# Patient Record
Sex: MALE | Race: WHITE | NOT HISPANIC OR LATINO | Employment: OTHER | URBAN - METROPOLITAN AREA
[De-identification: names, ages, dates, MRNs, and addresses within clinical notes are randomized per-mention and may not be internally consistent; named-entity substitution may affect disease eponyms.]

---

## 2017-06-20 ENCOUNTER — OFFICE VISIT (OUTPATIENT)
Dept: ORTHOPEDIC SURGERY | Facility: CLINIC | Age: 77
End: 2017-06-20

## 2017-06-20 VITALS — WEIGHT: 215 LBS | HEIGHT: 69 IN | BODY MASS INDEX: 31.84 KG/M2 | TEMPERATURE: 98.4 F

## 2017-06-20 DIAGNOSIS — M43.16 SPONDYLOLISTHESIS OF LUMBAR REGION: ICD-10-CM

## 2017-06-20 DIAGNOSIS — M48.061 SPINAL STENOSIS OF LUMBAR REGION: Primary | ICD-10-CM

## 2017-06-20 PROCEDURE — 99204 OFFICE O/P NEW MOD 45 MIN: CPT | Performed by: ORTHOPAEDIC SURGERY

## 2017-06-20 NOTE — PROGRESS NOTES
New patient or new problem visit    Chief Complaint   Patient presents with   • Lumbar Spine - Establish Care       HPI: He complains of moderate aching chronic low back pain which radiates into the right lower extremity.  The leg pain is worse than the back pain.  He does water exercises.  He is tried Naprosyn which helps somewhat but can't take that now as he is on anticoagulants for peripheral arterial disease for which she is undergone 2 surgeries    PFSH: See chart- reviewed    Review of Systems   Constitutional: Negative for fever.   HENT: Negative.    Cardiovascular: Negative for chest pain.   Gastrointestinal: Negative for abdominal pain.   Genitourinary: Positive for dysuria.   Musculoskeletal: Positive for back pain.   Neurological: Positive for weakness and numbness. Negative for headaches.       PE: Constitutional: Vital signs above-noted.  Awake, alert and oriented    Psychiatric: Affect and insight do not appear grossly disturbed.    Pulmonary: Breathing is unlabored, color is good.    Skin: Warm, dry and normal turgor    Cardiac:  pedal pulses intact.  No edema.    Eyesight and hearing appear adequate for examination purposes      Musculoskeletal:  There is mild tenderness to percussion and palpation of the spine. Motion appears untested.  Posture is unremarkable to coronal and sagittal inspection.    The skin about the area is intact.  There is no palpable or visible deformity.  There is no local spasm.       Neurologic:   Reflexes are 2+ and symmetrical in the patellae and absent in the Achilles.   Motor function is undisturbed in quadriceps, EHL, and gastrocnemius      Sensation appears symmetrically intact to light touch   .  In the bilateral lower extremities there is no evidence of atrophy.   Clonus is absent..  Gait appears undisturbed.  SLR test negative      MEDICAL DECISION MAKING    XRAY: Plain film x-rays of the lumbar spine show L4 5 degenerative spondylolisthesis grade 16 mm and  otherwise minimal degenerative change.  No comparison views are available.  MRI scan the lumbar spine shows moderate to severe number spinal stenosis at L4 5 where there is a degenerative spondylolisthesis and no other abnormal levels are noted.  I reviewed the radiologist report with which I agree.    Other: n/a    Impression: L4 5 degenerative spondylolisthesis with spinal stenosis (and history of peripheral arterial disease)    Plan: Lumbar epidural steroid injections.  He will have to stop the Plavix.  I will see him back if he fails to improve.  I suspect he would be a reasonable candidate for a 1 level decompression and fusion if need be.    Answers for HPI/ROS submitted by the patient on 6/18/2017   Back pain  Chronicity: new  Onset: more than 1 month ago  Frequency: 2 to 4 times per day  Progression since onset: unchanged  Pain location: gluteal, sacro-iliac  Pain quality: aching, shooting  Radiates to: right foot, right knee, right thigh  Pain - numeric: 6/10  Pain is: worse during the day  Aggravated by: position, standing  Stiffness is present: all day  bladder incontinence: No  bowel incontinence: No  leg pain: No  paresis: No  paresthesias: No  pelvic pain: Yes  tingling: Yes  weight loss: No  Risk factors: obesity

## 2017-07-13 ENCOUNTER — TRANSCRIBE ORDERS (OUTPATIENT)
Dept: PAIN MEDICINE | Facility: HOSPITAL | Age: 77
End: 2017-07-13

## 2017-07-13 ENCOUNTER — HOSPITAL ENCOUNTER (OUTPATIENT)
Dept: PAIN MEDICINE | Facility: HOSPITAL | Age: 77
Discharge: HOME | End: 2017-07-13
Admitting: ORTHOPAEDIC SURGERY

## 2017-07-13 ENCOUNTER — ANESTHESIA (OUTPATIENT)
Dept: PAIN MEDICINE | Facility: HOSPITAL | Age: 77
End: 2017-07-13

## 2017-07-13 ENCOUNTER — HOSPITAL ENCOUNTER (OUTPATIENT)
Dept: GENERAL RADIOLOGY | Facility: HOSPITAL | Age: 77
Discharge: HOME | End: 2017-07-13

## 2017-07-13 ENCOUNTER — ANESTHESIA EVENT (OUTPATIENT)
Dept: PAIN MEDICINE | Facility: HOSPITAL | Age: 77
End: 2017-07-13

## 2017-07-13 VITALS
DIASTOLIC BLOOD PRESSURE: 67 MMHG | WEIGHT: 207 LBS | TEMPERATURE: 97.9 F | HEART RATE: 66 BPM | RESPIRATION RATE: 16 BRPM | HEIGHT: 69 IN | OXYGEN SATURATION: 96 % | BODY MASS INDEX: 30.66 KG/M2 | SYSTOLIC BLOOD PRESSURE: 116 MMHG

## 2017-07-13 DIAGNOSIS — R52 PAIN: ICD-10-CM

## 2017-07-13 DIAGNOSIS — M48.061 SPINAL STENOSIS OF LUMBAR REGION: Primary | ICD-10-CM

## 2017-07-13 PROCEDURE — 0 IOPAMIDOL 41 % SOLUTION: Performed by: ANESTHESIOLOGY

## 2017-07-13 PROCEDURE — C1755 CATHETER, INTRASPINAL: HCPCS

## 2017-07-13 PROCEDURE — 77003 FLUOROGUIDE FOR SPINE INJECT: CPT

## 2017-07-13 PROCEDURE — 25010000002 METHYLPREDNISOLONE PER 80 MG: Performed by: ANESTHESIOLOGY

## 2017-07-13 RX ORDER — FOLIC ACID 1 MG/1
1 TABLET ORAL DAILY
COMMUNITY

## 2017-07-13 RX ORDER — LIDOCAINE HYDROCHLORIDE 10 MG/ML
1 INJECTION, SOLUTION INFILTRATION; PERINEURAL ONCE
Status: DISCONTINUED | OUTPATIENT
Start: 2017-07-13 | End: 2017-07-14 | Stop reason: HOSPADM

## 2017-07-13 RX ORDER — METHYLPREDNISOLONE ACETATE 80 MG/ML
80 INJECTION, SUSPENSION INTRA-ARTICULAR; INTRALESIONAL; INTRAMUSCULAR; SOFT TISSUE ONCE
Status: COMPLETED | OUTPATIENT
Start: 2017-07-13 | End: 2017-07-13

## 2017-07-13 RX ADMIN — IOPAMIDOL 10 ML: 408 INJECTION, SOLUTION INTRATHECAL at 10:02

## 2017-07-13 RX ADMIN — METHYLPREDNISOLONE ACETATE 80 MG: 80 INJECTION, SUSPENSION INTRA-ARTICULAR; INTRALESIONAL; INTRAMUSCULAR; SOFT TISSUE at 10:02

## 2017-07-13 NOTE — ANESTHESIA PROCEDURE NOTES
PAIN Epidural block    Patient location during procedure: pain clinic  Start Time: 7/13/2017 9:49 AM  Stop Time: 7/13/2017 10:04 AM  Indication:procedure for pain  Performed By  Anesthesiologist: SHA NGUYEN  Preanesthetic Checklist  Completed: patient identified, site marked, surgical consent, pre-op evaluation, timeout performed, risks and benefits discussed and monitors and equipment checked  Additional Notes  Interlaminar epidural was performed under fluoroscopic guidance.    Diagnosis  * Spinal stenosis of lumbar region without neurogenic claudication (M48.06)  * Lumbar radiculopathy (M54.16)  * Spondylolisthesis, lumbar region (M43.16)      Epidural Block Prep:  Pt Position:prone  Sterile Tech:cap, gloves, mask and sterile barrier  Prep:chlorhexidine gluconate and isopropyl alcohol  Monitoring:blood pressure monitoring, continuous pulse oximetry and EKG  Epidural Block Procedure:  Approach:right paramedian  Guidance: fluoroscopy  Location:lumbar  Level:4-5  Needle Type:Tuohy  Needle Gauge:20  Aspiration:negative  Medications:  Depomedrol:80 mg  Preservative Free Saline:3mL  Isovue:2mL  Comments:Epidural spread of contrast.  Post Assessment:  Dressing:occlusive dressing applied  Pt Tolerance:patient tolerated the procedure well with no apparent complications  Complications:no

## 2017-07-13 NOTE — H&P
[facility]    History and Physical    Patient Name: Albert Yadav  :  1940  MRN:  [id number]  Date of Admission: 2017    Subjective     Patient is a 77 y.o. male presents with chief complaint of chronic, intermitent, moderate low back and right lower extremity pain.  Onset of symptoms was gradual starting 4 months ago.  Symptoms are associated/aggravated by standing or walking for more than several minutes. Symptoms improve with ice, lying down and rest. In addition to his pain, he complains of some intermittent numbness and tingling in his right lower extremity which occurs mainly when he is walking.  On a pain scale from 0-10,he rates his pain as a 4. He describes the pain as dull and aching in nature.  He says the pain has adversely affected his ability to exercise.  He denies any weakness in his lower extremity.    MRI scan the lumbar spine shows moderate to severe number spinal stenosis at L4 5 where there is a degenerative spondylolisthesis, per Dr. Pacheco's note.    The following portions of the patients history were reviewed and updated as appropriate: current medications, allergies, past medical history, past surgical history, past family history, past social history and problem list                Objective     Past Medical History:   Past Medical History:   Diagnosis Date   • Hyperlipidemia    • Hypertension    • Peripheral vascular disease    • Rheumatoid arthritis    • Staph infection       [facility] POST SX     Past Surgical History:   Past Surgical History:   Procedure Laterality Date   • ANGIOPLASTY FEMORAL ARTERY Left 2016    Procedure: ULTRA SOUND ACCESS RIGHT FEMORAL ARTERY, AIF BILATERAL RUNOFF, SELECTIVE CATHETERIZATION LEFT FEMORAL ARTERY.;  Surgeon: Errol Michael MD;  Location: [facility] OR ;  Service:    • ARTERIOVENOUS FISTULA/SHUNT SURGERY Left 2016    Procedure: LEFT ILEO-FEMORAL GORTEX GRAFT AND LEFT LEG ARERIOGRAM ;  Surgeon: Errol DIAMOND  "MD Alec;  Location: [facility] 18/19;  Service:    • CHOLECYSTECTOMY     • EYE SURGERY Left     as child   • FRACTURE SURGERY Left     arm   • ILIAC ARTERY STENT     • KNEE SURGERY Left     ORIF   • ORIF ANKLE FRACTURE Left      Family History: History reviewed. No pertinent family history.  Social History:   Social History   Substance Use Topics   • Smoking status: Former Smoker     Quit date: 6/9/1989   • Smokeless tobacco: Never Used   • Alcohol use 4.2 oz/week     7 Shots of liquor per week       Vital Signs Range for the last 24 hours  Temperature: Temp:  [36.6 °C (97.9 °F)] 36.6 °C (97.9 °F)   Temp Source: Temp src: Oral   BP: BP: (144)/(85) 144/85   Pulse: Heart Rate:  [66] 66   Respirations: Resp:  [16] 16   SPO2: SpO2:  [96 %] 96 %   O2 Amount (l/min):     O2 Devices O2 Device: room air   Weight: Weight:  [207 lb (93.9 kg)] 207 lb (93.9 kg)     Flowsheet Rows         First Filed Value    Admission Height  69\" (175.3 cm) Documented at 07/13/2017 0911    Admission Weight  207 lb (93.9 kg) Documented at 07/13/2017 0911          --------------------------------------------------------------------------------    Current Outpatient Prescriptions   Medication Sig Dispense Refill   • atorvastatin (LIPITOR) 40 MG tablet Take 40 mg by mouth Every Evening.     • cetirizine (ZyrTEC) 10 MG tablet Take 10 mg by mouth Every Morning.     • folic acid (FOLVITE) 1 MG tablet Take 1 mg by mouth Daily.     • hydrochlorothiazide (HYDRODIURIL) 50 MG tablet Take 50 mg by mouth Every Morning.     • lisinopril (PRINIVIL,ZESTRIL) 40 MG tablet Take 40 mg by mouth Every Evening.     • melatonin 5 MG tablet tablet Take 5 mg by mouth Every Night.     • methotrexate 2.5 MG tablet Take 2.5 mg by mouth 1 (One) Time Per Week. wednesdays     • Multiple Vitamins-Minerals (CENTRUM SILVER) tablet Take 1 tablet by mouth Every Morning.     • naproxen (NAPROSYN) 250 MG tablet Take 250 mg by mouth 2 (Two) Times a Day As Needed for mild " pain (1-3). HP;DING FOR SX     • traZODone (DESYREL) 50 MG tablet Take 50 mg by mouth Every Night.     • amoxicillin-clavulanate (AUGMENTIN) 875-125 MG per tablet Take 1 tablet by mouth 2 (Two) Times a Day. Indications: Skin and Soft Tissue Infection 8 tablet 0   • aspirin tablet Take 81 mg by mouth Every Evening. INSTRUCTED TO CONTINUE TILL AM SX     • clopidogrel (PLAVIX) 75 MG tablet Take 75 mg by mouth Every Morning. TO STOP 3 DAYS PTS     • ONE TOUCH ULTRA TEST test strip   0   • Sennosides (SENNA LAX PO) Take 2 tablets by mouth 2 (Two) Times a Day.       No current facility-administered medications for this encounter.        --------------------------------------------------------------------------------  Assessment/Plan      Anesthesia Evaluation     Patient summary reviewed and Nursing notes reviewed   NPO Solid Status: > 8 hours       Airway   Mallampati: II  TM distance: >3 FB  Neck ROM: full  no difficulty expected  Dental - normal exam     Pulmonary - normal exam    breath sounds clear to auscultation  (+) a smoker Former,   Cardiovascular - normal exam    Rhythm: regular  Rate: normal    (+) hypertension, PVD (Carotid stenosis, bilateral), hyperlipidemia  (-) angina, orthopnea, PND, BEAULIEU      Neuro/Psych- negative ROS and neuro exam normal  GI/Hepatic/Renal/Endo    (+) obesity,      Musculoskeletal (-) normal exam    (-) straight leg test  Abdominal  - normal exam    Abdomen: soft.  Bowel sounds: normal.   Substance History - negative use     OB/GYN negative ob/gyn ROS         Other   (+) arthritis (Rheumatoid arthritis)         Phys Exam Other:   NECK:  Supple without adenopathy, JVD or bruits.    EXTREMITIES:  There is no clubbing, cyanosis or edema.  Radial pulses are 1+ and equal bilaterally.  Examination of the lumbar spine shows no marked tenderness or discoloration.    SKIN:  Warm and dry.    NEUROLOGICAL EXAM:  Alert and oriented ×3.  Extraocular muscles intact.  Strength is normal and  symmetrical in the lower extremities with respect to dorsal and plantar flexion of the ankles and quadriceps.                           Diagnosis and Plan    Treatment Plan  ASA 3      Procedures: Lumbar Epidural Steroid Injection(LESI), With fluoroscopy,       Anesthetic plan and risks discussed with patient.        Alternative management options include physical therapy, medical management with nonsteroidal anti-inflammatory medications or narcotics, chiropractic manipulation and surgical intervention.    Diagnosis     * Spinal stenosis of lumbar region without neurogenic claudication [M48.06]     * Lumbar radiculopathy [M54.16]     * Spondylolisthesis, lumbar region [M43.16]

## 2017-08-11 ENCOUNTER — APPOINTMENT (OUTPATIENT)
Dept: PAIN MEDICINE | Facility: HOSPITAL | Age: 77
End: 2017-08-11

## 2017-09-07 ENCOUNTER — ANESTHESIA EVENT (OUTPATIENT)
Dept: PAIN MEDICINE | Facility: HOSPITAL | Age: 77
End: 2017-09-07

## 2017-09-07 ENCOUNTER — ANESTHESIA (OUTPATIENT)
Dept: PAIN MEDICINE | Facility: HOSPITAL | Age: 77
End: 2017-09-07

## 2017-09-07 ENCOUNTER — HOSPITAL ENCOUNTER (OUTPATIENT)
Dept: GENERAL RADIOLOGY | Facility: HOSPITAL | Age: 77
Discharge: HOME | End: 2017-09-07

## 2017-09-07 ENCOUNTER — HOSPITAL ENCOUNTER (OUTPATIENT)
Dept: PAIN MEDICINE | Facility: HOSPITAL | Age: 77
Discharge: HOME | End: 2017-09-07
Attending: ORTHOPAEDIC SURGERY | Admitting: ORTHOPAEDIC SURGERY

## 2017-09-07 VITALS
WEIGHT: 209 LBS | OXYGEN SATURATION: 97 % | DIASTOLIC BLOOD PRESSURE: 70 MMHG | HEIGHT: 69 IN | RESPIRATION RATE: 16 BRPM | BODY MASS INDEX: 30.96 KG/M2 | SYSTOLIC BLOOD PRESSURE: 131 MMHG | HEART RATE: 70 BPM

## 2017-09-07 DIAGNOSIS — R52 PAIN: ICD-10-CM

## 2017-09-07 LAB — GLUCOSE BLDC GLUCOMTR-MCNC: 126 MG/DL (ref 70–130)

## 2017-09-07 PROCEDURE — 0 IOPAMIDOL 41 % SOLUTION: Performed by: ANESTHESIOLOGY

## 2017-09-07 PROCEDURE — C1755 CATHETER, INTRASPINAL: HCPCS

## 2017-09-07 PROCEDURE — 77003 FLUOROGUIDE FOR SPINE INJECT: CPT

## 2017-09-07 PROCEDURE — 25010000002 METHYLPREDNISOLONE PER 80 MG: Performed by: ANESTHESIOLOGY

## 2017-09-07 PROCEDURE — 82962 GLUCOSE BLOOD TEST: CPT

## 2017-09-07 RX ORDER — METHYLPREDNISOLONE ACETATE 80 MG/ML
80 INJECTION, SUSPENSION INTRA-ARTICULAR; INTRALESIONAL; INTRAMUSCULAR; SOFT TISSUE ONCE
Status: COMPLETED | OUTPATIENT
Start: 2017-09-07 | End: 2017-09-07

## 2017-09-07 RX ORDER — LIDOCAINE HYDROCHLORIDE 10 MG/ML
1 INJECTION, SOLUTION INFILTRATION; PERINEURAL ONCE AS NEEDED
Status: DISCONTINUED | OUTPATIENT
Start: 2017-09-07 | End: 2017-09-08 | Stop reason: HOSPADM

## 2017-09-07 RX ADMIN — IOPAMIDOL 10 ML: 408 INJECTION, SOLUTION INTRATHECAL at 08:04

## 2017-09-07 RX ADMIN — METHYLPREDNISOLONE ACETATE 80 MG: 80 INJECTION, SUSPENSION INTRA-ARTICULAR; INTRALESIONAL; INTRAMUSCULAR; SOFT TISSUE at 08:04

## 2017-09-07 NOTE — ANESTHESIA PROCEDURE NOTES
PAIN Epidural block    Patient location during procedure: pain clinic  Start Time: 9/7/2017 7:50 AM  Stop Time: 9/7/2017 8:06 AM  Indication:procedure for pain  Performed By  Anesthesiologist: SHA NGUYEN  Preanesthetic Checklist  Completed: patient identified, site marked, surgical consent, pre-op evaluation, timeout performed, risks and benefits discussed and monitors and equipment checked  Additional Notes  Interlaminar epidural was performed under fluoroscopic guidance.    Diagnosis     * Spondylolisthesis, lumbar region (M43.16)     * Spinal stenosis, lumbar region, without neurogenic claudication (M48.06)     * Lumbar radiculopathy (M54.16)  Prep:  Pt Position:prone  Sterile Tech:cap, gloves, mask and sterile barrier  Prep:chlorhexidine gluconate and isopropyl alcohol  Monitoring:blood pressure monitoring, continuous pulse oximetry and EKG  Procedure:  Approach:right paramedian  Guidance: fluoroscopy  Location:lumbar  Level:4-5  Needle Type:Tuohy  Needle Gauge:20  Aspiration:negative  Medications:  Depomedrol:80 mg  Preservative Free Saline:3mL  Isovue:2mL  Comments:Epidural spread of contrast.  Post Assessment:  Dressing:occlusive dressing applied  Pt Tolerance:patient tolerated the procedure well with no apparent complications  Complications:no

## 2017-09-07 NOTE — H&P
[facility]    History and Physical    Patient Name: Albert Yadav  :  1940  MRN:  [id number]  Date of Admission: 2017    Subjective     Patient is a 77 y.o. male presents with chief complaint of chronic, intermitent, moderate low back and right lower extremity pain.  Onset of symptoms was gradual starting 6 months ago.  Symptoms are associated/aggravated by standing or walking for more than several minutes. Symptoms improve with ice, lying down and rest. In addition to his pain, he complains of some intermittent numbness and tingling in his right lower extremity which occurs mainly when he is walking.  On a pain scale from 0-10,he rates his pain as a 4. He describes the pain as dull and aching in nature.  He says the pain has adversely affected his ability to exercise.  He denies any weakness in his lower extremity.  He reports approximate 90% relief following his last lumbar epidural steroid injection.  His pain has gradually returned.  His pain level today is a 3/10.     MRI scan the lumbar spine shows moderate to severe number spinal stenosis at L4 5 where there is a degenerative spondylolisthesis, per Dr. Pacheco's note.    The following portions of the patients history were reviewed and updated as appropriate: current medications, allergies, past medical history, past surgical history, past family history, past social history and problem list                Objective     Past Medical History:   Past Medical History:   Diagnosis Date   • Hyperlipidemia    • Hypertension    • Peripheral vascular disease    • Rheumatoid arthritis    • Staph infection       [facility] POST SX     Past Surgical History:   Past Surgical History:   Procedure Laterality Date   • ANGIOPLASTY FEMORAL ARTERY Left 2016    Procedure: ULTRA SOUND ACCESS RIGHT FEMORAL ARTERY, AIF BILATERAL RUNOFF, SELECTIVE CATHETERIZATION LEFT FEMORAL ARTERY.;  Surgeon: Errol Michael MD;  Location: [facility] OR ;  Service:  "   • ARTERIOVENOUS FISTULA/SHUNT SURGERY Left 12/13/2016    Procedure: LEFT ILEO-FEMORAL GORTEX GRAFT AND LEFT LEG ARERIOGRAM ;  Surgeon: Errol Michael MD;  Location: [facility] 18/19;  Service:    • CHOLECYSTECTOMY     • EYE SURGERY Left     as child   • FRACTURE SURGERY Left     arm   • ILIAC ARTERY STENT     • KNEE SURGERY Left     ORIF   • ORIF ANKLE FRACTURE Left      Family History: History reviewed. No pertinent family history.  Social History:   Social History   Substance Use Topics   • Smoking status: Former Smoker     Quit date: 6/9/1989   • Smokeless tobacco: Never Used   • Alcohol use 4.2 oz/week     7 Shots of liquor per week       Vital Signs Range for the last 24 hours  Temperature:     Temp Source:     BP: BP: (138)/(61) 138/61   Pulse: Heart Rate:  [65] 65   Respirations: Resp:  [16] 16   SPO2: SpO2:  [96 %] 96 %   O2 Amount (l/min):     O2 Devices O2 Device: room air   Weight: Weight:  [209 lb (94.8 kg)] 209 lb (94.8 kg)     Flowsheet Rows         First Filed Value    Admission Height  69\" (175.3 cm) Documented at 09/07/2017 0732    Admission Weight  209 lb (94.8 kg) Documented at 09/07/2017 0732          --------------------------------------------------------------------------------    Current Outpatient Prescriptions   Medication Sig Dispense Refill   • atorvastatin (LIPITOR) 40 MG tablet Take 40 mg by mouth Every Evening.     • cetirizine (ZyrTEC) 10 MG tablet Take 10 mg by mouth Every Morning.     • folic acid (FOLVITE) 1 MG tablet Take 1 mg by mouth Daily.     • hydrochlorothiazide (HYDRODIURIL) 50 MG tablet Take 50 mg by mouth Every Morning.     • lisinopril (PRINIVIL,ZESTRIL) 40 MG tablet Take 40 mg by mouth Every Evening.     • melatonin 5 MG tablet tablet Take 5 mg by mouth Every Night.     • methotrexate 2.5 MG tablet Take 2.5 mg by mouth 1 (One) Time Per Week. wednesdays     • Multiple Vitamins-Minerals (CENTRUM SILVER) tablet Take 1 tablet by mouth Every Morning.     • " naproxen (NAPROSYN) 250 MG tablet Take 250 mg by mouth 2 (Two) Times a Day As Needed for mild pain (1-3). HP;DING FOR SX     • ONE TOUCH ULTRA TEST test strip   0   • Sennosides (SENNA LAX PO) Take 2 tablets by mouth 2 (Two) Times a Day.     • traZODone (DESYREL) 50 MG tablet Take 50 mg by mouth Every Night.     • aspirin tablet Take 81 mg by mouth Every Evening. INSTRUCTED TO CONTINUE TILL AM SX     • clopidogrel (PLAVIX) 75 MG tablet Take 75 mg by mouth Every Morning. TO STOP 3 DAYS PTS       Current Facility-Administered Medications   Medication Dose Route Frequency Provider Last Rate Last Dose   • iopamidol (ISOVUE-M 200) injection 41%  12 mL Epidural Once PRN Dyllan Lugo MD       • lidocaine (XYLOCAINE) 1 % injection 1 mL  1 mL Intradermal Once PRN Dyllan Lugo MD       • methylPREDNISolone acetate (DEPO-medrol) injection 80 mg  80 mg Intra-articular Once Dyllan Lugo MD           --------------------------------------------------------------------------------  Assessment/Plan      Anesthesia Evaluation     Patient summary reviewed and Nursing notes reviewed   NPO Solid Status: > 8 hours       Airway   Mallampati: II  TM distance: >3 FB  Neck ROM: full  no difficulty expected  Dental - normal exam     Pulmonary - negative pulmonary ROS and normal exam    breath sounds clear to auscultation  Cardiovascular - normal exam    Rhythm: regular  Rate: normal    (+) hypertension, PVD (Carotid stenosis, bilateral), hyperlipidemia  (-) angina, orthopnea, PND, BEAULIEU      Neuro/Psych- negative ROS  GI/Hepatic/Renal/Endo    (+) obesity,      Musculoskeletal     Abdominal  - normal exam    Abdomen: soft.  Bowel sounds: normal.   Substance History - negative use     OB/GYN negative ob/gyn ROS         Other   (+) arthritis                                Diagnosis and Plan    Treatment Plan  ASA 3      Procedures: Lumbar Epidural Steroid Injection(LESI), With fluoroscopy,       Anesthetic plan and risks discussed  with patient.          Diagnosis     * Spondylolisthesis, lumbar region [M43.16]     * Spinal stenosis, lumbar region, without neurogenic claudication [M48.06]     * Lumbar radiculopathy [M54.16]

## 2017-11-06 ENCOUNTER — TRANSCRIBE ORDERS (OUTPATIENT)
Dept: PAIN MEDICINE | Facility: HOSPITAL | Age: 77
End: 2017-11-06

## 2017-11-06 DIAGNOSIS — M48.062 SPINAL STENOSIS OF LUMBAR REGION WITH NEUROGENIC CLAUDICATION: Primary | ICD-10-CM

## 2017-11-16 ENCOUNTER — HOSPITAL ENCOUNTER (OUTPATIENT)
Dept: GENERAL RADIOLOGY | Facility: HOSPITAL | Age: 77
Discharge: HOME | End: 2017-11-16

## 2017-11-16 ENCOUNTER — HOSPITAL ENCOUNTER (OUTPATIENT)
Dept: PAIN MEDICINE | Facility: HOSPITAL | Age: 77
Discharge: HOME | End: 2017-11-16
Admitting: ORTHOPAEDIC SURGERY

## 2017-11-16 ENCOUNTER — ANESTHESIA (OUTPATIENT)
Dept: PAIN MEDICINE | Facility: HOSPITAL | Age: 77
End: 2017-11-16

## 2017-11-16 ENCOUNTER — ANESTHESIA EVENT (OUTPATIENT)
Dept: PAIN MEDICINE | Facility: HOSPITAL | Age: 77
End: 2017-11-16

## 2017-11-16 VITALS
HEART RATE: 72 BPM | OXYGEN SATURATION: 94 % | SYSTOLIC BLOOD PRESSURE: 116 MMHG | DIASTOLIC BLOOD PRESSURE: 70 MMHG | RESPIRATION RATE: 16 BRPM

## 2017-11-16 DIAGNOSIS — R52 PAIN: ICD-10-CM

## 2017-11-16 DIAGNOSIS — M48.062 SPINAL STENOSIS OF LUMBAR REGION WITH NEUROGENIC CLAUDICATION: ICD-10-CM

## 2017-11-16 PROCEDURE — 0 IOPAMIDOL 41 % SOLUTION: Performed by: ANESTHESIOLOGY

## 2017-11-16 PROCEDURE — 25010000002 METHYLPREDNISOLONE PER 80 MG: Performed by: ANESTHESIOLOGY

## 2017-11-16 PROCEDURE — C1755 CATHETER, INTRASPINAL: HCPCS

## 2017-11-16 PROCEDURE — 77003 FLUOROGUIDE FOR SPINE INJECT: CPT

## 2017-11-16 RX ORDER — LIDOCAINE HYDROCHLORIDE 10 MG/ML
1 INJECTION, SOLUTION INFILTRATION; PERINEURAL ONCE AS NEEDED
Status: DISCONTINUED | OUTPATIENT
Start: 2017-11-16 | End: 2017-11-17 | Stop reason: HOSPADM

## 2017-11-16 RX ORDER — METHYLPREDNISOLONE ACETATE 80 MG/ML
80 INJECTION, SUSPENSION INTRA-ARTICULAR; INTRALESIONAL; INTRAMUSCULAR; SOFT TISSUE ONCE
Status: COMPLETED | OUTPATIENT
Start: 2017-11-16 | End: 2017-11-16

## 2017-11-16 RX ORDER — SODIUM CHLORIDE 0.9 % (FLUSH) 0.9 %
1-10 SYRINGE (ML) INJECTION AS NEEDED
Status: DISCONTINUED | OUTPATIENT
Start: 2017-11-16 | End: 2017-11-17 | Stop reason: HOSPADM

## 2017-11-16 RX ORDER — MIDAZOLAM HYDROCHLORIDE 1 MG/ML
1 INJECTION INTRAMUSCULAR; INTRAVENOUS AS NEEDED
Status: DISCONTINUED | OUTPATIENT
Start: 2017-11-16 | End: 2017-11-17 | Stop reason: HOSPADM

## 2017-11-16 RX ADMIN — IOPAMIDOL 10 ML: 408 INJECTION, SOLUTION INTRATHECAL at 08:10

## 2017-11-16 RX ADMIN — METHYLPREDNISOLONE ACETATE 80 MG: 80 INJECTION, SUSPENSION INTRA-ARTICULAR; INTRALESIONAL; INTRAMUSCULAR; SOFT TISSUE at 08:10

## 2017-11-16 NOTE — H&P
[facility]    History and Physical    Patient Name: Albert Yadav  :  1940  MRN:  [id number]  Date of Admission: 2017    Subjective     Patient is a 77 y.o. male presents with chief complaint of chronic low back, hips: bilateral, buttock and knee: bilateral pain.  Onset of symptoms was gradual starting several years ago.  Symptoms are associated/aggravated by nothing in particular or activity. Symptoms improve with injection  He reports pain in his low back which is more of an aggravation in severe pain.  The symptoms of numbness and tingling radiating down his right leg intermittently, they seem to be exacerbated with activity.  He feels like at times it does progress to jovany weakness any does report having fallen as a result of this in the past.  He does occasionally have some symptoms to go into his left leg as well but the symptoms are more the right than the left denies any pain with Valsalva or bowel or bladder incontinence.    He's had 2 previous epidural steroid injections.  One was very helpful, the second one was less helpful.  His MRI report which shows some spinal stenosis especially at L4 5 and spondylolisthesis.  The following portions of the patients history were reviewed and updated as appropriate: current medications, allergies, past medical history, past surgical history, past family history, past social history and problem list                Objective     Past Medical History:   Past Medical History:   Diagnosis Date   • Hyperlipidemia    • Hypertension    • Peripheral vascular disease    • Rheumatoid arthritis    • Staph infection     2015  [facility] POST SX     Past Surgical History:   Past Surgical History:   Procedure Laterality Date   • ANGIOPLASTY FEMORAL ARTERY Left 2016    Procedure: ULTRA SOUND ACCESS RIGHT FEMORAL ARTERY, AIF BILATERAL RUNOFF, SELECTIVE CATHETERIZATION LEFT FEMORAL ARTERY.;  Surgeon: Errol Michael MD;  Location: [facility] OR ;   Service:    • ARTERIOVENOUS FISTULA/SHUNT SURGERY Left 12/13/2016    Procedure: LEFT ILEO-FEMORAL GORTEX GRAFT AND LEFT LEG ARERIOGRAM ;  Surgeon: Errol Michael MD;  Location: [facility] OR 18/19;  Service:    • CHOLECYSTECTOMY     • EYE SURGERY Left     as child   • FRACTURE SURGERY Left     arm   • ILIAC ARTERY STENT     • KNEE SURGERY Left     ORIF   • ORIF ANKLE FRACTURE Left      Family History: History reviewed. No pertinent family history.  Social History:   Social History   Substance Use Topics   • Smoking status: Former Smoker     Quit date: 6/9/1989   • Smokeless tobacco: Never Used   • Alcohol use 4.2 oz/week     7 Shots of liquor per week       Vital Signs Range for the last 24 hours  Temperature:     Temp Source:     BP:     Pulse:     Respirations:     SPO2:     O2 Amount (l/min):     O2 Devices     Weight:           --------------------------------------------------------------------------------    Current Outpatient Prescriptions   Medication Sig Dispense Refill   • aspirin tablet Take 81 mg by mouth Every Evening. INSTRUCTED TO CONTINUE TILL AM SX     • atorvastatin (LIPITOR) 40 MG tablet Take 40 mg by mouth Every Evening.     • cetirizine (ZyrTEC) 10 MG tablet Take 10 mg by mouth Every Morning.     • clopidogrel (PLAVIX) 75 MG tablet Take 75 mg by mouth Every Morning. TO STOP 3 DAYS PTS     • folic acid (FOLVITE) 1 MG tablet Take 1 mg by mouth Daily.     • hydrochlorothiazide (HYDRODIURIL) 50 MG tablet Take 50 mg by mouth Every Morning.     • lisinopril (PRINIVIL,ZESTRIL) 40 MG tablet Take 40 mg by mouth Every Evening.     • melatonin 5 MG tablet tablet Take 5 mg by mouth Every Night.     • methotrexate 2.5 MG tablet Take 2.5 mg by mouth 1 (One) Time Per Week. wednesdays     • Multiple Vitamins-Minerals (CENTRUM SILVER) tablet Take 1 tablet by mouth Every Morning.     • naproxen (NAPROSYN) 250 MG tablet Take 250 mg by mouth 2 (Two) Times a Day As Needed for mild pain (1-3). HP;DING FOR SX      • ONE TOUCH ULTRA TEST test strip   0   • Sennosides (SENNA LAX PO) Take 2 tablets by mouth 2 (Two) Times a Day.     • traZODone (DESYREL) 50 MG tablet Take 50 mg by mouth Every Night.       Current Facility-Administered Medications   Medication Dose Route Frequency Provider Last Rate Last Dose   • iopamidol (ISOVUE-M 200) injection 41%  12 mL Epidural Once PRN Boyd Arriola MD       • lidocaine (XYLOCAINE) 1 % injection 1 mL  1 mL Intradermal Once PRN Boyd Arriola MD       • methylPREDNISolone acetate (DEPO-medrol) injection 80 mg  80 mg Intra-articular Once Boyd Arriola MD       • midazolam (VERSED) injection 1 mg  1 mg Intravenous PRN Boyd Arriola MD       • sodium chloride 0.9 % flush 1-10 mL  1-10 mL Intravenous PRN Boyd Arriola MD           --------------------------------------------------------------------------------  Assessment/Plan      Anesthesia Evaluation     no history of anesthetic complications:         Airway   Mallampati: II  TM distance: >3 FB  no difficulty expected  Dental      Pulmonary - normal exam   (-) wheezes  Cardiovascular     ECG reviewed  Rhythm: regular    (+) hypertension, PVD, hyperlipidemia  (-) murmur    ROS comment: ECG- IVCD  PE comment: Dorsal pedal pulses were palpable.  Lower extremity's were warm without edema.    Neuro/Psych  (-) normal sensory deficit    PE Comment: There is no obvious numbness or tingling in the lower extremities.  Patellar tendon reflexes were equal bilaterally.  GI/Hepatic/Renal/Endo      Musculoskeletal   normal range of motion    (+) back pain,   (-) straight leg test      PE comment: I was unable to appreciate any jovany weakness in his quadriceps group at this visit.  He does say this weakness is intermittent.  Abdominal    Substance History      OB/GYN          Other   (+) arthritis                                Diagnosis and Plan    Treatment Plan  ASA 2      Procedures: Lumbar Epidural Steroid Injection(LESI), With  fluoroscopy,       Anesthetic plan and risks discussed with patient.          Diagnosis     * Lumbar neuritis [M54.16]     * Lumbar spinal stenosis [M48.061]     * Spondylolisthesis, lumbar region [M43.16]     * Lumbago [M54.5]

## 2017-11-16 NOTE — ANESTHESIA PROCEDURE NOTES
PAIN Epidural block    Patient location during procedure: pain clinic  Start Time: 11/16/2017 8:03 AM  Stop Time: 11/16/2017 8:12 AM  Indication:at surgeon's request and procedure for pain  Performed By  Anesthesiologist: RENDER, ARTURO RAY  Preanesthetic Checklist  Completed: patient identified, site marked, surgical consent, pre-op evaluation, timeout performed, risks and benefits discussed and monitors and equipment checked  Additional Notes  Post-Op Diagnosis Codes:     * Lumbar neuritis (M54.16)     * Lumbar spinal stenosis (M48.061)     * Spondylolisthesis, lumbar region (M43.16)     * Lumbago (M54.5)    Prep:  Pt Position:prone  Sterile Tech:sterile barrier, mask and gloves  Prep:chlorhexidine gluconate and isopropyl alcohol  Monitoring:blood pressure monitoring, continuous pulse oximetry and EKG  Procedure:  Sedation: no   Approach:right paramedian  Guidance: fluoroscopy  Location:lumbar  Level:4-5  Needle Gauge:20 G  Aspiration:negative  Test Dose:negative  Medications:  Depomedrol:80 mg  Isovue:2mL  Comments:Lumbar epidural steroid injections performed using a right paramedian approach at the L4 5 level.  There is a good loss resistance with mild exacerbation of his symptoms upon entering the epidural space.  Epidurograms performed with 2 mL of Isovue, there was mild exacerbation of the pain, contrast was noted to spread primarily caudally on the right.  80 mg of Depo-Medrol were then slowly injected without exacerbation of his symptoms.  The needle was withdrawn.  Post Assessment:  Pt Tolerance:patient tolerated the procedure well with no apparent complications  Complications:no

## 2017-11-17 ENCOUNTER — OFFICE VISIT (OUTPATIENT)
Dept: NEUROSURGERY | Facility: CLINIC | Age: 77
End: 2017-11-17

## 2017-11-17 ENCOUNTER — HOSPITAL ENCOUNTER (OUTPATIENT)
Dept: GENERAL RADIOLOGY | Facility: HOSPITAL | Age: 77
Discharge: HOME | End: 2017-11-17
Attending: NEUROLOGICAL SURGERY | Admitting: NEUROLOGICAL SURGERY

## 2017-11-17 VITALS
RESPIRATION RATE: 18 BRPM | HEIGHT: 68 IN | HEART RATE: 80 BPM | BODY MASS INDEX: 32.28 KG/M2 | WEIGHT: 213 LBS | SYSTOLIC BLOOD PRESSURE: 108 MMHG | DIASTOLIC BLOOD PRESSURE: 60 MMHG

## 2017-11-17 DIAGNOSIS — M48.062 SPINAL STENOSIS, LUMBAR REGION, WITH NEUROGENIC CLAUDICATION: Primary | ICD-10-CM

## 2017-11-17 DIAGNOSIS — M48.062 SPINAL STENOSIS, LUMBAR REGION, WITH NEUROGENIC CLAUDICATION: ICD-10-CM

## 2017-11-17 PROCEDURE — 99203 OFFICE O/P NEW LOW 30 MIN: CPT | Performed by: NEUROLOGICAL SURGERY

## 2017-11-17 PROCEDURE — 72114 X-RAY EXAM L-S SPINE BENDING: CPT

## 2017-11-17 NOTE — PROGRESS NOTES
Subjective   Patient ID: Albert Yadav is a 77 y.o. male is being seen for consultation today at the request of Carlos Shields MD for back pain. Patient presents unaccompanied.     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 He has had several NATALYA's (last was yesterday).  He feels the NATALYA's are efficacious to some extent.  He reports his RLE is much worse than his LLE in terms of paroxysmal numbness and weakness.  He has mild back discomfort for years but his  RLE leg is really his worst issue.  He reports 2 falls referable to these paroxysms of numbness and leg dysfunction.  These are related to standing.  These occur at least daily.  No b/b issues.  No perineal complaints.  In general his leg strength is good.  He walks daily.  Leaning over a shopping cart relieves his complaints within a minute.  He trialed gabapentin but this was ineffective.  He has rheumatoid arthritis and does take MTX for this.  He trialed PT with limited efficacy as well.       The following portions of the patient's history were reviewed and updated as appropriate: allergies, current medications, past family history, past medical history, past social history, past surgical history and problem list.    Review of Systems   Constitutional: Negative for chills and fever.   HENT: Negative for tinnitus.    Eyes: Negative for visual disturbance.   Respiratory: Positive for cough (due to cold). Negative for shortness of breath and wheezing.    Cardiovascular: Negative for chest pain and palpitations.   Gastrointestinal: Negative for abdominal pain, nausea and vomiting.   Genitourinary: Negative for difficulty urinating and enuresis.   Musculoskeletal: Positive for back pain.   Skin: Negative for rash.   Neurological: Positive for numbness (RLE with standing, relieved with recent epidural). Negative for weakness.   Psychiatric/Behavioral: Negative for sleep disturbance.       Objective   Physical Exam   Constitutional: He  is oriented to person, place, and time.   Neurological: He is oriented to person, place, and time.   Reflex Scores:       Tricep reflexes are 2+ on the right side and 2+ on the left side.       Bicep reflexes are 2+ on the right side and 2+ on the left side.       Brachioradialis reflexes are 2+ on the right side and 2+ on the left side.       Patellar reflexes are 2+ on the right side and 2+ on the left side.       Achilles reflexes are 1+ on the right side and 1+ on the left side.    Neurologic Exam     Mental Status   Oriented to person, place, and time.     Motor Exam   Muscle bulk: normal    Strength   Right deltoid: 5/5  Left deltoid: 5/5  Right biceps: 5/5  Left biceps: 5/5  Right triceps: 5/5  Left triceps: 5/5  Right wrist flexion: 5/5  Left wrist flexion: 5/5  Right wrist extension: 5/5  Left wrist extension: 5/5  Right interossei: 5/5  Left interossei: 5/5  Right iliopsoas: 5/5  Left iliopsoas: 5/5  Right quadriceps: 5/5  Left quadriceps: 5/5  Right hamstrin/5  Left hamstrin/5  Right anterior tibial: 5/5  Left anterior tibial: 5/5  Right gastroc: 5/5  Left gastroc: 5/5       5/5 EHL     Gait, Coordination, and Reflexes     Reflexes   Right brachioradialis: 2+  Left brachioradialis: 2+  Right biceps: 2+  Left biceps: 2+  Right triceps: 2+  Left triceps: 2+  Right patellar: 2+  Left patellar: 2+  Right achilles: 1+  Left achilles: 1+  Right Freed: absent  Left Freed: absent  Right ankle clonus: absent  Left ankle clonus: absent       No SLR          Assessment/Plan   Independent Review of Radiographic Studies:  L45 spinal stenosis related to degenerative slip at this level.  He has milder stenosis at other levels.      Medical Decision Making:  Patient with degenerative slip at L45 and RA as well as some other medical issues.  He has had excellent non operative management.  He has had severe symptoms since May and feels he is treading water so to speak.  In terms of surgery we would need to  obtain flexion extension radiographs to ascertain if a fusion would be necessary.  If not unstable a simple decompression would likely assist his leg complaints.      Albert was seen today for back pain.    Diagnoses and all orders for this visit:    Spinal stenosis, lumbar region, with neurogenic claudication  -     XR Spine Lumbar Complete With Flex & Ext; Future      Return in about 10 days (around 11/27/2017).

## 2017-11-20 ENCOUNTER — APPOINTMENT (OUTPATIENT)
Dept: GENERAL RADIOLOGY | Facility: HOSPITAL | Age: 77
End: 2017-11-20

## 2017-11-20 ENCOUNTER — HOSPITAL ENCOUNTER (EMERGENCY)
Facility: HOSPITAL | Age: 77
Discharge: HOME | End: 2017-11-21
Attending: EMERGENCY MEDICINE | Admitting: EMERGENCY MEDICINE

## 2017-11-20 DIAGNOSIS — K56.41 FECAL IMPACTION (HCC): Primary | ICD-10-CM

## 2017-11-20 PROCEDURE — 74000 HC ABDOMEN KUB: CPT

## 2017-11-20 PROCEDURE — 99284 EMERGENCY DEPT VISIT MOD MDM: CPT

## 2017-11-21 VITALS
BODY MASS INDEX: 31.67 KG/M2 | OXYGEN SATURATION: 98 % | DIASTOLIC BLOOD PRESSURE: 74 MMHG | HEART RATE: 88 BPM | TEMPERATURE: 98.8 F | HEIGHT: 68 IN | RESPIRATION RATE: 18 BRPM | SYSTOLIC BLOOD PRESSURE: 136 MMHG | WEIGHT: 209 LBS

## 2017-11-21 RX ORDER — POLYETHYLENE GLYCOL 3350 17 G/17G
17 POWDER, FOR SOLUTION ORAL DAILY
Qty: 24 EACH | Refills: 0 | Status: SHIPPED | OUTPATIENT
Start: 2017-11-21 | End: 2018-05-09

## 2017-11-21 NOTE — ED PROVIDER NOTES
EMERGENCY DEPARTMENT ENCOUNTER    CHIEF COMPLAINT  Chief Complaint: Constipation  History given by: Patient  History limited by:   Room Number: 25/25  PMD: Carlos Shields MD      HPI:  Pt is a 77 y.o. male who presents complaining of constipation that onset 6 days. Pt reports having an epidural prior to onset of constipation. He reports that his last BM was 6 days ago. He has been taking Hydrocodone for pain. Pt reports some abd pain due to constipation. He has had nausea, but no vomiting.    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, left leg 12/13/2016   • Spinal stenosis, lumbar region, with neurogenic claudication 11/17/2017     Resolved Ambulatory Problems     Diagnosis Date Noted   • No Resolved Ambulatory Problems     Past Medical History:   Diagnosis Date   • Broken bones    • Hyperlipidemia    • Hypertension    • Peripheral vascular disease    • Rheumatoid arthritis    • Staph infection    • Tendency toward bleeding easily        PAST SURGICAL HISTORY  Past Surgical History:   Procedure Laterality Date   • ANGIOPLASTY FEMORAL ARTERY Left 11/7/2016    Procedure: ULTRA SOUND ACCESS RIGHT FEMORAL ARTERY, AIF BILATERAL RUNOFF, SELECTIVE CATHETERIZATION LEFT FEMORAL ARTERY.;  Surgeon: Errol Michael MD;  Location: [facility] OR 18/19;  Service:    • ARTERIOVENOUS FISTULA/SHUNT SURGERY Left 12/13/2016    Procedure: LEFT ILEO-FEMORAL GORTEX GRAFT AND LEFT LEG ARERIOGRAM ;  Surgeon: Errol Michael MD;  Location:  [facility] HYBRID OR 18/19;  Service:    • CHOLECYSTECTOMY     • EYE SURGERY Left     as child   • FRACTURE SURGERY Left     arm   • ILIAC ARTERY STENT     • KNEE SURGERY Left     ORIF   • ORIF ANKLE FRACTURE Left        FAMILY HISTORY  Family History   Problem Relation Age of Onset   • Diabetes Mother    • Cancer Father    • Heart disease Maternal Grandmother    • Heart disease Maternal Grandfather    • Heart disease Paternal Grandmother    • Heart disease Paternal Grandfather        SOCIAL HISTORY  Social History     Social History   • Marital status:      Spouse name: N/A   • Number of children: N/A   • Years of education: N/A     Occupational History   • retired teacher, musician    •       part time     Social History Main Topics   • Smoking status: Former Smoker     Quit date: 6/9/1989   • Smokeless tobacco: Never Used   • Alcohol use Yes      Comment: 1 drink per day at most   • Drug use: No   • Sexual activity: Defer     Other Topics Concern   • Not on file     Social History Narrative       ALLERGIES  Review of patient's allergies indicates no known allergies.    REVIEW OF SYSTEMS  Review of Systems   HENT: Negative.    Respiratory: Negative.    Cardiovascular: Negative.    Gastrointestinal: Positive for abdominal distention, abdominal pain and constipation.   Musculoskeletal: Negative for arthralgias and back pain.   Neurological: Negative for dizziness and facial asymmetry.   All other systems reviewed and are negative.      PHYSICAL EXAM  ED Triage Vitals   Temp Heart Rate Resp BP SpO2   11/20/17 2245 11/20/17 2245 11/20/17 2245 11/20/17 2245 11/20/17 2245   98.7 °F (37.1 °C) 88 18 140/75 97 %      Temp src Heart Rate Source Patient Position BP Location FiO2 (%)   -- 11/20/17 2354 11/20/17 2354 11/20/17 2354 --    Monitor Sitting Right arm        Physical Exam   Constitutional: He is oriented to person, place, and time and well-developed, well-nourished, and in no distress.   HENT:    Head: Normocephalic and atraumatic.   Eyes: EOM are normal. Pupils are equal, round, and reactive to light.   Neck: Normal range of motion. Neck supple.   Cardiovascular: Normal rate, regular rhythm and normal heart sounds.    Pulmonary/Chest: Effort normal and breath sounds normal. No respiratory distress.   Abdominal: Soft. There is tenderness in the suprapubic area. There is no rebound and no guarding.   Genitourinary: Rectal exam shows external hemorrhoid.   Genitourinary Comments: Large amount of stool in rectal vault that was removed via manual disimpaction. There was mild amount of red streaked stool consistent with irritated hemorrhoid.    Musculoskeletal: Normal range of motion. He exhibits no edema.   Neurological: He is alert and oriented to person, place, and time. He has normal sensation and normal strength.   Skin: Skin is warm and dry.   Psychiatric: Mood and affect normal.   Nursing note and vitals reviewed.      LAB RESULTS  Lab Results (last 24 hours)     ** No results found for the last 24 hours. **          I ordered the above labs and reviewed the results    RADIOLOGY  XR Abdomen 1 View   Preliminary Result   No acute findings in the abdomen.    Moderately large stool in the colon, patient may be constipated in   keeping with history.                       I ordered the above noted radiological studies. Interpreted by radiologist. Reviewed by me in PACS.       PROCEDURES  Procedures      PROGRESS AND CONSULTS  ED Course           MEDICAL DECISION MAKING  Results were reviewed/discussed with the patient and they were also made aware of online access. Pt also made aware that some labs, such as cultures, will not be resulted during ER visit and follow up with PMD is necessary.     MDM       DIAGNOSIS  Final diagnoses:   Fecal impaction       DISPOSITION  discharged    Latest Documented Vital Signs:  As of 1:44 AM  BP- 136/74 HR- 88 Temp- 98.8 °F (37.1 °C) (Oral) O2 sat- 98%    --  Documentation  assistance provided by adleine Gamez for Dr. Hughes.  Information recorded by the shukriibe was done at my direction and has been verified and validated by me.     Brooks Gamez  11/21/17 0029       Kumar Hughes MD  11/21/17 0148       Kumar Hughes MD  11/21/17 0225

## 2017-11-21 NOTE — ED NOTES
No bowel mvmt in 4 days. Pt reports he takes liquid hydrocodone & also had an epidural 4 days ago. Pt states he has tried home remedies with no success.     Ely Serna RN  11/20/17 3552       Ely Serna RN  11/20/17 8781

## 2017-11-29 ENCOUNTER — OFFICE VISIT (OUTPATIENT)
Dept: NEUROSURGERY | Facility: CLINIC | Age: 77
End: 2017-11-29

## 2017-11-29 VITALS
RESPIRATION RATE: 16 BRPM | BODY MASS INDEX: 32.43 KG/M2 | DIASTOLIC BLOOD PRESSURE: 80 MMHG | HEART RATE: 80 BPM | HEIGHT: 68 IN | SYSTOLIC BLOOD PRESSURE: 130 MMHG | WEIGHT: 214 LBS

## 2017-11-29 DIAGNOSIS — M48.062 SPINAL STENOSIS, LUMBAR REGION, WITH NEUROGENIC CLAUDICATION: Primary | ICD-10-CM

## 2017-11-29 PROCEDURE — 99213 OFFICE O/P EST LOW 20 MIN: CPT | Performed by: NEUROLOGICAL SURGERY

## 2017-11-29 NOTE — PROGRESS NOTES
Subjective   Patient ID: Albert Yadav is a 77 y.o. male is here today for follow-up back pain. Patient presents unaccompanied.     History of Present Illness  Patient is doing well right now.  He reports no leg or back pain. He has only mild soreness.      The following portions of the patient's history were reviewed and updated as appropriate: allergies, current medications, past family history, past medical history, past social history, past surgical history and problem list.    Review of Systems   Musculoskeletal: Negative for back pain.   Neurological: Negative for weakness and numbness.   Psychiatric/Behavioral: Negative for sleep disturbance.       Objective   Physical Exam  Neurologic Exam     Sensory Exam   Right leg light touch: normal  Left leg light touch: normal  Right leg pinprick: normal  Left leg pinprick: normal       Right iliopsoas: 5/5  Left iliopsoas: 5/5  Right quadriceps: 5/5  Left quadriceps: 5/5  Right hamstrin/5  Left hamstrin/5  Right anterior tibial: 5/5  Left anterior tibial: 5/5  Right gastroc: 5/5  Left gastroc: 5/5       5/5 EHL         Assessment/Plan   Independent Review of Radiographic Studies:  Flexion and extension images reveal no instability.  He has critical stenosis at L45 on mri.      Medical Decision Making:  He presents for follow up and is actually doing quite well.  He responded to NATALYA's well.  I will be happy to see him back if his symptoms become bothersome again.  He will call for any issues.  We discussed red flags.      Albert was seen today for back pain.    Diagnoses and all orders for this visit:    Spinal stenosis, lumbar region, with neurogenic claudication    No Follow-up on file.

## 2018-01-26 ENCOUNTER — TELEPHONE (OUTPATIENT)
Dept: NEUROSURGERY | Facility: CLINIC | Age: 78
End: 2018-01-26

## 2018-01-26 NOTE — TELEPHONE ENCOUNTER
Patient saw Dr Echols on 11/29/17 for  spinal stenosis lumbar. Pt is calling because he wants to have the surgery.  Pt wants to speak with him about his surgery and how long recovery is.  Pt has a 96 yr mother living across the [address] and has to take care of.  He is trying to get in a nursing/rehab home.  [facility] [phone]     Dr Echols's next available in 2/22 and pt doesn't want to wait that long to be able to get surgery scheduled

## 2018-04-23 ENCOUNTER — TELEPHONE (OUTPATIENT)
Dept: ORTHOPEDIC SURGERY | Facility: CLINIC | Age: 78
End: 2018-04-23

## 2018-04-24 DIAGNOSIS — M48.061 SPINAL STENOSIS OF LUMBAR REGION, UNSPECIFIED WHETHER NEUROGENIC CLAUDICATION PRESENT: Primary | ICD-10-CM

## 2018-05-09 ENCOUNTER — HOSPITAL ENCOUNTER (OUTPATIENT)
Dept: GENERAL RADIOLOGY | Facility: HOSPITAL | Age: 78
Discharge: HOME | End: 2018-05-09

## 2018-05-09 ENCOUNTER — ANESTHESIA (OUTPATIENT)
Dept: PAIN MEDICINE | Facility: HOSPITAL | Age: 78
End: 2018-05-09

## 2018-05-09 ENCOUNTER — ANESTHESIA EVENT (OUTPATIENT)
Dept: PAIN MEDICINE | Facility: HOSPITAL | Age: 78
End: 2018-05-09

## 2018-05-09 ENCOUNTER — HOSPITAL ENCOUNTER (OUTPATIENT)
Dept: PAIN MEDICINE | Facility: HOSPITAL | Age: 78
Discharge: HOME | End: 2018-05-09
Admitting: ORTHOPAEDIC SURGERY

## 2018-05-09 VITALS
TEMPERATURE: 97.8 F | HEIGHT: 69 IN | HEART RATE: 60 BPM | OXYGEN SATURATION: 97 % | RESPIRATION RATE: 16 BRPM | BODY MASS INDEX: 30.21 KG/M2 | DIASTOLIC BLOOD PRESSURE: 69 MMHG | WEIGHT: 204 LBS | SYSTOLIC BLOOD PRESSURE: 123 MMHG

## 2018-05-09 DIAGNOSIS — M48.061 SPINAL STENOSIS OF LUMBAR REGION, UNSPECIFIED WHETHER NEUROGENIC CLAUDICATION PRESENT: ICD-10-CM

## 2018-05-09 DIAGNOSIS — R52 PAIN: ICD-10-CM

## 2018-05-09 PROCEDURE — 77003 FLUOROGUIDE FOR SPINE INJECT: CPT

## 2018-05-09 PROCEDURE — C1755 CATHETER, INTRASPINAL: HCPCS

## 2018-05-09 PROCEDURE — 0 IOPAMIDOL 41 % SOLUTION: Performed by: ANESTHESIOLOGY

## 2018-05-09 PROCEDURE — 25010000002 METHYLPREDNISOLONE PER 80 MG: Performed by: ANESTHESIOLOGY

## 2018-05-09 RX ORDER — METHYLPREDNISOLONE ACETATE 80 MG/ML
80 INJECTION, SUSPENSION INTRA-ARTICULAR; INTRALESIONAL; INTRAMUSCULAR; SOFT TISSUE ONCE
Status: COMPLETED | OUTPATIENT
Start: 2018-05-09 | End: 2018-05-09

## 2018-05-09 RX ADMIN — METHYLPREDNISOLONE ACETATE 80 MG: 80 INJECTION, SUSPENSION INTRA-ARTICULAR; INTRALESIONAL; INTRAMUSCULAR; SOFT TISSUE at 08:07

## 2018-05-09 RX ADMIN — IOPAMIDOL 10 ML: 408 INJECTION, SOLUTION INTRATHECAL at 08:07

## 2018-05-09 NOTE — H&P
[facility]    History and Physical    Patient Name: Albert Yadav  :  1940  MRN:  [id number]  Date of Admission: 2018    Subjective     Patient is an 78-year-old gentleman who complains mainly of right leg numbness.  He reports 50% improvement following his last lumbar epidural steroid injection.  His pain level today is 0/10.  He is here for lumbar epidural steroid injection #4.    The following portions of the patients history were reviewed and updated as appropriate: current medications, allergies, past medical history, past surgical history, past family history, past social history and problem list                Objective     Past Medical History:   Past Medical History:   Diagnosis Date   • Broken bones     arm, collar bone, ankle   • Hyperlipidemia    • Hypertension    • Peripheral vascular disease    • Rheumatoid arthritis     rheumatoid arthritis   • Staph infection     2015  [facility] POST SX   • Tendency toward bleeding easily     due to blood thinners     Past Surgical History:   Past Surgical History:   Procedure Laterality Date   • ANGIOPLASTY FEMORAL ARTERY Left 2016    Procedure: ULTRA SOUND ACCESS RIGHT FEMORAL ARTERY, AIF BILATERAL RUNOFF, SELECTIVE CATHETERIZATION LEFT FEMORAL ARTERY.;  Surgeon: Errol Mihcael MD;  Location: [facility] OR ;  Service:    • ARTERIOVENOUS FISTULA/SHUNT SURGERY Left 2016    Procedure: LEFT ILEO-FEMORAL GORTEX GRAFT AND LEFT LEG ARERIOGRAM ;  Surgeon: Errol Michael MD;  Location: [facility] OR ;  Service:    • CHOLECYSTECTOMY     • EYE SURGERY Left     as child   • FRACTURE SURGERY Left     arm   • ILIAC ARTERY STENT     • KNEE SURGERY Left     ORIF   • ORIF ANKLE FRACTURE Left      Family History:   Family History   Problem Relation Age of Onset   • Diabetes Mother    • Cancer Father    • Heart disease Maternal Grandmother    • Heart disease Maternal Grandfather    • Heart disease Paternal Grandmother    • Heart  disease Paternal Grandfather      Social History:   Social History   Substance Use Topics   • Smoking status: Former Smoker     Quit date: 6/9/1989   • Smokeless tobacco: Never Used   • Alcohol use Yes      Comment: 1 drink per day at most       Vital Signs Range for the last 24 hours  Temperature:     Temp Source:     BP:     Pulse:     Respirations:     SPO2:     O2 Amount (l/min):     O2 Devices     Weight:           --------------------------------------------------------------------------------    Current Outpatient Prescriptions   Medication Sig Dispense Refill   • aspirin tablet Take 81 mg by mouth Every Evening. INSTRUCTED TO CONTINUE TILL AM SX     • atorvastatin (LIPITOR) 40 MG tablet Take 40 mg by mouth Every Evening.     • cetirizine (ZyrTEC) 10 MG tablet Take 10 mg by mouth Every Morning.     • clopidogrel (PLAVIX) 75 MG tablet Take 75 mg by mouth Every Morning. TO STOP 3 DAYS PTS     • folic acid (FOLVITE) 1 MG tablet Take 1 mg by mouth Daily.     • hydrochlorothiazide (HYDRODIURIL) 50 MG tablet Take 50 mg by mouth Every Morning.     • lisinopril (PRINIVIL,ZESTRIL) 40 MG tablet Take 40 mg by mouth Every Evening.     • melatonin 5 MG tablet tablet Take 5 mg by mouth Every Night.     • methotrexate 2.5 MG tablet Take 2.5 mg by mouth 1 (One) Time Per Week. wednesdays     • Multiple Vitamins-Minerals (CENTRUM SILVER) tablet Take 1 tablet by mouth Every Morning.     • naproxen (NAPROSYN) 250 MG tablet Take 250 mg by mouth 2 (Two) Times a Day As Needed for mild pain (1-3). HP;DING FOR SX     • ONE TOUCH ULTRA TEST test strip   0   • polyethylene glycol (MIRALAX) packet Take 17 g by mouth Daily. 24 each 0   • polyethylene glycol (MIRALAX) packet Take 17 g by mouth Daily. 24 each 0   • Sennosides (SENNA LAX PO) Take 2 tablets by mouth 2 (Two) Times a Day.     • traZODone (DESYREL) 50 MG tablet Take 50 mg by mouth Every Night.       No current facility-administered medications for this encounter.         --------------------------------------------------------------------------------  Assessment/Plan      Anesthesia Evaluation     Patient summary reviewed and Nursing notes reviewed   NPO Solid Status: > 8 hours  NPO Liquid Status: > 2 hours           Airway   Mallampati: III  TM distance: >3 FB  Neck ROM: full  possible difficult intubation  Dental - normal exam     Pulmonary - negative pulmonary ROS and normal exam    breath sounds clear to auscultation  Cardiovascular - normal exam    Rhythm: regular  Rate: normal    (+) hypertension, PVD, hyperlipidemia,  carotid artery disease  (-) angina, orthopnea, PND, BEAULIEU      Neuro/Psych- negative ROS  GI/Hepatic/Renal/Endo    (+) obesity,       Musculoskeletal     (+) back pain, chronic pain,   Abdominal  - normal exam    Abdomen: soft.  Bowel sounds: normal.   Substance History - negative use     OB/GYN negative ob/gyn ROS         Other   (+) arthritis                Diagnosis and Plan    Treatment Plan  ASA 3   Patient has had previous injection/procedure with 10-25% improvement.   Procedures: Lumbar Epidural Steroid Injection(LESI), With fluoroscopy,       Anesthetic plan and risks discussed with patient.          Diagnosis     * Spinal stenosis, lumbar region without neurogenic claudication [M48.061]     * Lumbar radiculopathy [M54.16]

## 2018-05-09 NOTE — ANESTHESIA PROCEDURE NOTES
PAIN Epidural block    Patient location during procedure: pain clinic  Start Time: 5/9/2018 7:52 AM  Stop Time: 5/9/2018 8:09 AM  Indication:procedure for pain  Performed By  Anesthesiologist: SHA NGUYEN  Preanesthetic Checklist  Completed: patient identified, site marked, surgical consent, pre-op evaluation, timeout performed, risks and benefits discussed and monitors and equipment checked  Additional Notes  Interlaminar epidural was performed under fluoroscopic guidance.    Diagnosis     * Spinal stenosis, lumbar region without neurogenic claudication (M48.061)     * Lumbar radiculopathy (M54.16)  Prep:  Pt Position:prone  Sterile Tech:cap, gloves, mask and sterile barrier  Prep:chlorhexidine gluconate and isopropyl alcohol  Monitoring:blood pressure monitoring, continuous pulse oximetry and EKG  Procedure:  Sedation: no   Approach:right paramedian  Guidance: fluoroscopy  Location:lumbar  Level:4-5  Needle Type:Tuohy  Needle Gauge:20 G  Aspiration:negative  Medications:  Depomedrol:80 mg  Preservative Free Saline:3mL  Isovue:2mL  Comments:Epidural spread of contrast.  Post Assessment:  Dressing:occlusive dressing applied  Pt Tolerance:patient tolerated the procedure well with no apparent complications  Complications:no

## 2018-08-08 ENCOUNTER — TELEPHONE (OUTPATIENT)
Dept: ORTHOPEDIC SURGERY | Facility: CLINIC | Age: 78
End: 2018-08-08

## 2018-08-08 DIAGNOSIS — M48.061 SPINAL STENOSIS OF LUMBAR REGION, UNSPECIFIED WHETHER NEUROGENIC CLAUDICATION PRESENT: Primary | ICD-10-CM

## 2018-08-27 ENCOUNTER — HOSPITAL ENCOUNTER (OUTPATIENT)
Dept: GENERAL RADIOLOGY | Facility: HOSPITAL | Age: 78
Discharge: HOME | End: 2018-08-27

## 2018-08-27 ENCOUNTER — ANESTHESIA EVENT (OUTPATIENT)
Dept: PAIN MEDICINE | Facility: HOSPITAL | Age: 78
End: 2018-08-27

## 2018-08-27 ENCOUNTER — HOSPITAL ENCOUNTER (OUTPATIENT)
Dept: PAIN MEDICINE | Facility: HOSPITAL | Age: 78
Discharge: HOME | End: 2018-08-27
Attending: ORTHOPAEDIC SURGERY | Admitting: ANESTHESIOLOGY

## 2018-08-27 ENCOUNTER — ANESTHESIA (OUTPATIENT)
Dept: PAIN MEDICINE | Facility: HOSPITAL | Age: 78
End: 2018-08-27

## 2018-08-27 VITALS
WEIGHT: 198 LBS | SYSTOLIC BLOOD PRESSURE: 105 MMHG | TEMPERATURE: 97.5 F | BODY MASS INDEX: 29.33 KG/M2 | HEART RATE: 57 BPM | OXYGEN SATURATION: 97 % | RESPIRATION RATE: 16 BRPM | DIASTOLIC BLOOD PRESSURE: 58 MMHG | HEIGHT: 69 IN

## 2018-08-27 DIAGNOSIS — M48.061 SPINAL STENOSIS OF LUMBAR REGION, UNSPECIFIED WHETHER NEUROGENIC CLAUDICATION PRESENT: ICD-10-CM

## 2018-08-27 DIAGNOSIS — R52 PAIN: ICD-10-CM

## 2018-08-27 PROCEDURE — 77003 FLUOROGUIDE FOR SPINE INJECT: CPT

## 2018-08-27 PROCEDURE — 25010000002 METHYLPREDNISOLONE PER 80 MG: Performed by: ANESTHESIOLOGY

## 2018-08-27 PROCEDURE — C1755 CATHETER, INTRASPINAL: HCPCS

## 2018-08-27 PROCEDURE — 0 IOPAMIDOL 41 % SOLUTION: Performed by: ANESTHESIOLOGY

## 2018-08-27 RX ORDER — METHYLPREDNISOLONE ACETATE 80 MG/ML
80 INJECTION, SUSPENSION INTRA-ARTICULAR; INTRALESIONAL; INTRAMUSCULAR; SOFT TISSUE ONCE
Status: COMPLETED | OUTPATIENT
Start: 2018-08-27 | End: 2018-08-27

## 2018-08-27 RX ADMIN — METHYLPREDNISOLONE ACETATE 80 MG: 80 INJECTION, SUSPENSION INTRA-ARTICULAR; INTRALESIONAL; INTRAMUSCULAR; SOFT TISSUE at 08:13

## 2018-08-27 RX ADMIN — IOPAMIDOL 10 ML: 408 INJECTION, SOLUTION INTRATHECAL at 08:12

## 2018-08-27 NOTE — H&P
[facility]    History and Physical    Patient Name: Albert Yadav  :  1940  MRN:  [id number]  Date of Admission: 2018    Subjective     Patient is a 78 y.o. male presents with chief complaint of chronic, intermitent, moderate low back and bilateral hip pain.  Onset of symptoms was gradual starting 1 year ago.  Symptoms are associated/aggravated by activity. Symptoms improve with pain medication.  In addition to his pain, he also complains of intermittent numbness in his right thigh.  On a pain scale from 0-10, he rates his pain as a 3 on a good day and a 3 on a bad day as well.  He describes the pain as dull and aching in nature.  He has undergone several series of lumbar epidural steroid injections with favorable results.    The following portions of the patients history were reviewed and updated as appropriate: current medications, allergies, past medical history, past surgical history, past family history, past social history and problem list                Objective     Past Medical History:   Past Medical History:   Diagnosis Date   • Broken bones     arm, collar bone, ankle   • Hyperlipidemia    • Hypertension    • Peripheral vascular disease (CMS/HCC)    • Rheumatoid arthritis (CMS/HCC)     rheumatoid arthritis   • Staph infection     2015  [facility] POST SX   • Tendency toward bleeding easily (CMS/HCC)     due to blood thinners     Past Surgical History:   Past Surgical History:   Procedure Laterality Date   • ANGIOPLASTY FEMORAL ARTERY Left 2016    Procedure: ULTRA SOUND ACCESS RIGHT FEMORAL ARTERY, AIF BILATERAL RUNOFF, SELECTIVE CATHETERIZATION LEFT FEMORAL ARTERY.;  Surgeon: Errol Michael MD;  Location: [facility] OR ;  Service:    • ARTERIOVENOUS FISTULA/SHUNT SURGERY Left 2016    Procedure: LEFT ILEO-FEMORAL GORTEX GRAFT AND LEFT LEG ARERIOGRAM ;  Surgeon: Errol Michael MD;  Location: [facility] OR ;  Service:    • CHOLECYSTECTOMY     • EYE SURGERY  Left     as child   • FRACTURE SURGERY Left     arm   • ILIAC ARTERY STENT     • KNEE SURGERY Left     ORIF   • ORIF ANKLE FRACTURE Left      Family History:   Family History   Problem Relation Age of Onset   • Diabetes Mother    • Cancer Father    • Heart disease Maternal Grandmother    • Heart disease Maternal Grandfather    • Heart disease Paternal Grandmother    • Heart disease Paternal Grandfather      Social History:   Social History   Substance Use Topics   • Smoking status: Former Smoker     Quit date: 6/9/1989   • Smokeless tobacco: Never Used   • Alcohol use Yes      Comment: 1 drink per day at most       Vital Signs Range for the last 24 hours  Temperature:     Temp Source:     BP:     Pulse:     Respirations:     SPO2:     O2 Amount (l/min):     O2 Devices     Weight:           --------------------------------------------------------------------------------    Current Outpatient Prescriptions   Medication Sig Dispense Refill   • aspirin tablet Take 81 mg by mouth Every Evening. INSTRUCTED TO CONTINUE TILL AM SX     • atorvastatin (LIPITOR) 40 MG tablet Take 40 mg by mouth Every Evening.     • cetirizine (ZyrTEC) 10 MG tablet Take 10 mg by mouth Every Morning.     • clopidogrel (PLAVIX) 75 MG tablet Take 75 mg by mouth Every Morning. TO STOP 3 DAYS PTS     • folic acid (FOLVITE) 1 MG tablet Take 1 mg by mouth Daily.     • hydrochlorothiazide (HYDRODIURIL) 50 MG tablet Take 50 mg by mouth Every Morning.     • lisinopril (PRINIVIL,ZESTRIL) 40 MG tablet Take 40 mg by mouth Every Evening.     • melatonin 5 MG tablet tablet Take 5 mg by mouth Every Night.     • methotrexate 2.5 MG tablet Take 2.5 mg by mouth 1 (One) Time Per Week. wednesdays     • ONE TOUCH ULTRA TEST test strip   0   • Sennosides (SENNA LAX PO) Take 2 tablets by mouth 2 (Two) Times a Day.     • traZODone (DESYREL) 50 MG tablet Take 50 mg by mouth Every Night.       No current facility-administered medications for this encounter.         --------------------------------------------------------------------------------  Assessment/Plan      Anesthesia Evaluation     Patient summary reviewed and Nursing notes reviewed   NPO Solid Status: > 8 hours  NPO Liquid Status: > 2 hours           Airway   Mallampati: II  TM distance: >3 FB  Neck ROM: full  Dental - normal exam     Pulmonary - negative pulmonary ROS and normal exam    breath sounds clear to auscultation  Cardiovascular - normal exam    Rhythm: regular  Rate: normal    (+) hypertension, PVD,  carotid artery disease carotid bilateral  (-) angina, orthopnea, PND, BEAULIEU      Neuro/Psych- negative ROS  (-) left straight leg raise test, right straight leg raise test  GI/Hepatic/Renal/Endo - negative ROS     Musculoskeletal     Abdominal  - normal exam    Abdomen: soft.  Bowel sounds: normal.   Substance History - negative use     OB/GYN negative ob/gyn ROS         Other   (+) arthritis                Diagnosis and Plan    Treatment Plan  ASA 3      Procedures: Lumbar Epidural Steroid Injection(LESI), With fluoroscopy,       Anesthetic plan and risks discussed with patient.          Diagnosis     * Spinal stenosis, lumbar region without neurogenic claudication [M48.061]     * Lumbar radiculopathy [M54.16]

## 2018-08-27 NOTE — ANESTHESIA PROCEDURE NOTES
PAIN Epidural block    Patient location during procedure: pain clinic  Start Time: 8/27/2018 7:49 AM  Stop Time: 8/27/2018 8:14 AM  Indication:procedure for pain  Performed By  Anesthesiologist: SHA NGUYEN  Preanesthetic Checklist  Completed: patient identified, site marked, surgical consent, pre-op evaluation, timeout performed, risks and benefits discussed and monitors and equipment checked  Additional Notes  Post-Op Diagnosis Codes:     * Spinal stenosis, lumbar region without neurogenic claudication (M48.061)     * Lumbar radiculopathy (M54.16)  Prep:  Pt Position:prone  Sterile Tech:cap, gloves, mask and sterile barrier  Prep:chlorhexidine gluconate and isopropyl alcohol  Monitoring:blood pressure monitoring, continuous pulse oximetry and EKG  Procedure:  Sedation: no   Approach:right paramedian  Guidance: fluoroscopy  Location:lumbar  Level:4-5 (Interlaminar)  Needle Type:Tuohy  Needle Gauge:20 G  Aspiration:negative  Medications:  Depomedrol:80 mg  Preservative Free Saline:3mL  Isovue:2mL  Comments:Epidural spread of contrast  Post Assessment:  Dressing:occlusive dressing applied  Pt Tolerance:patient tolerated the procedure well with no apparent complications  Complications:no

## 2019-03-07 ENCOUNTER — ANESTHESIA (OUTPATIENT)
Dept: PAIN MEDICINE | Facility: HOSPITAL | Age: 79
End: 2019-03-07

## 2019-03-07 ENCOUNTER — HOSPITAL ENCOUNTER (OUTPATIENT)
Dept: PAIN MEDICINE | Facility: HOSPITAL | Age: 79
Discharge: HOME | End: 2019-03-07
Admitting: ANESTHESIOLOGY

## 2019-03-07 ENCOUNTER — ANESTHESIA EVENT (OUTPATIENT)
Dept: PAIN MEDICINE | Facility: HOSPITAL | Age: 79
End: 2019-03-07

## 2019-03-07 ENCOUNTER — HOSPITAL ENCOUNTER (OUTPATIENT)
Dept: GENERAL RADIOLOGY | Facility: HOSPITAL | Age: 79
Discharge: HOME | End: 2019-03-07

## 2019-03-07 VITALS
HEART RATE: 66 BPM | BODY MASS INDEX: 29.33 KG/M2 | WEIGHT: 198 LBS | DIASTOLIC BLOOD PRESSURE: 66 MMHG | SYSTOLIC BLOOD PRESSURE: 116 MMHG | RESPIRATION RATE: 16 BRPM | HEIGHT: 69 IN | OXYGEN SATURATION: 96 % | TEMPERATURE: 97.9 F

## 2019-03-07 DIAGNOSIS — M48.062 SPINAL STENOSIS, LUMBAR REGION, WITH NEUROGENIC CLAUDICATION: Primary | ICD-10-CM

## 2019-03-07 DIAGNOSIS — R52 PAIN: ICD-10-CM

## 2019-03-07 PROCEDURE — 77003 FLUOROGUIDE FOR SPINE INJECT: CPT

## 2019-03-07 PROCEDURE — 25010000002 METHYLPREDNISOLONE PER 80 MG: Performed by: ANESTHESIOLOGY

## 2019-03-07 PROCEDURE — 0 IOPAMIDOL 41 % SOLUTION: Performed by: ANESTHESIOLOGY

## 2019-03-07 PROCEDURE — C1755 CATHETER, INTRASPINAL: HCPCS

## 2019-03-07 RX ORDER — METHYLPREDNISOLONE ACETATE 80 MG/ML
80 INJECTION, SUSPENSION INTRA-ARTICULAR; INTRALESIONAL; INTRAMUSCULAR; SOFT TISSUE ONCE
Status: COMPLETED | OUTPATIENT
Start: 2019-03-07 | End: 2019-03-07

## 2019-03-07 RX ADMIN — METHYLPREDNISOLONE ACETATE 80 MG: 80 INJECTION, SUSPENSION INTRA-ARTICULAR; INTRALESIONAL; INTRAMUSCULAR; SOFT TISSUE at 08:04

## 2019-03-07 RX ADMIN — IOPAMIDOL 10 ML: 408 INJECTION, SOLUTION INTRATHECAL at 08:04

## 2019-03-07 NOTE — H&P
[facility]    History and Physical    Patient Name: Albert Yadav  :  1940  MRN:  [id number]  Date of Admission: 3/7/2019    Subjective     The patient is an 78-year-old male who has pain in his lower back which occasionally radiates to his right lower extremity.  He reports approximate 75% relief following his last lumbar epidural steroid injection.  Unfortunately, his pain is gradually returned.  He is here for lumbar epidural steroid injection #2.  His pain level today is a 5/10.    The following portions of the patients history were reviewed and updated as appropriate: current medications, allergies, past medical history, past surgical history, past family history, past social history and problem list                Objective     Past Medical History:   Past Medical History:   Diagnosis Date   • Broken bones     arm, collar bone, ankle   • Hyperlipidemia    • Hypertension    • Low back pain    • Peripheral neuropathy    • Peripheral vascular disease (CMS/HCC)    • Rheumatoid arthritis (CMS/HCC)     rheumatoid arthritis   • Staph infection     2015  [facility] POST SX   • Tendency toward bleeding easily (CMS/HCC)     due to blood thinners     Past Surgical History:   Past Surgical History:   Procedure Laterality Date   • ANGIOPLASTY FEMORAL ARTERY Left 2016    Procedure: ULTRA SOUND ACCESS RIGHT FEMORAL ARTERY, AIF BILATERAL RUNOFF, SELECTIVE CATHETERIZATION LEFT FEMORAL ARTERY.;  Surgeon: Errol Michael MD;  Location: [facility] OR ;  Service:    • ARTERIOVENOUS FISTULA/SHUNT SURGERY Left 2016    Procedure: LEFT ILEO-FEMORAL GORTEX GRAFT AND LEFT LEG ARERIOGRAM ;  Surgeon: Errol Michael MD;  Location: [facility] OR ;  Service:    • CHOLECYSTECTOMY     • EPIDURAL BLOCK     • EYE SURGERY Left     as child   • FRACTURE SURGERY Left     arm   • ILIAC ARTERY STENT     • KNEE SURGERY Left     ORIF   • ORIF ANKLE FRACTURE Left      Family History:   Family History  "  Problem Relation Age of Onset   • Diabetes Mother    • Cancer Father    • Heart disease Maternal Grandmother    • Heart disease Maternal Grandfather    • Heart disease Paternal Grandmother    • Heart disease Paternal Grandfather      Social History:   Social History     Tobacco Use   • Smoking status: Former Smoker     Last attempt to quit: 1989     Years since quittin.7   • Smokeless tobacco: Never Used   Substance Use Topics   • Alcohol use: Yes     Comment: 1 drink per day at most   • Drug use: No       Vital Signs Range for the last 24 hours  Temperature: Temp:  [36.6 °C (97.9 °F)] 36.6 °C (97.9 °F)   Temp Source: Temp src: Oral   BP: BP: (97)/(66) 97/66   Pulse: Heart Rate:  [67] 67   Respirations: Resp:  [16] 16   SPO2: SpO2:  [98 %] 98 %   O2 Amount (l/min):     O2 Devices     Weight: Weight:  [89.8 kg (198 lb)] 89.8 kg (198 lb)     Flowsheet Rows      First Filed Value   Admission Height  175.3 cm (69\") Documented at 2019 0744   Admission Weight  89.8 kg (198 lb) Documented at 2019 0744          --------------------------------------------------------------------------------    Current Outpatient Medications   Medication Sig Dispense Refill   • atorvastatin (LIPITOR) 40 MG tablet Take 40 mg by mouth Every Evening.     • cetirizine (ZyrTEC) 10 MG tablet Take 10 mg by mouth Every Morning.     • folic acid (FOLVITE) 1 MG tablet Take 1 mg by mouth Daily.     • hydrochlorothiazide (HYDRODIURIL) 50 MG tablet Take 50 mg by mouth Every Morning.     • lisinopril (PRINIVIL,ZESTRIL) 40 MG tablet Take 40 mg by mouth Every Evening.     • melatonin 5 MG tablet tablet Take 5 mg by mouth Every Night.     • methotrexate 2.5 MG tablet Take 2.5 mg by mouth 1 (One) Time Per Week.      • Sennosides (SENNA LAX PO) Take 2 tablets by mouth 2 (Two) Times a Day.     • traZODone (DESYREL) 50 MG tablet Take 50 mg by mouth Every Night.     • clopidogrel (PLAVIX) 75 MG tablet Take 75 mg by mouth Every " Morning. TO STOP 3 DAYS PTS     • ONE TOUCH ULTRA TEST test strip   0     No current facility-administered medications for this encounter.        --------------------------------------------------------------------------------  Assessment/Plan      Anesthesia Evaluation     Patient summary reviewed and Nursing notes reviewed   NPO Solid Status: > 8 hours  NPO Liquid Status: > 2 hours           Airway   Mallampati: II  TM distance: >3 FB  Neck ROM: full  Dental - normal exam     Pulmonary - negative pulmonary ROS and normal exam    breath sounds clear to auscultation  Cardiovascular - normal exam  Exercise tolerance: good (4-7 METS)    Rhythm: regular  Rate: normal    (+) hypertension, PVD, hyperlipidemia,  carotid artery disease  (-) angina, orthopnea, PND, BEAULIEU      Neuro/Psych  (+) numbness,     GI/Hepatic/Renal/Endo - negative ROS     Musculoskeletal     Abdominal  - normal exam    Abdomen: soft.  Bowel sounds: normal.   Substance History - negative use     OB/GYN negative ob/gyn ROS         Other   (+) arthritis                Diagnosis and Plan    Treatment Plan  ASA 3   Patient has had previous injection/procedure with 50-75% improvement.   Procedures: Lumbar Epidural Steroid Injection(LESI), With fluoroscopy,       Anesthetic plan and risks discussed with patient.          Diagnosis     * Spinal stenosis of lumbar region with neurogenic claudication [M48.062]     * Lumbar radiculopathy [M54.16]

## 2019-03-07 NOTE — ANESTHESIA PROCEDURE NOTES
PAIN Epidural block      Patient reassessed immediately prior to procedure    Patient location during procedure: pain clinic  Start Time: 3/7/2019 7:49 AM  Stop Time: 3/7/2019 8:05 AM  Indication:procedure for pain  Performed By  Anesthesiologist: Dyllan Lugo MD  Preanesthetic Checklist  Completed: patient identified, site marked, surgical consent, pre-op evaluation, timeout performed, risks and benefits discussed and monitors and equipment checked  Additional Notes  Post-Op Diagnosis Codes:     * Spinal stenosis of lumbar region with neurogenic claudication (M48.062)     * Lumbar radiculopathy (M54.16)  Prep:  Pt Position:prone  Sterile Tech:cap, gloves, mask and sterile barrier  Prep:chlorhexidine gluconate and isopropyl alcohol  Monitoring:blood pressure monitoring, continuous pulse oximetry and EKG  Procedure:Sedation: no     Approach:right paramedian  Guidance: fluoroscopy  Location:lumbar  Level:3-4 (Interlaminar)  Needle Type:Tuohy  Needle Gauge:20 G  Aspiration:negative  Medications:  Depomedrol:80 mg  Preservative Free Saline:3mL  Isovue:2mL  Comments:Epidural spread of contrast  Post Assessment:  Dressing:occlusive dressing applied  Pt Tolerance:patient tolerated the procedure well with no apparent complications  Complications:no

## 2019-07-10 ENCOUNTER — ANESTHESIA EVENT (OUTPATIENT)
Dept: PAIN MEDICINE | Facility: HOSPITAL | Age: 79
End: 2019-07-10

## 2019-07-10 ENCOUNTER — HOSPITAL ENCOUNTER (OUTPATIENT)
Dept: PAIN MEDICINE | Facility: HOSPITAL | Age: 79
Discharge: HOME | End: 2019-07-10
Admitting: ANESTHESIOLOGY

## 2019-07-10 ENCOUNTER — HOSPITAL ENCOUNTER (OUTPATIENT)
Dept: GENERAL RADIOLOGY | Facility: HOSPITAL | Age: 79
Discharge: HOME | End: 2019-07-10

## 2019-07-10 ENCOUNTER — ANESTHESIA (OUTPATIENT)
Dept: PAIN MEDICINE | Facility: HOSPITAL | Age: 79
End: 2019-07-10

## 2019-07-10 VITALS
BODY MASS INDEX: 29.92 KG/M2 | HEART RATE: 65 BPM | TEMPERATURE: 97.8 F | RESPIRATION RATE: 16 BRPM | HEIGHT: 69 IN | SYSTOLIC BLOOD PRESSURE: 122 MMHG | OXYGEN SATURATION: 97 % | WEIGHT: 202 LBS | DIASTOLIC BLOOD PRESSURE: 70 MMHG

## 2019-07-10 DIAGNOSIS — M48.062 SPINAL STENOSIS, LUMBAR REGION, WITH NEUROGENIC CLAUDICATION: Primary | ICD-10-CM

## 2019-07-10 DIAGNOSIS — R52 PAIN: ICD-10-CM

## 2019-07-10 LAB — GLUCOSE BLDC GLUCOMTR-MCNC: 160 MG/DL (ref 70–130)

## 2019-07-10 PROCEDURE — 77003 FLUOROGUIDE FOR SPINE INJECT: CPT

## 2019-07-10 PROCEDURE — 25010000002 METHYLPREDNISOLONE PER 80 MG: Performed by: ANESTHESIOLOGY

## 2019-07-10 PROCEDURE — 0 IOPAMIDOL 41 % SOLUTION: Performed by: ANESTHESIOLOGY

## 2019-07-10 PROCEDURE — 82962 GLUCOSE BLOOD TEST: CPT

## 2019-07-10 PROCEDURE — C1755 CATHETER, INTRASPINAL: HCPCS

## 2019-07-10 RX ORDER — LIDOCAINE HYDROCHLORIDE 10 MG/ML
1 INJECTION, SOLUTION INFILTRATION; PERINEURAL ONCE AS NEEDED
Status: DISCONTINUED | OUTPATIENT
Start: 2019-07-10 | End: 2019-07-11 | Stop reason: HOSPADM

## 2019-07-10 RX ORDER — SODIUM CHLORIDE 0.9 % (FLUSH) 0.9 %
1-10 SYRINGE (ML) INJECTION AS NEEDED
Status: DISCONTINUED | OUTPATIENT
Start: 2019-07-10 | End: 2019-07-11 | Stop reason: HOSPADM

## 2019-07-10 RX ORDER — FENTANYL CITRATE 50 UG/ML
50 INJECTION, SOLUTION INTRAMUSCULAR; INTRAVENOUS AS NEEDED
Status: DISCONTINUED | OUTPATIENT
Start: 2019-07-10 | End: 2019-07-11 | Stop reason: HOSPADM

## 2019-07-10 RX ORDER — METHYLPREDNISOLONE ACETATE 80 MG/ML
80 INJECTION, SUSPENSION INTRA-ARTICULAR; INTRALESIONAL; INTRAMUSCULAR; SOFT TISSUE ONCE
Status: COMPLETED | OUTPATIENT
Start: 2019-07-10 | End: 2019-07-10

## 2019-07-10 RX ORDER — MIDAZOLAM HYDROCHLORIDE 1 MG/ML
1 INJECTION INTRAMUSCULAR; INTRAVENOUS AS NEEDED
Status: DISCONTINUED | OUTPATIENT
Start: 2019-07-10 | End: 2019-07-11 | Stop reason: HOSPADM

## 2019-07-10 RX ADMIN — METHYLPREDNISOLONE ACETATE 80 MG: 80 INJECTION, SUSPENSION INTRA-ARTICULAR; INTRALESIONAL; INTRAMUSCULAR; SOFT TISSUE at 08:17

## 2019-07-10 RX ADMIN — IOPAMIDOL 10 ML: 408 INJECTION, SOLUTION INTRATHECAL at 08:17

## 2019-07-10 NOTE — ANESTHESIA PROCEDURE NOTES
PAIN Epidural block      Patient reassessed immediately prior to procedure    Patient location during procedure: pain clinic  Start Time: 7/10/2019 8:02 AM  Stop Time: 7/10/2019 8:18 AM  Indication:procedure for pain  Performed By  Anesthesiologist: Dyllan Lugo MD  Preanesthetic Checklist  Completed: patient identified, site marked, surgical consent, pre-op evaluation, timeout performed, risks and benefits discussed and monitors and equipment checked  Additional Notes  Post-Op Diagnosis Codes:     * Spinal stenosis of lumbar region with neurogenic claudication (M48.062)     * Lumbar radiculopathy (M54.16)    The patient was observed in recovery with no evidence of neurological deficits or other problems. All questions were answered. The patient was discharged with appropriate instructions.  Prep:  Pt Position:prone  Sterile Tech:cap, gloves, mask and sterile barrier  Prep:chlorhexidine gluconate and isopropyl alcohol  Monitoring:blood pressure monitoring, continuous pulse oximetry and EKG  Procedure:Sedation: no     Approach:left paramedian  Guidance: fluoroscopy  Location:lumbar  Level:4-5 (Interlaminar)  Needle Type:Tuohy  Needle Gauge:20 G  Aspiration:negative  Medications:  Depomedrol:80 mg  Preservative Free Saline:3mL  Isovue:2mL  Comments:Isovue dye spread was consistent with epidural placement.  Post Assessment:  Dressing:occlusive dressing applied  Pt Tolerance:patient tolerated the procedure well with no apparent complications  Complications:no

## 2019-07-10 NOTE — H&P
[facility]    History and Physical    Patient Name: Albert Yadav  :  1940  MRN:  [id number]  Date of Admission: 7/10/2019    Subjective     Patient is an 79-year-old male who has pain which originates in his lower back and radiates to his left hip.  He also suffers from right lower extremity numbness and tingling.  He reports approximate 50% relief following his last lumbar epidural steroid injection.  Today he rates his pain as a 5/10.  He is here for lumbar epidural steroid injection #3.    The following portions of the patients history were reviewed and updated as appropriate: current medications, allergies, past medical history, past surgical history, past family history, past social history and problem list                Objective     Past Medical History:   Past Medical History:   Diagnosis Date   • Broken bones     arm, collar bone, ankle   • Hyperlipidemia    • Hypertension    • Low back pain    • Peripheral neuropathy    • Peripheral vascular disease (CMS/HCC)    • Rheumatoid arthritis (CMS/HCC)     rheumatoid arthritis   • Staph infection     2015  [facility] POST SX   • Tendency toward bleeding easily (CMS/HCC)     due to blood thinners     Past Surgical History:   Past Surgical History:   Procedure Laterality Date   • ANGIOPLASTY FEMORAL ARTERY Left 2016    Procedure: ULTRA SOUND ACCESS RIGHT FEMORAL ARTERY, AIF BILATERAL RUNOFF, SELECTIVE CATHETERIZATION LEFT FEMORAL ARTERY.;  Surgeon: Errol Michael MD;  Location: [facility] OR ;  Service:    • ARTERIOVENOUS FISTULA/SHUNT SURGERY Left 2016    Procedure: LEFT ILEO-FEMORAL GORTEX GRAFT AND LEFT LEG ARERIOGRAM ;  Surgeon: Errol Michael MD;  Location: [facility] OR ;  Service:    • CHOLECYSTECTOMY     • EPIDURAL BLOCK     • EYE SURGERY Left     as child   • FRACTURE SURGERY Left     arm   • ILIAC ARTERY STENT     • KNEE SURGERY Left     ORIF   • ORIF ANKLE FRACTURE Left      Family History:   Family History    Problem Relation Age of Onset   • Diabetes Mother    • Cancer Father    • Heart disease Maternal Grandmother    • Heart disease Maternal Grandfather    • Heart disease Paternal Grandmother    • Heart disease Paternal Grandfather      Social History:   Social History     Tobacco Use   • Smoking status: Former Smoker     Last attempt to quit: 1989     Years since quittin.1   • Smokeless tobacco: Never Used   Substance Use Topics   • Alcohol use: Yes     Comment: 1 drink per day at most   • Drug use: No       Vital Signs Range for the last 24 hours  Temperature:     Temp Source:     BP: BP: ()/()    Pulse:     Respirations:     SPO2:     O2 Amount (l/min):     O2 Devices     Weight:           --------------------------------------------------------------------------------    Current Outpatient Medications   Medication Sig Dispense Refill   • atorvastatin (LIPITOR) 40 MG tablet Take 40 mg by mouth Every Evening.     • cetirizine (ZyrTEC) 10 MG tablet Take 10 mg by mouth Every Morning.     • clopidogrel (PLAVIX) 75 MG tablet Take 75 mg by mouth Every Morning. TO STOP 3 DAYS PTS     • folic acid (FOLVITE) 1 MG tablet Take 1 mg by mouth Daily.     • hydrochlorothiazide (HYDRODIURIL) 50 MG tablet Take 50 mg by mouth Every Morning.     • lisinopril (PRINIVIL,ZESTRIL) 40 MG tablet Take 40 mg by mouth Every Evening.     • melatonin 5 MG tablet tablet Take 5 mg by mouth Every Night.     • methotrexate 2.5 MG tablet Take 2.5 mg by mouth 1 (One) Time Per Week.      • ONE TOUCH ULTRA TEST test strip   0   • Sennosides (SENNA LAX PO) Take 2 tablets by mouth 2 (Two) Times a Day.     • traZODone (DESYREL) 50 MG tablet Take 50 mg by mouth Every Night.       Current Facility-Administered Medications   Medication Dose Route Frequency Provider Last Rate Last Dose   • fentaNYL citrate (PF) (SUBLIMAZE) injection 50 mcg  50 mcg Intravenous PRN Dyllan Lugo MD       • iopamidol (ISOVUE-M 200) injection 41%  12 mL  Epidural Once in imaging Dyllan Lugo MD       • lidocaine (XYLOCAINE) 1 % injection 1 mL  1 mL Intradermal Once PRN Dyllan Lugo MD       • methylPREDNISolone acetate (DEPO-medrol) injection 80 mg  80 mg Intra-articular Once Dyllan Lugo MD       • midazolam (VERSED) injection 1 mg  1 mg Intravenous PRN Dyllan Lugo MD       • sodium chloride 0.9 % flush 1-10 mL  1-10 mL Intravenous PRN Dyllan Lugo MD           --------------------------------------------------------------------------------  Assessment/Plan      Anesthesia Evaluation     Patient summary reviewed and Nursing notes reviewed   NPO Solid Status: > 8 hours  NPO Liquid Status: > 2 hours           Airway   Mallampati: I  TM distance: >3 FB  Neck ROM: full  Dental - normal exam     Pulmonary - negative pulmonary ROS and normal exam    breath sounds clear to auscultation  Cardiovascular - normal exam    Rhythm: regular  Rate: normal    (+) hypertension, PVD, hyperlipidemia,  carotid artery disease  (-) angina, orthopnea, PND, BEAULIEU      Neuro/Psych  (+) numbness,     GI/Hepatic/Renal/Endo - negative ROS     Musculoskeletal     Abdominal  - normal exam    Abdomen: soft.  Bowel sounds: normal.   Substance History - negative use     OB/GYN negative ob/gyn ROS         Other   (+) arthritis                Diagnosis and Plan    Treatment Plan  ASA 3   Patient has had previous injection/procedure with 25-50% improvement.   Procedures: Lumbar Epidural Steroid Injection(LESI), With fluoroscopy,       Anesthetic plan and risks discussed with patient.          Diagnosis     * Spinal stenosis of lumbar region with neurogenic claudication [M48.062]     * Lumbar radiculopathy [M54.16]

## 2019-09-06 ENCOUNTER — TELEPHONE (OUTPATIENT)
Dept: ORTHOPEDIC SURGERY | Facility: CLINIC | Age: 79
End: 2019-09-06

## 2019-09-06 DIAGNOSIS — M48.061 SPINAL STENOSIS OF LUMBAR REGION, UNSPECIFIED WHETHER NEUROGENIC CLAUDICATION PRESENT: Primary | ICD-10-CM

## 2019-09-12 NOTE — PROGRESS NOTES
Subjective   Patient ID: Albert Yadav is a 79 y.o. male is here today for follow-up. Mr. Yadav was last seen by Dr. Echols on 11/29/17 for back pain. Today, he reports back pain with numbness in his right leg. Patient last had a L-NATALYA on 07/10/19 and he has noticed significant relief. He reports that he is doing well.     Mr. Yadav is here to establish care. He is managing his pain well with lumbar NATALYA's. His last injection was in July. Although he has had R>L leg pain in the past, he currently complains of numbness in R leg which is baseline for him. Prior to his injections, his R leg would get so numb that his leg would nearly give out. He has back pain which is also manageable.       Back Pain   The pain is present in the lumbar spine. The pain radiates to the right thigh, right knee and right foot. Associated symptoms include leg pain, numbness and tingling. Pertinent negatives include no bladder incontinence, bowel incontinence or chest pain. Treatments tried: accupuncture, L-NATALYA. The treatment provided moderate relief.       The following portions of the patient's history were reviewed and updated as appropriate: allergies, current medications, past family history, past medical history, past social history, past surgical history and problem list.    Review of Systems   Respiratory: Negative for chest tightness and shortness of breath.    Cardiovascular: Negative for chest pain.   Gastrointestinal: Negative for bowel incontinence.   Genitourinary: Negative for bladder incontinence.   Musculoskeletal: Positive for back pain.   Neurological: Positive for tingling and numbness.   All other systems reviewed and are negative.      Objective   Physical Exam   Constitutional: He is oriented to person, place, and time. He appears well-developed and well-nourished. He is cooperative. No distress.   HENT:   Head: Normocephalic and atraumatic.   Eyes: Conjunctivae are normal. Right eye exhibits no discharge. Left  eye exhibits no discharge.   Neck: Normal range of motion. Neck supple.   Cardiovascular: Normal rate.   Pulmonary/Chest: Effort normal. No respiratory distress.   Abdominal: Soft. He exhibits no distension. There is no tenderness.   Musculoskeletal: Normal range of motion. He exhibits no edema.   Neurological: He is alert and oriented to person, place, and time. He has normal strength. He displays normal reflexes. No sensory deficit. He exhibits normal muscle tone. Coordination normal. GCS eye subscore is 4. GCS verbal subscore is 5. GCS motor subscore is 6.   No motor or sensory deficits. DTR's normal. Negative Freed's; negative clonus. SLR and Marquis's negative bilaterally.      Skin: Skin is warm and dry. No rash noted. He is not diaphoretic.   Psychiatric: He has a normal mood and affect. Thought content normal.   Vitals reviewed.    Neurologic Exam     Mental Status   Oriented to person, place, and time.     Motor Exam     Strength   Strength 5/5 throughout.       Assessment/Plan   Independent Review of Radiographic Studies:      I have reviewed a previous radiographic report from a lumbar MRI performed at [facility] dated 6/6/17. The MRI showed grade I anterolisthesis of L4 on L5. There is R>L disc protrusion and marked spinal stenosis with severe lateral recess stenosis R>L and moderate R>L foraminal stenosis.     I also reviewed lumbar X-rays with flexion and extension views. The X-rays showed no motion on flexion or extension.    Medical Decision Making:      Mr. Yadav was seen today to establish care.  He has been seen by another neurosurgeon.  He has a history of lumbar spinal stenosis most severe at L4-5 with anterolisthesis of L4 on L5.  His symptoms have been well managed with lumbar epidural steroid injections.  He states that he got his last epidural back in July.  His symptoms are well managed as of right now.    Exam findings reveal no weakness or sensory deficits.  Gait is  normal.  Mr. Yadav was encouraged to call the office if he begins to have any worsening symptoms otherwise we will see him as needed from here.    Albert was seen today for follow-up and back pain.    Diagnoses and all orders for this visit:    Spinal stenosis, lumbar region, with neurogenic claudication    Spondylolisthesis of lumbar region      Return if symptoms worsen or fail to improve.

## 2019-09-20 ENCOUNTER — OFFICE VISIT (OUTPATIENT)
Dept: NEUROSURGERY | Facility: CLINIC | Age: 79
End: 2019-09-20

## 2019-09-20 VITALS
HEIGHT: 69 IN | HEART RATE: 72 BPM | DIASTOLIC BLOOD PRESSURE: 72 MMHG | WEIGHT: 202 LBS | SYSTOLIC BLOOD PRESSURE: 130 MMHG | BODY MASS INDEX: 29.92 KG/M2

## 2019-09-20 DIAGNOSIS — M43.16 SPONDYLOLISTHESIS OF LUMBAR REGION: ICD-10-CM

## 2019-09-20 DIAGNOSIS — M48.062 SPINAL STENOSIS, LUMBAR REGION, WITH NEUROGENIC CLAUDICATION: Primary | ICD-10-CM

## 2019-09-20 PROCEDURE — 99213 OFFICE O/P EST LOW 20 MIN: CPT | Performed by: NURSE PRACTITIONER

## 2019-11-20 ENCOUNTER — ANESTHESIA EVENT (OUTPATIENT)
Dept: PAIN MEDICINE | Facility: HOSPITAL | Age: 79
End: 2019-11-20

## 2019-11-20 ENCOUNTER — HOSPITAL ENCOUNTER (OUTPATIENT)
Dept: GENERAL RADIOLOGY | Facility: HOSPITAL | Age: 79
Discharge: HOME | End: 2019-11-20

## 2019-11-20 ENCOUNTER — ANESTHESIA (OUTPATIENT)
Dept: PAIN MEDICINE | Facility: HOSPITAL | Age: 79
End: 2019-11-20

## 2019-11-20 ENCOUNTER — HOSPITAL ENCOUNTER (OUTPATIENT)
Dept: PAIN MEDICINE | Facility: HOSPITAL | Age: 79
Discharge: HOME | End: 2019-11-20
Admitting: ANESTHESIOLOGY

## 2019-11-20 VITALS
OXYGEN SATURATION: 98 % | RESPIRATION RATE: 16 BRPM | BODY MASS INDEX: 30.27 KG/M2 | HEART RATE: 75 BPM | SYSTOLIC BLOOD PRESSURE: 145 MMHG | TEMPERATURE: 98 F | WEIGHT: 205 LBS | DIASTOLIC BLOOD PRESSURE: 76 MMHG

## 2019-11-20 DIAGNOSIS — M48.062 SPINAL STENOSIS, LUMBAR REGION, WITH NEUROGENIC CLAUDICATION: Primary | ICD-10-CM

## 2019-11-20 DIAGNOSIS — R52 PAIN: ICD-10-CM

## 2019-11-20 PROCEDURE — 77003 FLUOROGUIDE FOR SPINE INJECT: CPT

## 2019-11-20 PROCEDURE — 25010000002 METHYLPREDNISOLONE PER 80 MG: Performed by: ANESTHESIOLOGY

## 2019-11-20 PROCEDURE — C1755 CATHETER, INTRASPINAL: HCPCS

## 2019-11-20 PROCEDURE — 0 IOPAMIDOL 41 % SOLUTION: Performed by: ANESTHESIOLOGY

## 2019-11-20 RX ORDER — LIDOCAINE HYDROCHLORIDE 10 MG/ML
1 INJECTION, SOLUTION INFILTRATION; PERINEURAL ONCE AS NEEDED
Status: DISCONTINUED | OUTPATIENT
Start: 2019-11-20 | End: 2019-11-21 | Stop reason: HOSPADM

## 2019-11-20 RX ORDER — SENNOSIDES 8.6 MG
650 CAPSULE ORAL EVERY 8 HOURS PRN
COMMUNITY
End: 2023-03-07 | Stop reason: HOSPADM

## 2019-11-20 RX ORDER — METHYLPREDNISOLONE ACETATE 80 MG/ML
80 INJECTION, SUSPENSION INTRA-ARTICULAR; INTRALESIONAL; INTRAMUSCULAR; SOFT TISSUE ONCE
Status: COMPLETED | OUTPATIENT
Start: 2019-11-20 | End: 2019-11-20

## 2019-11-20 RX ADMIN — METHYLPREDNISOLONE ACETATE 80 MG: 80 INJECTION, SUSPENSION INTRA-ARTICULAR; INTRALESIONAL; INTRAMUSCULAR; SOFT TISSUE at 08:27

## 2019-11-20 RX ADMIN — IOPAMIDOL 10 ML: 408 INJECTION, SOLUTION INTRATHECAL at 08:27

## 2019-11-20 NOTE — ANESTHESIA PROCEDURE NOTES
PAIN Epidural block      Patient reassessed immediately prior to procedure    Patient location during procedure: pain clinic  Start Time: 11/20/2019 8:17 AM  Stop Time: 11/20/2019 8:29 AM  Indication:procedure for pain  Performed By  Anesthesiologist: Dyllan Lugo MD  Preanesthetic Checklist  Completed: patient identified, site marked, surgical consent, pre-op evaluation, timeout performed, risks and benefits discussed and monitors and equipment checked  Additional Notes  Post-Op Diagnosis Codes:     * Spinal stenosis of lumbar region with neurogenic claudication (M48.062)     * Lumbar radiculopathy (M54.16)    The patient was observed in recovery with no evidence of neurological deficits or other problems. All questions were answered. The patient was discharged with appropriate instructions.  Prep:  Pt Position:prone  Sterile Tech:cap, gloves, mask and sterile barrier  Prep:chlorhexidine gluconate and isopropyl alcohol  Monitoring:blood pressure monitoring, continuous pulse oximetry and EKG  Procedure:Sedation: no     Approach:right paramedian  Guidance: fluoroscopy  Location:lumbar  Level:4-5 (Interlaminar)  Needle Type:Tuohy  Needle Gauge:20 G  Aspiration:negative  Medications:  Depomedrol:80 mg  Preservative Free Saline:3mL  Isovue:2mL  Comments:Isovue dye spread was consistent with epidural placement.  Post Assessment:  Dressing:occlusive dressing applied  Pt Tolerance:patient tolerated the procedure well with no apparent complications  Complications:no

## 2019-11-20 NOTE — H&P
[facility]    History and Physical    Patient Name: Albert Yadav  :  1940  MRN:  [id number]  Date of Admission: 2019    Subjective     Patient is a 79 y.o. male presents with chief complaint of chronic, intermitent, moderate, severe posterior right lower extremity pain.  Onset of symptoms was gradual starting 3 years ago.  Symptoms are associated/aggravated by exercise, lifting or twisting and walking more than a few minutes. Symptoms improve with relaxation, pain medication, lying down and injection.  He is responded favorably to lumbar epidural steroid injections in the past.  He was referred to the pain clinic for a new series of lumbar epidural steroid injections.    The following portions of the patients history were reviewed and updated as appropriate: current medications, allergies, past medical history, past surgical history, past family history, past social history and problem list                Objective     Past Medical History:   Past Medical History:   Diagnosis Date   • Broken bones     arm, collar bone, ankle   • Hyperlipidemia    • Hypertension    • Low back pain    • Peripheral neuropathy    • Peripheral vascular disease (CMS/HCC)    • Rheumatoid arthritis (CMS/HCC)     rheumatoid arthritis   • Staph infection     2015  [facility] POST SX   • Tendency toward bleeding easily (CMS/HCC)     due to blood thinners     Past Surgical History:   Past Surgical History:   Procedure Laterality Date   • ANGIOPLASTY FEMORAL ARTERY Left 2016    Procedure: ULTRA SOUND ACCESS RIGHT FEMORAL ARTERY, AIF BILATERAL RUNOFF, SELECTIVE CATHETERIZATION LEFT FEMORAL ARTERY.;  Surgeon: Errol Michael MD;  Location: [facility] OR ;  Service:    • ARTERIOVENOUS FISTULA/SHUNT SURGERY Left 2016    Procedure: LEFT ILEO-FEMORAL GORTEX GRAFT AND LEFT LEG ARERIOGRAM ;  Surgeon: Errol Michael MD;  Location: [facility] OR ;  Service:    • CHOLECYSTECTOMY     • EPIDURAL BLOCK     •  EYE SURGERY Left     as child   • FRACTURE SURGERY Left     arm   • ILIAC ARTERY STENT     • KNEE SURGERY Left     ORIF   • ORIF ANKLE FRACTURE Left      Family History:   Family History   Problem Relation Age of Onset   • Diabetes Mother    • Cancer Father    • Heart disease Maternal Grandmother    • Heart disease Maternal Grandfather    • Heart disease Paternal Grandmother    • Heart disease Paternal Grandfather      Social History:   Social History     Tobacco Use   • Smoking status: Former Smoker     Last attempt to quit: 1989     Years since quittin.4   • Smokeless tobacco: Never Used   Substance Use Topics   • Alcohol use: Yes     Comment: 1 drink per day at most   • Drug use: No       Vital Signs Range for the last 24 hours  Temperature: Temp:  [36.7 °C (98 °F)] 36.7 °C (98 °F)   Temp Source: Temp src: Oral   BP: BP: (129)/(68) 129/68   Pulse: Heart Rate:  [73] 73   Respirations: Resp:  [16] 16   SPO2: SpO2:  [99 %] 99 %   O2 Amount (l/min):     O2 Devices Device (Oxygen Therapy): room air   Weight: Weight:  [93 kg (205 lb)] 93 kg (205 lb)     Flowsheet Rows      First Filed Value   Admission Height  --   Admission Weight  93 kg (205 lb) Documented at 2019 0801          --------------------------------------------------------------------------------    Current Outpatient Medications   Medication Sig Dispense Refill   • acetaminophen (TYLENOL) 650 MG 8 hr tablet Take 650 mg by mouth Every 8 (Eight) Hours As Needed for Mild Pain .     • atorvastatin (LIPITOR) 40 MG tablet Take 40 mg by mouth Every Evening.     • cetirizine (ZyrTEC) 10 MG tablet Take 10 mg by mouth Every Morning.     • folic acid (FOLVITE) 1 MG tablet Take 1 mg by mouth Daily.     • hydrochlorothiazide (HYDRODIURIL) 50 MG tablet Take 50 mg by mouth Every Morning.     • lisinopril (PRINIVIL,ZESTRIL) 40 MG tablet Take 40 mg by mouth Every Evening.     • melatonin 5 MG tablet tablet Take 5 mg by mouth Every Night.     • methotrexate  2.5 MG tablet Take 2.5 mg by mouth 1 (One) Time Per Week. wednesdays     • ONE TOUCH ULTRA TEST test strip   0   • Sennosides (SENNA LAX PO) Take 2 tablets by mouth 2 (Two) Times a Day.     • traZODone (DESYREL) 50 MG tablet Take 50 mg by mouth Every Night.     • clopidogrel (PLAVIX) 75 MG tablet Take 75 mg by mouth Every Morning. TO STOP 3 DAYS PTS       No current facility-administered medications for this encounter.        --------------------------------------------------------------------------------  Assessment/Plan      Anesthesia Evaluation     Patient summary reviewed and Nursing notes reviewed   NPO Solid Status: > 8 hours  NPO Liquid Status: > 2 hours    Pain impairs ability to perform ADLs: Dressing, Working and Exercise/Activity  Modalities previously tried to control pain with limited effectiveness within the last 4-6 weeks: OTC medications, Prescription medications and Other     Airway   Mallampati: II  TM distance: >3 FB  Neck ROM: full  Dental - normal exam     Pulmonary - negative pulmonary ROS and normal exam    breath sounds clear to auscultation  Cardiovascular - normal exam    Rhythm: regular  Rate: normal    (+) hypertension, PVD, hyperlipidemia,  carotid artery disease carotid bilateral  (-) angina, orthopnea, PND, BEAULIEU      Neuro/Psych  (+) numbness,     GI/Hepatic/Renal/Endo - negative ROS     Musculoskeletal     Abdominal  - normal exam    Abdomen: soft.  Bowel sounds: normal.   Substance History - negative use     OB/GYN negative ob/gyn ROS         Other   arthritis,                 Diagnosis and Plan    Treatment Plan  ASA 3      Procedures: Lumbar Epidural Steroid Injection(LESI) (Target area will be L4-5), With fluoroscopy,       Anesthetic plan and risks discussed with patient.        Alternative management options include physical therapy, medical management with nonsteroidal anti-inflammatory medications or narcotics, chiropractic manipulation and surgical  intervention.    Diagnosis     * Spinal stenosis of lumbar region with neurogenic claudication [M48.062]     * Lumbar radiculopathy [M54.16]

## 2020-06-09 ENCOUNTER — HOSPITAL ENCOUNTER (OUTPATIENT)
Dept: GENERAL RADIOLOGY | Facility: HOSPITAL | Age: 80
Discharge: HOME | End: 2020-06-09

## 2020-06-09 ENCOUNTER — ANESTHESIA (OUTPATIENT)
Dept: PAIN MEDICINE | Facility: HOSPITAL | Age: 80
End: 2020-06-09

## 2020-06-09 ENCOUNTER — ANESTHESIA EVENT (OUTPATIENT)
Dept: PAIN MEDICINE | Facility: HOSPITAL | Age: 80
End: 2020-06-09

## 2020-06-09 ENCOUNTER — HOSPITAL ENCOUNTER (OUTPATIENT)
Dept: PAIN MEDICINE | Facility: HOSPITAL | Age: 80
Discharge: HOME | End: 2020-06-09
Admitting: ANESTHESIOLOGY

## 2020-06-09 VITALS
OXYGEN SATURATION: 97 % | TEMPERATURE: 97.3 F | HEART RATE: 63 BPM | RESPIRATION RATE: 14 BRPM | BODY MASS INDEX: 30.72 KG/M2 | SYSTOLIC BLOOD PRESSURE: 124 MMHG | WEIGHT: 208 LBS | DIASTOLIC BLOOD PRESSURE: 63 MMHG

## 2020-06-09 DIAGNOSIS — R52 PAIN: ICD-10-CM

## 2020-06-09 DIAGNOSIS — M48.062 SPINAL STENOSIS, LUMBAR REGION, WITH NEUROGENIC CLAUDICATION: Primary | ICD-10-CM

## 2020-06-09 PROCEDURE — 77003 FLUOROGUIDE FOR SPINE INJECT: CPT

## 2020-06-09 PROCEDURE — 25010000002 METHYLPREDNISOLONE PER 80 MG: Performed by: ANESTHESIOLOGY

## 2020-06-09 PROCEDURE — C1755 CATHETER, INTRASPINAL: HCPCS

## 2020-06-09 PROCEDURE — 0 IOPAMIDOL 41 % SOLUTION: Performed by: ANESTHESIOLOGY

## 2020-06-09 RX ORDER — METHYLPREDNISOLONE ACETATE 80 MG/ML
80 INJECTION, SUSPENSION INTRA-ARTICULAR; INTRALESIONAL; INTRAMUSCULAR; SOFT TISSUE ONCE
Status: COMPLETED | OUTPATIENT
Start: 2020-06-09 | End: 2020-06-09

## 2020-06-09 RX ADMIN — METHYLPREDNISOLONE ACETATE 80 MG: 80 INJECTION, SUSPENSION INTRA-ARTICULAR; INTRALESIONAL; INTRAMUSCULAR; SOFT TISSUE at 08:51

## 2020-06-09 RX ADMIN — IOPAMIDOL 10 ML: 408 INJECTION, SOLUTION INTRATHECAL at 08:51

## 2020-06-09 NOTE — ANESTHESIA PROCEDURE NOTES
PAIN Epidural block      Patient reassessed immediately prior to procedure    Patient location during procedure: pain clinic  Start Time: 6/9/2020 8:37 AM  Stop Time: 6/9/2020 8:54 AM  Indication:procedure for pain  Performed By  Anesthesiologist: Dyllan Lugo MD  Preanesthetic Checklist  Completed: patient identified, site marked, surgical consent, pre-op evaluation, timeout performed, risks and benefits discussed and monitors and equipment checked  Additional Notes  Post-Op Diagnosis Codes:     * Spinal stenosis of lumbar region with neurogenic claudication (M48.062)     * Lumbar radiculopathy (M54.16)    The patient was observed in recovery with no evidence of neurological deficits or other problems. All questions were answered. The patient was discharged with appropriate instructions.  Prep:  Pt Position:prone  Sterile Tech:cap, gloves, mask and sterile barrier  Prep:chlorhexidine gluconate and isopropyl alcohol  Monitoring:blood pressure monitoring, continuous pulse oximetry and EKG  Procedure:Sedation: no     Approach:right paramedian  Guidance: fluoroscopy  Location:lumbar  Level:4-5 (Interlaminar)  Needle Type:Tuohy  Needle Gauge:20 G  Aspiration:negative  Medications:  Depomedrol:80 mg  Preservative Free Saline:3mL  Isovue:2mL  Comments:Isovue dye spread was consistent with epidural placement.  Post Assessment:  Dressing:occlusive dressing applied  Pt Tolerance:patient tolerated the procedure well with no apparent complications  Complications:no

## 2020-06-09 NOTE — H&P
[facility]    History and Physical    Patient Name: Albert Yadav  :  1940  MRN:  [id number]  Date of Admission: 2020    Subjective     Patient is an 80-year-old male who has pain which originates in his lower back and radiates to his right lower extremity.  He also complains of some intermittent numbness and tingling in his right lower extremity.  He reports approximate 95% relief following his last lumbar epidural steroid injection.  Today he rates his pain as a 3/10.    The following portions of the patients history were reviewed and updated as appropriate: current medications, allergies, past medical history, past surgical history, past family history, past social history and problem list                Objective     Past Medical History:   Past Medical History:   Diagnosis Date   • Broken bones     arm, collar bone, ankle   • Hyperlipidemia    • Hypertension    • Low back pain    • Peripheral neuropathy    • Peripheral vascular disease (CMS/HCC)    • Rheumatoid arthritis (CMS/HCC)     rheumatoid arthritis   • Staph infection     2015  [facility] POST SX   • Tendency toward bleeding easily (CMS/HCC)     due to blood thinners     Past Surgical History:   Past Surgical History:   Procedure Laterality Date   • ANGIOPLASTY FEMORAL ARTERY Left 2016    Procedure: ULTRA SOUND ACCESS RIGHT FEMORAL ARTERY, AIF BILATERAL RUNOFF, SELECTIVE CATHETERIZATION LEFT FEMORAL ARTERY.;  Surgeon: Errol Michael MD;  Location: [facility] OR ;  Service:    • ARTERIOVENOUS FISTULA/SHUNT SURGERY Left 2016    Procedure: LEFT ILEO-FEMORAL GORTEX GRAFT AND LEFT LEG ARERIOGRAM ;  Surgeon: Errol Michael MD;  Location: [facility] OR ;  Service:    • CHOLECYSTECTOMY     • EPIDURAL BLOCK     • EYE SURGERY Left     as child   • FRACTURE SURGERY Left     arm   • ILIAC ARTERY STENT     • KNEE SURGERY Left     ORIF   • ORIF ANKLE FRACTURE Left      Family History:   Family History   Problem  Relation Age of Onset   • Diabetes Mother    • Cancer Father    • Heart disease Maternal Grandmother    • Heart disease Maternal Grandfather    • Heart disease Paternal Grandmother    • Heart disease Paternal Grandfather      Social History:   Social History     Tobacco Use   • Smoking status: Former Smoker     Last attempt to quit: 1989     Years since quittin.0   • Smokeless tobacco: Never Used   Substance Use Topics   • Alcohol use: Yes     Comment: 1 drink per day at most   • Drug use: No       Vital Signs Range for the last 24 hours  Temperature: Temp:  [36.3 °C (97.3 °F)] 36.3 °C (97.3 °F)   Temp Source: Temp src: Infrared   BP: BP: (140)/(76) 140/76   Pulse: Heart Rate:  [67] 67   Respirations: Resp:  [16] 16   SPO2: SpO2:  [97 %] 97 %   O2 Amount (l/min):     O2 Devices     Weight: Weight:  [94.3 kg (208 lb)] 94.3 kg (208 lb)     Flowsheet Rows      First Filed Value   Admission Height  --   Admission Weight  94.3 kg (208 lb) Documented at 2020 0812          --------------------------------------------------------------------------------    Current Outpatient Medications   Medication Sig Dispense Refill   • acetaminophen (TYLENOL) 650 MG 8 hr tablet Take 650 mg by mouth Every 8 (Eight) Hours As Needed for Mild Pain .     • atorvastatin (LIPITOR) 40 MG tablet Take 40 mg by mouth Every Evening.     • cetirizine (ZyrTEC) 10 MG tablet Take 10 mg by mouth Every Morning.     • folic acid (FOLVITE) 1 MG tablet Take 1 mg by mouth Daily.     • hydrochlorothiazide (HYDRODIURIL) 50 MG tablet Take 50 mg by mouth Every Morning.     • lisinopril (PRINIVIL,ZESTRIL) 40 MG tablet Take 40 mg by mouth Every Evening.     • melatonin 5 MG tablet tablet Take 5 mg by mouth Every Night.     • methotrexate 2.5 MG tablet Take 2.5 mg by mouth 1 (One) Time Per Week.      • ONE TOUCH ULTRA TEST test strip   0   • Sennosides (SENNA LAX PO) Take 2 tablets by mouth 2 (Two) Times a Day.     • traZODone (DESYREL) 50  MG tablet Take 50 mg by mouth Every Night.     • clopidogrel (PLAVIX) 75 MG tablet Take 75 mg by mouth Every Morning. TO STOP 3 DAYS PTS       No current facility-administered medications for this encounter.        --------------------------------------------------------------------------------  Assessment/Plan      Anesthesia Evaluation     Patient summary reviewed and Nursing notes reviewed   NPO Solid Status: > 8 hours  NPO Liquid Status: > 2 hours           Airway   Mallampati: II  TM distance: >3 FB  Neck ROM: full  Dental - normal exam     Pulmonary - negative pulmonary ROS and normal exam    breath sounds clear to auscultation  Cardiovascular - normal exam    Rhythm: regular  Rate: normal    (+) hypertension, PVD, hyperlipidemia,  carotid artery disease carotid bilateral  (-) angina, orthopnea, PND, BEAULIEU      Neuro/Psych  (+) numbness,     GI/Hepatic/Renal/Endo - negative ROS     Musculoskeletal     Abdominal  - normal exam    Abdomen: soft.  Bowel sounds: normal.   Substance History - negative use     OB/GYN negative ob/gyn ROS         Other   arthritis,                 Diagnosis and Plan    Treatment Plan  ASA 3   Patient has had previous injection/procedure with % improvement.   Procedures: Lumbar Epidural Steroid Injection(LESI), With fluoroscopy,       Anesthetic plan and risks discussed with patient.          Diagnosis     * Spinal stenosis of lumbar region with neurogenic claudication [M48.062]     * Lumbar radiculopathy [M54.16]

## 2020-06-09 NOTE — NURSING NOTE
"Patient found on floor walking out of department.  He states \"the soles of his shoes made him trip\".  No injuries reported by patient or assessed by staff.  Assisted off floor to wheelchair by this nurse, Hattie Hendrix RN, Lo Koehler RN.  Patient did not want to go to the emergency room.  Dr. Lugo and  notified.  Patient will call department if he has any problems.  Out to car by wheelchair with Hattie Hendrix RN.  "

## 2020-08-21 ENCOUNTER — TRANSCRIBE ORDERS (OUTPATIENT)
Dept: ADMINISTRATIVE | Facility: HOSPITAL | Age: 80
End: 2020-08-21

## 2020-08-21 DIAGNOSIS — I65.23 CAROTID STENOSIS, BILATERAL: Primary | ICD-10-CM

## 2020-09-02 ENCOUNTER — HOSPITAL ENCOUNTER (OUTPATIENT)
Dept: CT IMAGING | Facility: HOSPITAL | Age: 80
Discharge: HOME | End: 2020-09-02
Admitting: SURGERY

## 2020-09-02 DIAGNOSIS — I65.23 CAROTID STENOSIS, BILATERAL: ICD-10-CM

## 2020-09-02 LAB — CREAT BLDA-MCNC: 1.1 MG/DL (ref 0.6–1.3)

## 2020-09-02 PROCEDURE — 82565 ASSAY OF CREATININE: CPT

## 2020-09-02 PROCEDURE — 0 IOPAMIDOL PER 1 ML: Performed by: SURGERY

## 2020-09-02 PROCEDURE — 70496 CT ANGIOGRAPHY HEAD: CPT

## 2020-09-02 PROCEDURE — 70498 CT ANGIOGRAPHY NECK: CPT

## 2020-09-02 RX ADMIN — IOPAMIDOL 95 ML: 755 INJECTION, SOLUTION INTRAVENOUS at 07:14

## 2020-09-23 ENCOUNTER — TRANSCRIBE ORDERS (OUTPATIENT)
Dept: PREADMISSION TESTING | Facility: HOSPITAL | Age: 80
End: 2020-09-23

## 2020-09-23 DIAGNOSIS — Z01.818 OTHER SPECIFIED PRE-OPERATIVE EXAMINATION: Primary | ICD-10-CM

## 2020-09-25 ENCOUNTER — APPOINTMENT (OUTPATIENT)
Dept: PREADMISSION TESTING | Facility: HOSPITAL | Age: 80
End: 2020-09-25

## 2020-09-25 VITALS
WEIGHT: 205 LBS | RESPIRATION RATE: 18 BRPM | HEART RATE: 67 BPM | TEMPERATURE: 98.4 F | HEIGHT: 69 IN | SYSTOLIC BLOOD PRESSURE: 151 MMHG | DIASTOLIC BLOOD PRESSURE: 74 MMHG | BODY MASS INDEX: 30.36 KG/M2 | OXYGEN SATURATION: 97 %

## 2020-09-25 LAB
ANION GAP SERPL CALCULATED.3IONS-SCNC: 10.8 MMOL/L (ref 5–15)
BUN SERPL-MCNC: 12 MG/DL (ref 8–23)
BUN/CREAT SERPL: 10.2 (ref 7–25)
CALCIUM SPEC-SCNC: 8.4 MG/DL (ref 8.6–10.5)
CHLORIDE SERPL-SCNC: 102 MMOL/L (ref 98–107)
CO2 SERPL-SCNC: 24.2 MMOL/L (ref 22–29)
CREAT SERPL-MCNC: 1.18 MG/DL (ref 0.76–1.27)
DEPRECATED RDW RBC AUTO: 44.4 FL (ref 37–54)
ERYTHROCYTE [DISTWIDTH] IN BLOOD BY AUTOMATED COUNT: 12.6 % (ref 12.3–15.4)
GFR SERPL CREATININE-BSD FRML MDRD: 59 ML/MIN/1.73
GLUCOSE SERPL-MCNC: 133 MG/DL (ref 65–99)
HCT VFR BLD AUTO: 40.8 % (ref 37.5–51)
HGB BLD-MCNC: 13.9 G/DL (ref 13–17.7)
MCH RBC QN AUTO: 33.3 PG (ref 26.6–33)
MCHC RBC AUTO-ENTMCNC: 34.1 G/DL (ref 31.5–35.7)
MCV RBC AUTO: 97.8 FL (ref 79–97)
PLATELET # BLD AUTO: 190 10*3/MM3 (ref 140–450)
PMV BLD AUTO: 10.5 FL (ref 6–12)
POTASSIUM SERPL-SCNC: 3.8 MMOL/L (ref 3.5–5.2)
RBC # BLD AUTO: 4.17 10*6/MM3 (ref 4.14–5.8)
SODIUM SERPL-SCNC: 137 MMOL/L (ref 136–145)
WBC # BLD AUTO: 6.66 10*3/MM3 (ref 3.4–10.8)

## 2020-09-25 PROCEDURE — 93010 ELECTROCARDIOGRAM REPORT: CPT | Performed by: INTERNAL MEDICINE

## 2020-09-25 PROCEDURE — 80048 BASIC METABOLIC PNL TOTAL CA: CPT | Performed by: SURGERY

## 2020-09-25 PROCEDURE — 93005 ELECTROCARDIOGRAM TRACING: CPT

## 2020-09-25 PROCEDURE — 85027 COMPLETE CBC AUTOMATED: CPT | Performed by: SURGERY

## 2020-09-25 PROCEDURE — 36415 COLL VENOUS BLD VENIPUNCTURE: CPT

## 2020-09-25 NOTE — DISCHARGE INSTRUCTIONS
ARRIVE DAY OF SURGERY AT  5:30 AM TO MAIN SURGERY        Take the following medications the morning of surgery: NONE      If you are on prescription narcotic pain medication to control your pain you may also take that medication the morning of surgery.    General Instructions:  • Do not eat solid food after midnight the night before surgery.  • You may drink clear liquids day of surgery but must stop at least one hour before your hospital arrival time.  • It is beneficial for you to have a clear drink that contains carbohydrates the day of surgery.  We suggest a 12 to 20 ounce bottle of Gatorade or Powerade for non-diabetic patients or a 12 to 20 ounce bottle of G2 or Powerade Zero for diabetic patients. (Pediatric patients, are not advised to drink a 12 to 20 ounce carbohydrate drink)    Clear liquids are liquids you can see through.  Nothing red in color.     Plain water                               Sports drinks  Sodas                                   Gelatin (Jell-O)  Fruit juices without pulp such as white grape juice and apple juice  Popsicles that contain no fruit or yogurt  Tea or coffee (no cream or milk added)  Gatorade / Powerade  G2 / Powerade Zero    • Infants may have breast milk up to four hours before surgery.  • Infants drinking formula may drink formula up to six hours before surgery.   • Patients who avoid smoking, chewing tobacco and alcohol for 4 weeks prior to surgery have a reduced risk of post-operative complications.  Quit smoking as many days before surgery as you can.  • Do not smoke, use chewing tobacco or drink alcohol the day of surgery.   • If applicable bring your C-PAP/ BI-PAP machine.  • Bring any papers given to you in the doctor’s office.  • Wear clean comfortable clothes.  • Do not wear contact lenses, false eyelashes or make-up.  Bring a case for your glasses.   • Bring crutches or walker if applicable.  • Remove all piercings.  Leave jewelry and any other valuables at  home.  • Hair extensions with metal clips must be removed prior to surgery.  • The Pre-Admission Testing nurse will instruct you to bring medications if unable to obtain an accurate list in Pre-Admission Testing.            Preventing a Surgical Site Infection:  • For 2 to 3 days before surgery, avoid shaving with a razor because the razor can irritate skin and make it easier to develop an infection.    • Any areas of open skin can increase the risk of a post-operative wound infection by allowing bacteria to enter and travel throughout the body.  Notify your surgeon if you have any skin wounds / rashes even if it is not near the expected surgical site.  The area will need assessed to determine if surgery should be delayed until it is healed.  • The night prior to surgery shower using a fresh bar of anti-bacterial soap (such as Dial) and clean washcloth.  Sleep in a clean bed with clean clothing.  Do not allow pets to sleep with you.  • Shower on the morning of surgery using a fresh bar of anti-bacterial soap (such as Dial) and clean washcloth.  Dry with a clean towel and dress in clean clothing.  • Ask your surgeon if you will be receiving antibiotics prior to surgery.  • Make sure you, your family, and all healthcare providers clean their hands with soap and water or an alcohol based hand  before caring for you or your wound.    Day of surgery:  Your arrival time is approximately two hours before your scheduled surgery time.  Upon arrival, a Pre-op nurse and Anesthesiologist will review your health history, obtain vital signs, and answer questions you may have.  The only belongings needed at this time will be a list of your home medications and if applicable your C-PAP/BI-PAP machine.  If you are staying overnight your family can leave the rest of your belongings in the car and bring them to your room later.  A Pre-op nurse will start an IV and you may receive medication in preparation for surgery,  including something to help you relax.  Your family will be able to see you in the Pre-op area.  Two visitors at a time will be allowed in the Pre-op room.  While you are in surgery your family should notify the waiting room  if they leave the waiting room area and provide a contact phone number.    Please be aware that surgery does come with discomfort.  We want to make every effort to control your discomfort so please discuss any uncontrolled symptoms with your nurse.   Your doctor will most likely have prescribed pain medications.      If you are going home after surgery you will receive individualized written care instructions before being discharged.  A responsible adult must drive you to and from the hospital on the day of your surgery and stay with you for 24 hours.    If you are staying overnight following surgery, you will be transported to your hospital room following the recovery period.  [facility] has all private rooms.    If you have any questions please call Pre-Admission Testing at [phone].  Deductibles and co-payments are collected on the day of service. Please be prepared to pay the required co-pay, deductible or deposit on the day of service as defined by your plan.    Patient Education for Self-Quarantine Process    Following your COVID testing, we strongly recommend that you do not leave your home after you have been tested for COVID except to get medical care. This includes not going to work, school or to public areas.  If this is not possible for you to do please limit your activities to only required outings.  Be sure to wear a mask when you are with other people, practice social distancing and wash your hands frequently.      The following items provide additional details to keep you safe.  • Wash your hands with soap and water frequently for at least 20 seconds.   • Avoid touching your eyes, nose and mouth with unwashed hands.  • Do not share anything -  utensils, towels, food from the same bowl.   • Have your own utensils, drinking glass, dishes, towels and bedding.   • Do not have visitors.   • Do use FaceTime to stay in touch with family and friends.  • You should stay in a specific room away from others if possible.   • Stay at least 6 feet away from others in the home if you cannot have a dedicated room to yourself.   • Do not snuggle with your pet. While the CDC says there is no evidence that pets can spread COVID-19 or be infected from humans, it is probably best to avoid “petting, snuggling, being kissed or licked and sharing food (during self-quarantine)”, according to the CDC.   • Sanitize household surfaces daily. Include all high touch areas (door handles, light switches, phones, countertops, etc.)  • Do not share a bathroom with others, if possible.   • Wear a mask around others in your home if you are unable to stay in a separate room or 6 feet apart. If  you are unable to wear a mask, have your family member wear a mask if they must be within 6 feet of you.   Call your surgeon immediately if you experience any of the following symptoms:  • Sore Throat  • Shortness of Breath or difficulty breathing  • Cough  • Chills  • Body soreness or muscle pain  • Headache  • Fever  • New loss of taste or smell  • Do not arrive for your surgery ill.  Your procedure will need to be rescheduled to another time.  You will need to call your physician before the day of surgery to avoid any unnecessary exposure to hospital staff as well as other patients.    CHLORHEXIDINE CLOTH INSTRUCTIONS  The morning of surgery follow these instructions using the Chlorhexidine cloths you've been given.  These steps reduce bacteria on the body.  Do not use the cloths near your eyes, ears mouth, genitalia or on open wounds.  Throw the cloths away after use but do not try to flush them down a toilet.      • Open and remove one cloth at a time from the package.    • Leave the cloth  unfolded and begin the bathing.  • Massage the skin with the cloths using gentle pressure to remove bacteria.  Do not scrub harshly.   • Follow the steps below with one 2% CHG cloth per area (6 total cloths).  • One cloth for neck, shoulders and chest.  • One cloth for both arms, hands, fingers and underarms (do underarms last).  • One cloth for the abdomen followed by groin.  • One cloth for right leg and foot including between the toes.  • One cloth for left leg and foot including between the toes.  • The last cloth is to be used for the back of the neck, back and buttocks.    Allow the CHG to air dry 3 minutes on the skin which will give it time to work and decrease the chance of irritation.  The skin may feel sticky until it is dry.  Do not rinse with water or any other liquid or you will lose the beneficial effects of the CHG.  If mild skin irritation occurs, do rinse the skin to remove the CHG.  Report this to the nurse at time of admission.  Do not apply lotions, creams, ointments, deodorants or perfumes after using the clothes. Dress in clean clothes before coming to the hospital.

## 2020-09-26 ENCOUNTER — APPOINTMENT (OUTPATIENT)
Dept: LAB | Facility: HOSPITAL | Age: 80
End: 2020-09-26

## 2020-09-26 ENCOUNTER — LAB (OUTPATIENT)
Dept: LAB | Facility: HOSPITAL | Age: 80
End: 2020-09-26

## 2020-09-26 DIAGNOSIS — Z01.818 OTHER SPECIFIED PRE-OPERATIVE EXAMINATION: ICD-10-CM

## 2020-09-26 PROCEDURE — C9803 HOPD COVID-19 SPEC COLLECT: HCPCS

## 2020-09-26 PROCEDURE — U0004 COV-19 TEST NON-CDC HGH THRU: HCPCS

## 2020-09-28 LAB — SARS-COV-2 RNA RESP QL NAA+PROBE: NOT DETECTED

## 2020-09-29 ENCOUNTER — ANESTHESIA EVENT (OUTPATIENT)
Dept: PERIOP | Facility: HOSPITAL | Age: 80
End: 2020-09-29

## 2020-09-29 ENCOUNTER — APPOINTMENT (OUTPATIENT)
Dept: CARDIOLOGY | Facility: HOSPITAL | Age: 80
End: 2020-09-29

## 2020-09-29 ENCOUNTER — HOSPITAL ENCOUNTER (INPATIENT)
Facility: HOSPITAL | Age: 80
LOS: 1 days | Discharge: HOME | End: 2020-09-30
Attending: SURGERY | Admitting: SURGERY

## 2020-09-29 ENCOUNTER — ANESTHESIA (OUTPATIENT)
Dept: PERIOP | Facility: HOSPITAL | Age: 80
End: 2020-09-29

## 2020-09-29 PROBLEM — I65.23 CAROTID STENOSIS, ASYMPTOMATIC, BILATERAL: Status: ACTIVE | Noted: 2020-09-29

## 2020-09-29 LAB
BH CV XLRA MEAS RIGHT DIST CCA EDV: 10.6 CM/SEC
BH CV XLRA MEAS RIGHT DIST CCA PSV: 109 CM/SEC
BH CV XLRA MEAS RIGHT PROX ECA EDV: 7.37 CM/SEC
BH CV XLRA MEAS RIGHT PROX ECA PSV: 122 CM/SEC
BH CV XLRA MEAS RIGHT PROX ICA EDV: 20.5 CM/SEC
BH CV XLRA MEAS RIGHT PROX ICA PSV: 59.5 CM/SEC

## 2020-09-29 PROCEDURE — 25010000003 CEFAZOLIN IN DEXTROSE 2-4 GM/100ML-% SOLUTION: Performed by: SURGERY

## 2020-09-29 PROCEDURE — C1768 GRAFT, VASCULAR: HCPCS | Performed by: SURGERY

## 2020-09-29 PROCEDURE — 25010000002 HEPARIN (PORCINE) PER 1000 UNITS: Performed by: SURGERY

## 2020-09-29 PROCEDURE — 93010 ELECTROCARDIOGRAM REPORT: CPT | Performed by: INTERNAL MEDICINE

## 2020-09-29 PROCEDURE — 03UK0JZ SUPPLEMENT RIGHT INTERNAL CAROTID ARTERY WITH SYNTHETIC SUBSTITUTE, OPEN APPROACH: ICD-10-PCS | Performed by: SURGERY

## 2020-09-29 PROCEDURE — 93005 ELECTROCARDIOGRAM TRACING: CPT | Performed by: SURGERY

## 2020-09-29 PROCEDURE — 25010000002 MIDAZOLAM PER 1 MG: Performed by: ANESTHESIOLOGY

## 2020-09-29 PROCEDURE — 25010000002 HEPARIN (PORCINE) PER 1000 UNITS: Performed by: NURSE ANESTHETIST, CERTIFIED REGISTERED

## 2020-09-29 PROCEDURE — 25010000003 CEFAZOLIN PER 500 MG: Performed by: SURGERY

## 2020-09-29 PROCEDURE — 25010000002 NEOSTIGMINE PER 0.5 MG: Performed by: NURSE ANESTHETIST, CERTIFIED REGISTERED

## 2020-09-29 PROCEDURE — 25010000002 PROPOFOL 10 MG/ML EMULSION: Performed by: NURSE ANESTHETIST, CERTIFIED REGISTERED

## 2020-09-29 PROCEDURE — 93882 EXTRACRANIAL UNI/LTD STUDY: CPT

## 2020-09-29 PROCEDURE — 25010000002 FENTANYL CITRATE (PF) 100 MCG/2ML SOLUTION: Performed by: NURSE ANESTHETIST, CERTIFIED REGISTERED

## 2020-09-29 PROCEDURE — 25010000002 HYDROMORPHONE PER 4 MG: Performed by: NURSE ANESTHETIST, CERTIFIED REGISTERED

## 2020-09-29 PROCEDURE — 03CK0ZZ EXTIRPATION OF MATTER FROM RIGHT INTERNAL CAROTID ARTERY, OPEN APPROACH: ICD-10-PCS | Performed by: SURGERY

## 2020-09-29 PROCEDURE — 25010000002 ONDANSETRON PER 1 MG: Performed by: NURSE ANESTHETIST, CERTIFIED REGISTERED

## 2020-09-29 PROCEDURE — 25010000002 PROTAMINE SULFATE PER 10 MG: Performed by: NURSE ANESTHETIST, CERTIFIED REGISTERED

## 2020-09-29 PROCEDURE — 25010000002 PHENYLEPHRINE PER 1 ML: Performed by: NURSE ANESTHETIST, CERTIFIED REGISTERED

## 2020-09-29 PROCEDURE — 85347 COAGULATION TIME ACTIVATED: CPT

## 2020-09-29 PROCEDURE — 25010000002 PHENYLEPHRINE 10 MG/ML SOLUTION 5 ML VIAL: Performed by: SURGERY

## 2020-09-29 PROCEDURE — 25010000002 FENTANYL CITRATE (PF) 100 MCG/2ML SOLUTION: Performed by: ANESTHESIOLOGY

## 2020-09-29 PROCEDURE — 25010000002 DEXAMETHASONE PER 1 MG: Performed by: NURSE ANESTHETIST, CERTIFIED REGISTERED

## 2020-09-29 DEVICE — FLOSEAL HEMOSTATIC MATRIX, 5ML
Type: IMPLANTABLE DEVICE | Site: NECK | Status: FUNCTIONAL
Brand: FLOSEAL HEMOSTATIC MATRIX

## 2020-09-29 DEVICE — LIGACLIP MCA MULTIPLE CLIP APPLIERS, 20 SMALL CLIPS
Type: IMPLANTABLE DEVICE | Site: NECK | Status: FUNCTIONAL
Brand: LIGACLIP

## 2020-09-29 DEVICE — PTCH VASC XENOSURE PLS BIO 0.8X8CM: Type: IMPLANTABLE DEVICE | Site: CAROTID | Status: FUNCTIONAL

## 2020-09-29 RX ORDER — ATORVASTATIN CALCIUM 20 MG/1
40 TABLET, FILM COATED ORAL EVERY EVENING
Status: DISCONTINUED | OUTPATIENT
Start: 2020-09-29 | End: 2020-09-30 | Stop reason: HOSPADM

## 2020-09-29 RX ORDER — DEXAMETHASONE SODIUM PHOSPHATE 10 MG/ML
INJECTION INTRAMUSCULAR; INTRAVENOUS AS NEEDED
Status: DISCONTINUED | OUTPATIENT
Start: 2020-09-29 | End: 2020-09-29 | Stop reason: SURG

## 2020-09-29 RX ORDER — CLOPIDOGREL BISULFATE 75 MG/1
75 TABLET ORAL EVERY MORNING
Status: DISCONTINUED | OUTPATIENT
Start: 2020-09-30 | End: 2020-09-30 | Stop reason: HOSPADM

## 2020-09-29 RX ORDER — NALOXONE HCL 0.4 MG/ML
0.4 VIAL (ML) INJECTION
Status: DISCONTINUED | OUTPATIENT
Start: 2020-09-29 | End: 2020-09-30

## 2020-09-29 RX ORDER — CEFAZOLIN SODIUM 2 G/100ML
2 INJECTION, SOLUTION INTRAVENOUS EVERY 8 HOURS
Status: COMPLETED | OUTPATIENT
Start: 2020-09-29 | End: 2020-09-30

## 2020-09-29 RX ORDER — HEPARIN SODIUM 1000 [USP'U]/ML
INJECTION, SOLUTION INTRAVENOUS; SUBCUTANEOUS AS NEEDED
Status: DISCONTINUED | OUTPATIENT
Start: 2020-09-29 | End: 2020-09-29 | Stop reason: SURG

## 2020-09-29 RX ORDER — LIDOCAINE HYDROCHLORIDE 10 MG/ML
INJECTION, SOLUTION EPIDURAL; INFILTRATION; INTRACAUDAL; PERINEURAL AS NEEDED
Status: DISCONTINUED | OUTPATIENT
Start: 2020-09-29 | End: 2020-09-29 | Stop reason: HOSPADM

## 2020-09-29 RX ORDER — MAGNESIUM HYDROXIDE 1200 MG/15ML
LIQUID ORAL AS NEEDED
Status: DISCONTINUED | OUTPATIENT
Start: 2020-09-29 | End: 2020-09-29 | Stop reason: HOSPADM

## 2020-09-29 RX ORDER — FOLIC ACID 1 MG/1
1 TABLET ORAL DAILY
Status: DISCONTINUED | OUTPATIENT
Start: 2020-09-29 | End: 2020-09-30 | Stop reason: HOSPADM

## 2020-09-29 RX ORDER — HYDROCODONE BITARTRATE AND ACETAMINOPHEN 7.5; 325 MG/1; MG/1
1 TABLET ORAL ONCE AS NEEDED
Status: COMPLETED | OUTPATIENT
Start: 2020-09-29 | End: 2020-09-29

## 2020-09-29 RX ORDER — HYDROMORPHONE HYDROCHLORIDE 1 MG/ML
0.5 INJECTION, SOLUTION INTRAMUSCULAR; INTRAVENOUS; SUBCUTANEOUS
Status: DISCONTINUED | OUTPATIENT
Start: 2020-09-29 | End: 2020-09-29 | Stop reason: HOSPADM

## 2020-09-29 RX ORDER — FENTANYL CITRATE 50 UG/ML
INJECTION, SOLUTION INTRAMUSCULAR; INTRAVENOUS AS NEEDED
Status: DISCONTINUED | OUTPATIENT
Start: 2020-09-29 | End: 2020-09-29 | Stop reason: SURG

## 2020-09-29 RX ORDER — ONDANSETRON 4 MG/1
4 TABLET, FILM COATED ORAL EVERY 6 HOURS PRN
Status: DISCONTINUED | OUTPATIENT
Start: 2020-09-29 | End: 2020-09-30 | Stop reason: HOSPADM

## 2020-09-29 RX ORDER — SODIUM CHLORIDE 0.9 % (FLUSH) 0.9 %
3-10 SYRINGE (ML) INJECTION AS NEEDED
Status: DISCONTINUED | OUTPATIENT
Start: 2020-09-29 | End: 2020-09-29 | Stop reason: HOSPADM

## 2020-09-29 RX ORDER — CHOLECALCIFEROL (VITAMIN D3) 125 MCG
5 CAPSULE ORAL NIGHTLY
Status: DISCONTINUED | OUTPATIENT
Start: 2020-09-29 | End: 2020-09-30 | Stop reason: HOSPADM

## 2020-09-29 RX ORDER — LABETALOL HYDROCHLORIDE 5 MG/ML
5 INJECTION, SOLUTION INTRAVENOUS
Status: DISCONTINUED | OUTPATIENT
Start: 2020-09-29 | End: 2020-09-29 | Stop reason: HOSPADM

## 2020-09-29 RX ORDER — TRAZODONE HYDROCHLORIDE 50 MG/1
50 TABLET ORAL NIGHTLY
Status: DISCONTINUED | OUTPATIENT
Start: 2020-09-29 | End: 2020-09-30 | Stop reason: HOSPADM

## 2020-09-29 RX ORDER — FENTANYL CITRATE 50 UG/ML
50 INJECTION, SOLUTION INTRAMUSCULAR; INTRAVENOUS
Status: DISCONTINUED | OUTPATIENT
Start: 2020-09-29 | End: 2020-09-29 | Stop reason: HOSPADM

## 2020-09-29 RX ORDER — HYDROMORPHONE HYDROCHLORIDE 1 MG/ML
0.5 INJECTION, SOLUTION INTRAMUSCULAR; INTRAVENOUS; SUBCUTANEOUS
Status: DISCONTINUED | OUTPATIENT
Start: 2020-09-29 | End: 2020-09-30

## 2020-09-29 RX ORDER — CETIRIZINE HYDROCHLORIDE 10 MG/1
10 TABLET ORAL EVERY MORNING
Status: DISCONTINUED | OUTPATIENT
Start: 2020-09-30 | End: 2020-09-30 | Stop reason: HOSPADM

## 2020-09-29 RX ORDER — ONDANSETRON 2 MG/ML
INJECTION INTRAMUSCULAR; INTRAVENOUS AS NEEDED
Status: DISCONTINUED | OUTPATIENT
Start: 2020-09-29 | End: 2020-09-29 | Stop reason: SURG

## 2020-09-29 RX ORDER — ONDANSETRON 2 MG/ML
4 INJECTION INTRAMUSCULAR; INTRAVENOUS EVERY 6 HOURS PRN
Status: DISCONTINUED | OUTPATIENT
Start: 2020-09-29 | End: 2020-09-30 | Stop reason: HOSPADM

## 2020-09-29 RX ORDER — FAMOTIDINE 10 MG/ML
20 INJECTION, SOLUTION INTRAVENOUS ONCE
Status: COMPLETED | OUTPATIENT
Start: 2020-09-29 | End: 2020-09-29

## 2020-09-29 RX ORDER — LISINOPRIL 40 MG/1
40 TABLET ORAL EVERY EVENING
Status: DISCONTINUED | OUTPATIENT
Start: 2020-09-29 | End: 2020-09-30

## 2020-09-29 RX ORDER — LIDOCAINE HYDROCHLORIDE 20 MG/ML
INJECTION, SOLUTION INFILTRATION; PERINEURAL AS NEEDED
Status: DISCONTINUED | OUTPATIENT
Start: 2020-09-29 | End: 2020-09-29 | Stop reason: SURG

## 2020-09-29 RX ORDER — SODIUM CHLORIDE 9 MG/ML
50 INJECTION, SOLUTION INTRAVENOUS CONTINUOUS
Status: DISCONTINUED | OUTPATIENT
Start: 2020-09-29 | End: 2020-09-30 | Stop reason: HOSPADM

## 2020-09-29 RX ORDER — PROPOFOL 10 MG/ML
VIAL (ML) INTRAVENOUS AS NEEDED
Status: DISCONTINUED | OUTPATIENT
Start: 2020-09-29 | End: 2020-09-29 | Stop reason: SURG

## 2020-09-29 RX ORDER — NITROGLYCERIN 0.4 MG/1
0.4 TABLET SUBLINGUAL
Status: DISCONTINUED | OUTPATIENT
Start: 2020-09-29 | End: 2020-09-30 | Stop reason: HOSPADM

## 2020-09-29 RX ORDER — GLYCOPYRROLATE 0.2 MG/ML
INJECTION INTRAMUSCULAR; INTRAVENOUS AS NEEDED
Status: DISCONTINUED | OUTPATIENT
Start: 2020-09-29 | End: 2020-09-29 | Stop reason: SURG

## 2020-09-29 RX ORDER — SODIUM CHLORIDE, SODIUM LACTATE, POTASSIUM CHLORIDE, CALCIUM CHLORIDE 600; 310; 30; 20 MG/100ML; MG/100ML; MG/100ML; MG/100ML
9 INJECTION, SOLUTION INTRAVENOUS CONTINUOUS
Status: DISCONTINUED | OUTPATIENT
Start: 2020-09-29 | End: 2020-09-29

## 2020-09-29 RX ORDER — HYDROCHLOROTHIAZIDE 25 MG/1
50 TABLET ORAL EVERY MORNING
Status: DISCONTINUED | OUTPATIENT
Start: 2020-09-30 | End: 2020-09-30

## 2020-09-29 RX ORDER — LIDOCAINE HYDROCHLORIDE 10 MG/ML
0.5 INJECTION, SOLUTION EPIDURAL; INFILTRATION; INTRACAUDAL; PERINEURAL ONCE AS NEEDED
Status: DISCONTINUED | OUTPATIENT
Start: 2020-09-29 | End: 2020-09-29 | Stop reason: HOSPADM

## 2020-09-29 RX ORDER — MIDAZOLAM HYDROCHLORIDE 1 MG/ML
1 INJECTION INTRAMUSCULAR; INTRAVENOUS
Status: DISCONTINUED | OUTPATIENT
Start: 2020-09-29 | End: 2020-09-29 | Stop reason: HOSPADM

## 2020-09-29 RX ORDER — PROTAMINE SULFATE 10 MG/ML
INJECTION, SOLUTION INTRAVENOUS AS NEEDED
Status: DISCONTINUED | OUTPATIENT
Start: 2020-09-29 | End: 2020-09-29 | Stop reason: SURG

## 2020-09-29 RX ORDER — ACETAMINOPHEN 325 MG/1
650 TABLET ORAL ONCE AS NEEDED
Status: DISCONTINUED | OUTPATIENT
Start: 2020-09-29 | End: 2020-09-29 | Stop reason: HOSPADM

## 2020-09-29 RX ORDER — HYDROCODONE BITARTRATE AND ACETAMINOPHEN 5; 325 MG/1; MG/1
1 TABLET ORAL EVERY 4 HOURS PRN
Status: DISCONTINUED | OUTPATIENT
Start: 2020-09-29 | End: 2020-09-30 | Stop reason: HOSPADM

## 2020-09-29 RX ORDER — FLUMAZENIL 0.1 MG/ML
0.2 INJECTION INTRAVENOUS AS NEEDED
Status: DISCONTINUED | OUTPATIENT
Start: 2020-09-29 | End: 2020-09-29 | Stop reason: HOSPADM

## 2020-09-29 RX ORDER — HYDRALAZINE HYDROCHLORIDE 20 MG/ML
5 INJECTION INTRAMUSCULAR; INTRAVENOUS
Status: DISCONTINUED | OUTPATIENT
Start: 2020-09-29 | End: 2020-09-29 | Stop reason: HOSPADM

## 2020-09-29 RX ORDER — SODIUM CHLORIDE 0.9 % (FLUSH) 0.9 %
3 SYRINGE (ML) INJECTION EVERY 12 HOURS SCHEDULED
Status: DISCONTINUED | OUTPATIENT
Start: 2020-09-29 | End: 2020-09-29 | Stop reason: HOSPADM

## 2020-09-29 RX ORDER — ROCURONIUM BROMIDE 10 MG/ML
INJECTION, SOLUTION INTRAVENOUS AS NEEDED
Status: DISCONTINUED | OUTPATIENT
Start: 2020-09-29 | End: 2020-09-29 | Stop reason: SURG

## 2020-09-29 RX ORDER — PHENYLEPHRINE HYDROCHLORIDE 10 MG/ML
INJECTION INTRAVENOUS
Status: DISPENSED
Start: 2020-09-29 | End: 2020-09-29

## 2020-09-29 RX ORDER — NALOXONE HCL 0.4 MG/ML
0.2 VIAL (ML) INJECTION AS NEEDED
Status: DISCONTINUED | OUTPATIENT
Start: 2020-09-29 | End: 2020-09-29 | Stop reason: HOSPADM

## 2020-09-29 RX ORDER — CEFAZOLIN SODIUM 2 G/100ML
2 INJECTION, SOLUTION INTRAVENOUS ONCE
Status: COMPLETED | OUTPATIENT
Start: 2020-09-29 | End: 2020-09-29

## 2020-09-29 RX ORDER — ONDANSETRON 2 MG/ML
4 INJECTION INTRAMUSCULAR; INTRAVENOUS ONCE AS NEEDED
Status: DISCONTINUED | OUTPATIENT
Start: 2020-09-29 | End: 2020-09-29 | Stop reason: HOSPADM

## 2020-09-29 RX ORDER — ACETAMINOPHEN 325 MG/1
650 TABLET ORAL EVERY 6 HOURS PRN
Status: DISCONTINUED | OUTPATIENT
Start: 2020-09-29 | End: 2020-09-30 | Stop reason: HOSPADM

## 2020-09-29 RX ADMIN — LIDOCAINE HYDROCHLORIDE 60 MG: 20 INJECTION, SOLUTION INFILTRATION; PERINEURAL at 07:37

## 2020-09-29 RX ADMIN — NEOSTIGMINE METHYLSULFATE 3 MG: 1 INJECTION INTRAMUSCULAR; INTRAVENOUS; SUBCUTANEOUS at 09:37

## 2020-09-29 RX ADMIN — PHENYLEPHRINE HYDROCHLORIDE 100 MCG: 10 INJECTION INTRAVENOUS at 09:24

## 2020-09-29 RX ADMIN — FAMOTIDINE 20 MG: 10 INJECTION INTRAVENOUS at 06:51

## 2020-09-29 RX ADMIN — MIDAZOLAM 1 MG: 1 INJECTION INTRAMUSCULAR; INTRAVENOUS at 06:57

## 2020-09-29 RX ADMIN — ROCURONIUM BROMIDE 10 MG: 10 INJECTION INTRAVENOUS at 08:10

## 2020-09-29 RX ADMIN — ONDANSETRON HYDROCHLORIDE 4 MG: 2 SOLUTION INTRAMUSCULAR; INTRAVENOUS at 09:37

## 2020-09-29 RX ADMIN — DEXAMETHASONE SODIUM PHOSPHATE 6 MG: 10 INJECTION INTRAMUSCULAR; INTRAVENOUS at 08:05

## 2020-09-29 RX ADMIN — TRAZODONE HYDROCHLORIDE 50 MG: 50 TABLET ORAL at 21:39

## 2020-09-29 RX ADMIN — PHENYLEPHRINE HYDROCHLORIDE 100 MCG: 10 INJECTION INTRAVENOUS at 08:31

## 2020-09-29 RX ADMIN — PHENYLEPHRINE HYDROCHLORIDE 100 MCG: 10 INJECTION INTRAVENOUS at 08:12

## 2020-09-29 RX ADMIN — FENTANYL CITRATE 25 MCG: 50 INJECTION INTRAMUSCULAR; INTRAVENOUS at 09:53

## 2020-09-29 RX ADMIN — SODIUM CHLORIDE, POTASSIUM CHLORIDE, SODIUM LACTATE AND CALCIUM CHLORIDE: 600; 310; 30; 20 INJECTION, SOLUTION INTRAVENOUS at 09:17

## 2020-09-29 RX ADMIN — FOLIC ACID 1 MG: 1 TABLET ORAL at 21:39

## 2020-09-29 RX ADMIN — FENTANYL CITRATE 25 MCG: 50 INJECTION INTRAMUSCULAR; INTRAVENOUS at 07:37

## 2020-09-29 RX ADMIN — PHENYLEPHRINE HYDROCHLORIDE 100 MCG: 10 INJECTION INTRAVENOUS at 09:06

## 2020-09-29 RX ADMIN — FENTANYL CITRATE 25 MCG: 50 INJECTION INTRAMUSCULAR; INTRAVENOUS at 07:57

## 2020-09-29 RX ADMIN — PROTAMINE SULFATE 40 MG: 10 INJECTION, SOLUTION INTRAVENOUS at 09:20

## 2020-09-29 RX ADMIN — PHENYLEPHRINE HYDROCHLORIDE 100 MCG: 10 INJECTION INTRAVENOUS at 08:16

## 2020-09-29 RX ADMIN — SODIUM CHLORIDE, POTASSIUM CHLORIDE, SODIUM LACTATE AND CALCIUM CHLORIDE 9 ML/HR: 600; 310; 30; 20 INJECTION, SOLUTION INTRAVENOUS at 06:51

## 2020-09-29 RX ADMIN — PHENYLEPHRINE HYDROCHLORIDE 100 MCG: 10 INJECTION INTRAVENOUS at 09:21

## 2020-09-29 RX ADMIN — PHENYLEPHRINE HYDROCHLORIDE 50 MCG: 10 INJECTION INTRAVENOUS at 07:54

## 2020-09-29 RX ADMIN — SODIUM CHLORIDE 75 ML/HR: 9 INJECTION, SOLUTION INTRAVENOUS at 19:03

## 2020-09-29 RX ADMIN — HYDROMORPHONE HYDROCHLORIDE 0.5 MG: 1 INJECTION, SOLUTION INTRAMUSCULAR; INTRAVENOUS; SUBCUTANEOUS at 10:43

## 2020-09-29 RX ADMIN — PHENYLEPHRINE HYDROCHLORIDE 100 MCG: 10 INJECTION INTRAVENOUS at 08:18

## 2020-09-29 RX ADMIN — FENTANYL CITRATE 25 MCG: 50 INJECTION INTRAMUSCULAR; INTRAVENOUS at 08:05

## 2020-09-29 RX ADMIN — ROCURONIUM BROMIDE 40 MG: 10 INJECTION INTRAVENOUS at 07:37

## 2020-09-29 RX ADMIN — CEFAZOLIN SODIUM 2 G: 2 INJECTION, SOLUTION INTRAVENOUS at 07:45

## 2020-09-29 RX ADMIN — FENTANYL CITRATE 50 MCG: 50 INJECTION, SOLUTION INTRAMUSCULAR; INTRAVENOUS at 06:57

## 2020-09-29 RX ADMIN — Medication 5 MG: at 21:40

## 2020-09-29 RX ADMIN — FENTANYL CITRATE 50 MCG: 50 INJECTION, SOLUTION INTRAMUSCULAR; INTRAVENOUS at 10:23

## 2020-09-29 RX ADMIN — GLYCOPYRROLATE 0.4 MG: 0.2 INJECTION INTRAMUSCULAR; INTRAVENOUS at 09:37

## 2020-09-29 RX ADMIN — PHENYLEPHRINE HYDROCHLORIDE 100 MCG: 10 INJECTION INTRAVENOUS at 08:42

## 2020-09-29 RX ADMIN — PHENYLEPHRINE HYDROCHLORIDE 100 MCG: 10 INJECTION INTRAVENOUS at 08:13

## 2020-09-29 RX ADMIN — PROPOFOL 160 MG: 10 INJECTION, EMULSION INTRAVENOUS at 07:37

## 2020-09-29 RX ADMIN — PHENYLEPHRINE HYDROCHLORIDE 100 MCG: 10 INJECTION INTRAVENOUS at 08:24

## 2020-09-29 RX ADMIN — PHENYLEPHRINE HYDROCHLORIDE 100 MCG: 10 INJECTION INTRAVENOUS at 08:54

## 2020-09-29 RX ADMIN — HEPARIN SODIUM 10000 UNITS: 1000 INJECTION, SOLUTION INTRAVENOUS; SUBCUTANEOUS at 08:15

## 2020-09-29 RX ADMIN — CEFAZOLIN SODIUM 2 G: 2 INJECTION, SOLUTION INTRAVENOUS at 15:19

## 2020-09-29 RX ADMIN — HYDROCODONE BITARTRATE AND ACETAMINOPHEN 1 TABLET: 7.5; 325 TABLET ORAL at 14:05

## 2020-09-29 RX ADMIN — PHENYLEPHRINE HYDROCHLORIDE 0.5 MCG/KG/MIN: 10 INJECTION INTRAVENOUS at 10:20

## 2020-09-29 NOTE — ANESTHESIA PROCEDURE NOTES
Airway  Urgency: elective    Date/Time: 9/29/2020 7:43 AM  Airway not difficult    General Information and Staff    Patient location during procedure: OR  Anesthesiologist: Jyoti Cruz MD  CRNA: Iza Wyman CRNA    Indications and Patient Condition  Indications for airway management: airway protection    Preoxygenated: yes (pt pre-O2 with 100% O2)  Mask difficulty assessment: 2 - vent by mask + OA or adjuvant +/- NMBA    Final Airway Details  Final airway type: endotracheal airway      Successful airway: ETT  Cuffed: yes   Successful intubation technique: direct laryngoscopy  Facilitating devices/methods: anterior pressure/BURP  Endotracheal tube insertion site: oral  Blade: Jim  Blade size: 4  ETT size (mm): 7.5  Cormack-Lehane Classification: grade IIa - partial view of glottis  Placement verified by: chest auscultation and capnometry   Cuff volume (mL): 7  Measured from: lips  ETT/EBT  to lips (cm): 22  Number of attempts at approach: 1  Assessment: lips, teeth, and gum same as pre-op and atraumatic intubation    Additional Comments  ATOETx1. No change in dentition.

## 2020-09-29 NOTE — ANESTHESIA POSTPROCEDURE EVALUATION
Patient: Albert Yadav    Procedure Summary     Date: 09/29/20 Room / Location: [facility] OR  / [facility] MAIN OR    Anesthesia Start: 0728 Anesthesia Stop: 1004    Procedure: CAROTID ENDARTERECTOMY (Right Neck) Diagnosis:     Surgeon: James Graham MD Provider: Jyoti Cruz MD    Anesthesia Type: general ASA Status: 3          Anesthesia Type: general    Vitals  Vitals Value Taken Time   /65 09/29/20 1115   Temp 37.3 °C (99.1 °F) 09/29/20 1001   Pulse 46 09/29/20 1125   Resp 14 09/29/20 1115   SpO2 98 % 09/29/20 1125   Vitals shown include unvalidated device data.        Post Anesthesia Care and Evaluation    Patient location during evaluation: PACU  Patient participation: complete - patient participated  Level of consciousness: awake and alert  Pain management: adequate  Airway patency: patent  Anesthetic complications: No anesthetic complications    Cardiovascular status: acceptable  Respiratory status: acceptable  Hydration status: acceptable    Comments: --------------------            09/29/20               1115     --------------------   BP:       144/65     Pulse:    (!) 46     Resp:       14       Temp:                SpO2:      99%      --------------------

## 2020-09-29 NOTE — ADDENDUM NOTE
Addendum  created 09/29/20 1253 by Jyoti Cruz MD    Attestation recorded in Intraprocedure, Intraprocedure Attestations filed

## 2020-09-29 NOTE — ANESTHESIA PREPROCEDURE EVALUATION
Anesthesia Evaluation                  Airway   Mallampati: I  TM distance: >3 FB  Neck ROM: full  No difficulty expected  Dental          Pulmonary - normal exam   Cardiovascular - normal exam    (+) hypertension less than 2 medications, PVD, hyperlipidemia,  carotid artery disease right carotid      Neuro/Psych  (+) numbness,     GI/Hepatic/Renal/Endo      Musculoskeletal     Abdominal    Substance History      OB/GYN          Other   arthritis,                      Anesthesia Plan    ASA 3     general     intravenous induction     Anesthetic plan, all risks, benefits, and alternatives have been provided, discussed and informed consent has been obtained with: patient.

## 2020-09-30 VITALS
HEART RATE: 71 BPM | RESPIRATION RATE: 16 BRPM | OXYGEN SATURATION: 95 % | BODY MASS INDEX: 30.37 KG/M2 | HEIGHT: 69 IN | SYSTOLIC BLOOD PRESSURE: 147 MMHG | DIASTOLIC BLOOD PRESSURE: 64 MMHG | WEIGHT: 205.03 LBS | TEMPERATURE: 98.2 F

## 2020-09-30 LAB
ACT BLD: 120 SECONDS (ref 82–152)
ACT BLD: 120 SECONDS (ref 82–152)
ACT BLD: 235 SECONDS (ref 82–152)
ACT BLD: 257 SECONDS (ref 82–152)

## 2020-09-30 PROCEDURE — 25010000002 ENOXAPARIN PER 10 MG: Performed by: SURGERY

## 2020-09-30 PROCEDURE — 25010000003 CEFAZOLIN IN DEXTROSE 2-4 GM/100ML-% SOLUTION: Performed by: SURGERY

## 2020-09-30 RX ADMIN — ENOXAPARIN SODIUM 40 MG: 40 INJECTION SUBCUTANEOUS at 09:03

## 2020-09-30 RX ADMIN — HYDROCHLOROTHIAZIDE 50 MG: 25 TABLET ORAL at 06:19

## 2020-09-30 RX ADMIN — CEFAZOLIN SODIUM 2 G: 2 INJECTION, SOLUTION INTRAVENOUS at 00:44

## 2020-09-30 RX ADMIN — CETIRIZINE HYDROCHLORIDE 10 MG: 10 TABLET, FILM COATED ORAL at 06:19

## 2020-09-30 RX ADMIN — CLOPIDOGREL 75 MG: 75 TABLET, FILM COATED ORAL at 06:19

## 2020-09-30 RX ADMIN — ATORVASTATIN CALCIUM 40 MG: 20 TABLET, FILM COATED ORAL at 16:47

## 2020-09-30 RX ADMIN — FOLIC ACID 1 MG: 1 TABLET ORAL at 09:02

## 2020-10-01 NOTE — PROGRESS NOTES
Case Management Discharge Note      Final Note: Pt discharged home, no known needs.  ZACHARY Huang RN         Selected Continued Care - Discharged on 9/30/2020 Admission date: 9/29/2020 - Discharge disposition: Home or Self Care    Destination    No services have been selected for the patient.              Durable Medical Equipment    No services have been selected for the patient.              Dialysis/Infusion    No services have been selected for the patient.              Home Medical Care    No services have been selected for the patient.              Therapy    No services have been selected for the patient.              Community Resources    No services have been selected for the patient.                  Transportation Services  Private: Car    Final Discharge Disposition Code: 01 - home or self-care

## 2020-10-03 ENCOUNTER — NURSE TRIAGE (OUTPATIENT)
Dept: CALL CENTER | Facility: HOSPITAL | Age: 80
End: 2020-10-03

## 2020-11-18 ENCOUNTER — HOSPITAL ENCOUNTER (OUTPATIENT)
Dept: PAIN MEDICINE | Facility: HOSPITAL | Age: 80
Discharge: HOME | End: 2020-11-18

## 2020-12-08 NOTE — PROGRESS NOTES
"Subjective   History of Present Illness: Albert Yadav is a 80 y.o. male is here today for follow-up.      He was last seen in office 9/20/19 for numbness in leg. Symptoms were manageable at that time.       He returns with increased numbness both legs R>L. He has been home exercises with no relief. He has not had any recent treatments or imaging. Mr. Yadav takes Tylenol prn for pain.        History of Present Illness     Mr. Yadav is here today for reevaluation of weakness in both of his legs.  He feels the weakness when he is standing for prolonged flurries of time.  His legs will go numb and he will feel as if they are going to give out on him.  He has had a couple of falls.  Nothing too severe.  He has the expected back pain but no with some associated leg pain right greater than left.  He denies bowel or bladder incontinence.    The following portions of the patient's history were reviewed and updated as appropriate: allergies, current medications, past family history, past medical history, past social history, past surgical history and problem list.    Review of Systems   Musculoskeletal: Positive for back pain and gait problem.   Neurological: Positive for weakness and numbness (both legs ).       Objective     Vitals:    12/15/20 1334   BP: 141/76   Pulse: 80   Temp: 97.8 °F (36.6 °C)   Weight: 94.1 kg (207 lb 6.4 oz)   Height: 175.3 cm (69.02\")     Body mass index is 30.61 kg/m².      Neurologic Exam     Mental Status   Oriented to person, place, and time.       .Physical Exam  Vitals signs reviewed.   Constitutional:       General: He is not in acute distress.     Appearance: He is well-developed. He is not diaphoretic.   HENT:      Head: Normocephalic and atraumatic.   Eyes:      General:         Right eye: No discharge.         Left eye: No discharge.      Conjunctiva/sclera: Conjunctivae normal.   Neck:      Musculoskeletal: Normal range of motion and neck supple.      Trachea: No tracheal " deviation.   Cardiovascular:      Rate and Rhythm: Normal rate.   Pulmonary:      Effort: Pulmonary effort is normal. No respiratory distress.   Abdominal:      General: There is no distension.      Palpations: Abdomen is soft.      Tenderness: There is no abdominal tenderness.   Musculoskeletal: Normal range of motion.         General: No tenderness.      Right lower leg: No edema.      Left lower leg: No edema.   Skin:     General: Skin is warm and dry.      Findings: No erythema.   Neurological:      Mental Status: He is alert and oriented to person, place, and time.      GCS: GCS eye subscore is 4. GCS verbal subscore is 5. GCS motor subscore is 6.      Sensory: No sensory deficit.      Motor: Weakness present. No abnormal muscle tone.      Coordination: Coordination abnormal.      Deep Tendon Reflexes: Reflexes are normal and symmetric. Reflexes normal.      Comments: The patient exhibits bilateral tibialis anterior and EHL weakness of 4-/5.  Right gastroc weakness 4/5.  Otherwise patient has normal motor exam.  Sensation is equal intact throughout the upper and lower extremities.  DTRs normal upper and lower extremities.  Negative Dariel's.  Negative clonus.  Straight leg raise mildly positive on the right at 30 degrees.  The patient is unable to bear weight on heels or right toes.     Psychiatric:         Behavior: Behavior is cooperative.         Thought Content: Thought content normal.       Assessment/Plan   Independent Review of Radiographic Studies:      I personally reviewed the images from the following studies.  Previous MRI of the lumbar spine performed at [facility] an open MRI dated June 6, 2017 was reviewed today in the office.  There are multilevel degenerative changes greatest at L4-5 where there is disc height loss as well as 6 to 7 mm and right greater than left disc protrusion.  It contributes to severe right and moderate to severe left facet arthropathy.  There are facet degenerative  changes bilaterally at L4-5 with moderate to severe spinal stenosis, severe lateral recess stenosis right greater than left at L4-5.    Medical Decision Making:      Mr. Yadav was seen in the office today for the above complaints.  He has been dealing with this issue for many years.  He is weak on exam.  It has been a while since he has had any imaging therefore I will order an MRI of the lumbar spine without contrast and have him return to the office thereafter for an evaluation with .      Diagnoses and all orders for this visit:    1. Spinal stenosis, lumbar region, with neurogenic claudication (Primary)  -     MRI Lumbar Spine Without Contrast; Future    2. Spondylolisthesis of lumbar region  -     MRI Lumbar Spine Without Contrast; Future      Return for Dr. Francisco.

## 2020-12-15 ENCOUNTER — OFFICE VISIT (OUTPATIENT)
Dept: NEUROSURGERY | Facility: CLINIC | Age: 80
End: 2020-12-15

## 2020-12-15 VITALS
TEMPERATURE: 97.8 F | BODY MASS INDEX: 30.72 KG/M2 | WEIGHT: 207.4 LBS | SYSTOLIC BLOOD PRESSURE: 141 MMHG | HEART RATE: 80 BPM | HEIGHT: 69 IN | DIASTOLIC BLOOD PRESSURE: 76 MMHG

## 2020-12-15 DIAGNOSIS — M43.16 SPONDYLOLISTHESIS OF LUMBAR REGION: ICD-10-CM

## 2020-12-15 DIAGNOSIS — M48.062 SPINAL STENOSIS, LUMBAR REGION, WITH NEUROGENIC CLAUDICATION: Primary | ICD-10-CM

## 2020-12-15 PROCEDURE — 99213 OFFICE O/P EST LOW 20 MIN: CPT | Performed by: NURSE PRACTITIONER

## 2020-12-21 ENCOUNTER — TELEPHONE (OUTPATIENT)
Dept: NEUROSURGERY | Facility: CLINIC | Age: 80
End: 2020-12-21

## 2020-12-21 NOTE — TELEPHONE ENCOUNTER
I called and left message for Dr Graham medical assistant regarding getting clearance for lumbar mri since the patient has stents in his legs.

## 2020-12-22 ENCOUNTER — HOSPITAL ENCOUNTER (OUTPATIENT)
Dept: PAIN MEDICINE | Facility: HOSPITAL | Age: 80
Discharge: HOME | End: 2020-12-22

## 2020-12-22 ENCOUNTER — HOSPITAL ENCOUNTER (OUTPATIENT)
Dept: GENERAL RADIOLOGY | Facility: HOSPITAL | Age: 80
Discharge: HOME | End: 2020-12-22

## 2020-12-22 ENCOUNTER — ANESTHESIA (OUTPATIENT)
Dept: PAIN MEDICINE | Facility: HOSPITAL | Age: 80
End: 2020-12-22

## 2020-12-22 ENCOUNTER — ANESTHESIA EVENT (OUTPATIENT)
Dept: PAIN MEDICINE | Facility: HOSPITAL | Age: 80
End: 2020-12-22

## 2020-12-22 VITALS
WEIGHT: 200 LBS | SYSTOLIC BLOOD PRESSURE: 121 MMHG | RESPIRATION RATE: 16 BRPM | HEIGHT: 69 IN | DIASTOLIC BLOOD PRESSURE: 69 MMHG | BODY MASS INDEX: 29.62 KG/M2 | HEART RATE: 67 BPM | OXYGEN SATURATION: 98 % | TEMPERATURE: 96.9 F

## 2020-12-22 DIAGNOSIS — R52 PAIN: ICD-10-CM

## 2020-12-22 DIAGNOSIS — M48.062 SPINAL STENOSIS, LUMBAR REGION, WITH NEUROGENIC CLAUDICATION: Primary | ICD-10-CM

## 2020-12-22 PROCEDURE — 77003 FLUOROGUIDE FOR SPINE INJECT: CPT

## 2020-12-22 PROCEDURE — 0 IOPAMIDOL 41 % SOLUTION: Performed by: ANESTHESIOLOGY

## 2020-12-22 PROCEDURE — 25010000002 METHYLPREDNISOLONE PER 80 MG: Performed by: ANESTHESIOLOGY

## 2020-12-22 PROCEDURE — C1755 CATHETER, INTRASPINAL: HCPCS

## 2020-12-22 RX ORDER — METHYLPREDNISOLONE ACETATE 80 MG/ML
80 INJECTION, SUSPENSION INTRA-ARTICULAR; INTRALESIONAL; INTRAMUSCULAR; SOFT TISSUE ONCE
Status: COMPLETED | OUTPATIENT
Start: 2020-12-22 | End: 2020-12-22

## 2020-12-22 RX ADMIN — IOPAMIDOL 2 ML: 408 INJECTION, SOLUTION INTRATHECAL at 08:09

## 2020-12-22 RX ADMIN — METHYLPREDNISOLONE ACETATE 80 MG: 80 INJECTION, SUSPENSION INTRA-ARTICULAR; INTRALESIONAL; INTRAMUSCULAR; SOFT TISSUE at 08:10

## 2020-12-22 NOTE — ANESTHESIA PROCEDURE NOTES
PAIN Epidural block      Patient reassessed immediately prior to procedure    Patient location during procedure: pain clinic  Start Time: 12/22/2020 7:51 AM  Stop Time: 12/22/2020 8:11 AM  Indication:procedure for pain  Performed By  Anesthesiologist: Dyllan Lugo MD  Preanesthetic Checklist  Completed: patient identified, site marked, surgical consent, pre-op evaluation, timeout performed, IV checked, risks and benefits discussed and monitors and equipment checked  Additional Notes  Post-Op Diagnosis Codes:     * Spinal stenosis of lumbar region with neurogenic claudication (M48.062)     * Lumbar radiculopathy (M54.16)     * Degeneration of lumbar intervertebral disc (M51.36)    The patient was observed in recovery with no evidence of neurological deficits or other problems. All questions were answered. The patient was discharged with appropriate instructions.  Prep:  Pt Position:prone  Sterile Tech:cap, gloves and sterile barrier  Prep:chlorhexidine gluconate and isopropyl alcohol  Monitoring:EKG, continuous pulse oximetry and blood pressure monitoring  Procedure:Sedation: no     Approach:right paramedian  Guidance: fluoroscopy  Location:lumbar  Level:4-5 (Interlaminar)  Needle Type:Tuohy  Needle Gauge:20 G  Aspiration:negative  Medications:  Depomedrol:80 mg  Preservative Free Saline:3mL  Isovue:2mL  Comments:Isovue dye spread was consistent with epidural placement.  Post Assessment:  Dressing:occlusive dressing applied  Pt Tolerance:patient tolerated the procedure well with no apparent complications  Complications:no

## 2020-12-22 NOTE — TELEPHONE ENCOUNTER
Per Shiloh at Dr Graham the patient can have the mri due to the stents are mri conditional. Shiloh stated that the mri just has to be set a certain way and will be faxing the information to us.

## 2020-12-22 NOTE — H&P
[facility]    History and Physical    Patient Name: Albert Yadav  :  1940  MRN:  [id number]  Date of Admission: 2020    Subjective     Patient is a 80 y.o. male presents with chief complaint of chronic, intermitent, moderate, severe low back and posterior right lower extremity pain.  Onset of symptoms was gradual starting 3 years ago.  Symptoms are associated/aggravated by activity, lifting, standing or walking for more than a few minutes. Symptoms improve with pain medication, injection and rest.  On a pain scale from 0-10, he rates his pain as a 2 while at rest and a 7 with activity.  He describes the pain is dull and aching in nature.  He was referred to the pain clinic for a series of lumbar epidural steroid injections.  He has responded favorably to lumbar epidural steroid injections in the past.    His lumbar MRI results from  are as follows:    There are multilevel degenerative changes greatest at L4-5 where there is disc height loss as well as 6 to 7 mm and right greater than left disc protrusion.  It contributes to severe right and moderate to severe left facet arthropathy.  There are facet degenerative changes bilaterally at L4-5 with moderate to severe spinal stenosis, severe lateral recess stenosis right greater than left at L4-5.    The following portions of the patients history were reviewed and updated as appropriate: current medications, allergies, past medical history, past surgical history, past family history, past social history and problem list                Objective     Past Medical History:   Past Medical History:   Diagnosis Date   • At risk for sleep apnea     STOP BANG = 5   • Broken bones     arm, collar bone, ankle   • Carotid artery disorder (CMS/HCC)    • Groin rash     PT STATES LEFT GROIN AT TIMES   • History of transfusion    • Hyperlipidemia    • Hypertension    • Low back pain    • Lumbar spinal stenosis    • Numbness and tingling     RIGHT ARM, RIGHT  "LEG & FOOT   • Peripheral neuropathy    • Peripheral vascular disease (CMS/HCC)    • Rheumatoid arthritis (CMS/HCC)     HANDS DIALLO   • Staph infection     2015  [facility] POST SX   • Tendency toward bleeding easily (CMS/HCC)     due to blood thinners     Past Surgical History:   Past Surgical History:   Procedure Laterality Date   • ANGIOPLASTY FEMORAL ARTERY Left 2016    Procedure: ULTRA SOUND ACCESS RIGHT FEMORAL ARTERY, AIF BILATERAL RUNOFF, SELECTIVE CATHETERIZATION LEFT FEMORAL ARTERY.;  Surgeon: Errol Michael MD;  Location: [facility] OR ;  Service:    • ARTERIOVENOUS FISTULA/SHUNT SURGERY Left 2016    Procedure: LEFT ILEO-FEMORAL GORTEX GRAFT AND LEFT LEG ARERIOGRAM ;  Surgeon: Errol Michael MD;  Location: [facility] OR ;  Service:    • CAROTID ENDARTERECTOMY Right 2020    Procedure: CAROTID ENDARTERECTOMY;  Surgeon: James Graham MD;  Location: [facility] MAIN OR;  Service: Vascular;  Laterality: Right;   • CHOLECYSTECTOMY     • EPIDURAL BLOCK     • EYE SURGERY Left     as child   • FRACTURE SURGERY Left     arm   • ILIAC ARTERY STENT     • KNEE SURGERY Left     ORIF   • ORIF ANKLE FRACTURE Left      Family History:   Family History   Problem Relation Age of Onset   • Diabetes Mother    • Cancer Father    • Heart disease Maternal Grandmother    • Heart disease Maternal Grandfather    • Heart disease Paternal Grandmother    • Heart disease Paternal Grandfather    • Malig Hyperthermia Neg Hx      Social History:   Social History     Tobacco Use   • Smoking status: Former Smoker     Packs/day: 2.00     Types: Cigarettes     Quit date: 1989     Years since quittin.5   • Smokeless tobacco: Never Used   • Tobacco comment: \"DIDN'T INHALE\"   Substance Use Topics   • Alcohol use: Yes     Alcohol/week: 7.0 standard drinks     Types: 7 Shots of liquor per week     Comment: 1 drink per day at most   • Drug use: No       Vital Signs Range for the last 24 " hours  Temperature:     Temp Source:     BP:     Pulse:     Respirations:     SPO2:     O2 Amount (l/min):     O2 Devices     Weight:           --------------------------------------------------------------------------------    Current Outpatient Medications   Medication Sig Dispense Refill   • acetaminophen (TYLENOL) 650 MG 8 hr tablet Take 650 mg by mouth Every 8 (Eight) Hours As Needed for Mild Pain .     • atorvastatin (LIPITOR) 40 MG tablet Take 40 mg by mouth Every Evening.     • cetirizine (ZyrTEC) 10 MG tablet Take 10 mg by mouth Every Morning.     • clopidogrel (PLAVIX) 75 MG tablet Take 75 mg by mouth Every Morning.     • folic acid (FOLVITE) 1 MG tablet Take 1 mg by mouth Daily.     • hydrochlorothiazide (HYDRODIURIL) 50 MG tablet Take 50 mg by mouth Every Morning.     • lisinopril (PRINIVIL,ZESTRIL) 40 MG tablet Take 40 mg by mouth Every Evening.     • melatonin 5 MG tablet tablet Take 5 mg by mouth Every Night.     • methotrexate 2.5 MG tablet Take 2.5 mg by mouth 1 (One) Time Per Week. wednesdays     • ONE TOUCH ULTRA TEST test strip   0   • Sennosides (SENNA LAX PO) Take 2 tablets by mouth 2 (Two) Times a Day.     • traZODone (DESYREL) 50 MG tablet Take 50 mg by mouth Every Night.       No current facility-administered medications for this encounter.        --------------------------------------------------------------------------------  Assessment/Plan      Anesthesia Evaluation     Patient summary reviewed and Nursing notes reviewed   NPO Solid Status: > 8 hours  NPO Liquid Status: > 2 hours    Pain impairs ability to perform ADLs: Meal prep/Eating, Exercise/Activity and Sleeping  Modalities previously tried to control pain with limited effectiveness within the last 4-6 weeks: OTC medications, Physical therapy and Rest     Airway   Mallampati: II  TM distance: >3 FB  Neck ROM: full  Dental - normal exam     Pulmonary - normal exam    breath sounds clear to auscultation  (+) a smoker Former,    Cardiovascular - normal exam    Rhythm: regular  Rate: normal    (+) hypertension 2 medications or greater, PVD, hyperlipidemia,  carotid artery disease carotid bilateral  (-) angina, orthopnea, PND, BEAULIEU      Neuro/Psych- negative ROS  GI/Hepatic/Renal/Endo    (+) obesity,       Musculoskeletal     Abdominal  - normal exam    Abdomen: soft.  Bowel sounds: normal.   Substance History - negative use     OB/GYN negative ob/gyn ROS         Other   arthritis (RA),                 Diagnosis and Plan    Treatment Plan  ASA 3      Procedures: Lumbar Epidural Steroid Injection(LESI), With fluoroscopy,       Anesthetic plan and risks discussed with patient.        Alternative management options include physical therapy, medical management with nonsteroidal anti-inflammatory medications or narcotics, chiropractic manipulation and surgical intervention.    1.  Lumbar 4 epidural steroid injections, up to 3, spaced 1-2 weeks apart.  If pain control is acceptable after 1 or 2 injections, it was discussed with the patient that they may return for the subsequent injections if and when their pain returns.  The risks were discussed with the patient including failure of relief, worsening pain, Headache (post dural puncture headache), bleeding (epidural hematoma) and infection (epidural abscess or skin infection).  2.  Physical therapy exercises at home as prescribed by physical therapy or from the pain clinic handout (given to the patient).  Continuation of these exercises every day, or multiple times per week, even when the patient has good pain relief, was stressed to the patient as a preventative measure to decrease the frequency and severity of future pain episodes.  3.  Continue pain medicines as already prescribed.  If patient not currently taking any, it is recommended to begin Acetaminophen 1000 mg po q 8 hours.  If other medicines containing Acetaminophen are currently prescribed, maintain daily dose at 3000 mg.    4.  If they  can tolerate NSAIDS, it is recommended to take Ibuprofen 600 mg po q 6 hours for 7 days during pain exacerbations.  Alternatively, they may substitute an NSAID of their choice (e.g. Aleve).  This may be taken at the same time as Acetaminophen.  5.  Heat and ice to the affected area as tolerated for pain control.  It was discussed that heating pads can cause burns.  6.  Daily low impact exercise such as walking or water exercise was recommended to maintain overall health and aid in weight control.   7.  Follow up as needed for subsequent injections.  8.  Patient was counseled to abstain from tobacco products.    Diagnosis     * Spinal stenosis of lumbar region with neurogenic claudication [M48.062]     * Lumbar radiculopathy [M54.16]     * Degeneration of lumbar intervertebral disc [M51.36]

## 2021-01-04 DIAGNOSIS — M43.16 SPONDYLOLISTHESIS OF LUMBAR REGION: ICD-10-CM

## 2021-01-04 DIAGNOSIS — M48.062 SPINAL STENOSIS, LUMBAR REGION, WITH NEUROGENIC CLAUDICATION: ICD-10-CM

## 2021-01-05 NOTE — PROGRESS NOTES
This is not a patient of [facility].  The provider ordering this MRI used to be employed here.  We are checking to see if this is a patient that she has seen by her at her new practice location which is [facility].  I have instructed the MA to contact [facility] pain [facility] to confirm.

## 2021-01-06 ENCOUNTER — TELEPHONE (OUTPATIENT)
Dept: NEUROSURGERY | Facility: CLINIC | Age: 81
End: 2021-01-06

## 2021-01-06 NOTE — TELEPHONE ENCOUNTER
----- Message from Albert Yadav sent at 1/5/2021  5:34 PM EST -----  Regarding: Test Results Question  Contact: [phone]  I have a question about MRI LUMBAR SPINE WO CONTRAST     What question am I supposed to have?  I don't know why I got this My Chart notification. Please call me ASAP.    Albert yadav  resulted on 12/31/20.

## 2021-01-06 NOTE — TELEPHONE ENCOUNTER
Spoke with pt and he will wait until he sees Dr Francisco on the 1/18/21 to discuss results and plan.

## 2021-01-19 ENCOUNTER — TELEPHONE (OUTPATIENT)
Dept: NEUROSURGERY | Facility: CLINIC | Age: 81
End: 2021-01-19

## 2021-01-19 NOTE — TELEPHONE ENCOUNTER
Patient called to get yesterday's appointment rescheduled. Explained we will call as soon as we have the appointment.    [phone]    Thank You

## 2021-01-20 ENCOUNTER — OFFICE VISIT (OUTPATIENT)
Dept: NEUROSURGERY | Facility: CLINIC | Age: 81
End: 2021-01-20

## 2021-01-20 VITALS
SYSTOLIC BLOOD PRESSURE: 148 MMHG | WEIGHT: 207 LBS | TEMPERATURE: 99.2 F | DIASTOLIC BLOOD PRESSURE: 74 MMHG | BODY MASS INDEX: 30.66 KG/M2 | HEART RATE: 76 BPM | HEIGHT: 69 IN

## 2021-01-20 DIAGNOSIS — M48.062 SPINAL STENOSIS, LUMBAR REGION, WITH NEUROGENIC CLAUDICATION: Primary | ICD-10-CM

## 2021-01-20 DIAGNOSIS — M43.16 SPONDYLOLISTHESIS AT L4-L5 LEVEL: ICD-10-CM

## 2021-01-20 PROCEDURE — 99214 OFFICE O/P EST MOD 30 MIN: CPT | Performed by: NEUROLOGICAL SURGERY

## 2021-01-20 NOTE — PROGRESS NOTES
Subjective   Patient ID: Albert Yadav is a 80 y.o. male is here today for follow-up.    Patient was last seen 12.15.20 by BETO Kramer.    Patient is being seen today for back pain.  Patient states the right leg has numbness and tingling.    This very nice gentleman has had pain in his back and right buttock and leg for about 3 or 4 years.  Epidural block still continues to help him.  He does have a bit of a foot drop but he still feels functional and would like to continue working with the pain clinic.  I told him that we might at some point need to do a lumbar fusion at L4-L5 but he wants to continue working with Dr. Lugo at the pain clinic.  I think that is fine and will arrange to get a block every 3 months.  The last one was in December which is still working and so we will get one in March and and in June 2021 with a follow-up visit in 6 months from now.  If things get worse in terms of pain or he notices more weakness then he will let me know.        Back Pain  The pain is present in the lumbar spine. The quality of the pain is described as aching. The pain radiates to the right thigh. The pain is at a severity of 5/10. The pain is the same all the time. Associated symptoms include numbness and tingling. Pertinent negatives include no bladder incontinence, bowel incontinence or fever. He has tried nothing for the symptoms.       The following portions of the patient's history were reviewed and updated as appropriate: allergies, current medications, past family history, past medical history, past social history, past surgical history and problem list.    Review of Systems   Constitutional: Negative for chills and fever.   HENT: Negative for congestion.    Gastrointestinal: Negative for bowel incontinence.   Genitourinary: Negative for bladder incontinence.   Musculoskeletal: Positive for back pain and gait problem.   Neurological: Positive for tingling and numbness. Negative for dizziness and  "light-headedness.   All other systems reviewed and are negative.          Objective     Vitals:    01/20/21 1227   BP: 148/74   Pulse: 76   Temp: 99.2 °F (37.3 °C)   Weight: 93.9 kg (207 lb)   Height: 175.3 cm (69.02\")     Body mass index is 30.55 kg/m².      Physical Exam  Constitutional:       Appearance: He is well-developed.   HENT:      Head: Normocephalic and atraumatic.   Eyes:      Extraocular Movements: EOM normal.      Conjunctiva/sclera: Conjunctivae normal.      Pupils: Pupils are equal, round, and reactive to light.   Neck:      Vascular: No carotid bruit.   Neurological:      Mental Status: He is oriented to person, place, and time.      Coordination: Finger-Nose-Finger Test and Heel to Shin Test normal.      Gait: Gait is intact.      Deep Tendon Reflexes:      Reflex Scores:       Tricep reflexes are 2+ on the right side and 2+ on the left side.       Bicep reflexes are 2+ on the right side and 2+ on the left side.       Brachioradialis reflexes are 2+ on the right side and 2+ on the left side.       Patellar reflexes are 2+ on the right side and 2+ on the left side.       Achilles reflexes are 2+ on the right side and 2+ on the left side.  Psychiatric:         Speech: Speech normal.       Neurologic Exam     Mental Status   Oriented to person, place, and time.   Registration of memory: Good recent and remote memory.   Attention: normal. Concentration: normal.   Speech: speech is normal   Level of consciousness: alert  Knowledge: consistent with education.     Cranial Nerves     CN II   Visual fields full to confrontation.   Visual acuity: normal    CN III, IV, VI   Pupils are equal, round, and reactive to light.  Extraocular motions are normal.     CN V   Facial sensation intact.   Right corneal reflex: normal  Left corneal reflex: normal    CN VII   Facial expression full, symmetric.   Right facial weakness: none  Left facial weakness: none    CN VIII   Hearing: intact    CN IX, X   Palate: " symmetric    CN XI   Right sternocleidomastoid strength: normal  Left sternocleidomastoid strength: normal    CN XII   Tongue: not atrophic  Tongue deviation: none    Motor Exam   Muscle bulk: normal  Right arm tone: normal  Left arm tone: normal  Right leg tone: normal  Left leg tone: normal    Strength   Strength 5/5 except as noted.   Right anterior tibial: 4/5    Sensory Exam   Light touch normal.     Gait, Coordination, and Reflexes     Gait  Gait: normal    Coordination   Finger to nose coordination: normal  Heel to shin coordination: normal    Reflexes   Right brachioradialis: 2+  Left brachioradialis: 2+  Right biceps: 2+  Left biceps: 2+  Right triceps: 2+  Left triceps: 2+  Right patellar: 2+  Left patellar: 2+  Right achilles: 2+  Left achilles: 2+  Right : 2+  Left : 2+          Assessment/Plan   Independent Review of Radiographic Studies:      I personally reviewed the images from the following studies.    I reviewed the recently completed lumbar MRI done in December which does show rather severe spinal stenosis at L4-L5 with a superimposed degenerative spondylolisthesis.  Agree with the report.        Medical Decision Making:      We will stick with blocks for right now and will ask the pain clinic to do one in March and another one in June and then have him come and see me in July 2021.  If things get worse he will let me know.      Diagnoses and all orders for this visit:    1. Spinal stenosis, lumbar region, with neurogenic claudication (Primary)  -     Epidural Block    2. Spondylolisthesis at L4-L5 level  -     Epidural Block      Return in about 6 months (around 7/20/2021).

## 2021-03-04 DIAGNOSIS — Z23 IMMUNIZATION DUE: ICD-10-CM

## 2021-03-08 ENCOUNTER — ANESTHESIA EVENT (OUTPATIENT)
Dept: PAIN MEDICINE | Facility: HOSPITAL | Age: 81
End: 2021-03-08

## 2021-03-08 ENCOUNTER — ANESTHESIA (OUTPATIENT)
Dept: PAIN MEDICINE | Facility: HOSPITAL | Age: 81
End: 2021-03-08

## 2021-03-08 ENCOUNTER — HOSPITAL ENCOUNTER (OUTPATIENT)
Dept: GENERAL RADIOLOGY | Facility: HOSPITAL | Age: 81
Discharge: HOME | End: 2021-03-08

## 2021-03-08 ENCOUNTER — HOSPITAL ENCOUNTER (OUTPATIENT)
Dept: PAIN MEDICINE | Facility: HOSPITAL | Age: 81
Discharge: HOME | End: 2021-03-08

## 2021-03-08 VITALS
HEART RATE: 62 BPM | RESPIRATION RATE: 16 BRPM | DIASTOLIC BLOOD PRESSURE: 63 MMHG | OXYGEN SATURATION: 98 % | SYSTOLIC BLOOD PRESSURE: 122 MMHG | BODY MASS INDEX: 30.51 KG/M2 | TEMPERATURE: 97.7 F | HEIGHT: 69 IN | WEIGHT: 206 LBS

## 2021-03-08 DIAGNOSIS — R52 PAIN: ICD-10-CM

## 2021-03-08 DIAGNOSIS — M48.062 SPINAL STENOSIS, LUMBAR REGION, WITH NEUROGENIC CLAUDICATION: Primary | ICD-10-CM

## 2021-03-08 PROCEDURE — 0 IOPAMIDOL 41 % SOLUTION: Performed by: ANESTHESIOLOGY

## 2021-03-08 PROCEDURE — 77003 FLUOROGUIDE FOR SPINE INJECT: CPT

## 2021-03-08 PROCEDURE — 25010000002 METHYLPREDNISOLONE PER 80 MG: Performed by: ANESTHESIOLOGY

## 2021-03-08 RX ORDER — METHYLPREDNISOLONE ACETATE 80 MG/ML
80 INJECTION, SUSPENSION INTRA-ARTICULAR; INTRALESIONAL; INTRAMUSCULAR; SOFT TISSUE ONCE
Status: COMPLETED | OUTPATIENT
Start: 2021-03-08 | End: 2021-03-08

## 2021-03-08 RX ADMIN — IOPAMIDOL 10 ML: 408 INJECTION, SOLUTION INTRATHECAL at 08:01

## 2021-03-08 RX ADMIN — METHYLPREDNISOLONE ACETATE 80 MG: 80 INJECTION, SUSPENSION INTRA-ARTICULAR; INTRALESIONAL; INTRAMUSCULAR; SOFT TISSUE at 08:01

## 2021-03-08 NOTE — ANESTHESIA PROCEDURE NOTES
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 All questions were answered. The patient was discharged with appropriate instructions.  Prep:  Pt Position:prone  Sterile Tech:cap, gloves, mask and sterile barrier  Prep:chlorhexidine gluconate and isopropyl alcohol  Monitoring:blood pressure monitoring, continuous pulse oximetry and EKG  Procedure:Sedation: no     Approach:right paramedian  Guidance: fluoroscopy  Location:lumbar  Level:4-5 (Interlaminar)  Needle Type:Tuohy  Needle Gauge:20 G  Aspiration:negative  Medications:  Depomedrol:80 mg  Preservative Free Saline:3mL  Isovue:2mL  Comments:Isovue dye spread was consistent with epidural placement.  Post Assessment:  Dressing:occlusive dressing applied  Pt Tolerance:patient tolerated the procedure well with no apparent complications  Complications:no

## 2021-03-08 NOTE — H&P
[facility]    History and Physical    Patient Name: Albert Yadav  :  1940  MRN:  [id number]  Date of Admission: 3/8/2021    Subjective     Patient is an 80-year-old male who has pain which originates in his lower back and radiates to his right lower extremity rene.  He reports approximate 65 to 70% relief for a month following his last lumbar epidural steroid injection.  Today he rates his pain as a 6/10.  He is here for lumbar epidural steroid injection #2.    His lumbar MRI results from 2017 are as follows:    There are multilevel degenerative changes greatest at L4-5 where there is disc height loss as well as 6 to 7 mm and right greater than left disc protrusion.  It contributes to severe right and moderate to severe left facet arthropathy.  There are facet degenerative changes bilaterally at L4-5 with moderate to severe spinal stenosis, severe lateral recess stenosis right greater than left at L4-5.    The following portions of the patients history were reviewed and updated as appropriate: current medications, allergies, past medical history, past surgical history, past family history, past social history and problem list                Objective     Past Medical History:   Past Medical History:   Diagnosis Date   • At risk for sleep apnea     STOP BANG = 5   • Broken bones     arm, collar bone, ankle   • Carotid artery disorder (CMS/HCC)    • Groin rash     PT STATES LEFT GROIN AT TIMES   • History of transfusion    • Hyperlipidemia    • Hypertension    • Low back pain    • Lumbar spinal stenosis    • Numbness and tingling     RIGHT ARM, RIGHT LEG & FOOT   • Peripheral neuropathy    • Peripheral vascular disease (CMS/HCC)    • Rheumatoid arthritis (CMS/HCC)     HANDS DIALLO   • Staph infection     2015  [facility] POST SX   • Tendency toward bleeding easily (CMS/HCC)     due to blood thinners     Past Surgical History:   Past Surgical History:   Procedure Laterality Date   • ANGIOPLASTY FEMORAL ARTERY  "Left 2016    Procedure: ULTRA SOUND ACCESS RIGHT FEMORAL ARTERY, AIF BILATERAL RUNOFF, SELECTIVE CATHETERIZATION LEFT FEMORAL ARTERY.;  Surgeon: Errol Michael MD;  Location: [facility] OR ;  Service:    • ARTERIOVENOUS FISTULA/SHUNT SURGERY Left 2016    Procedure: LEFT ILEO-FEMORAL GORTEX GRAFT AND LEFT LEG ARERIOGRAM ;  Surgeon: Errol Michael MD;  Location: [facility] OR ;  Service:    • CAROTID ENDARTERECTOMY Right 2020    Procedure: CAROTID ENDARTERECTOMY;  Surgeon: James Graham MD;  Location: [facility] MAIN OR;  Service: Vascular;  Laterality: Right;   • CHOLECYSTECTOMY     • EPIDURAL BLOCK     • EYE SURGERY Left     as child   • FRACTURE SURGERY Left     arm   • ILIAC ARTERY STENT     • KNEE SURGERY Left     ORIF   • ORIF ANKLE FRACTURE Left      Family History:   Family History   Problem Relation Age of Onset   • Diabetes Mother    • Cancer Father    • Heart disease Maternal Grandmother    • Heart disease Maternal Grandfather    • Heart disease Paternal Grandmother    • Heart disease Paternal Grandfather    • Malig Hyperthermia Neg Hx      Social History:   Social History     Tobacco Use   • Smoking status: Former Smoker     Packs/day: 2.00     Types: Cigarettes     Quit date: 1989     Years since quittin.7   • Smokeless tobacco: Never Used   • Tobacco comment: \"DIDN'T INHALE\"   Vaping Use   • Vaping Use: Never used   Substance Use Topics   • Alcohol use: Yes     Alcohol/week: 7.0 standard drinks     Types: 7 Shots of liquor per week     Comment: 1 drink per day at most   • Drug use: No       Vital Signs Range for the last 24 hours  Temperature:     Temp Source:     BP:     Pulse:     Respirations:     SPO2:     O2 Amount (l/min):     O2 Devices     Weight:           --------------------------------------------------------------------------------    Current Outpatient Medications   Medication Sig Dispense Refill   • acetaminophen (TYLENOL) 650 MG 8 hr " tablet Take 650 mg by mouth Every 8 (Eight) Hours As Needed for Mild Pain .     • atorvastatin (LIPITOR) 40 MG tablet Take 40 mg by mouth Every Evening.     • cetirizine (ZyrTEC) 10 MG tablet Take 10 mg by mouth Every Morning.     • clopidogrel (PLAVIX) 75 MG tablet Take 75 mg by mouth Every Morning.     • folic acid (FOLVITE) 1 MG tablet Take 1 mg by mouth Daily.     • hydrochlorothiazide (HYDRODIURIL) 50 MG tablet Take 50 mg by mouth Every Morning.     • lisinopril (PRINIVIL,ZESTRIL) 40 MG tablet Take 40 mg by mouth Every Evening.     • melatonin 5 MG tablet tablet Take 5 mg by mouth Every Night.     • methotrexate 2.5 MG tablet Take 2.5 mg by mouth. Wednesdays 10 2.5 mg weekly     • nystatin 405564 UNIT/GM powder APPLY TO RASH EVERY DAY AS NEEDED     • ONE TOUCH ULTRA TEST test strip   0   • Sennosides (SENNA LAX PO) Take 1 tablet by mouth 1 (One) Time.     • traZODone (DESYREL) 50 MG tablet Take 50 mg by mouth Every Night.     • triamcinolone (KENALOG) 0.025 % cream APPLY TWICE DAILY TO FOREHEAD AND CHEST       No current facility-administered medications for this encounter.       --------------------------------------------------------------------------------  Assessment/Plan      Anesthesia Evaluation     Patient summary reviewed and Nursing notes reviewed   NPO Solid Status: > 8 hours  NPO Liquid Status: > 2 hours    Pain impairs ability to perform ADLs: Meal prep/Eating, Exercise/Activity and Sleeping  Modalities previously tried to control pain with limited effectiveness within the last 4-6 weeks: OTC medications, Physical therapy and Rest     Airway   Mallampati: II  TM distance: >3 FB  Neck ROM: full  Dental - normal exam     Pulmonary - normal exam    breath sounds clear to auscultation  (+) a smoker Former,   Cardiovascular - normal exam    Rhythm: regular  Rate: normal    (+) hypertension 2 medications or greater, PVD, hyperlipidemia,  carotid artery disease carotid bilateral  (-) angina, orthopnea,  PND, BEAULIEU      Neuro/Psych- negative ROS  GI/Hepatic/Renal/Endo    (+) obesity,       Musculoskeletal     Abdominal  - normal exam    Abdomen: soft.  Bowel sounds: normal.   Substance History - negative use     OB/GYN negative ob/gyn ROS         Other   arthritis (RA),                 Diagnosis and Plan      Treatment Plan  ASA 3   Patient has had previous injection/procedure with 50-75% improvement.   Procedures: Lumbar Epidural Steroid Injection(LESI), With fluoroscopy,       Anesthetic plan and risks discussed with patient.        Alternative management options include physical therapy, medical management with nonsteroidal anti-inflammatory medications or narcotics, chiropractic manipulation and surgical intervention.    1.  Lumbar 4 epidural steroid injections, up to 3, spaced 1-2 weeks apart.  If pain control is acceptable after 1 or 2 injections, it was discussed with the patient that they may return for the subsequent injections if and when their pain returns.  The risks were discussed with the patient including failure of relief, worsening pain, Headache (post dural puncture headache), bleeding (epidural hematoma) and infection (epidural abscess or skin infection).  2.  Physical therapy exercises at home as prescribed by physical therapy or from the pain clinic handout (given to the patient).  Continuation of these exercises every day, or multiple times per week, even when the patient has good pain relief, was stressed to the patient as a preventative measure to decrease the frequency and severity of future pain episodes.  3.  Continue pain medicines as already prescribed.  If patient not currently taking any, it is recommended to begin Acetaminophen 1000 mg po q 8 hours.  If other medicines containing Acetaminophen are currently prescribed, maintain daily dose at 3000 mg.    4.  If they can tolerate NSAIDS, it is recommended to take Ibuprofen 600 mg po q 6 hours for 7 days during pain exacerbations.   Alternatively, they may substitute an NSAID of their choice (e.g. Aleve).  This may be taken at the same time as Acetaminophen.  5.  Heat and ice to the affected area as tolerated for pain control.  It was discussed that heating pads can cause burns.  6.  Daily low impact exercise such as walking or water exercise was recommended to maintain overall health and aid in weight control.   7.  Follow up as needed for subsequent injections.  8.  Patient was counseled to abstain from tobacco products.    Diagnosis     * Degeneration of lumbar intervertebral disc [M51.36]     * Spinal stenosis of lumbar region with neurogenic claudication [M48.062]     * Lumbar radiculopathy [M54.16]

## 2021-06-03 ENCOUNTER — HOSPITAL ENCOUNTER (OUTPATIENT)
Dept: GENERAL RADIOLOGY | Facility: HOSPITAL | Age: 81
Discharge: HOME | End: 2021-06-03

## 2021-06-03 ENCOUNTER — ANESTHESIA (OUTPATIENT)
Dept: PAIN MEDICINE | Facility: HOSPITAL | Age: 81
End: 2021-06-03

## 2021-06-03 ENCOUNTER — ANESTHESIA EVENT (OUTPATIENT)
Dept: PAIN MEDICINE | Facility: HOSPITAL | Age: 81
End: 2021-06-03

## 2021-06-03 ENCOUNTER — HOSPITAL ENCOUNTER (OUTPATIENT)
Dept: PAIN MEDICINE | Facility: HOSPITAL | Age: 81
Discharge: HOME | End: 2021-06-03

## 2021-06-03 VITALS
DIASTOLIC BLOOD PRESSURE: 62 MMHG | OXYGEN SATURATION: 97 % | HEART RATE: 66 BPM | SYSTOLIC BLOOD PRESSURE: 126 MMHG | TEMPERATURE: 97.3 F | RESPIRATION RATE: 13 BRPM

## 2021-06-03 DIAGNOSIS — R52 PAIN: ICD-10-CM

## 2021-06-03 DIAGNOSIS — M48.062 SPINAL STENOSIS, LUMBAR REGION, WITH NEUROGENIC CLAUDICATION: Primary | ICD-10-CM

## 2021-06-03 LAB — GLUCOSE BLDC GLUCOMTR-MCNC: 130 MG/DL (ref 70–130)

## 2021-06-03 PROCEDURE — 25010000002 METHYLPREDNISOLONE PER 80 MG: Performed by: ANESTHESIOLOGY

## 2021-06-03 PROCEDURE — 82962 GLUCOSE BLOOD TEST: CPT

## 2021-06-03 PROCEDURE — 0 IOPAMIDOL 41 % SOLUTION: Performed by: ANESTHESIOLOGY

## 2021-06-03 PROCEDURE — 77003 FLUOROGUIDE FOR SPINE INJECT: CPT

## 2021-06-03 RX ORDER — METHYLPREDNISOLONE ACETATE 80 MG/ML
80 INJECTION, SUSPENSION INTRA-ARTICULAR; INTRALESIONAL; INTRAMUSCULAR; SOFT TISSUE ONCE
Status: COMPLETED | OUTPATIENT
Start: 2021-06-03 | End: 2021-06-03

## 2021-06-03 RX ADMIN — IOPAMIDOL 10 ML: 408 INJECTION, SOLUTION INTRATHECAL at 07:59

## 2021-06-03 RX ADMIN — METHYLPREDNISOLONE ACETATE 80 MG: 80 INJECTION, SUSPENSION INTRA-ARTICULAR; INTRALESIONAL; INTRAMUSCULAR; SOFT TISSUE at 07:59

## 2021-06-03 NOTE — ANESTHESIA PROCEDURE NOTES
PAIN Epidural block      Patient reassessed immediately prior to procedure    Patient location during procedure: pain clinic  Start Time: 6/3/2021 7:42 AM  Stop Time: 6/3/2021 8:01 AM  Indication:procedure for pain  Performed By  Anesthesiologist: Dyllan Lugo MD  Preanesthetic Checklist  Completed: patient identified, site marked, risks and benefits discussed, surgical consent, monitors and equipment checked, pre-op evaluation and timeout performed  Additional Notes  Post-Op Diagnosis Codes:     * Degeneration of lumbar intervertebral disc (M51.36)     * Spinal stenosis of lumbar region with neurogenic claudication (M48.062)     * Lumbar radiculopathy (M54.16)    The patient was observed in recovery with no evidence of neurological deficits or other problems. All questions were answered. The patient was discharged with appropriate instructions.  Prep:  Pt Position:prone  Sterile Tech:cap, gloves, mask and sterile barrier  Prep:chlorhexidine gluconate and isopropyl alcohol  Monitoring:blood pressure monitoring, continuous pulse oximetry and EKG  Procedure:Sedation: no     Approach:right paramedian  Guidance: fluoroscopy  Location:lumbar  Level:4-5 (Interlaminar)  Needle Type:Tuohy  Needle Gauge:20 G  Aspiration:negative  Medications:  Depomedrol:80 mg  Preservative Free Saline:3mL  Isovue:2mL  Comments:Isovue dye spread was consistent with epidural placement.  Post Assessment:  Dressing:occlusive dressing applied  Pt Tolerance:patient tolerated the procedure well with no apparent complications  Complications:no

## 2021-06-03 NOTE — H&P
[facility]    History and Physical    Patient Name: Albert Yadav  :  1940  MRN:  [id number]  Date of Admission: 6/3/2021    Subjective     Patient is an 81-year-old male who has pain which originates in his lower back and radiates to his right lower extremity.  He reports approximate 50% relief for a month following his last lumbar epidural steroid injection.  Today his pain ranges between a 4 and a 5/10.  He is here for lumbar epidural steroid injection #3.    His lumbar MRI results from 2017 are as follows:    There are multilevel degenerative changes greatest at L4-5 where there is disc height loss as well as 6 to 7 mm and right greater than left disc protrusion.  It contributes to severe right and moderate to severe left facet arthropathy.  There are facet degenerative changes bilaterally at L4-5 with moderate to severe spinal stenosis, severe lateral recess stenosis right greater than left at L4-5.    The following portions of the patients history were reviewed and updated as appropriate: current medications, allergies, past medical history, past surgical history, past family history, past social history and problem list                Objective     Past Medical History:   Past Medical History:   Diagnosis Date   • At risk for sleep apnea     STOP BANG = 5   • Broken bones     arm, collar bone, ankle   • Carotid artery disorder (CMS/HCC)    • Groin rash     PT STATES LEFT GROIN AT TIMES   • History of transfusion    • Hyperlipidemia    • Hypertension    • Low back pain    • Lumbar spinal stenosis    • Numbness and tingling     RIGHT ARM, RIGHT LEG & FOOT   • Peripheral neuropathy    • Peripheral vascular disease (CMS/HCC)    • Rheumatoid arthritis (CMS/HCC)     HANDS DIALLO   • Staph infection     2015  [facility] POST SX   • Tendency toward bleeding easily (CMS/HCC)     due to blood thinners     Past Surgical History:   Past Surgical History:   Procedure Laterality Date   • ANGIOPLASTY FEMORAL ARTERY  "Left 2016    Procedure: ULTRA SOUND ACCESS RIGHT FEMORAL ARTERY, AIF BILATERAL RUNOFF, SELECTIVE CATHETERIZATION LEFT FEMORAL ARTERY.;  Surgeon: Errol Michael MD;  Location: [facility] OR ;  Service:    • ARTERIOVENOUS FISTULA/SHUNT SURGERY Left 2016    Procedure: LEFT ILEO-FEMORAL GORTEX GRAFT AND LEFT LEG ARERIOGRAM ;  Surgeon: Errol Michael MD;  Location: [facility] OR ;  Service:    • CAROTID ENDARTERECTOMY Right 2020    Procedure: CAROTID ENDARTERECTOMY;  Surgeon: James Graham MD;  Location: [facility] MAIN OR;  Service: Vascular;  Laterality: Right;   • CHOLECYSTECTOMY     • EPIDURAL BLOCK     • EYE SURGERY Left     as child   • FRACTURE SURGERY Left     arm   • ILIAC ARTERY STENT     • KNEE SURGERY Left     ORIF   • ORIF ANKLE FRACTURE Left      Family History:   Family History   Problem Relation Age of Onset   • Diabetes Mother    • Cancer Father    • Heart disease Maternal Grandmother    • Heart disease Maternal Grandfather    • Heart disease Paternal Grandmother    • Heart disease Paternal Grandfather    • Malig Hyperthermia Neg Hx      Social History:   Social History     Tobacco Use   • Smoking status: Former Smoker     Packs/day: 2.00     Types: Cigarettes     Quit date: 1989     Years since quittin.0   • Smokeless tobacco: Never Used   • Tobacco comment: \"DIDN'T INHALE\"   Vaping Use   • Vaping Use: Never used   Substance Use Topics   • Alcohol use: Yes     Alcohol/week: 7.0 standard drinks     Types: 7 Shots of liquor per week     Comment: 1 drink per day at most   • Drug use: No       Vital Signs Range for the last 24 hours  Temperature:     Temp Source:     BP:     Pulse:     Respirations:     SPO2:     O2 Amount (l/min):     O2 Devices     Weight:           --------------------------------------------------------------------------------    Current Outpatient Medications   Medication Sig Dispense Refill   • acetaminophen (TYLENOL) 650 MG 8 hr " tablet Take 650 mg by mouth Every 8 (Eight) Hours As Needed for Mild Pain .     • atorvastatin (LIPITOR) 40 MG tablet Take 40 mg by mouth Every Evening.     • cetirizine (ZyrTEC) 10 MG tablet Take 10 mg by mouth Every Morning.     • clopidogrel (PLAVIX) 75 MG tablet Take 75 mg by mouth Every Morning.     • folic acid (FOLVITE) 1 MG tablet Take 1 mg by mouth Daily.     • hydrochlorothiazide (HYDRODIURIL) 50 MG tablet Take 50 mg by mouth Every Morning.     • lisinopril (PRINIVIL,ZESTRIL) 40 MG tablet Take 40 mg by mouth Every Evening.     • melatonin 5 MG tablet tablet Take 5 mg by mouth Every Night.     • methotrexate 2.5 MG tablet Take 2.5 mg by mouth. Wednesdays 10 2.5 mg weekly     • nystatin 927455 UNIT/GM powder APPLY TO RASH EVERY DAY AS NEEDED     • ONE TOUCH ULTRA TEST test strip   0   • Sennosides (SENNA LAX PO) Take 1 tablet by mouth 1 (One) Time.     • traZODone (DESYREL) 50 MG tablet Take 50 mg by mouth Every Night.     • triamcinolone (KENALOG) 0.025 % cream APPLY TWICE DAILY TO FOREHEAD AND CHEST       No current facility-administered medications for this encounter.       --------------------------------------------------------------------------------  Assessment/Plan      Anesthesia Evaluation     Patient summary reviewed and Nursing notes reviewed   NPO Solid Status: > 8 hours  NPO Liquid Status: > 2 hours    Pain impairs ability to perform ADLs: Meal prep/Eating, Exercise/Activity and Sleeping  Modalities previously tried to control pain with limited effectiveness within the last 4-6 weeks: OTC medications, Physical therapy and Rest     Airway   Mallampati: II  TM distance: >3 FB  Neck ROM: full  Dental - normal exam     Pulmonary - normal exam    breath sounds clear to auscultation  (+) a smoker Former,   Cardiovascular - normal exam    Rhythm: regular  Rate: normal    (+) hypertension 2 medications or greater, PVD, hyperlipidemia,  carotid artery disease carotid bilateral  (-) angina, orthopnea,  PND, BEAULIEU      Neuro/Psych- negative ROS  GI/Hepatic/Renal/Endo    (+) obesity,       Musculoskeletal     Abdominal  - normal exam    Abdomen: soft.  Bowel sounds: normal.   Substance History - negative use     OB/GYN negative ob/gyn ROS         Other   arthritis (RA),                 Diagnosis and Plan      Treatment Plan  ASA 3   Patient has had previous injection/procedure with 50-75% improvement.   Procedures: Lumbar Epidural Steroid Injection(LESI), With fluoroscopy,       Anesthetic plan and risks discussed with patient.        Alternative management options include physical therapy, medical management with nonsteroidal anti-inflammatory medications or narcotics, chiropractic manipulation and surgical intervention.    1.  Lumbar 4 epidural steroid injections, up to 3, spaced 1-2 weeks apart.  If pain control is acceptable after 1 or 2 injections, it was discussed with the patient that they may return for the subsequent injections if and when their pain returns.  The risks were discussed with the patient including failure of relief, worsening pain, Headache (post dural puncture headache), bleeding (epidural hematoma) and infection (epidural abscess or skin infection).  2.  Physical therapy exercises at home as prescribed by physical therapy or from the pain clinic handout (given to the patient).  Continuation of these exercises every day, or multiple times per week, even when the patient has good pain relief, was stressed to the patient as a preventative measure to decrease the frequency and severity of future pain episodes.  3.  Continue pain medicines as already prescribed.  If patient not currently taking any, it is recommended to begin Acetaminophen 1000 mg po q 8 hours.  If other medicines containing Acetaminophen are currently prescribed, maintain daily dose at 3000 mg.    4.  If they can tolerate NSAIDS, it is recommended to take Ibuprofen 600 mg po q 6 hours for 7 days during pain exacerbations.   Alternatively, they may substitute an NSAID of their choice (e.g. Aleve).  This may be taken at the same time as Acetaminophen.  5.  Heat and ice to the affected area as tolerated for pain control.  It was discussed that heating pads can cause burns.  6.  Daily low impact exercise such as walking or water exercise was recommended to maintain overall health and aid in weight control.   7.  Follow up as needed for subsequent injections.  8.  Patient was counseled to abstain from tobacco products.    Diagnosis     * Degeneration of lumbar intervertebral disc [M51.36]     * Spinal stenosis of lumbar region with neurogenic claudication [M48.062]     * Lumbar radiculopathy [M54.16]

## 2021-07-20 ENCOUNTER — TELEPHONE (OUTPATIENT)
Dept: NEUROSURGERY | Facility: CLINIC | Age: 81
End: 2021-07-20

## 2021-07-20 NOTE — TELEPHONE ENCOUNTER
Patient called and needs a call back, due to at the end of a shingles break out and wants to know if he should keep appointment    Thank you

## 2021-07-21 ENCOUNTER — TELEPHONE (OUTPATIENT)
Dept: NEUROSURGERY | Facility: CLINIC | Age: 81
End: 2021-07-21

## 2021-07-21 NOTE — TELEPHONE ENCOUNTER
Caller: Albert Yadav    Relationship: Self    Best call back number: [phone]    What is the best time to reach you:ANYTIME    Who are you requesting to speak with (clinical staff, provider,  specific staff member):CARLA PERKINS    Do you know the name of the person who called: NA    What was the call regarding:PT CALLED AND IS REQUESTING TO SPEAK TO CARLA PERKINS-PT STATES HE WANTED TO TALK WITH HER ABOUT WHAT THE PLAN IS GOING TO BE FOR TODAYS VISIT. PT IS AWARE THAT HE HAS A VISIT WITH  TODAY. PLEASE ADVISE THANK YOU!    Do you require a callback:YES PLEASE

## 2021-08-11 ENCOUNTER — TELEPHONE (OUTPATIENT)
Dept: NEUROSURGERY | Facility: CLINIC | Age: 81
End: 2021-08-11

## 2021-08-11 NOTE — TELEPHONE ENCOUNTER
TAMMIE--PATIENT WANTED TO LET CARLA PERKINS AND DR LOW KNOW THAT HE HAS DECIDED TO GO A DIFFERENT DIRECTION WITH HIS PROVIDERS.

## 2021-08-18 ENCOUNTER — HOSPITAL ENCOUNTER (OUTPATIENT)
Dept: GENERAL RADIOLOGY | Facility: HOSPITAL | Age: 81
Discharge: HOME | End: 2021-08-18

## 2021-08-18 ENCOUNTER — HOSPITAL ENCOUNTER (OUTPATIENT)
Dept: PAIN MEDICINE | Facility: HOSPITAL | Age: 81
Discharge: HOME | End: 2021-08-18

## 2021-08-18 ENCOUNTER — ANESTHESIA EVENT (OUTPATIENT)
Dept: PAIN MEDICINE | Facility: HOSPITAL | Age: 81
End: 2021-08-18

## 2021-08-18 ENCOUNTER — ANESTHESIA (OUTPATIENT)
Dept: PAIN MEDICINE | Facility: HOSPITAL | Age: 81
End: 2021-08-18

## 2021-08-18 VITALS
HEART RATE: 64 BPM | SYSTOLIC BLOOD PRESSURE: 114 MMHG | TEMPERATURE: 97.1 F | OXYGEN SATURATION: 98 % | RESPIRATION RATE: 16 BRPM | DIASTOLIC BLOOD PRESSURE: 60 MMHG

## 2021-08-18 DIAGNOSIS — R52 PAIN: ICD-10-CM

## 2021-08-18 DIAGNOSIS — M43.16 SPONDYLOLISTHESIS AT L4-L5 LEVEL: Primary | ICD-10-CM

## 2021-08-18 DIAGNOSIS — M48.062 SPINAL STENOSIS, LUMBAR REGION, WITH NEUROGENIC CLAUDICATION: ICD-10-CM

## 2021-08-18 LAB — GLUCOSE BLDC GLUCOMTR-MCNC: 115 MG/DL (ref 70–130)

## 2021-08-18 PROCEDURE — 25010000002 METHYLPREDNISOLONE PER 80 MG: Performed by: ANESTHESIOLOGY

## 2021-08-18 PROCEDURE — 82962 GLUCOSE BLOOD TEST: CPT

## 2021-08-18 PROCEDURE — 0 IOPAMIDOL 41 % SOLUTION: Performed by: ANESTHESIOLOGY

## 2021-08-18 PROCEDURE — 77003 FLUOROGUIDE FOR SPINE INJECT: CPT

## 2021-08-18 RX ORDER — METHYLPREDNISOLONE ACETATE 80 MG/ML
80 INJECTION, SUSPENSION INTRA-ARTICULAR; INTRALESIONAL; INTRAMUSCULAR; SOFT TISSUE ONCE
Status: COMPLETED | OUTPATIENT
Start: 2021-08-18 | End: 2021-08-18

## 2021-08-18 RX ADMIN — IOPAMIDOL 10 ML: 408 INJECTION, SOLUTION INTRATHECAL at 08:12

## 2021-08-18 RX ADMIN — METHYLPREDNISOLONE ACETATE 80 MG: 80 INJECTION, SUSPENSION INTRA-ARTICULAR; INTRALESIONAL; INTRAMUSCULAR; SOFT TISSUE at 08:12

## 2021-08-18 NOTE — H&P
[facility]    History and Physical    Patient Name: Albert Yadav  :  1940  MRN:  [id number]  Date of Admission: 2021    Subjective     Patient is an 81-year-old male who has pain which originates in his lower back and radiates to his right lower extremity rene.  He reports approximate 75% relief for a month following his last lumbar epidural steroid injection.  Today he rates his pain as a 6/10.  He is here for lumbar epidural steroid injection #4.    His lumbar MRI results from 2017 are as follows:    There are multilevel degenerative changes greatest at L4-5 where there is disc height loss as well as 6 to 7 mm and right greater than left disc protrusion.  It contributes to severe right and moderate to severe left facet arthropathy.  There are facet degenerative changes bilaterally at L4-5 with moderate to severe spinal stenosis, severe lateral recess stenosis right greater than left at L4-5.    The following portions of the patients history were reviewed and updated as appropriate: current medications, allergies, past medical history, past surgical history, past family history, past social history and problem list                Objective     Past Medical History:   Past Medical History:   Diagnosis Date   • At risk for sleep apnea     STOP BANG = 5   • Broken bones     arm, collar bone, ankle   • Carotid artery disorder (CMS/HCC)    • Falls    • Groin rash     PT STATES LEFT GROIN AT TIMES   • History of transfusion    • Hyperlipidemia    • Hypertension    • Low back pain    • Lumbar spinal stenosis    • Numbness and tingling     RIGHT ARM, RIGHT LEG & FOOT   • Peripheral neuropathy    • Peripheral vascular disease (CMS/HCC)    • Rheumatoid arthritis (CMS/HCC)     HANDS DIALLO   • Staph infection     2015  [facility] POST SX   • Tendency toward bleeding easily (CMS/HCC)     due to blood thinners     Past Surgical History:   Past Surgical History:   Procedure Laterality Date   • ANGIOPLASTY FEMORAL  "ARTERY Left 2016    Procedure: ULTRA SOUND ACCESS RIGHT FEMORAL ARTERY, AIF BILATERAL RUNOFF, SELECTIVE CATHETERIZATION LEFT FEMORAL ARTERY.;  Surgeon: Errol Michael MD;  Location: [facility] OR ;  Service:    • ARTERIOVENOUS FISTULA/SHUNT SURGERY Left 2016    Procedure: LEFT ILEO-FEMORAL GORTEX GRAFT AND LEFT LEG ARERIOGRAM ;  Surgeon: Errol Michael MD;  Location: [facility] OR ;  Service:    • CAROTID ENDARTERECTOMY Right 2020    Procedure: CAROTID ENDARTERECTOMY;  Surgeon: James Graham MD;  Location: [facility] MAIN OR;  Service: Vascular;  Laterality: Right;   • CHOLECYSTECTOMY     • EPIDURAL BLOCK     • EYE SURGERY Left     as child   • FRACTURE SURGERY Left     arm   • ILIAC ARTERY STENT     • KNEE SURGERY Left     ORIF   • ORIF ANKLE FRACTURE Left      Family History:   Family History   Problem Relation Age of Onset   • Diabetes Mother    • Cancer Father    • Heart disease Maternal Grandmother    • Heart disease Maternal Grandfather    • Heart disease Paternal Grandmother    • Heart disease Paternal Grandfather    • Malig Hyperthermia Neg Hx      Social History:   Social History     Tobacco Use   • Smoking status: Former Smoker     Packs/day: 2.00     Types: Cigarettes     Quit date: 1989     Years since quittin.2   • Smokeless tobacco: Never Used   • Tobacco comment: \"DIDN'T INHALE\"   Vaping Use   • Vaping Use: Never used   Substance Use Topics   • Alcohol use: Yes     Alcohol/week: 7.0 standard drinks     Types: 7 Shots of liquor per week     Comment: 1 drink per day at most   • Drug use: No       Vital Signs Range for the last 24 hours  Temperature: Temp:  [36.2 °C (97.1 °F)] 36.2 °C (97.1 °F)   Temp Source: Temp src: Infrared   BP: BP: (162)/(74) 162/74   Pulse: Heart Rate:  [66] 66   Respirations: Resp:  [16] 16   SPO2: SpO2:  [99 %] 99 %   O2 Amount (l/min):     O2 Devices Device (Oxygen Therapy): room air   Weight:   "         --------------------------------------------------------------------------------    Current Outpatient Medications   Medication Sig Dispense Refill   • acetaminophen (TYLENOL) 650 MG 8 hr tablet Take 650 mg by mouth Every 8 (Eight) Hours As Needed for Mild Pain .     • atorvastatin (LIPITOR) 40 MG tablet Take 40 mg by mouth Every Evening.     • cetirizine (ZyrTEC) 10 MG tablet Take 10 mg by mouth Every Morning.     • folic acid (FOLVITE) 1 MG tablet Take 1 mg by mouth Daily.     • hydrochlorothiazide (HYDRODIURIL) 50 MG tablet Take 50 mg by mouth Every Morning.     • lisinopril (PRINIVIL,ZESTRIL) 40 MG tablet Take 40 mg by mouth Every Evening.     • melatonin 5 MG tablet tablet Take 5 mg by mouth Every Night.     • methotrexate 2.5 MG tablet Take 2.5 mg by mouth. Wednesdays 10 2.5 mg weekly     • ONE TOUCH ULTRA TEST test strip   0   • Sennosides (SENNA LAX PO) Take 1 tablet by mouth 1 (One) Time.     • traZODone (DESYREL) 50 MG tablet Take 50 mg by mouth Every Night.     • triamcinolone (KENALOG) 0.025 % cream APPLY TWICE DAILY TO FOREHEAD AND CHEST     • clopidogrel (PLAVIX) 75 MG tablet Take 75 mg by mouth Every Morning.     • nystatin 188418 UNIT/GM powder APPLY TO RASH EVERY DAY AS NEEDED       No current facility-administered medications for this encounter.       --------------------------------------------------------------------------------  Assessment/Plan      Anesthesia Evaluation     Patient summary reviewed and Nursing notes reviewed   NPO Solid Status: > 8 hours  NPO Liquid Status: > 2 hours    Pain impairs ability to perform ADLs: Meal prep/Eating, Exercise/Activity and Sleeping  Modalities previously tried to control pain with limited effectiveness within the last 4-6 weeks: OTC medications, Physical therapy and Rest     Airway   Mallampati: II  TM distance: >3 FB  Neck ROM: full  Dental - normal exam     Pulmonary - normal exam    breath sounds clear to auscultation  (+) a smoker Former,    Cardiovascular - normal exam    Rhythm: regular  Rate: normal    (+) hypertension 2 medications or greater, PVD, hyperlipidemia,  carotid artery disease carotid bilateral  (-) angina, orthopnea, PND, BEAULIEU      Neuro/Psych- negative ROS  GI/Hepatic/Renal/Endo    (+) obesity,       Musculoskeletal     Abdominal  - normal exam    Abdomen: soft.  Bowel sounds: normal.   Substance History - negative use     OB/GYN negative ob/gyn ROS         Other   arthritis (RA),                 Diagnosis and Plan      Treatment Plan  ASA 3   Patient has had previous injection/procedure with 50-75% improvement.   Procedures: Lumbar Epidural Steroid Injection(LESI), With fluoroscopy,       Anesthetic plan and risks discussed with patient.        Alternative management options include physical therapy, medical management with nonsteroidal anti-inflammatory medications or narcotics, chiropractic manipulation and surgical intervention.    1.  Lumbar 4 epidural steroid injections, up to 3, spaced 1-2 weeks apart.  If pain control is acceptable after 1 or 2 injections, it was discussed with the patient that they may return for the subsequent injections if and when their pain returns.  The risks were discussed with the patient including failure of relief, worsening pain, Headache (post dural puncture headache), bleeding (epidural hematoma) and infection (epidural abscess or skin infection).  2.  Physical therapy exercises at home as prescribed by physical therapy or from the pain clinic handout (given to the patient).  Continuation of these exercises every day, or multiple times per week, even when the patient has good pain relief, was stressed to the patient as a preventative measure to decrease the frequency and severity of future pain episodes.  3.  Continue pain medicines as already prescribed.  If patient not currently taking any, it is recommended to begin Acetaminophen 1000 mg po q 8 hours.  If other medicines containing Acetaminophen  are currently prescribed, maintain daily dose at 3000 mg.    4.  If they can tolerate NSAIDS, it is recommended to take Ibuprofen 600 mg po q 6 hours for 7 days during pain exacerbations.  Alternatively, they may substitute an NSAID of their choice (e.g. Aleve).  This may be taken at the same time as Acetaminophen.  5.  Heat and ice to the affected area as tolerated for pain control.  It was discussed that heating pads can cause burns.  6.  Daily low impact exercise such as walking or water exercise was recommended to maintain overall health and aid in weight control.   7.  Follow up as needed for subsequent injections.  8.  Patient was counseled to abstain from tobacco products.    Diagnosis     * Degeneration of lumbar intervertebral disc [M51.36]     * Spinal stenosis of lumbar region with neurogenic claudication [M48.062]     * Lumbar radiculopathy [M54.16]

## 2021-08-18 NOTE — ANESTHESIA PROCEDURE NOTES
PAIN Epidural block      Patient reassessed immediately prior to procedure    Patient location during procedure: pain clinic  Start Time: 8/18/2021 7:56 AM  Stop Time: 8/18/2021 8:14 AM  Indication:procedure for pain  Performed By  Anesthesiologist: Dyllan Lugo MD  Preanesthetic Checklist  Completed: patient identified, site marked, risks and benefits discussed, surgical consent, monitors and equipment checked, pre-op evaluation and timeout performed  Additional Notes  Post-Op Diagnosis Codes:     * Degeneration of lumbar intervertebral disc (M51.36)     * Spinal stenosis of lumbar region with neurogenic claudication (M48.062)     * Lumbar radiculopathy (M54.16)    The patient was observed in recovery with no evidence of neurological deficits or other problems. All questions were answered. The patient was discharged with appropriate instructions.  Prep:  Pt Position:prone  Sterile Tech:cap, gloves, mask and sterile barrier  Prep:chlorhexidine gluconate and isopropyl alcohol  Monitoring:blood pressure monitoring, continuous pulse oximetry and EKG  Procedure:Sedation: no     Approach:right paramedian  Guidance: fluoroscopy  Location:lumbar  Level:4-5 (Interlaminar)  Needle Type:Tuohy  Needle Gauge:20 G  Aspiration:negative  Medications:  Depomedrol:80 mg  Preservative Free Saline:3mL  Isovue:2mL  Comments:Isovue dye spread was consistent with epidural placement.  Post Assessment:  Dressing:occlusive dressing applied  Pt Tolerance:patient tolerated the procedure well with no apparent complications  Complications:no

## 2021-08-24 ENCOUNTER — TELEPHONE (OUTPATIENT)
Dept: ORTHOPEDIC SURGERY | Facility: CLINIC | Age: 81
End: 2021-08-24

## 2021-08-24 NOTE — TELEPHONE ENCOUNTER
Provider: DR CRAIG    Caller: NARINDER SHERIDAN    Relationship to Patient: SELF    Phone Number: [phone]    Reason for Call: PATIENT RECEIVED NEW PATIENT PACKET IN THE MAIL AND WANTED TO DISCUSS SOME QUESTIONS WITH A NURSE.

## 2021-10-15 ENCOUNTER — OFFICE VISIT (OUTPATIENT)
Dept: ORTHOPEDIC SURGERY | Facility: CLINIC | Age: 81
End: 2021-10-15

## 2021-10-15 VITALS — BODY MASS INDEX: 28.44 KG/M2 | TEMPERATURE: 98 F | HEIGHT: 69 IN | WEIGHT: 192 LBS

## 2021-10-15 DIAGNOSIS — M43.16 SPONDYLOLISTHESIS OF LUMBAR REGION: ICD-10-CM

## 2021-10-15 DIAGNOSIS — M48.062 SPINAL STENOSIS OF LUMBAR REGION WITH NEUROGENIC CLAUDICATION: ICD-10-CM

## 2021-10-15 DIAGNOSIS — R52 PAIN: Primary | ICD-10-CM

## 2021-10-15 PROCEDURE — 72100 X-RAY EXAM L-S SPINE 2/3 VWS: CPT | Performed by: ORTHOPAEDIC SURGERY

## 2021-10-15 PROCEDURE — 99204 OFFICE O/P NEW MOD 45 MIN: CPT | Performed by: ORTHOPAEDIC SURGERY

## 2021-10-15 RX ORDER — HYDROXYZINE PAMOATE 25 MG/1
25 CAPSULE ORAL 3 TIMES DAILY PRN
COMMUNITY
Start: 2021-08-09 | End: 2022-10-13

## 2021-10-15 NOTE — PROGRESS NOTES
New patient or new problem visit    CC: Imbalance, low back pain    HPI: He has imbalance, low back pain and some right leg numbness in about that order severity of symptoms.  His main concern is balance he has fallen more than once and shows me some scars on his arms.  He is on anticoagulants so that does not help.  I actually saw him 4 years ago for this and he seen Dr. Francisco as well meantime.  He has been undergoing epidurals with some relief of symptoms but obviously they are not helping his balance.  Physical therapy and medications have been tried and exhausted as well.  No bowel or bladder complaints fever chills or weight loss.    PFSH: See attached.  He has a 98-year-old mother-in-law with whom he resides and who requires attention now such that his availability for own medical care is compromised.    ROS: As above    PE: He uses a cane and has that with him.  On exam slight EHL weakness on the right good strength on the left knee strength is good but he has bilateral mild flexion contractures.  Sensation appears intact he does have multiple ecchymoses in the upper extremities which she states are from anticoagulants as well as from falling.  Posture is slightly stooped.    XRAY: Plain film x-rays lumbar spine show excellent lordosis but a 12 mm spondylolisthesis at L4-5 and a slight scoliosis.  Comparison films are slightly oblique so we can compare whether or not there is been any increase but I suspect he has had slight increase in the spondylolisthesis.  His old MRI showed stenosis confined to L4-5 which was quite severe.  Recent MRI in 2020 showed some involvement at 2 3 and 3 4 but basically 4 5 remains significant level and I reviewed the radiologist report but I feel that it gives too much concern to 3 4 and 4 5.    Other: n/a    Impression: L4-5 spondylolisthesis with spinal stenosis.  He is a good candidate for surgery if he is medically able which I suspect he should be.  He has social  issues to iron out.    Plan: He can continue epidurals for now.  I recommended he adopt a walker instead of a cane and told him if he keeps falling he is going to break or injure something important.  I believe he should have surgery if he is medically able and they understand the social situation but it is compromising his medical care and I do not see an advantage to waiting.  I explained the risks of surgery.

## 2021-10-18 ENCOUNTER — TELEPHONE (OUTPATIENT)
Dept: ORTHOPEDIC SURGERY | Facility: CLINIC | Age: 81
End: 2021-10-18

## 2021-10-18 NOTE — TELEPHONE ENCOUNTER
----- Message from Abdulkadir Pacheco MD sent at 10/18/2021  1:56 PM EDT -----  Regarding: FW: Question regarding XR SPINE LUMBAR AP AND LATERAL      ----- Message -----  From: Monserrat Castillo MA  Sent: 10/18/2021   8:51 AM EDT  To: Abdulkadir Pacheco MD  Subject: Question regarding XR SPINE LUMBAR AP AND LA#    Please review    ----- Message -----  From: Albert Yadav  Sent: 10/16/2021   7:17 AM EDT  To: Yamile Os [facility] Clinical Pool  Subject: Question regarding XR SPINE LUMBAR AP AND LA#    I do not remember remember any “risk of surgery” discussion. Please explain.

## 2021-10-18 NOTE — TELEPHONE ENCOUNTER
Call returned to the patient.  We discussed risk and benefits of surgery.  Patient understands and agrees and will contact me once he is ready to schedule surgery.  Have left it open for him to call if he has any other questions or concerns

## 2021-10-19 ENCOUNTER — PREP FOR SURGERY (OUTPATIENT)
Dept: OTHER | Facility: HOSPITAL | Age: 81
End: 2021-10-19

## 2021-10-19 DIAGNOSIS — M48.062 LUMBAR STENOSIS WITH NEUROGENIC CLAUDICATION: Primary | ICD-10-CM

## 2021-10-19 PROBLEM — M48.061 LUMBAR STENOSIS: Status: ACTIVE | Noted: 2021-10-19

## 2021-10-19 RX ORDER — CEFAZOLIN SODIUM 2 G/100ML
2 INJECTION, SOLUTION INTRAVENOUS ONCE
Status: CANCELLED | OUTPATIENT
Start: 2021-12-06 | End: 2021-10-19

## 2021-10-25 ENCOUNTER — TELEPHONE (OUTPATIENT)
Dept: ORTHOPEDIC SURGERY | Facility: CLINIC | Age: 81
End: 2021-10-25

## 2021-10-25 NOTE — TELEPHONE ENCOUNTER
Provider:  DR. SHERICE CRAIG  Caller:  NARINDER SHERIDAN  Relationship to Patient:  SELF  Pharmacy:   Phone Number: [phone]  Reason for Call:  PATIENT WANTS TO CANCEL HIS SURGERY SCHEDULED ON 12/6/21.

## 2021-12-03 ENCOUNTER — HOSPITAL ENCOUNTER (EMERGENCY)
Facility: HOSPITAL | Age: 81
Discharge: HOME | End: 2021-12-03
Attending: EMERGENCY MEDICINE | Admitting: EMERGENCY MEDICINE

## 2021-12-03 ENCOUNTER — APPOINTMENT (OUTPATIENT)
Dept: CT IMAGING | Facility: HOSPITAL | Age: 81
End: 2021-12-03

## 2021-12-03 VITALS
OXYGEN SATURATION: 98 % | HEART RATE: 80 BPM | RESPIRATION RATE: 17 BRPM | TEMPERATURE: 98.7 F | DIASTOLIC BLOOD PRESSURE: 66 MMHG | SYSTOLIC BLOOD PRESSURE: 158 MMHG

## 2021-12-03 DIAGNOSIS — S01.511A LIP LACERATION, INITIAL ENCOUNTER: ICD-10-CM

## 2021-12-03 DIAGNOSIS — S62.101A CLOSED FRACTURE OF RIGHT WRIST, INITIAL ENCOUNTER: ICD-10-CM

## 2021-12-03 DIAGNOSIS — S09.90XA INJURY OF HEAD, INITIAL ENCOUNTER: ICD-10-CM
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
## 2021-12-03 PROCEDURE — 99282 EMERGENCY DEPT VISIT SF MDM: CPT

## 2021-12-03 PROCEDURE — 72125 CT NECK SPINE W/O DYE: CPT

## 2021-12-03 PROCEDURE — 70450 CT HEAD/BRAIN W/O DYE: CPT

## 2021-12-03 RX ORDER — LIDOCAINE HYDROCHLORIDE AND EPINEPHRINE 10; 10 MG/ML; UG/ML
10 INJECTION, SOLUTION INFILTRATION; PERINEURAL ONCE
Status: COMPLETED | OUTPATIENT
Start: 2021-12-03 | End: 2021-12-03

## 2021-12-03 RX ORDER — ONDANSETRON 4 MG/1
4 TABLET, ORALLY DISINTEGRATING ORAL 4 TIMES DAILY PRN
Qty: 15 TABLET | Refills: 0 | Status: SHIPPED | OUTPATIENT
Start: 2021-12-03 | End: 2022-10-13

## 2021-12-03 RX ORDER — HYDROCODONE BITARTRATE AND ACETAMINOPHEN 5; 325 MG/1; MG/1
1 TABLET ORAL EVERY 6 HOURS PRN
Qty: 12 TABLET | Refills: 0 | Status: SHIPPED | OUTPATIENT
Start: 2021-12-03 | End: 2021-12-06

## 2021-12-03 RX ADMIN — LIDOCAINE-EPINEPHRINE-TETRACAINE GEL 4-0.05-0.5% 3 ML: 4-0.05-0.5 GEL at 12:54

## 2021-12-03 RX ADMIN — LIDOCAINE HYDROCHLORIDE,EPINEPHRINE BITARTRATE 10 ML: 10; .01 INJECTION, SOLUTION INFILTRATION; PERINEURAL at 14:10

## 2021-12-03 NOTE — ED PROVIDER NOTES
EMERGENCY DEPARTMENT ENCOUNTER    Room Number:  A02/02  Date of encounter:  12/3/2021  PCP: Valerie Burnham MD  Historian: Patient      I used full protective equipment while examining this patient.  This includes face mask, gloves and protective eyewear.  I washed my hands before entering the room and immediately upon leaving the room      HPI:  Chief Complaint: Fall  A complete HPI/ROS/PMH/PSH/SH/FH are unobtainable due to: Nothing    Context: Albert Yadav is a 81 y.o. male who presents to the ED c/o injuries sustained in mechanical fall prior to arrival.  Patient states he lost his balance in the parking lot.  Patient states he fell forward and hit his face on the pavement.  Patient also injured his right wrist.  He initially went to an immediate care center where he had x-rays of the right wrist which show a fracture.  He states he has been treated for this.  He was referred to the ER for further treatment of his head.  Patient is on Plavix for history of coronary artery disease.  He denies any loss of consciousness, nausea, vomiting, neck pain.  He does have a small laceration to the inner upper lip.  Denies any dental injury.  He denies any other extremity injuries.  He states he was in his normal state of health prior to this fall.  He states his tetanus shot is up-to-date.    Review of Medical Records  I reviewed urgent care office visit from prior to arrival.  X-rays reviewed show an impacted distal right radius fracture and  an ulnar styloid fracture.  Patient is in an appropriate splint.    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   • Falls    • Fracture of ankle 1972   • Fracture of wrist 1947   • Fracture, clavicle 26 years ago   • Fracture, foot 26 year ago   • Groin rash    • History of transfusion    • Hyperlipidemia    • Hypertension    • Low back pain    • Low back strain ?   • Lumbar spinal stenosis    • Numbness and tingling    • Peripheral neuropathy    • Peripheral vascular disease ([facility])    • Rheumatoid arthritis ([facility])    • Staph infection    • Tendency toward bleeding easily ([facility])          PAST SURGICAL HISTORY  Past Surgical History:   Procedure Laterality Date   • ANGIOPLASTY FEMORAL ARTERY Left 11/7/2016    Procedure: ULTRA SOUND ACCESS RIGHT FEMORAL ARTERY, AIF BILATERAL RUNOFF, SELECTIVE CATHETERIZATION LEFT FEMORAL ARTERY.;  Surgeon: Errol Michael MD;  Location: [facility] OR 18/19;  Service:    • ANKLE OPEN REDUCTION INTERNAL FIXATION  1980   • ARTERIOVENOUS FISTULA/SHUNT SURGERY Left 12/13/2016    Procedure: LEFT ILEO-FEMORAL GORTEX GRAFT AND LEFT LEG ARERIOGRAM ;  Surgeon: Errol Michael MD;  Location: [facility] OR 18/19;  Service:    • CAROTID ENDARTERECTOMY Right 9/29/2020    Procedure: CAROTID ENDARTERECTOMY;  Surgeon: James Graham MD;  Location: [facility];  Service: Vascular;  Laterality: Right;   • CHOLECYSTECTOMY     • EPIDURAL BLOCK     • EYE SURGERY Left     as child   • FRACTURE SURGERY Left     arm   • ILIAC ARTERY STENT     • KNEE SURGERY  Left     ORIF   • ORIF ANKLE FRACTURE Left          FAMILY HISTORY  Family History   Problem Relation Age of Onset   • Diabetes Mother    • Cancer Mother    • Cancer Father    • Diabetes Father    • Heart disease Maternal Grandmother    • Heart disease Maternal Grandfather    • Heart disease Paternal Grandmother    • Heart disease Paternal Grandfather    • Malig Hyperthermia Neg Hx          SOCIAL HISTORY  Social History     Socioeconomic History   • Marital status:    Tobacco Use   • Smoking status: Former Smoker     Packs/day: 2.00     Years: 0.00     Pack years: 0.00     Types: Cigarettes     Start date: 1968     Quit date: 1989     Years since quittin.5   • Smokeless tobacco: Never Used   • Tobacco comment: Did’nt inhale   Vaping Use   • Vaping Use: Never used   Substance and Sexual Activity   • Alcohol use: Not Currently     Alcohol/week: 0.0 standard drinks     Comment: 1 drink per day at most   • Drug use: Never   • Sexual activity: Not Currently     Partners: Female     Birth control/protection: Condom         ALLERGIES  Patient has no known allergies.        REVIEW OF SYSTEMS  All systems reviewed and negative except for those discussed in HPI.       PHYSICAL EXAM    I have reviewed the triage vital signs and nursing notes.    ED Triage Vitals   Temp Heart Rate Resp BP SpO2   21 1233 21 1233 21 1233 21 1234 21 1233   98.7 °F (37.1 °C) 80 18 130/70 97 %      Temp src Heart Rate Source Patient Position BP Location FiO2 (%)   21 1233 -- -- -- --   Tympanic           Physical Exam    GENERAL: Alert, oriented, not distressed  HENT: Abrasion to external right upper lip.  Laceration to mucosal surface of right upper lip.  No dental injury.  No hematomas to scalp.  No vertebral neck tenderness.  EYES: no scleral icterus, left sided amblyopia  CV: regular rhythm, regular rate, no murmur  RESPIRATORY: normal effort, CTA  ABDOMEN: soft,  nontender  MUSCULOSKELETAL: Moderate tenderness to right wrist without deformity.  Neurovascular intact distally.  Remainder of extremities are normal.  NEURO: alert, moves all extremities, follows commands  SKIN: warm, dry    RADIOLOGY  CT Cervical Spine Without Contrast, CT Head Without Contrast    Result Date: 12/3/2021  EMERGENCY NONCONTRAST HEAD CT NONCONTRAST CERVICAL SPINE CT 12/03/2021  CLINICAL HISTORY: This patient lost his balance in parking lot, fell forward, hit his face on pavement, headache and neck pain.  HEAD CT TECHNIQUE: Spiral CT images were obtained from the base of skull to the vertex without intravenous contrast. Images were reformatted and submitted in 3 mm thick axial CT section with brain algorithm and 2 mm thick axial CT section with high-resolution bone algorithm, and 2 mm thick sagittal and coronal reconstructions were performed and submitted in brain algorithm.  COMPARISON: Correlated to prior CT angiogram of the head and neck 09/02/2020. There are no additional prior studies for comparison.  FINDINGS: There is some mild low-density periventricular white matter, consistent with mild small vessel disease. The remainder of the brain parenchyma is normal in attenuation. There is diffuse cerebral atrophy. The lateral and third ventricles are mildly prominent in size, felt to be due to central volume loss or atrophy. I see no focal mass effect. There is no midline shift. No extra-axial fluid collections are identified. There is no evidence of acute intracranial hemorrhage. No acute skull fractures identified. The calvarium and the skull base are normal in appearance. Paranasal sinuses and mastoid air cells and middle ear cavities are clear.      1. No acute intracranial abnormality is seen. 2. There is mild small vessel disease in periventricular white matter. There is diffuse cerebral atrophy. The lateral and third ventricles are mildly prominent in size, felt to be on the basis central  volume loss or atrophy. 3. The remainder of the head CT is within normal limits with no acute skull fracture or intracranial hemorrhage identified.  CERVICAL SPINE CT TECHNIQUE: Spiral CT images were obtained from the skull base down to the T2 thoracic level and images were reformatted and submitted in 2 mm thick axial and sagittal CT sections with soft tissue algorithm, 1 mm thick axial, sagittal and coronal CT sections with high-resolution bone algorithm.  FINDINGS: The atlantooccipital articulation is within normal limits.  At C1-C2, there are arthritic changes at the atlantodental interval with marginal spurring off the anterior ring of C1 and there is a 5 x 4 mm subchondral cyst at the anterior aspect of the base of the odontoid, otherwise the C1-C2 level is normal in appearance.  At C2-C3, there is mild bilateral facet overgrowth. Posterior disc margin is normal. There is no canal or foraminal narrowing.  At C3-C4, there is disc space narrowing and degenerative endplate change, posterior disc osteophyte complex, adjacent thin band of ossification posterior longitudinal ligament along the posterior central disc margin abuts and deforms the ventral surface of the cord resulting in up to moderate canal narrowing. There is bilateral uncovertebral joint hypertrophy and there is mild-to-moderate right and there is moderate-to-severe left bony foraminal narrowing.  At C4-C5, there is mild bilateral facet overgrowth. There is disc space narrowing and degenerative endplate change, mild diffuse posterior disc osteophyte complex. In addition, there is moderate size right paracentral posterior lateral disc herniation measuring up to 7 x 5 x 7 mm that abuts and flattens the right anterolateral cord, moderate to severely narrowing the right side of the canal, compresses the right C5 nerve root as it extends from the cord toward the right foramen. There is mild right and there is moderate left bony foraminal narrowing.  At  C5-C6, there is mild bilateral facet overgrowth. There is moderate disc space narrowing and degenerative endplate changes, diffuse posterior disc aspect complex abuts and deforms the ventral surface of the cord, moderately narrowing the canal. There is bilateral uncovertebral joint hypertrophy. There is moderate-to-severe bilateral bony foraminal narrowing.  At C6-C7, there is mild bilateral facet overgrowth. There is severe disc space narrowing. There are degenerative endplate changes, diffuse posterior disc osteophyte complex abuts the ventral surface of the cord mildly narrowing the canal. There is bilateral uncovertebral joint hypertrophy and there is mild-to-moderate left and moderate right bony foraminal narrowing.  At C7-T1, there is mild bilateral facet overgrowth. Posterior disc margin is normal. There is no canal or foraminal narrowing.  No acute fracture is seen in the cervical spine.  IMPRESSION: 1. No acute fracture is seen in the cervical spine. 2. There is diffuse cervical spondylosis as described above, posterior disc osteophyte complex with adjacent ossification posterior longitudinal ligament along posterior disc margin results in mild-to-moderate canal narrowing at C3-C4. There is moderate size right paracentral to posterior lateral disc herniation at C4-C5 measuring 7 x 5 x 7 mm in size, abuts and flattens the right anterolateral cord moderate to severely narrowing the right lateral aspect of the canal, compresses the right C5 nerve root as it extends from the cord toward the right neural foramen. Posterior disc osteophyte complex at C5-C6 moderately narrows the canal, and there is mild canal narrowing at C6-C7. There is multilevel bony foraminal narrowing in the cervical spine.  Radiation dose reduction techniques were utilized, including automated exposure control and exposure modulation based on body size.  This report was finalized on 12/3/2021 2:42 PM by Dr. Leonardo Leon M.D.        I  ordered the above noted radiological studies. Reviewed by me and discussed with radiologist.  See dictation for official radiology interpretation.      MEDICATIONS GIVEN IN ER    Medications   lidocaine 1% - EPINEPHrine 1:012183 (XYLOCAINE W/EPI) 1 %-1:805305 injection 10 mL (10 mL Injection Given by Other 12/3/21 1410)   Lido-EPINEPHrine-Tetracaine 4-0.05-0.5 % gel 3 mL (3 mL Topical Given 12/3/21 1254)     Laceration Repair    Date/Time: 12/3/2021 7:43 PM  Performed by: Eric Mays PA  Authorized by: Errol Encinas II, MD     Consent:     Consent obtained:  Verbal  Anesthesia (see MAR for exact dosages):     Anesthesia method:  Local infiltration and topical application    Topical anesthetic:  LET    Local anesthetic:  Lidocaine 1% WITH epi  Laceration details:     Location:  Lip    Lip location:  Upper interior lip    Length (cm):  2  Repair type:     Repair type:  Simple  Exploration:     Hemostasis achieved with:  Epinephrine    Wound exploration: wound explored through full range of motion and entire depth of wound probed and visualized      Contaminated: no    Treatment:     Area cleansed with:  Hibiclens    Amount of cleaning:  Standard    Irrigation solution:  Sterile saline  Mucous membrane repair:     Suture size:  5-0    Suture material:  Plain gut    Suture technique:  Simple interrupted    Number of sutures:  4  Approximation:     Approximation:  Close    Vermilion border: well-aligned    Post-procedure details:     Dressing:  Open (no dressing)    Patient tolerance of procedure:  Tolerated well, no immediate complications        PROGRESS, DATA ANALYSIS, CONSULTS, AND MEDICAL DECISION MAKING    All labs have been independently reviewed by me.  All radiology studies have been reviewed by me and discussed with radiologist dictating the report.   EKG's independently viewed and interpreted by me.  Discussion below represents my analysis of pertinent findings related to patient's condition,  differential diagnosis, treatment plan and final disposition.    I have discussed case with Dr. Encinas, emergency room physician.  He has performed his own bedside examination and agrees with treatment plan.    ED Course as of 12/03/21 1945   Fri Dec 03, 2021   1242 Patient presents with injury sustained a mechanical fall prior to arrival.  Patient has upper lip laceration.  Patient is on Plavix.  Plan obtain CT scan of head to rule out intracranial hemorrhage.  Plan for wound closure.  No neuro deficits. [EE]   1401 I discussed CT findings with Dr. Leon.  No acute intracranial hemorrhage or skull fracture.  Moderate degenerative changes of the spine without acute fracture. [EE]   1430 Patient has a normal head CT.  I have updated patient on this.  I explained that sutures will fall out on their own.  I will give him follow-up for his right wrist fracture.  Patient has no neuro deficits and ambulated out of the ER without difficulty. [EE]      ED Course User Index  [EE] Eric Mays PA       AS OF 19:45 EST VITALS:    BP - 158/66  HR - 80  TEMP - 98.7 °F (37.1 °C) (Tympanic)  O2 SATS - 98%        DIAGNOSIS  Final diagnoses:   Fall, initial encounter   Lip laceration, initial encounter   Injury of head, initial encounter   Closed fracture of right wrist, initial encounter         DISPOSITION  Discharged           Eric Mays PA  12/03/21 1945

## 2021-12-03 NOTE — ED NOTES
Pt wearing mask throughout encouter. This RN wearing mask and goggles throughout encounter.       Angelique Salmeron, RN  12/03/21 [id number]

## 2021-12-03 NOTE — ED TRIAGE NOTES
Pt states mechanical [place] this [place], pt hit face on ground, pt has laceration to right side of lip. And injury to right hand. Pt takes plavix.     Pt arrives in triage with mask on. Triage staff wearing N95 masks and goggles.

## 2021-12-03 NOTE — ED PROVIDER NOTES
MD ATTESTATION NOTE    The SAIDA and I have discussed this patient's history, physical exam, and treatment plan.  I have reviewed the documentation and personally had a face to face interaction with the patient. I affirm the documentation and agree with the treatment and plan.  The attached note describes my personal findings.      Albert Yadav is a 81 y.o. male who presents to the ED c/o fall.  He was at the [facility]'s parking lot when he fell.  He has issues with his balance due to spinal issues.  He is on Plavix.      On exam:  Right upper lip laceration  Scalp is atraumatic  Right wrist is splinted  Otherwise, no pain to palpation of the 4 extremities    Labs  No results found for this or any previous visit (from the past 24 hour(s)).    Radiology  No Radiology Exams Resulted Within Past 24 Hours    Medical Decision Making:  ED Course as of 12/05/21 1720   Fri Dec 03, 2021   1242 Patient presents with injury sustained a mechanical fall prior to arrival.  Patient has upper lip laceration.  Patient is on Plavix.  Plan obtain CT scan of head to rule out intracranial hemorrhage.  Plan for wound closure.  No neuro deficits. [EE]   1401 I discussed CT findings with Dr. Leon.  No acute intracranial hemorrhage or skull fracture.  Moderate degenerative changes of the spine without acute fracture. [EE]   1430 Patient has a normal head CT.  I have updated patient on this.  I explained that sutures will fall out on their own.  I will give him follow-up for his right wrist fracture.  Patient has no neuro deficits and ambulated out of the ER without difficulty. [EE]      ED Course User Index  [EE] Eric Mays PA       PPE: Both the patient and I wore a surgical mask throughout the entire patient encounter. I wore protective goggles.     Diagnosis  Final diagnoses:   Fall, initial encounter   Lip laceration, initial encounter   Injury of head, initial encounter   Closed fracture of right wrist, initial encounter        Huang  Errol Luque II, MD  12/05/21 [id number]

## 2021-12-06 ENCOUNTER — APPOINTMENT (OUTPATIENT)
Dept: GENERAL RADIOLOGY | Facility: HOSPITAL | Age: 81
End: 2021-12-06

## 2021-12-06 ENCOUNTER — APPOINTMENT (OUTPATIENT)
Dept: CT IMAGING | Facility: HOSPITAL | Age: 81
End: 2021-12-06

## 2021-12-06 ENCOUNTER — HOSPITAL ENCOUNTER (EMERGENCY)
Facility: HOSPITAL | Age: 81
Discharge: HOME | End: 2021-12-06
Attending: EMERGENCY MEDICINE | Admitting: EMERGENCY MEDICINE

## 2021-12-06 VITALS
RESPIRATION RATE: 20 BRPM | HEART RATE: 70 BPM | OXYGEN SATURATION: 98 % | TEMPERATURE: 97.8 F | SYSTOLIC BLOOD PRESSURE: 151 MMHG | DIASTOLIC BLOOD PRESSURE: 76 MMHG

## 2021-12-06 DIAGNOSIS — S22.41XA CLOSED FRACTURE OF MULTIPLE RIBS OF RIGHT SIDE, INITIAL ENCOUNTER: ICD-10-CM
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
## 2021-12-06 PROCEDURE — 76377 3D RENDER W/INTRP POSTPROCES: CPT

## 2021-12-06 PROCEDURE — 71250 CT THORAX DX C-: CPT

## 2021-12-06 PROCEDURE — 71101 X-RAY EXAM UNILAT RIBS/CHEST: CPT

## 2021-12-06 PROCEDURE — 99283 EMERGENCY DEPT VISIT LOW MDM: CPT

## 2021-12-06 RX ORDER — LIDOCAINE 50 MG/G
1 PATCH TOPICAL EVERY 24 HOURS
Qty: 15 EACH | Refills: 0 | Status: SHIPPED | OUTPATIENT
Start: 2021-12-06 | End: 2022-10-13

## 2021-12-06 NOTE — ED TRIAGE NOTES
Patient to er from home with c/o he had a fall on 12/03/2021 and was seen here for that. Today patient is having increasing in right rib pain. Patient has mask on in triage along with staff.

## 2021-12-06 NOTE — ED PROVIDER NOTES
11:28 EST  Patient seen and examined with physician assistant.  Briefly patient presents for evaluation of fall with right rib injury.  Patient was here for fall couple days ago when she was complaining of laceration and wrist fracture.  Has had increasing pain in his right chest wall.  Has had no shortness of breath.        On exam patient is alert cooperative in no distress.  Patient does have tenderness to his right chest wall.  His lungs are clear bilaterally and heart is regular rate and rhythm.          Chest x-ray shows isolated rib fracture will CT scan his chest.          MD ATTESTATION NOTE    The SAIDA and I have discussed this patient's history, physical exam, and treatment plan.  I have reviewed the documentation and personally had a face to face interaction with the patient. I affirm the documentation and agree with the treatment and plan.  The attached note describes my personal findings.      Patient was wearing a face mask when I entered the room and they continued to wear a mask throughout their stay in the ED.  I wore PPE, including  gloves, face mask with shield or face mask with goggles whenever I was in the room with patient.      Francisco Stoddard MD  12/06/21 2535

## 2021-12-06 NOTE — ED PROVIDER NOTES
EMERGENCY DEPARTMENT ENCOUNTER    Room Number:  B03/03  Date of encounter:  12/6/2021  PCP: Valerie Burnham MD  Historian: Patient       I used full protective equipment while examining this patient.  This includes face mask, gloves and protective eyewear.  I washed my hands before entering the room and immediately upon leaving the room      HPI:  Chief Complaint: Fall, rib pain  A complete HPI/ROS/PMH/PSH/SH/FH are unobtainable due to: Nothing    Context: Albert Yadav is a 81 y.o. male who presents to the ED c/o right anterior rib pain after mechanical fall 3 days ago.  Patient had lost his balance and fell forward.  He was initially seen in the ER on 12/3/2021.  At that time patient had a right wrist fracture, and laceration to his upper lip.  He states that at that time he did not have any chest wall pain.  He states this pain developed a day later.  He complains of a sharp, stabbing sensation over the right anterior chest wall.  He states his pain is worse with deep breaths and movement.  The pain improves with rest.  He denies any shortness of breath.    Review of Medical Records  I reviewed patient's ER visit from 12/3/2021.  Patient had a fall with a normal head CT.  Patient did have laceration repair to his upper lip mucosa.    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   • Falls    • Fracture of ankle 1972   • Fracture of wrist 1947   • Fracture, clavicle 26 years ago   • Fracture, foot 26 year ago   • Groin rash    • History of transfusion    • Hyperlipidemia    • Hypertension    • Low back pain    • Low back strain ?   • Lumbar spinal stenosis    • Numbness and tingling    • Peripheral neuropathy    • Peripheral vascular disease (HCC)    • Rheumatoid arthritis (HCC)    • Staph infection    • Tendency toward bleeding easily (HCC)          PAST SURGICAL HISTORY  Past Surgical History:   Procedure Laterality Date   • ANGIOPLASTY FEMORAL ARTERY Left 11/7/2016    Procedure: ULTRA SOUND ACCESS RIGHT FEMORAL ARTERY, AIF BILATERAL RUNOFF, SELECTIVE CATHETERIZATION LEFT FEMORAL ARTERY.;  Surgeon: Errol Michael MD;  Location: [facility] OR 18/19;  Service:    • ANKLE OPEN REDUCTION INTERNAL FIXATION  1980   • ARTERIOVENOUS FISTULA/SHUNT SURGERY Left 12/13/2016    Procedure: LEFT ILEO-FEMORAL GORTEX GRAFT AND LEFT LEG ARERIOGRAM ;  Surgeon: Errol Michael MD;  Location: [facility] OR 18/19;  Service:    • CAROTID ENDARTERECTOMY Right 9/29/2020    Procedure: CAROTID ENDARTERECTOMY;  Surgeon: James Graham MD;  Location: [facility];  Service: Vascular;  Laterality: Right;   • CHOLECYSTECTOMY     • EPIDURAL BLOCK     • EYE SURGERY Left     as child   • FRACTURE SURGERY Left     arm   • ILIAC ARTERY STENT     • KNEE SURGERY Left     ORIF   • ORIF ANKLE FRACTURE Left          FAMILY HISTORY  Family History   Problem Relation Age of Onset   • Diabetes Mother    • Cancer Mother    • Cancer Father    • Diabetes Father    • Heart disease Maternal Grandmother    • Heart disease Maternal Grandfather    •  Heart disease Paternal Grandmother    • Heart disease Paternal Grandfather    • Malig Hyperthermia Neg Hx          SOCIAL HISTORY  Social History     Socioeconomic History   • Marital status:    Tobacco Use   • Smoking status: Former Smoker     Packs/day: 2.00     Years: 0.00     Pack years: 0.00     Types: Cigarettes     Start date: 1968     Quit date: 1989     Years since quittin.5   • Smokeless tobacco: Never Used   • Tobacco comment: Did’nt inhale   Vaping Use   • Vaping Use: Never used   Substance and Sexual Activity   • Alcohol use: Not Currently     Alcohol/week: 0.0 standard drinks     Comment: 1 drink per day at most   • Drug use: Never   • Sexual activity: Not Currently     Partners: Female     Birth control/protection: Condom         ALLERGIES  Patient has no known allergies.        REVIEW OF SYSTEMS  All systems reviewed and negative except for those discussed in HPI.       PHYSICAL EXAM    I have reviewed the triage vital signs and nursing notes.    ED Triage Vitals   Temp Heart Rate Resp BP SpO2   21 0812 21 0812 21 0813 21 0812 21 0812   97.8 °F (36.6 °C) 70 20 151/76 98 %      Temp src Heart Rate Source Patient Position BP Location FiO2 (%)   21 0812 -- -- -- --   Tympanic           Physical Exam  GENERAL: Alert, oriented, not distressed  HENT: Healing laceration and abrasion to upper lip.  No dental injury.  No n vertebral tenderness or step-off.  purvi  EYES: no scleral icterus, EOMI  CV: regular rhythm, regular rate, no murmur  RESPIRATORY: Moderate right anterior chest wall tenderness without flail segment or deformity.  Lungs clear to auscultation.  ABDOMEN: soft, nontender  MUSCULOSKELETAL: Right wrist in Velcro splint.  Other extremities are normal.  NEURO: alert, moves all extremities, follows commands  SKIN: warm, dry      RADIOLOGY  XR Ribs Right With PA Chest    Result Date: 2021  XR RIBS RIGHT W PA CHEST-  Clinical: Fell with  right rib pain  COMPARISON chest radiograph 2016  FINDINGS: Cardiac size within normal limits. No mediastinal or hilar abnormality has developed. Minimal chronic parenchymal change noted at the left lung base similar to the previous examination. No right-sided pleural effusion, active airspace disease or pneumothorax.  Acute fracture of the right fifth rib. Old-appearing fractures of the right 8,9,10th and 11th ribs.  CONCLUSION: 1. No acute cardiovascular or pulmonary process is demonstrated. 2. Acute fracture right fifth rib. 3. Old-appearing right eighth, ninth, 10th and 11th rib fractures.  This report was finalized on 2021 9:19 AM by Dr. Chas Kyle M.D.      CT Chest Without Contrast Diagnostic    Result Date: 2021  CT SCAN OF THE CHEST WITHOUT CONTRAST  HISTORY: Fell. Right-sided chest pain and rib pain.  The CT scan was performed as an emergency procedure through the chest without contrast and demonstrates the followin. There are acute fractures of the right 4, 5, 6, and 7 ribs. There is no significant displacement. There are old healed fractures of the right 8th, 9th, and 10th ribs. There is no evidence of associated pneumothorax. 2. There is a 3-4 mm nodular density in the right upper lobe anteriorly as seen on image 38 and this shows no change from previous CT angiography of the neck that includes the upper right lung on 2020. There is also a small pneumatocele in the left upper lobe anteriorly that is unchanged. There is some minimal chronic appearing scarring at the left lung base. The lungs are otherwise clear. 3. There is no mediastinal or hilar or axillary adenopathy. There is mild generalized ectasia of the thoracic aorta with the ascending aorta measuring 4.4 cm and the descending thoracic aorta generally measuring 2.9 cm. 4. There is no pericardial effusion. The CT images through the upper liver, spleen, and right adrenal gland are unremarkable. There is nodular  enlargement of the left adrenal gland unchanged from 2015 and consistent with a benign adenoma.      Radiation dose reduction techniques were utilized, including automated exposure control and exposure modulation based on body size.  This report was finalized on 12/6/2021 3:24 PM by Dr. Riaz Hernandez M.D.        I ordered the above noted radiological studies. Reviewed by me and discussed with radiologist.  See dictation for official radiology interpretation.      MEDICATIONS GIVEN IN ER    Medications - No data to display      PROGRESS, DATA ANALYSIS, CONSULTS, AND MEDICAL DECISION MAKING    All labs have been independently reviewed by me.  All radiology studies have been reviewed by me and discussed with radiologist dictating the report.   EKG's independently viewed and interpreted by me.  Discussion below represents my analysis of pertinent findings related to patient's condition, differential diagnosis, treatment plan and final disposition.    I have discussed case with Dr. Stoddard, emergency room physician.  He has performed his own bedside examination and agrees with treatment plan.    ED Course as of 12/06/21 2016   Mon Dec 06, 2021   1121 Patient presents with right anterior chest wall pain after mechanical fall 3 days ago.  Right rib films interpreted myself show a nondisplaced right fifth rib fracture.  Plan for CT scan to further evaluate and rule out pneumothorax.  Patient has stable vitals and no shortness of breath. [EE]   1140 Chest x-ray interpreted myself shows a nondisplaced right fifth rib fracture.  No pneumothorax. [EE]   1310 I discussed CT findings with Dr. Hernandez.  Patient has nondisplaced fractures of the right fourth, fifth, sixth, and seventh ribs.  No pneumothorax.Patient fell 3 days ago.  He continues to have stable vitals and no shortness of breath or fever.  I do not believe admission is warranted.  We will give patient an incentive spirometer.  Patient is in agreement with this  treatment plan. [EE]      ED Course User Index  [EE] Eric Mays PA       AS OF 20:16 EST VITALS:    BP - 151/76  HR - 70  TEMP - 97.8 °F (36.6 °C) (Tympanic)  O2 SATS - 98%        DIAGNOSIS  Final diagnoses:   Fall, initial encounter   Closed fracture of multiple ribs of right side, initial encounter         DISPOSITION  Discharged           Eric Mays PA  12/06/21 2016

## 2022-01-04 ENCOUNTER — ANESTHESIA (OUTPATIENT)
Dept: PAIN MEDICINE | Facility: HOSPITAL | Age: 82
End: 2022-01-04

## 2022-01-04 ENCOUNTER — HOSPITAL ENCOUNTER (OUTPATIENT)
Dept: GENERAL RADIOLOGY | Facility: HOSPITAL | Age: 82
Discharge: HOME | End: 2022-01-04

## 2022-01-04 ENCOUNTER — HOSPITAL ENCOUNTER (OUTPATIENT)
Dept: PAIN MEDICINE | Facility: HOSPITAL | Age: 82
Discharge: HOME | End: 2022-01-04

## 2022-01-04 ENCOUNTER — ANESTHESIA EVENT (OUTPATIENT)
Dept: PAIN MEDICINE | Facility: HOSPITAL | Age: 82
End: 2022-01-04

## 2022-01-04 VITALS
SYSTOLIC BLOOD PRESSURE: 123 MMHG | HEART RATE: 75 BPM | DIASTOLIC BLOOD PRESSURE: 62 MMHG | OXYGEN SATURATION: 95 % | RESPIRATION RATE: 14 BRPM | TEMPERATURE: 97.1 F

## 2022-01-04 DIAGNOSIS — M43.16 SPONDYLOLISTHESIS AT L4-L5 LEVEL: Primary | ICD-10-CM

## 2022-01-04 DIAGNOSIS — R52 PAIN: ICD-10-CM

## 2022-01-04 LAB — GLUCOSE BLDC GLUCOMTR-MCNC: 136 MG/DL (ref 70–130)

## 2022-01-04 PROCEDURE — 0 IOPAMIDOL 41 % SOLUTION: Performed by: ANESTHESIOLOGY

## 2022-01-04 PROCEDURE — 82962 GLUCOSE BLOOD TEST: CPT

## 2022-01-04 PROCEDURE — 77003 FLUOROGUIDE FOR SPINE INJECT: CPT

## 2022-01-04 PROCEDURE — 25010000002 METHYLPREDNISOLONE PER 80 MG: Performed by: ANESTHESIOLOGY

## 2022-01-04 RX ORDER — METHYLPREDNISOLONE ACETATE 80 MG/ML
80 INJECTION, SUSPENSION INTRA-ARTICULAR; INTRALESIONAL; INTRAMUSCULAR; SOFT TISSUE ONCE
Status: COMPLETED | OUTPATIENT
Start: 2022-01-04 | End: 2022-01-04

## 2022-01-04 RX ORDER — DONEPEZIL HYDROCHLORIDE 5 MG/1
5 TABLET, FILM COATED ORAL DAILY
Status: ON HOLD | COMMUNITY
Start: 2021-12-29 | End: 2022-07-20

## 2022-01-04 RX ADMIN — IOPAMIDOL 10 ML: 408 INJECTION, SOLUTION INTRATHECAL at 08:57

## 2022-01-04 RX ADMIN — METHYLPREDNISOLONE ACETATE 80 MG: 80 INJECTION, SUSPENSION INTRA-ARTICULAR; INTRALESIONAL; INTRAMUSCULAR; SOFT TISSUE at 08:58

## 2022-01-04 NOTE — ANESTHESIA PROCEDURE NOTES
PAIN Epidural block      Patient reassessed immediately prior to procedure    Patient location during procedure: pain clinic  Start Time: 1/4/2022 8:44 AM  Stop Time: 1/4/2022 8:59 AM  Indication:procedure for pain  Performed By  Anesthesiologist: Dyllan Lugo MD  Preanesthetic Checklist  Completed: patient identified, site marked, risks and benefits discussed, surgical consent, monitors and equipment checked, pre-op evaluation and timeout performed  Additional Notes  Post-Op Diagnosis Codes:     * Degeneration of lumbar intervertebral disc (M51.36)     * Spinal stenosis of lumbar region with neurogenic claudication (M48.062)     * Lumbar radiculopathy (M54.16)    The patient was observed in recovery with no evidence of neurological deficits or other problems. All questions were answered. The patient was discharged with appropriate instructions.  Prep:  Pt Position:prone  Sterile Tech:cap, gloves, mask and sterile barrier  Prep:chlorhexidine gluconate and isopropyl alcohol  Monitoring:blood pressure monitoring, continuous pulse oximetry and EKG  Procedure:Sedation: no     Approach:right paramedian  Guidance: fluoroscopy  Location:lumbar  Level:4-5 (Interlaminar)  Needle Type:Jose  Needle Gauge:20 G  Aspiration:negative  Medications:  Preservative Free Saline:3mL  Isovue:2mL  Comments:Isovue dye spread was consistent with epidural placement.Depomedrol:80 mg  Post Assessment:  Dressing:occlusive dressing applied  Pt Tolerance:patient tolerated the procedure well with no apparent complications  Complications:no

## 2022-01-04 NOTE — H&P
[facility]    History and Physical    Patient Name: Albert Yadav  :  1940  MRN:  [id number]  Date of Admission: 2022    Subjective     Patient is a 81 y.o. male presents with chief complaint of chronic, intermitent, severe low back and right lower extremity pain.  Onset of symptoms was gradual starting 3 years ago.  Symptoms are associated/aggravated by lifting. Symptoms improve with pain medication, lying down and injection.  On a pain scale from 0-10, he rates his pain as an 8 while at rest and a 7 with activity.  He describes his pain is aching in nature.  He is responded favorably to lumbar epidural steroid injections in the past.  He is here today for a new series.    The following portions of the patients history were reviewed and updated as appropriate: current medications, allergies, past medical history, past surgical history, past family history, past social history and problem list                Objective     Past Medical History:   Past Medical History:   Diagnosis Date   • Arthritis of back 10 years ago   • At risk for sleep apnea     STOP BANG = 5   • Broken bones     arm, collar bone, ankle   • Carotid artery disorder (HCC)    • Cervical disc disorder ?   • Falls    • Fracture of ankle    • Fracture of wrist    • Fracture, clavicle 26 years ago   • Fracture, foot 26 year ago   • Groin rash     PT STATES LEFT GROIN AT TIMES   • History of transfusion    • Hyperlipidemia    • Hypertension    • Low back pain    • Low back strain ?   • Lumbar spinal stenosis    • Numbness and tingling     RIGHT ARM, RIGHT LEG & FOOT   • Peripheral neuropathy    • Peripheral vascular disease (HCC)    • Rheumatoid arthritis (HCC)     HANDS DIALLO   • Staph infection     2015  [facility] POST SX   • Tendency toward bleeding easily ([facility])     due to blood thinners     Past Surgical History:   Past Surgical History:   Procedure Laterality Date   • ANGIOPLASTY FEMORAL ARTERY Left 2016    Procedure: ULTRA  SOUND ACCESS RIGHT FEMORAL ARTERY, AIF BILATERAL RUNOFF, SELECTIVE CATHETERIZATION LEFT FEMORAL ARTERY.;  Surgeon: Errol Michael MD;  Location: [facility] OR ;  Service:    • ANKLE OPEN REDUCTION INTERNAL FIXATION     • ARTERIOVENOUS FISTULA/SHUNT SURGERY Left 2016    Procedure: LEFT ILEO-FEMORAL GORTEX GRAFT AND LEFT LEG ARERIOGRAM ;  Surgeon: Errol Michael MD;  Location: [facility] OR ;  Service:    • CAROTID ENDARTERECTOMY Right 2020    Procedure: CAROTID ENDARTERECTOMY;  Surgeon: James Graham MD;  Location: [facility] MAIN OR;  Service: Vascular;  Laterality: Right;   • CHOLECYSTECTOMY     • EPIDURAL BLOCK     • EYE SURGERY Left     as child   • FRACTURE SURGERY Left     arm   • ILIAC ARTERY STENT     • KNEE SURGERY Left     ORIF   • ORIF ANKLE FRACTURE Left      Family History:   Family History   Problem Relation Age of Onset   • Diabetes Mother    • Cancer Mother    • Cancer Father    • Diabetes Father    • Heart disease Maternal Grandmother    • Heart disease Maternal Grandfather    • Heart disease Paternal Grandmother    • Heart disease Paternal Grandfather    • Malig Hyperthermia Neg Hx      Social History:   Social History     Socioeconomic History   • Marital status:    Tobacco Use   • Smoking status: Former Smoker     Packs/day: 2.00     Years: 0.00     Pack years: 0.00     Types: Cigarettes     Start date: 1968     Quit date: 1989     Years since quittin.5   • Smokeless tobacco: Never Used   • Tobacco comment: Did’nt inhale   Vaping Use   • Vaping Use: Never used   Substance and Sexual Activity   • Alcohol use: Not Currently     Alcohol/week: 0.0 standard drinks     Comment: 1 drink per day at most   • Drug use: Never   • Sexual activity: Not Currently     Partners: Female     Birth control/protection: Condom       Vital Signs Range for the last 24 hours  Temperature: Temp:  [36.2 °C (97.1 °F)] 36.2 °C (97.1 °F)   Temp Source: Temp  src: Temporal   BP: BP: (142)/(71) 142/71   Pulse: Heart Rate:  [82] 82   Respirations: Resp:  [16] 16   SPO2: SpO2:  [97 %] 97 %   O2 Amount (l/min):     O2 Devices     Weight:           --------------------------------------------------------------------------------    Current Outpatient Medications   Medication Sig Dispense Refill   • acetaminophen (TYLENOL) 650 MG 8 hr tablet Take 650 mg by mouth Every 8 (Eight) Hours As Needed for Mild Pain .     • atorvastatin (LIPITOR) 40 MG tablet Take 40 mg by mouth Every Evening.     • cetirizine (ZyrTEC) 10 MG tablet Take 10 mg by mouth Every Morning.     • clopidogrel (PLAVIX) 75 MG tablet Take 75 mg by mouth Every Morning.     • donepezil (ARICEPT) 5 MG tablet Take 5 mg by mouth Daily.     • folic acid (FOLVITE) 1 MG tablet Take 1 mg by mouth Daily.     • hydrochlorothiazide (HYDRODIURIL) 50 MG tablet Take 50 mg by mouth Every Morning.     • hydrOXYzine pamoate (VISTARIL) 25 MG capsule Take 25 mg by mouth 3 (Three) Times a Day As Needed.     • lidocaine (LIDODERM) 5 % Place 1 patch on the skin as directed by provider Daily. Remove & Discard patch within 12 hours or as directed by MD 15 each 0   • lisinopril (PRINIVIL,ZESTRIL) 40 MG tablet Take 40 mg by mouth Every Evening.     • melatonin 5 MG tablet tablet Take 5 mg by mouth Every Night.     • methotrexate 2.5 MG tablet Take 2.5 mg by mouth. Wednesdays 10 2.5 mg weekly     • ONE TOUCH ULTRA TEST test strip   0   • Sennosides (SENNA LAX PO) Take 1 tablet by mouth 1 (One) Time.     • traZODone (DESYREL) 50 MG tablet Take 50 mg by mouth Every Night.     • triamcinolone (KENALOG) 0.025 % cream APPLY TWICE DAILY TO FOREHEAD AND CHEST     • nystatin 892872 UNIT/GM powder APPLY TO RASH EVERY DAY AS NEEDED     • ondansetron ODT (ZOFRAN-ODT) 4 MG disintegrating tablet Place 1 tablet on the tongue 4 (Four) Times a Day As Needed for Nausea or Vomiting. 15 tablet 0     No current facility-administered medications for this  encounter.       --------------------------------------------------------------------------------  Assessment/Plan      Anesthesia Evaluation     Patient summary reviewed and Nursing notes reviewed   NPO Solid Status: > 8 hours  NPO Liquid Status: > 2 hours           Airway   Mallampati: II  TM distance: >3 FB  Neck ROM: full  Dental - normal exam     Pulmonary - normal exam    breath sounds clear to auscultation  (+) a smoker Former,   Cardiovascular - normal exam    Rhythm: regular  Rate: normal    (+) hypertension, PVD, hyperlipidemia,  carotid artery disease  (-) angina, orthopnea, PND, BEAULIEU      Neuro/Psych  (+) numbness,     GI/Hepatic/Renal/Endo - negative ROS     Musculoskeletal     Abdominal    Substance History - negative use     OB/GYN negative ob/gyn ROS         Other   arthritis,                 Diagnosis and Plan    Treatment Plan  ASA 3      Procedures: Lumbar Epidural Steroid Injection(LESI), With fluoroscopy,       Anesthetic plan and risks discussed with patient.          Diagnosis     * Degeneration of lumbar intervertebral disc [M51.36]     * Spinal stenosis of lumbar region with neurogenic claudication [M48.062]     * Lumbar radiculopathy [M54.16]

## 2022-03-16 ENCOUNTER — HOSPITAL ENCOUNTER (EMERGENCY)
Facility: HOSPITAL | Age: 82
Discharge: HOME | End: 2022-03-16
Attending: EMERGENCY MEDICINE | Admitting: EMERGENCY MEDICINE

## 2022-03-16 ENCOUNTER — APPOINTMENT (OUTPATIENT)
Dept: CT IMAGING | Facility: HOSPITAL | Age: 82
End: 2022-03-16

## 2022-03-16 VITALS
DIASTOLIC BLOOD PRESSURE: 67 MMHG | WEIGHT: 188 LBS | HEIGHT: 69 IN | OXYGEN SATURATION: 98 % | TEMPERATURE: 97.2 F | RESPIRATION RATE: 18 BRPM | SYSTOLIC BLOOD PRESSURE: 102 MMHG | HEART RATE: 76 BPM | BODY MASS INDEX: 27.85 KG/M2
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
## 2022-03-16 DIAGNOSIS — S80.212A ABRASION OF LEFT KNEE, INITIAL ENCOUNTER: Primary | ICD-10-CM

## 2022-03-16 DIAGNOSIS — S50.311A ABRASION OF RIGHT ELBOW, INITIAL ENCOUNTER: ICD-10-CM

## 2022-03-16 PROCEDURE — 70450 CT HEAD/BRAIN W/O DYE: CPT

## 2022-03-16 PROCEDURE — 99283 EMERGENCY DEPT VISIT LOW MDM: CPT

## 2022-03-16 NOTE — ED NOTES
Pt arrives from home via EMS after slip and fall in driveway. Abrasion noted to knee and elbow. Denies LOC, takes blood thinners. Noted to be hypotensive on EMS arrival 80/50's. Given 250 normal saline en route. Pt a&ox4, abc's intact, NAD noted.    Patient wearing mask during triage. RN wearing appropriate PPE during triage. Hand hygiene performed.

## 2022-03-16 NOTE — DISCHARGE INSTRUCTIONS
Keep wounds clean and dry, ice for any pain or swelling, heat for stiffness or soreness, PCP follow-up as needed, ED return for worsening symptoms as needed.

## 2022-03-16 NOTE — ED PROVIDER NOTES
EMERGENCY DEPARTMENT ENCOUNTER    Room Number:  19/19  Date of encounter:  3/16/2022  PCP: Valerie Burnham MD  Historian: Patient      HPI:  Chief Complaint: Fall, left knee and right elbow abrasion  A complete HPI/ROS/PMH/PSH/SH/FH are unobtainable due to: None    Context: Albert Yadav is a 81 y.o. male who presents to the ED via [facility] from home for evaluation after he tripped and fell going up his driveway today.  Abrasions to left knee and right elbow.  Patient is on blood thinners but denies head injury or LOC.  Initially hypotensive with EMS 80s over 50s.  Was given 2 and 50 mm of normal saline prior to arrival.  Patient denies any significant concerns other than some mild discomfort in his left knee and right elbow.      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   • Falls    • Fracture of ankle 1972   • Fracture of wrist 1947   • Fracture, clavicle 26 years ago   • Fracture, foot 26 year ago   • Groin rash    • History of  transfusion    • Hyperlipidemia    • Hypertension    • Low back pain    • Low back strain ?   • Lumbar spinal stenosis    • Numbness and tingling    • Peripheral neuropathy    • Peripheral vascular disease (HCC)    • Rheumatoid arthritis (HCC)    • Staph infection    • Tendency toward bleeding easily (HCC)          PAST SURGICAL HISTORY  Past Surgical History:   Procedure Laterality Date   • ANGIOPLASTY FEMORAL ARTERY Left 2016    Procedure: ULTRA SOUND ACCESS RIGHT FEMORAL ARTERY, AIF BILATERAL RUNOFF, SELECTIVE CATHETERIZATION LEFT FEMORAL ARTERY.;  Surgeon: Errol Michael MD;  [facility] ;  Service:    • ANKLE OPEN REDUCTION INTERNAL FIXATION     • ARTERIOVENOUS FISTULA/SHUNT SURGERY Left 2016    Procedure: LEFT ILEO-FEMORAL GORTEX GRAFT AND LEFT LEG ARERIOGRAM ;  Surgeon: Errol Michael MD;  [facility] ;  Service:    • CAROTID ENDARTERECTOMY Right 2020    Procedure: CAROTID ENDARTERECTOMY;  Surgeon: James Graham MD;  Location: [facility];  Service: Vascular;  Laterality: Right;   • CHOLECYSTECTOMY     • EPIDURAL BLOCK     • EYE SURGERY Left     as child   • FRACTURE SURGERY Left     arm   • ILIAC ARTERY STENT     • KNEE SURGERY Left     ORIF   • ORIF ANKLE FRACTURE Left          FAMILY HISTORY  Family History   Problem Relation Age of Onset   • Diabetes Mother    • Cancer Mother    • Cancer Father    • Diabetes Father    • Heart disease Maternal Grandmother    • Heart disease Maternal Grandfather    • Heart disease Paternal Grandmother    • Heart disease Paternal Grandfather    • Malig Hyperthermia Neg Hx          SOCIAL HISTORY  Social History     Socioeconomic History   • Marital status:    Tobacco Use   • Smoking status: Former Smoker     Packs/day: 2.00     Years: 0.00     Pack years: 0.00     Types: Cigarettes     Start date: 1968     Quit date: 1989     Years since quittin.7   • Smokeless tobacco:  Never Used   • Tobacco comment: Did’nt inhale   Vaping Use   • Vaping Use: Never used   Substance and Sexual Activity   • Alcohol use: Not Currently     Alcohol/week: 0.0 standard drinks     Comment: 1 drink per day at most   • Drug use: Never   • Sexual activity: Not Currently     Partners: Female     Birth control/protection: Condom         ALLERGIES  Patient has no known allergies.        REVIEW OF SYSTEMS  Review of Systems     All systems reviewed and negative except for those discussed in HPI.       PHYSICAL EXAM    I have reviewed the triage vital signs and nursing notes.    ED Triage Vitals   Temp Heart Rate Resp BP SpO2   03/16/22 1140 03/16/22 1140 03/16/22 1140 03/16/22 1140 03/16/22 1140   97.2 °F (36.2 °C) 86 18 120/59 97 %      Temp src Heart Rate Source Patient Position BP Location FiO2 (%)   03/16/22 1140 -- 03/16/22 1216 03/16/22 1216 --   Tympanic  Lying Right arm        Physical Exam  General: No acute distress, nontoxic, nondiaphoretic  HEENT: Mucous membranes moist, atraumatic, EOMI  Neck: Full ROM  Pulm: Symmetric chest rise, nonlabored, lungs CTAB  Cardiovascular: Regular rate and rhythm, intact distal pulses  GI: Soft, nontender, nondistended, no rebound, no guarding, bowel sounds present  MSK: Full ROM, no deformity  Skin: Warm, dry.  Superficial abrasion anterior left knee and right elbow over the olecranon  Neuro: Awake, alert, oriented x 4, GCS 15, moving all extremities, no focal deficits  Psych: Calm, cooperative      N95, protective eye goggles, and gloves used during this encounter. Patient in surgical mask.      LAB RESULTS  No results found for this or any previous visit (from the past 24 hour(s)).    Ordered the above labs and independently reviewed the results.        RADIOLOGY  CT Head Without Contrast    Result Date: 3/16/2022  CT HEAD WITHOUT CONTRAST  CLINICAL HISTORY: Fall. On blood thinners.  TECHNIQUE: CT scan of the head was obtained with 3 mm axial images. No intravenous  contrast was administered. Sagittal and coronal reconstructions were obtained.  COMPARISON: CT head dated 12/03/2021.  FINDINGS:  There is no evidence for a calvarial fracture. There is no evidence for an acute extra-axial hemorrhage. The ventricles, sulci, and cisterns are age-appropriate. The basal ganglia and thalami are unremarkable in appearance. The posterior fossa structures are unremarkable.      No evidence for acute traumatic intracranial pathology.  Radiation dose reduction techniques were utilized, including automated exposure control and exposure modulation based on body size.          I ordered the above noted radiological studies. Reviewed by me.  See dictation for official radiology interpretation.      PROCEDURES    Procedures      MEDICATIONS GIVEN IN ER    Medications - No data to display      PROGRESS, DATA ANALYSIS, CONSULTS, AND MEDICAL DECISION MAKING    All labs have been independently reviewed by me.  All radiology studies have been reviewed by me and discussed with radiologist dictating the report.   EKG's independently viewed and interpreted by me.  Discussion below represents my analysis of pertinent findings related to patient's condition, differential diagnosis, treatment plan and final disposition.    Will obtain CT of the head to eval for any evidence of skull fracture or intracranial hemorrhage though the patient looks well-appearing on exam I think this is low likelihood.  From an extremity standpoint patient has full range of motion I do not suspect underlying fractures, do not need x-rays at this time.    CT reassuring, no evidence of any acute emergent process.  Wound care provided, safer discharge with close outpatient follow-up.  ED return for worsening symptoms as needed.         AS OF 18:38 EDT VITALS:    BP - 102/67  HR - 76  TEMP - 97.2 °F (36.2 °C) (Tympanic)  02 SATS - 98%        DIAGNOSIS  Final diagnoses:   Abrasion of left knee, initial encounter   Abrasion of right  elbow, initial encounter   Fall from standing, initial encounter         DISPOSITION  DISCHARGE    Patient discharged in stable condition.    Reviewed implications of results, diagnosis, meds, responsibility to follow up, warning signs and symptoms of possible worsening, potential complications and reasons to return to ER.    Patient/Family voiced understanding of above instructions.    Discussed plan for discharge, as there is no emergent indication for admission. Patient referred to primary care provider for BP management due to today's BP. Pt/family is agreeable and understands need for follow up and repeat testing.  Pt is aware that discharge does not mean that nothing is wrong but it indicates no emergency is present that requires admission and they must continue care with follow-up as given below or physician of their choice.     FOLLOW-UP  [facility] Emergency Department  [address]  [facility] [address] [phone]    As needed, If symptoms worsen    Valerie Burnham MD  [address] [facility]  [facility] [address]  [phone]    Schedule an appointment as soon as possible for a visit   As needed         Medication List      No changes were made to your prescriptions during this visit.                    Modesto Tatum MD  03/16/22 [id number]

## 2022-04-13 ENCOUNTER — ANESTHESIA (OUTPATIENT)
Dept: PAIN MEDICINE | Facility: HOSPITAL | Age: 82
End: 2022-04-13

## 2022-04-13 ENCOUNTER — HOSPITAL ENCOUNTER (OUTPATIENT)
Dept: GENERAL RADIOLOGY | Facility: HOSPITAL | Age: 82
Discharge: HOME | End: 2022-04-13

## 2022-04-13 ENCOUNTER — ANESTHESIA EVENT (OUTPATIENT)
Dept: PAIN MEDICINE | Facility: HOSPITAL | Age: 82
End: 2022-04-13

## 2022-04-13 ENCOUNTER — HOSPITAL ENCOUNTER (OUTPATIENT)
Dept: PAIN MEDICINE | Facility: HOSPITAL | Age: 82
Discharge: HOME | End: 2022-04-13

## 2022-04-13 VITALS
HEART RATE: 68 BPM | OXYGEN SATURATION: 96 % | TEMPERATURE: 98.4 F | DIASTOLIC BLOOD PRESSURE: 68 MMHG | RESPIRATION RATE: 16 BRPM | SYSTOLIC BLOOD PRESSURE: 124 MMHG

## 2022-04-13 DIAGNOSIS — R52 PAIN: ICD-10-CM

## 2022-04-13 DIAGNOSIS — M48.062 SPINAL STENOSIS, LUMBAR REGION, WITH NEUROGENIC CLAUDICATION: Primary | ICD-10-CM

## 2022-04-13 DIAGNOSIS — M43.16 SPONDYLOLISTHESIS AT L4-L5 LEVEL: ICD-10-CM

## 2022-04-13 PROCEDURE — 0 IOPAMIDOL 41 % SOLUTION: Performed by: ANESTHESIOLOGY

## 2022-04-13 PROCEDURE — 77003 FLUOROGUIDE FOR SPINE INJECT: CPT

## 2022-04-13 PROCEDURE — 25010000002 METHYLPREDNISOLONE PER 80 MG: Performed by: ANESTHESIOLOGY

## 2022-04-13 RX ORDER — METHYLPREDNISOLONE ACETATE 80 MG/ML
80 INJECTION, SUSPENSION INTRA-ARTICULAR; INTRALESIONAL; INTRAMUSCULAR; SOFT TISSUE ONCE
Status: COMPLETED | OUTPATIENT
Start: 2022-04-13 | End: 2022-04-13

## 2022-04-13 RX ADMIN — METHYLPREDNISOLONE ACETATE 80 MG: 80 INJECTION, SUSPENSION INTRA-ARTICULAR; INTRALESIONAL; INTRAMUSCULAR; SOFT TISSUE at 09:00

## 2022-04-13 RX ADMIN — IOPAMIDOL 10 ML: 408 INJECTION, SOLUTION INTRATHECAL at 08:59

## 2022-04-13 NOTE — ANESTHESIA PROCEDURE NOTES
PAIN Epidural block      Patient reassessed immediately prior to procedure    Patient location during procedure: pain clinic  Start Time: 4/13/2022 8:47 AM  Stop Time: 4/13/2022 9:01 AM  Indication:procedure for pain  Performed By  Anesthesiologist: Dyllan Lugo MD  Preanesthetic Checklist  Completed: patient identified, site marked, risks and benefits discussed, surgical consent, monitors and equipment checked, pre-op evaluation and timeout performed  Additional Notes  Post-Op Diagnosis Codes:     * Spinal stenosis of lumbar region with neurogenic claudication (M48.062)     * Lumbar radiculopathy (M54.16)    The patient was observed in recovery with no evidence of neurological deficits or other problems. All questions were answered. The patient was discharged with appropriate instructions.  Prep:  Pt Position:prone  Sterile Tech:cap, gloves, mask and sterile barrier  Prep:chlorhexidine gluconate and isopropyl alcohol  Monitoring:blood pressure monitoring, continuous pulse oximetry and EKG  Procedure:Sedation: no     Approach:right paramedian  Guidance: fluoroscopy  Location:lumbar  Level:4-5 (Interlaminar)  Needle Type:Tuohy  Needle Gauge:20 G  Aspiration:negative  Medications:  Preservative Free Saline:3mL  Isovue:2mL  Comments:Isovue dye spread was consistent with epidural placement.Depomedrol:80 mg  Post Assessment:  Dressing:occlusive dressing applied  Pt Tolerance:patient tolerated the procedure well with no apparent complications  Complications:no

## 2022-04-13 NOTE — H&P
[facility]    History and Physical    Patient Name: Albert Yadav  :  1940  MRN:  [id number]  Date of Admission: 2022    Subjective       The patient is a 81-year-old male who has pain which originates in his lower back and radiates to his right lower extremity.  He reports approximate 50% relief following his last lumbar epidural steroid injection.  Today his pain ranges between a 0 and a 7/10.  He is here for lumbar epidural steroid injection #2.      The following portions of the patients history were reviewed and updated as appropriate: current medications, allergies, past medical history, past surgical history, past family history, past social history and problem list                Objective     Past Medical History:   Past Medical History:   Diagnosis Date   • Arthritis of back 10 years ago   • At risk for sleep apnea     STOP BANG = 5   • Broken bones     arm, collar bone, ankle   • Carotid artery disorder (HCC)    • Cervical disc disorder ?   • Falls    • Fracture of ankle    • Fracture of wrist    • Fracture, clavicle 26 years ago   • Fracture, foot 26 year ago   • Groin rash     PT STATES LEFT GROIN AT TIMES   • History of transfusion    • Hyperlipidemia    • Hypertension    • Low back pain    • Low back strain ?   • Lumbar spinal stenosis    • Numbness and tingling     RIGHT ARM, RIGHT LEG & FOOT   • Peripheral neuropathy    • Peripheral vascular disease (HCC)    • Rheumatoid arthritis (HCC)     HANDS DIALLO   • Staph infection     2015  [facility] POST SX   • Tendency toward bleeding easily (HCC)     due to blood thinners     Past Surgical History:   Past Surgical History:   Procedure Laterality Date   • ANGIOPLASTY FEMORAL ARTERY Left 2016    Procedure: ULTRA SOUND ACCESS RIGHT FEMORAL ARTERY, AIF BILATERAL RUNOFF, SELECTIVE CATHETERIZATION LEFT FEMORAL ARTERY.;  Surgeon: Errol Michael MD;  Location: [facility] OR ;  Service:    • ANKLE OPEN REDUCTION INTERNAL  FIXATION     • ARTERIOVENOUS FISTULA/SHUNT SURGERY Left 2016    Procedure: LEFT ILEO-FEMORAL GORTEX GRAFT AND LEFT LEG ARERIOGRAM ;  Surgeon: Errol Michael MD;  Location: [facility] OR ;  Service:    • CAROTID ENDARTERECTOMY Right 2020    Procedure: CAROTID ENDARTERECTOMY;  Surgeon: James Graham MD;  Location: [facility];  Service: Vascular;  Laterality: Right;   • CHOLECYSTECTOMY     • EPIDURAL BLOCK     • EYE SURGERY Left     as child   • FRACTURE SURGERY Left     arm   • ILIAC ARTERY STENT     • KNEE SURGERY Left     ORIF   • ORIF ANKLE FRACTURE Left      Family History:   Family History   Problem Relation Age of Onset   • Diabetes Mother    • Cancer Mother    • Cancer Father    • Diabetes Father    • Heart disease Maternal Grandmother    • Heart disease Maternal Grandfather    • Heart disease Paternal Grandmother    • Heart disease Paternal Grandfather    • Malig Hyperthermia Neg Hx      Social History:   Social History     Socioeconomic History   • Marital status:    Tobacco Use   • Smoking status: Former Smoker     Packs/day: 2.00     Years: 0.00     Pack years: 0.00     Types: Cigarettes     Start date: 1968     Quit date: 1989     Years since quittin.8   • Smokeless tobacco: Never Used   • Tobacco comment: Did’nt inhale   Vaping Use   • Vaping Use: Never used   Substance and Sexual Activity   • Alcohol use: Not Currently     Alcohol/week: 0.0 standard drinks     Comment: 1 drink per day at most   • Drug use: Never   • Sexual activity: Not Currently     Partners: Female     Birth control/protection: Condom       Vital Signs Range for the last 24 hours  Temperature:     Temp Source:     BP:     Pulse:     Respirations:     SPO2:     O2 Amount (l/min):     O2 Devices     Weight:           --------------------------------------------------------------------------------    Current Outpatient Medications   Medication Sig Dispense Refill   •  acetaminophen (TYLENOL) 650 MG 8 hr tablet Take 650 mg by mouth Every 8 (Eight) Hours As Needed for Mild Pain .     • atorvastatin (LIPITOR) 40 MG tablet Take 40 mg by mouth Every Evening.     • cetirizine (ZyrTEC) 10 MG tablet Take 10 mg by mouth Every Morning.     • clopidogrel (PLAVIX) 75 MG tablet Take 75 mg by mouth Every Morning.     • donepezil (ARICEPT) 5 MG tablet Take 5 mg by mouth Daily.     • folic acid (FOLVITE) 1 MG tablet Take 1 mg by mouth Daily.     • hydrochlorothiazide (HYDRODIURIL) 50 MG tablet Take 50 mg by mouth Every Morning.     • hydrOXYzine pamoate (VISTARIL) 25 MG capsule Take 25 mg by mouth 3 (Three) Times a Day As Needed.     • lidocaine (LIDODERM) 5 % Place 1 patch on the skin as directed by provider Daily. Remove & Discard patch within 12 hours or as directed by MD 15 each 0   • lisinopril (PRINIVIL,ZESTRIL) 40 MG tablet Take 40 mg by mouth Every Evening.     • melatonin 5 MG tablet tablet Take 5 mg by mouth Every Night.     • methotrexate 2.5 MG tablet Take 2.5 mg by mouth. Wednesdays 10 2.5 mg weekly     • nystatin 593141 UNIT/GM powder APPLY TO RASH EVERY DAY AS NEEDED     • ondansetron ODT (ZOFRAN-ODT) 4 MG disintegrating tablet Place 1 tablet on the tongue 4 (Four) Times a Day As Needed for Nausea or Vomiting. 15 tablet 0   • ONE TOUCH ULTRA TEST test strip   0   • Sennosides (SENNA LAX PO) Take 1 tablet by mouth 1 (One) Time.     • traZODone (DESYREL) 50 MG tablet Take 50 mg by mouth Every Night.     • triamcinolone (KENALOG) 0.025 % cream APPLY TWICE DAILY TO FOREHEAD AND CHEST       No current facility-administered medications for this encounter.       --------------------------------------------------------------------------------  Assessment/Plan      Anesthesia Evaluation     Patient summary reviewed and Nursing notes reviewed   NPO Solid Status: > 8 hours  NPO Liquid Status: > 2 hours           Airway   Mallampati: II  TM distance: >3 FB  Neck ROM: full  Dental - normal  exam     Pulmonary - normal exam    breath sounds clear to auscultation  (+) a smoker Former,   Cardiovascular - normal exam    Rhythm: regular  Rate: normal    (+) hypertension, PVD, hyperlipidemia,  carotid artery disease carotid bilateral  (-) angina, orthopnea, PND, BEAULIEU      Neuro/Psych  (+) numbness,    GI/Hepatic/Renal/Endo - negative ROS     Musculoskeletal     Abdominal    Substance History - negative use     OB/GYN negative ob/gyn ROS         Other   arthritis,                 Diagnosis and Plan    Treatment Plan  ASA 3      Procedures: Lumbar Epidural Steroid Injection(LESI), With fluoroscopy,       Anesthetic plan and risks discussed with patient.          Diagnosis     * Spinal stenosis of lumbar region with neurogenic claudication [M48.062]     * Lumbar radiculopathy [M54.16]

## 2022-07-18 ENCOUNTER — HOSPITAL ENCOUNTER (OUTPATIENT)
Dept: GENERAL RADIOLOGY | Facility: HOSPITAL | Age: 82
Discharge: HOME | End: 2022-07-18

## 2022-07-18 ENCOUNTER — HOSPITAL ENCOUNTER (OUTPATIENT)
Dept: PAIN MEDICINE | Facility: HOSPITAL | Age: 82
Discharge: HOME | End: 2022-07-18

## 2022-07-18 ENCOUNTER — ANESTHESIA EVENT (OUTPATIENT)
Dept: PAIN MEDICINE | Facility: HOSPITAL | Age: 82
End: 2022-07-18

## 2022-07-18 ENCOUNTER — ANESTHESIA (OUTPATIENT)
Dept: PAIN MEDICINE | Facility: HOSPITAL | Age: 82
End: 2022-07-18

## 2022-07-18 VITALS
TEMPERATURE: 98.2 F | HEART RATE: 72 BPM | DIASTOLIC BLOOD PRESSURE: 69 MMHG | OXYGEN SATURATION: 98 % | SYSTOLIC BLOOD PRESSURE: 129 MMHG | RESPIRATION RATE: 14 BRPM

## 2022-07-18 DIAGNOSIS — M48.062 SPINAL STENOSIS, LUMBAR REGION, WITH NEUROGENIC CLAUDICATION: Primary | ICD-10-CM

## 2022-07-18 DIAGNOSIS — M54.50 LUMBAR PAIN: ICD-10-CM

## 2022-07-18 PROCEDURE — 0 IOPAMIDOL 41 % SOLUTION: Performed by: ANESTHESIOLOGY

## 2022-07-18 PROCEDURE — 25010000002 METHYLPREDNISOLONE PER 80 MG: Performed by: ANESTHESIOLOGY

## 2022-07-18 PROCEDURE — 77003 FLUOROGUIDE FOR SPINE INJECT: CPT

## 2022-07-18 RX ORDER — RIVASTIGMINE TARTRATE 1.5 MG/1
1.5 CAPSULE ORAL 2 TIMES DAILY
COMMUNITY
End: 2023-03-07 | Stop reason: HOSPADM

## 2022-07-18 RX ORDER — METHYLPREDNISOLONE ACETATE 80 MG/ML
80 INJECTION, SUSPENSION INTRA-ARTICULAR; INTRALESIONAL; INTRAMUSCULAR; SOFT TISSUE ONCE
Status: COMPLETED | OUTPATIENT
Start: 2022-07-18 | End: 2022-07-18

## 2022-07-18 RX ADMIN — IOPAMIDOL 10 ML: 408 INJECTION, SOLUTION INTRATHECAL at 09:45

## 2022-07-18 RX ADMIN — METHYLPREDNISOLONE ACETATE 80 MG: 80 INJECTION, SUSPENSION INTRA-ARTICULAR; INTRALESIONAL; INTRAMUSCULAR; SOFT TISSUE at 09:45

## 2022-07-18 NOTE — ANESTHESIA PROCEDURE NOTES
PAIN Epidural block      Patient reassessed immediately prior to procedure    Patient location during procedure: pain clinic  Start Time: 7/18/2022 9:31 AM  Stop Time: 7/18/2022 9:47 AM  Indication:procedure for pain  Performed By  Anesthesiologist: Dyllan Lugo MD  Preanesthetic Checklist  Completed: patient identified, site marked, risks and benefits discussed, surgical consent, monitors and equipment checked, pre-op evaluation and timeout performed  Additional Notes  Post-Op Diagnosis Codes:     * Spinal stenosis of lumbar region with neurogenic claudication (M48.062)     * Lumbar radiculopathy (M54.16)    The patient was observed in recovery with no evidence of neurological deficits or other problems. All questions were answered. The patient was discharged with appropriate instructions.  Prep:  Pt Position:prone  Sterile Tech:cap, gloves, mask and sterile barrier  Prep:chlorhexidine gluconate and isopropyl alcohol  Monitoring:blood pressure monitoring, continuous pulse oximetry and EKG  Procedure:Sedation: no     Approach:right paramedian  Guidance: fluoroscopy  Location:lumbar  Level:4-5 (Interlaminar)  Needle Type:Tuohy  Needle Gauge:20 G  Aspiration:negative  Medications:  Preservative Free Saline:3mL  Isovue:2mL  Comments:Isovue dye spread was consistent with epidural placement.Depomedrol:80 mg  Post Assessment:  Dressing:occlusive dressing applied  Pt Tolerance:patient tolerated the procedure well with no apparent complications  Complications:no

## 2022-07-18 NOTE — H&P
[facility]    History and Physical    Patient Name: Albert Yadav  :  1940  MRN:  [id number]  Date of Admission: 2022    Subjective       Patient is an 82-year-old male who has pain which originates in his lower back and radiates to his right lower extremity.  He reports approximate 50% relief following his last injection.  Today he rates his pain as an 8/10.  He is here for lumbar epidural steroid injection #3.      The following portions of the patients history were reviewed and updated as appropriate: current medications, allergies, past medical history, past surgical history, past family history, past social history and problem list                Objective     Past Medical History:   Past Medical History:   Diagnosis Date   • Arthritis of back 10 years ago   • At risk for sleep apnea     STOP BANG = 5   • Broken bones     arm, collar bone, ankle   • Carotid artery disorder (HCC)    • Cervical disc disorder ?   • Falls    • Fracture of ankle    • Fracture of wrist    • Fracture, clavicle 26 years ago   • Fracture, foot 26 year ago   • Groin rash     PT STATES LEFT GROIN AT TIMES   • History of transfusion    • Hyperlipidemia    • Hypertension    • Low back pain    • Low back strain ?   • Lumbar spinal stenosis    • Numbness and tingling     RIGHT ARM, RIGHT LEG & FOOT   • Peripheral neuropathy    • Peripheral vascular disease (HCC)    • Rheumatoid arthritis (HCC)     HANDS DIALLO   • Staph infection     2015  [facility] POST SX   • Tendency toward bleeding easily (HCC)     due to blood thinners     Past Surgical History:   Past Surgical History:   Procedure Laterality Date   • ANGIOPLASTY FEMORAL ARTERY Left 2016    Procedure: ULTRA SOUND ACCESS RIGHT FEMORAL ARTERY, AIF BILATERAL RUNOFF, SELECTIVE CATHETERIZATION LEFT FEMORAL ARTERY.;  Surgeon: Errol Michael MD;  Location: [facility] OR ;  Service:    • ANKLE OPEN REDUCTION INTERNAL FIXATION     • ARTERIOVENOUS  FISTULA/SHUNT SURGERY Left 2016    Procedure: LEFT ILEO-FEMORAL GORTEX GRAFT AND LEFT LEG ARERIOGRAM ;  Surgeon: Errol Michael MD;  Location: [facility] OR ;  Service:    • CAROTID ENDARTERECTOMY Right 2020    Procedure: CAROTID ENDARTERECTOMY;  Surgeon: James Graham MD;  Location: [facility] MAIN OR;  Service: Vascular;  Laterality: Right;   • CHOLECYSTECTOMY     • EPIDURAL BLOCK     • EYE SURGERY Left     as child   • FRACTURE SURGERY Left     arm   • ILIAC ARTERY STENT     • KNEE SURGERY Left     ORIF   • ORIF ANKLE FRACTURE Left      Family History:   Family History   Problem Relation Age of Onset   • Diabetes Mother    • Cancer Mother    • Cancer Father    • Diabetes Father    • Heart disease Maternal Grandmother    • Heart disease Maternal Grandfather    • Heart disease Paternal Grandmother    • Heart disease Paternal Grandfather    • Malig Hyperthermia Neg Hx      Social History:   Social History     Socioeconomic History   • Marital status:    Tobacco Use   • Smoking status: Former Smoker     Packs/day: 2.00     Years: 0.00     Pack years: 0.00     Types: Cigarettes     Start date: 1968     Quit date: 1989     Years since quittin.1   • Smokeless tobacco: Never Used   • Tobacco comment: Did’nt inhale   Vaping Use   • Vaping Use: Never used   Substance and Sexual Activity   • Alcohol use: Not Currently     Alcohol/week: 0.0 standard drinks     Comment: 1 drink per day at most   • Drug use: Never   • Sexual activity: Not Currently     Partners: Female     Birth control/protection: Condom       Vital Signs Range for the last 24 hours  Temperature: Temp:  [36.8 °C (98.2 °F)] 36.8 °C (98.2 °F)   Temp Source: Temp src: Tympanic   BP: BP: (115)/(78) 115/78   Pulse: Heart Rate:  [69] 69   Respirations: Resp:  [16] 16   SPO2: SpO2:  [98 %] 98 %   O2 Amount (l/min):     O2 Devices     Weight:            --------------------------------------------------------------------------------    Current Outpatient Medications   Medication Sig Dispense Refill   • atorvastatin (LIPITOR) 40 MG tablet Take 40 mg by mouth Every Evening.     • cetirizine (ZyrTEC) 10 MG tablet Take 10 mg by mouth Every Morning.     • folic acid (FOLVITE) 1 MG tablet Take 1 mg by mouth Daily.     • hydrochlorothiazide (HYDRODIURIL) 50 MG tablet Take 50 mg by mouth Every Morning.     • lisinopril (PRINIVIL,ZESTRIL) 40 MG tablet Take 40 mg by mouth Every Evening.     • melatonin 5 MG tablet tablet Take 5 mg by mouth Every Night.     • methotrexate 2.5 MG tablet Take 2.5 mg by mouth. Wednesdays 10 2.5 mg weekly     • acetaminophen (TYLENOL) 650 MG 8 hr tablet Take 650 mg by mouth Every 8 (Eight) Hours As Needed for Mild Pain .     • clopidogrel (PLAVIX) 75 MG tablet Take 75 mg by mouth Every Morning.     • donepezil (ARICEPT) 5 MG tablet Take 5 mg by mouth Daily.     • hydrOXYzine pamoate (VISTARIL) 25 MG capsule Take 25 mg by mouth 3 (Three) Times a Day As Needed.     • lidocaine (LIDODERM) 5 % Place 1 patch on the skin as directed by provider Daily. Remove & Discard patch within 12 hours or as directed by MD 15 each 0   • nystatin 264500 UNIT/GM powder APPLY TO RASH EVERY DAY AS NEEDED     • ondansetron ODT (ZOFRAN-ODT) 4 MG disintegrating tablet Place 1 tablet on the tongue 4 (Four) Times a Day As Needed for Nausea or Vomiting. 15 tablet 0   • ONE TOUCH ULTRA TEST test strip   0   • rivastigmine (EXELON) 1.5 MG capsule Take 1.5 mg by mouth 2 (Two) Times a Day.     • Sennosides (SENNA LAX PO) Take 1 tablet by mouth 1 (One) Time.     • traZODone (DESYREL) 50 MG tablet Take 50 mg by mouth Every Night.     • triamcinolone (KENALOG) 0.025 % cream APPLY TWICE DAILY TO FOREHEAD AND CHEST       No current facility-administered medications for this encounter.        --------------------------------------------------------------------------------  Assessment & Plan      Anesthesia Evaluation     Patient summary reviewed and Nursing notes reviewed   NPO Solid Status: > 8 hours  NPO Liquid Status: > 2 hours           Airway   Mallampati: II  TM distance: >3 FB  Neck ROM: full  Dental - normal exam     Pulmonary - normal exam    breath sounds clear to auscultation  (+) a smoker Former,   Cardiovascular - normal exam    Rhythm: regular  Rate: normal    (+) hypertension, PVD, hyperlipidemia,  carotid artery disease carotid bilateral  (-) angina, orthopnea, PND, BEAULIEU      Neuro/Psych  (+) numbness,    GI/Hepatic/Renal/Endo - negative ROS     Musculoskeletal     Abdominal    Substance History - negative use     OB/GYN negative ob/gyn ROS         Other   arthritis,                 Diagnosis and Plan      Treatment Plan  ASA 3   Patient has had previous injection/procedure with 50-75% improvement.   Procedures: Lumbar Epidural Steroid Injection(LESI), With fluoroscopy,       Anesthetic plan and risks discussed with patient.          Diagnosis     * Spinal stenosis of lumbar region with neurogenic claudication [M48.062]     * Lumbar radiculopathy [M54.16]

## 2022-07-19 ENCOUNTER — APPOINTMENT (OUTPATIENT)
Dept: CT IMAGING | Facility: HOSPITAL | Age: 82
End: 2022-07-19

## 2022-07-19 ENCOUNTER — HOSPITAL ENCOUNTER (OUTPATIENT)
Facility: HOSPITAL | Age: 82
Setting detail: OBSERVATION
Discharge: HOME | End: 2022-07-23
Attending: EMERGENCY MEDICINE | Admitting: INTERNAL MEDICINE

## 2022-07-19 DIAGNOSIS — S00.03XA CONTUSION OF SCALP, INITIAL ENCOUNTER: ICD-10-CM

## 2022-07-19 DIAGNOSIS — S22.41XA CLOSED FRACTURE OF MULTIPLE RIBS OF RIGHT SIDE, INITIAL ENCOUNTER: Primary | ICD-10-CM

## 2022-07-19 DIAGNOSIS — M47.812 OSTEOARTHRITIS OF CERVICAL SPINE, UNSPECIFIED SPINAL OSTEOARTHRITIS COMPLICATION STATUS: ICD-10-CM

## 2022-07-19 DIAGNOSIS — R93.89 ABNORMAL CT SCAN: ICD-10-CM

## 2022-07-19 DIAGNOSIS — T07.XXXA MULTIPLE ABRASIONS: ICD-10-CM

## 2022-07-19 LAB
ALBUMIN SERPL-MCNC: 3.6 G/DL (ref 3.5–5.2)
ALBUMIN/GLOB SERPL: 1.4 G/DL
ALP SERPL-CCNC: 94 U/L (ref 39–117)
ALT SERPL W P-5'-P-CCNC: 29 U/L (ref 1–41)
ANION GAP SERPL CALCULATED.3IONS-SCNC: 11.5 MMOL/L (ref 5–15)
AST SERPL-CCNC: 20 U/L (ref 1–40)
BASOPHILS # BLD AUTO: 0.02 10*3/MM3 (ref 0–0.2)
BASOPHILS NFR BLD AUTO: 0.1 % (ref 0–1.5)
BILIRUB SERPL-MCNC: 0.5 MG/DL (ref 0–1.2)
BUN SERPL-MCNC: 21 MG/DL (ref 8–23)
BUN/CREAT SERPL: 18.9 (ref 7–25)
CALCIUM SPEC-SCNC: 8.5 MG/DL (ref 8.6–10.5)
CHLORIDE SERPL-SCNC: 107 MMOL/L (ref 98–107)
CO2 SERPL-SCNC: 20.5 MMOL/L (ref 22–29)
CREAT SERPL-MCNC: 1.11 MG/DL (ref 0.76–1.27)
DEPRECATED RDW RBC AUTO: 45 FL (ref 37–54)
EGFRCR SERPLBLD CKD-EPI 2021: 65.5 ML/MIN/1.73
EOSINOPHIL # BLD AUTO: 0 10*3/MM3 (ref 0–0.4)
EOSINOPHIL NFR BLD AUTO: 0 % (ref 0.3–6.2)
ERYTHROCYTE [DISTWIDTH] IN BLOOD BY AUTOMATED COUNT: 13.2 % (ref 12.3–15.4)
GLOBULIN UR ELPH-MCNC: 2.5 GM/DL
GLUCOSE SERPL-MCNC: 138 MG/DL (ref 65–99)
HCT VFR BLD AUTO: 37.3 % (ref 37.5–51)
HGB BLD-MCNC: 13 G/DL (ref 13–17.7)
IMM GRANULOCYTES # BLD AUTO: 0.11 10*3/MM3 (ref 0–0.05)
IMM GRANULOCYTES NFR BLD AUTO: 0.6 % (ref 0–0.5)
LYMPHOCYTES # BLD AUTO: 0.95 10*3/MM3 (ref 0.7–3.1)
LYMPHOCYTES NFR BLD AUTO: 5.5 % (ref 19.6–45.3)
MCH RBC QN AUTO: 33.4 PG (ref 26.6–33)
MCHC RBC AUTO-ENTMCNC: 34.9 G/DL (ref 31.5–35.7)
MCV RBC AUTO: 95.9 FL (ref 79–97)
MONOCYTES # BLD AUTO: 1.35 10*3/MM3 (ref 0.1–0.9)
MONOCYTES NFR BLD AUTO: 7.8 % (ref 5–12)
NEUTROPHILS NFR BLD AUTO: 14.81 10*3/MM3 (ref 1.7–7)
NEUTROPHILS NFR BLD AUTO: 86 % (ref 42.7–76)
NRBC BLD AUTO-RTO: 0.1 /100 WBC (ref 0–0.2)
PLATELET # BLD AUTO: 200 10*3/MM3 (ref 140–450)
PMV BLD AUTO: 10.9 FL (ref 6–12)
POTASSIUM SERPL-SCNC: 4.3 MMOL/L (ref 3.5–5.2)
PROT SERPL-MCNC: 6.1 G/DL (ref 6–8.5)
RBC # BLD AUTO: 3.89 10*6/MM3 (ref 4.14–5.8)
SARS-COV-2 RNA RESP QL NAA+PROBE: NOT DETECTED
SODIUM SERPL-SCNC: 139 MMOL/L (ref 136–145)
WBC NRBC COR # BLD: 17.24 10*3/MM3 (ref 3.4–10.8)

## 2022-07-19 PROCEDURE — 72125 CT NECK SPINE W/O DYE: CPT

## 2022-07-19 PROCEDURE — G0378 HOSPITAL OBSERVATION PER HR: HCPCS

## 2022-07-19 PROCEDURE — C9803 HOPD COVID-19 SPEC COLLECT: HCPCS

## 2022-07-19 PROCEDURE — U0003 INFECTIOUS AGENT DETECTION BY NUCLEIC ACID (DNA OR RNA); SEVERE ACUTE RESPIRATORY SYNDROME CORONAVIRUS 2 (SARS-COV-2) (CORONAVIRUS DISEASE [COVID-19]), AMPLIFIED PROBE TECHNIQUE, MAKING USE OF HIGH THROUGHPUT TECHNOLOGIES AS DESCRIBED BY CMS-2020-01-R: HCPCS | Performed by: PHYSICIAN ASSISTANT

## 2022-07-19 PROCEDURE — 80053 COMPREHEN METABOLIC PANEL: CPT | Performed by: PHYSICIAN ASSISTANT

## 2022-07-19 PROCEDURE — 99285 EMERGENCY DEPT VISIT HI MDM: CPT

## 2022-07-19 PROCEDURE — 71250 CT THORAX DX C-: CPT

## 2022-07-19 PROCEDURE — 70450 CT HEAD/BRAIN W/O DYE: CPT

## 2022-07-19 PROCEDURE — 85025 COMPLETE CBC W/AUTO DIFF WBC: CPT | Performed by: PHYSICIAN ASSISTANT

## 2022-07-19 RX ORDER — ONDANSETRON 4 MG/1
4 TABLET, FILM COATED ORAL EVERY 6 HOURS PRN
Status: DISCONTINUED | OUTPATIENT
Start: 2022-07-19 | End: 2022-07-23 | Stop reason: HOSPADM

## 2022-07-19 RX ORDER — HYDROCODONE BITARTRATE AND ACETAMINOPHEN 7.5; 325 MG/1; MG/1
1 TABLET ORAL EVERY 6 HOURS PRN
Status: DISCONTINUED | OUTPATIENT
Start: 2022-07-19 | End: 2022-07-20

## 2022-07-19 RX ORDER — UREA 10 %
3 LOTION (ML) TOPICAL NIGHTLY PRN
Status: DISCONTINUED | OUTPATIENT
Start: 2022-07-19 | End: 2022-07-23 | Stop reason: HOSPADM

## 2022-07-19 RX ORDER — LIDOCAINE 50 MG/G
1 PATCH TOPICAL
Status: DISCONTINUED | OUTPATIENT
Start: 2022-07-20 | End: 2022-07-23 | Stop reason: HOSPADM

## 2022-07-19 RX ORDER — LISINOPRIL 40 MG/1
40 TABLET ORAL EVERY EVENING
Status: DISCONTINUED | OUTPATIENT
Start: 2022-07-20 | End: 2022-07-23 | Stop reason: HOSPADM

## 2022-07-19 RX ORDER — ACETAMINOPHEN 325 MG/1
650 TABLET ORAL EVERY 4 HOURS PRN
Status: DISCONTINUED | OUTPATIENT
Start: 2022-07-19 | End: 2022-07-23 | Stop reason: HOSPADM

## 2022-07-19 RX ORDER — SENNA PLUS 8.6 MG/1
1 TABLET ORAL 2 TIMES DAILY
Status: DISCONTINUED | OUTPATIENT
Start: 2022-07-20 | End: 2022-07-23 | Stop reason: HOSPADM

## 2022-07-19 RX ORDER — GINSENG 100 MG
1 CAPSULE ORAL ONCE
Status: COMPLETED | OUTPATIENT
Start: 2022-07-19 | End: 2022-07-19

## 2022-07-19 RX ORDER — TRAZODONE HYDROCHLORIDE 50 MG/1
50 TABLET ORAL NIGHTLY
Status: DISCONTINUED | OUTPATIENT
Start: 2022-07-20 | End: 2022-07-23 | Stop reason: HOSPADM

## 2022-07-19 RX ORDER — ATORVASTATIN CALCIUM 20 MG/1
40 TABLET, FILM COATED ORAL EVERY EVENING
Status: DISCONTINUED | OUTPATIENT
Start: 2022-07-20 | End: 2022-07-23 | Stop reason: HOSPADM

## 2022-07-19 RX ORDER — ONDANSETRON 2 MG/ML
4 INJECTION INTRAMUSCULAR; INTRAVENOUS EVERY 6 HOURS PRN
Status: DISCONTINUED | OUTPATIENT
Start: 2022-07-19 | End: 2022-07-23 | Stop reason: HOSPADM

## 2022-07-19 RX ORDER — CLOPIDOGREL BISULFATE 75 MG/1
75 TABLET ORAL EVERY MORNING
Status: DISCONTINUED | OUTPATIENT
Start: 2022-07-20 | End: 2022-07-23 | Stop reason: HOSPADM

## 2022-07-19 RX ORDER — RIVASTIGMINE TARTRATE 1.5 MG/1
1.5 CAPSULE ORAL 2 TIMES DAILY
Status: DISCONTINUED | OUTPATIENT
Start: 2022-07-20 | End: 2022-07-23 | Stop reason: HOSPADM

## 2022-07-19 RX ORDER — HYDROCHLOROTHIAZIDE 50 MG/1
50 TABLET ORAL EVERY MORNING
Status: DISCONTINUED | OUTPATIENT
Start: 2022-07-20 | End: 2022-07-23 | Stop reason: HOSPADM

## 2022-07-19 RX ORDER — FOLIC ACID 1 MG/1
1 TABLET ORAL DAILY
Status: DISCONTINUED | OUTPATIENT
Start: 2022-07-20 | End: 2022-07-23 | Stop reason: HOSPADM

## 2022-07-19 RX ORDER — NITROGLYCERIN 0.4 MG/1
0.4 TABLET SUBLINGUAL
Status: DISCONTINUED | OUTPATIENT
Start: 2022-07-19 | End: 2022-07-23 | Stop reason: HOSPADM

## 2022-07-19 RX ADMIN — HYDROCODONE BITARTRATE AND ACETAMINOPHEN 1 TABLET: 7.5; 325 TABLET ORAL at 21:55

## 2022-07-19 RX ADMIN — Medication 3 MG: at 23:28

## 2022-07-19 RX ADMIN — BACITRACIN 1 APPLICATION: 500 OINTMENT TOPICAL at 15:09

## 2022-07-19 NOTE — ED NOTES
Pt via [facility] from [facility] with c/o abrasions to all extremities d/t falling half way down escalator after cane got caught. Pt denies hitting head, denies LOC, pt is on blood thinners.   Pt placed in C-collar on scene.     All triage performed with this RN wearing appropriate PPE.  Pt placed in mask upon arrival to ED.

## 2022-07-19 NOTE — ED NOTES
This RN and ERT cleaned all skin tears with antibacterial soap and NS. Covering with mepitel and bacitracin and kerlex.

## 2022-07-19 NOTE — ED PROVIDER NOTES
I supervised care provided by the midlevel provider.   We have discussed this patient's history, physical exam, and treatment plan.  I have reviewed the note and personally saw and examined the patient and agree with the plan of care.   This is an elderly male who has a history of falls in the past because of spinal stenosis in his lower lumbar spine and periods of leg weakness and leg pain.  He usually ambulates just short distances with a cane.  He was on the escalator at [facility].  He was using his cane and then his cane got caught in the escalator steps as it went up.  He lost balance and he fell down a few flights of the escalator.  He has scrapes to his arms and legs.  No loss of consciousness.  Denies any neck pain or any new back pain.  He does also report some right lateral chest wall pain.  He has had a history of rib fractures in the past.  This pain is not severe.  His pain is worse with palpation and with deep breathing in that area.  Denies any abdominal pain or any other chest pain.  No focal motor or sensory changes.    GENERAL: Elderly male this pleasant not distressed  HENT: nares patent  Head/neck/ face are symmetric without gross deformity or swelling  EYES: no scleral icterus  CV: regular rhythm, regular rate with intact distal pulses  RESPIRATORY: normal effort and no respiratory distress.  Normal inspection but has tenderness in the anterior and mid axillary line in the right just right of his breast that occurs with palpation and deep breathing.  There is no crepitance or subcutaneous air.  Lungs are clear to auscultation bilaterally ABDOMEN: soft and nontender  MUSCULOSKELETAL: no deformity.  Patient has multiple areas to his upper and lower extremities bilaterally with skin tears and abrasions.  There is no active bleeding.  NEURO: alert and appropriate, moves all extremities, follows commands  SKIN: warm, dry    Vital signs and nursing notes reviewed.    Plan we will check a CT scan of his  head, cervical spine and chest.  We will clean the abrasions.  He has had similar episode in the past.  All questions answered at this time    ED Course as of 07/20/22 1641   Tue Jul 19, 2022 1700 I reassessed the patient.  We discussed his imaging studies.  CT head and C-spine showed no acute traumatic findings other are chronic degenerative changes.  CT chest shows 2 acute rib fractures as well as multiple subacute rib fractures.  I recommended admission to the hospital for monitoring and pain control.  Patient refuses, prefers to go home.  We discussed the risks of going home including hypoxia, uncontrolled pain pneumonia.  He verbalized understanding of these but desires to be discharged.  He states his pain is very mild he is not having trouble breathing or shortness of breath and he does not want to be admitted to the hospital.  We will make sure the patient can ambulate and if so he can be discharged with an incentive spirometer, can take Tylenol for pain and he states he has lidocaine patches at home that he can use. [KA]   1743 I reassessed the patient.  He is change his mind and is now willing to stay.  We will arrange admission.  I have offered the patient something for pain again and he declines. [KA]   1801 WBC(!): 17.24 [KA]   1801 Hemoglobin: 13.0 [KA]   1807 I discussed the patient with Dr. Caldera, hospitalist.  We discussed his history presentation and imaging and she agrees to admit for further evaluation and treatment. [KA]   1843 Glucose(!): 138 [KA]   1843 Creatinine: 1.11 [KA]      ED Course User Index  [KA] Rosie Adams PA     We are currently under a pandemic from the COVID19 infection.  The patient presented to the emergency department by ambulance or personal vehicle. I followed the current protocols required by Infection Control at [facility] in my evaluation and treatment of the patient. The patient was wearing a face mask during my evaluation and throughout my  encounter. During my whole encounter with this patient I used appropriate personal protective equipment.  This equipment consisted of eye protection, facemask, gown, and gloves.  I applied this equipment before entering the room.           Edilberto Kothari MD  07/20/22 2666

## 2022-07-19 NOTE — H&P
HISTORY AND PHYSICAL   [facility]        Date of Admission: 2022  Patient Identification:  Name: Albert Yadav  Age: 82 y.o.  Sex: male  :  1940  MRN: [id number]                     Primary Care Physician: Valerie Burnham MD    Chief Complaint:  82 year old gentleman was trying to get on the escalator at [employer]; he had his hand on the handrail but it moved faster than the stop so he fell; denies syncope; no recent illness; he uses a can due to a history of spinal stenosis    History of Present Illness:   As above    Past Medical History:  Past Medical History:   Diagnosis Date   • Arthritis of back 10 years ago   • At risk for sleep apnea     STOP BANG = 5   • Broken bones     arm, collar bone, ankle   • Carotid artery disorder (HCC)    • Cervical disc disorder ?   • Falls    • Fracture of ankle    • Fracture of wrist    • Fracture, clavicle 26 years ago   • Fracture, foot 26 year ago   • Groin rash     PT STATES LEFT GROIN AT TIMES   • History of transfusion    • Hyperlipidemia    • Hypertension    • Low back pain    • Low back strain ?   • Lumbar spinal stenosis    • Numbness and tingling     RIGHT ARM, RIGHT LEG & FOOT   • Peripheral neuropathy    • Peripheral vascular disease (HCC)    • Rheumatoid arthritis (HCC)     HANDS DIALLO   • Staph infection     2015  [facility] POST SX   • Tendency toward bleeding easily (HCC)     due to blood thinners     Past Surgical History:  Past Surgical History:   Procedure Laterality Date   • ANGIOPLASTY FEMORAL ARTERY Left 2016    Procedure: ULTRA SOUND ACCESS RIGHT FEMORAL ARTERY, AIF BILATERAL RUNOFF, SELECTIVE CATHETERIZATION LEFT FEMORAL ARTERY.;  Surgeon: Errol Michael MD;  Location: [facility] OR ;  Service:    • ANKLE OPEN REDUCTION INTERNAL FIXATION     • ARTERIOVENOUS FISTULA/SHUNT SURGERY Left 2016    Procedure: LEFT ILEO-FEMORAL GORTEX GRAFT AND LEFT LEG ARERIOGRAM ;  Surgeon: Errol Michael MD;   Location: [facility] OR ;  Service:    • CAROTID ENDARTERECTOMY Right 2020    Procedure: CAROTID ENDARTERECTOMY;  Surgeon: James Graham MD;  Location: [facility] OR;  Service: Vascular;  Laterality: Right;   • CHOLECYSTECTOMY     • EPIDURAL BLOCK     • EYE SURGERY Left     as child   • FRACTURE SURGERY Left     arm   • ILIAC ARTERY STENT     • KNEE SURGERY Left     ORIF   • ORIF ANKLE FRACTURE Left       Home Meds:  (Not in a hospital admission)      Allergies:  No Known Allergies  Immunizations:  Immunization History   Administered Date(s) Administered   • COVID-19 (PFIZER) PURPLE CAP 2021, 2021, 2021   • Covid-19 (Pfizer) Gray Cap 2022     Social History:   Social History     Social History Narrative   • Not on file     Social History     Socioeconomic History   • Marital status:    Tobacco Use   • Smoking status: Former Smoker     Packs/day: 2.00     Years: 0.00     Pack years: 0.00     Types: Cigarettes     Start date: 1968     Quit date: 1989     Years since quittin.1   • Smokeless tobacco: Never Used   • Tobacco comment: Did’nt inhale   Vaping Use   • Vaping Use: Never used   Substance and Sexual Activity   • Alcohol use: Not Currently     Alcohol/week: 0.0 standard drinks     Comment: 1 drink per day at most   • Drug use: Never   • Sexual activity: Not Currently     Partners: Female     Birth control/protection: Condom       Family History:  Family History   Problem Relation Age of Onset   • Diabetes Mother    • Cancer Mother    • Cancer Father    • Diabetes Father    • Heart disease Maternal Grandmother    • Heart disease Maternal Grandfather    • Heart disease Paternal Grandmother    • Heart disease Paternal Grandfather    • Malig Hyperthermia Neg Hx         Review of Systems  See history of present illness and past medical history.  Patient denies headache, dizziness, syncope, falls, trauma, change in vision, change in hearing, change in  "taste, changes in weight, changes in appetite, focal weakness, numbness, or paresthesia.  Patient denies chest pain, palpitations, dyspnea, orthopnea, PND, cough, sinus pressure, rhinorrhea, epistaxis, hemoptysis, nausea, vomiting,hematemesis, diarrhea, constipation or hematchezia.  Denies cold or heat intolerance, polydipsia, polyuria, polyphagia. Denies hematuria, pyuria, dysuria, hesitancy, frequency or urgency. Denies consumption of raw and under cooked meats foods or change in water source.  Denies fever, chills, sweats, night sweats.  Denies missing any routine medications. Remainder of ROS is negative.    Objective:  T Max 24 hrs: Temp (24hrs), Av.7 °F (36.5 °C), Min:97.7 °F (36.5 °C), Max:97.7 °F (36.5 °C)    Vitals Ranges:   Temp:  [97.7 °F (36.5 °C)] 97.7 °F (36.5 °C)  Heart Rate:  [71-73] 71  Resp:  [18] 18  BP: (114-150)/(65-83) 114/65      Exam:  /65 (BP Location: Right leg, Patient Position: Lying)   Pulse 71   Temp 97.7 °F (36.5 °C)   Resp 18   Ht 167.6 cm (66\")   Wt 85.3 kg (188 lb 0.8 oz)   SpO2 97%   BMI 30.35 kg/m²     General Appearance:    Alert, cooperative, no distress, appears stated age   Head:    Normocephalic, without obvious abnormality, atraumatic   Eyes:    PERRL, conjunctivae/corneas clear, EOM's intact, both eyes   Ears:    Normal external ear canals, both ears   Nose:   Nares normal, septum midline, mucosa normal, no drainage    or sinus tenderness   Throat:   Lips, mucosa, and tongue normal   Neck:   Supple, symmetrical, trachea midline, no adenopathy;     thyroid:  no enlargement/tenderness/nodules; no carotid    bruit or JVD   Back:     Symmetric, no curvature, ROM normal, no CVA tenderness   Lungs:     Clear to auscultation bilaterally, respirations unlabored   Chest Wall:    No tenderness or deformity    Heart:    Regular rate and rhythm, S1 and S2 normal, no murmur, rub   or gallop   Abdomen:     Soft, nontender, bowel sounds active all four quadrants,     no " masses, no hepatomegaly, no splenomegaly   Extremities:   Extremities normal, atraumatic, no cyanosis or edema   Pulses:   2+ and symmetric all extremities   Skin:   Skin color, texture, turgor normal, no rashes or lesions   Lymph nodes:   Cervical, supraclavicular, and axillary nodes normal   Neurologic:   CNII-XII intact, normal strength, sensation intact throughout      .    Data Review:  Labs in chart were reviewed.  WBC   Date Value Ref Range Status   07/19/2022 17.24 (H) 3.40 - 10.80 10*3/mm3 Final     Hemoglobin   Date Value Ref Range Status   07/19/2022 13.0 13.0 - 17.7 g/dL Final     Hematocrit   Date Value Ref Range Status   07/19/2022 37.3 (L) 37.5 - 51.0 % Final     Platelets   Date Value Ref Range Status   07/19/2022 200 140 - 450 10*3/mm3 Final     Sodium   Date Value Ref Range Status   07/19/2022 139 136 - 145 mmol/L Final     Potassium   Date Value Ref Range Status   07/19/2022 4.3 3.5 - 5.2 mmol/L Final     Chloride   Date Value Ref Range Status   07/19/2022 107 98 - 107 mmol/L Final     CO2   Date Value Ref Range Status   07/19/2022 20.5 (L) 22.0 - 29.0 mmol/L Final     BUN   Date Value Ref Range Status   07/19/2022 21 8 - 23 mg/dL Final     Creatinine   Date Value Ref Range Status   07/19/2022 1.11 0.76 - 1.27 mg/dL Final     Glucose   Date Value Ref Range Status   07/19/2022 138 (H) 65 - 99 mg/dL Final     Calcium   Date Value Ref Range Status   07/19/2022 8.5 (L) 8.6 - 10.5 mg/dL Final     AST (SGOT)   Date Value Ref Range Status   07/19/2022 20 1 - 40 U/L Final     ALT (SGPT)   Date Value Ref Range Status   07/19/2022 29 1 - 41 U/L Final     Alkaline Phosphatase   Date Value Ref Range Status   07/19/2022 94 39 - 117 U/L Final                Imaging Results (All)     Procedure Component Value Units Date/Time    CT Head Without Contrast [[id number]] Collected: 07/19/22 1748     Updated: 07/19/22 1748    Narrative:      CT HEAD AND CERVICAL SPINE WITHOUT CONTRAST     CLINICAL HISTORY:Patient lost  balance and fell down a few flights on the  escalator. Head trauma.     TECHNIQUE: CT scan of the head was obtained with 2 mm axial bone  algorithm and 3 mm axial soft tissue algorithm images. Sagittal and  coronal reconstructed images were obtained.     Comparison is made to previous CT scan of the head dated 03/16/2022.     FINDINGS:     There is no evidence for a calvarial fracture. There is no evidence for  an acute extra-axial hemorrhage.     The ventricles, sulci, and cisterns are age appropriate. There are mild  changes of chronic small vessel ischemic phenomena. The posterior fossa  structures are unremarkable. Atherosclerotic changes are appreciated  within the intracranial vasculature.     There is mild mucosal thickening within the ethmoid air cells and  mild-to-moderate mucosal thickening is seen within the right maxillary  sinus.       Impression:         No evidence for acute traumatic intracranial pathology.     TECHNIQUE: CT scan of the cervical spine was obtained with 1 mm axial  bone algorithm and 2 mm axial soft tissue algorithm images. Sagittal and  coronal reconstructed images were obtained.     FINDINGS:     There is no evidence for acute fracture or bony malalignment involving  the cervical spine.     Multilevel degenerative phenomena are incidentally noted. Disc  osteophyte complexes at C3-4, C5-6 and C6-7, in addition to bulging disc  material at the C4-5 level result in multilevel canal stenosis. At the  C4-5 level, there is at least a moderate, if not moderate-to-severe  degree of central canal stenosis secondary to bulging disc material.  Multilevel foraminal stenotic changes are noted within the cervical  spine from C3-4 down to C6-7 and these foraminal stenotic changes are  primarily secondary to uncovertebral joint hypertrophy which result in  up to severe degrees of foraminal narrowing.     IMPRESSION:     No evidence for acute fracture or bony malalignment involving the  cervical  spine.     Incidental multilevel degenerative phenomena are appreciated within the  cervical spine primarily from C3-4 down to C6-7 where there are  multilevel canal and foraminal stenotic changes. Of note, there is a  moderate, if not moderate-to-severe degree of central canal stenosis at  the C4-5 level secondary to bulging disc.     These findings were discussed with Rosie Adams on 07/19/2022 at  approximately 4:31 PM.                 Radiation dose reduction techniques were utilized, including automated  exposure control and exposure modulation based on body size.          CT Cervical Spine Without Contrast [[id number]] Collected: 07/19/22 1748     Updated: 07/19/22 1748    Narrative:      CT HEAD AND CERVICAL SPINE WITHOUT CONTRAST     CLINICAL HISTORY:Patient lost balance and fell down a few flights on the  escalator. Head trauma.     TECHNIQUE: CT scan of the head was obtained with 2 mm axial bone  algorithm and 3 mm axial soft tissue algorithm images. Sagittal and  coronal reconstructed images were obtained.     Comparison is made to previous CT scan of the head dated 03/16/2022.     FINDINGS:     There is no evidence for a calvarial fracture. There is no evidence for  an acute extra-axial hemorrhage.     The ventricles, sulci, and cisterns are age appropriate. There are mild  changes of chronic small vessel ischemic phenomena. The posterior fossa  structures are unremarkable. Atherosclerotic changes are appreciated  within the intracranial vasculature.     There is mild mucosal thickening within the ethmoid air cells and  mild-to-moderate mucosal thickening is seen within the right maxillary  sinus.       Impression:         No evidence for acute traumatic intracranial pathology.     TECHNIQUE: CT scan of the cervical spine was obtained with 1 mm axial  bone algorithm and 2 mm axial soft tissue algorithm images. Sagittal and  coronal reconstructed images were obtained.     FINDINGS:     There is no  evidence for acute fracture or bony malalignment involving  the cervical spine.     Multilevel degenerative phenomena are incidentally noted. Disc  osteophyte complexes at C3-4, C5-6 and C6-7, in addition to bulging disc  material at the C4-5 level result in multilevel canal stenosis. At the  C4-5 level, there is at least a moderate, if not moderate-to-severe  degree of central canal stenosis secondary to bulging disc material.  Multilevel foraminal stenotic changes are noted within the cervical  spine from C3-4 down to C6-7 and these foraminal stenotic changes are  primarily secondary to uncovertebral joint hypertrophy which result in  up to severe degrees of foraminal narrowing.     IMPRESSION:     No evidence for acute fracture or bony malalignment involving the  cervical spine.     Incidental multilevel degenerative phenomena are appreciated within the  cervical spine primarily from C3-4 down to C6-7 where there are  multilevel canal and foraminal stenotic changes. Of note, there is a  moderate, if not moderate-to-severe degree of central canal stenosis at  the C4-5 level secondary to bulging disc.     These findings were discussed with Rosie Adams on 07/19/2022 at  approximately 4:31 PM.                 Radiation dose reduction techniques were utilized, including automated  exposure control and exposure modulation based on body size.          CT Chest Without Contrast Diagnostic [[id number]] Collected: 07/19/22 1611     Updated: 07/19/22 1646    Narrative:      CT CHEST WO CONTRAST DIAGNOSTIC-     HISTORY:  Fall down escalator, right lateral chest wall pain.     TECHNIQUE: CT of the chest was performed without intravenous contrast.  Reformatted images were reviewed. Radiation dose reduction techniques  were utilized, including automated exposure control and exposure  modulation based on body size.     COMPARISON:  CT chest without contrast 12/06/2021        FINDINGS: Evaluation is limited by motion artifact.  No pathologically  enlarged thoracic lymph nodes are identified within the limitations of  motion artifact and lack of intravenous contrast. The heart is enlarged.  There is no pericardial effusion. There is calcific coronary artery  atherosclerosis. There is advanced calcific aortic and branch vessel  atherosclerosis. The ascending aorta is dilated to 4.3 cm at the level  of the pulmonary trunk, not significantly changed. The pulmonary trunk  is mildly dilated to 3.2 cm, not significant changed. There is no  significant pleural effusion.  Limited imaging through the upper abdomen demonstrates calcific  atherosclerosis of the visualized upper abdominal aorta. There are fluid  density lesions in the upper kidneys, which likely reflect cysts. There  is an approximately 5.7 cm predominantly fat density dorsal to the left  kidney, which is likely arising from the kidney and new from 2015. 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 There is thickening of the left greater than right  adrenal glands, not significantly changed.   There is mild bilateral gynecomastia. There is diffuse osseous  demineralization, which limits evaluation of fine osseous detail. There  is multilevel degenerative disc disease. There are old rib fractures.  There is an acute mildly displaced fracture of lateral right rib 9.  There is an acute nondisplaced fracture of lateral right rib 10. There  are nondisplaced subacute fractures of anterolateral right rib 7 and  anterior right rib 6.  There are trace septated secretions in the trachea. Evaluation of the  lung parenchyma is limited by respiratory motion artifact. There is mild  curvilinear atelectasis and/or scarring in the lingula. Otherwise, there  is no focal consolidation.          Impression:         1.  Acute mildly displaced fracture of lateral right rib 9. Acute  nondisplaced fracture of lateral right rib 10. Additional nondisplaced  subacute  fractures are identified on the right, as above. No  pneumothorax.  2.  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 Recommend  further evaluation with contrast-enhanced MRI.     Discussed with Dr. Kothari at 4:43 PM.     This report was finalized on 7/19/2022 4:43 PM by Dr. Goldie Reilly M.D.               Assessment:  Active Hospital Problems    Diagnosis  POA   • Closed fracture of multiple ribs of right side, initial encounter [S22.41XA]  Yes      Resolved Hospital Problems   No resolved problems to display.   s/p fall  Hyperglycemia  Rheumatoid arthritis  Peripheral neuropathy  Peripheral vascular disease    Plan:  Will ask thoracic surgery to see him  Pain control  Monitor on telemetry  Monitor blood sugar  D.w patient, wife and ED provider    Margaret Caldera MD  7/19/2022  19:21 EDT

## 2022-07-19 NOTE — ED PROVIDER NOTES
EMERGENCY DEPARTMENT ENCOUNTER    Room Number:  B10/10  Date seen:  7/19/2022  Time seen: 13:44 EDT  PCP: Valerie Burnham MD  Historian: patient      HPI:  Chief Complaint: fall, multiple injuries    A complete HPI/ROS/PMH/PSH/SH/FH are unobtainable due to: None    Context: Albert Yadav is a 82 y.o. male who presents to the ED for evaluation of multiple injuries he sustained after falling on an escalator today.  He states he tried to get onto the escalator when his cane got stuck and he fell.  He believes he did strike his head though very mildly, denies any loss of consciousness headache nausea vomiting or vision changes.  Also denies any neck or back pain.  He has a history of some broken ribs on his right side, denies any pain in that area. Denies any pain with breathing.  He denies any abdominal pain.  He has multiple abrasions to the extremities x4 but denies any other pain and states he seems to be moving them well.  He does take Plavix, is not otherwise anticoagulated.        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   • Falls    • Fracture of ankle 1972   • Fracture of wrist 1947   • Fracture, clavicle 26 years ago   • Fracture, foot 26 year ago   • Groin rash    • History of transfusion    • Hyperlipidemia    • Hypertension    • Low back pain    • Low back strain ?   • Lumbar spinal stenosis    • Numbness and tingling    • Peripheral neuropathy    • Peripheral vascular disease (HCC)    • Rheumatoid arthritis (HCC)    • Staph infection    • Tendency toward bleeding easily (HCC)          PAST SURGICAL HISTORY  Past Surgical History:   Procedure Laterality Date   • ANGIOPLASTY FEMORAL ARTERY Left 11/7/2016    Procedure: ULTRA SOUND ACCESS RIGHT FEMORAL ARTERY, AIF BILATERAL RUNOFF, SELECTIVE CATHETERIZATION LEFT FEMORAL ARTERY.;  Surgeon: Errol Michael MD;  Location: [facility] OR 18/19;  Service:    • ANKLE OPEN REDUCTION INTERNAL FIXATION  1980   • ARTERIOVENOUS FISTULA/SHUNT SURGERY Left 12/13/2016    Procedure: LEFT ILEO-FEMORAL GORTEX GRAFT AND LEFT LEG ARERIOGRAM ;  Surgeon: Errol Michael MD;  Location: [facility] OR 18/19;  Service:    • CAROTID ENDARTERECTOMY Right 9/29/2020    Procedure: CAROTID ENDARTERECTOMY;  Surgeon: James Graham MD;  Location: [facility] OR;  Service: Vascular;  Laterality: Right;   • CHOLECYSTECTOMY     • EPIDURAL BLOCK     • EYE SURGERY Left     as child   • FRACTURE SURGERY Left     arm   • ILIAC ARTERY STENT     • KNEE SURGERY Left     ORIF   • ORIF ANKLE FRACTURE Left          FAMILY HISTORY  Family History   Problem Relation Age of Onset   • Diabetes Mother    • Cancer Mother    • Cancer Father    • Diabetes Father    • Heart disease Maternal Grandmother    • Heart disease Maternal Grandfather    • Heart disease Paternal Grandmother    • Heart disease Paternal Grandfather    • Malig Hyperthermia Neg Hx          SOCIAL HISTORY  Social History     Socioeconomic History   • Marital status:    Tobacco Use   • Smoking status: Former Smoker      Packs/day: 2.00     Years: 0.00     Pack years: 0.00     Types: Cigarettes     Start date: 1968     Quit date: 1989     Years since quittin.1   • Smokeless tobacco: Never Used   • Tobacco comment: Did’nt inhale   Vaping Use   • Vaping Use: Never used   Substance and Sexual Activity   • Alcohol use: Not Currently     Alcohol/week: 0.0 standard drinks     Comment: 1 drink per day at most   • Drug use: Never   • Sexual activity: Not Currently     Partners: Female     Birth control/protection: Condom         ALLERGIES  Patient has no known allergies.        REVIEW OF SYSTEMS  Review of Systems     All systems reviewed and negative except for those discussed in HPI.       PHYSICAL EXAM  ED Triage Vitals [22 1158]   Temp Heart Rate Resp BP SpO2   97.7 °F (36.5 °C) 73 18 150/83 97 %      Temp src Heart Rate Source Patient Position BP Location FiO2 (%)   -- -- -- -- --         GENERAL: not distressed  HENT: atraumatic, normocephalic  EYES: no scleral icterus  CV: regular rhythm, regular rate  RESPIRATORY: normal effort CTA B no chest wall tenderness crepitus or step-offs, no edema or ecchymosis  ABDOMEN: soft, nontender nondistended  MUSCULOSKELETAL: no deformity.  Extremities x4 have age-appropriate range of motion.  He has multiple abrasions and skin tears to the bilateral upper extremities as well as diffusely to the bilateral lower extremities.  No midline C, T, L-spine tenderness.  NEURO: alert, moves all extremities, follows commands  SKIN: warm, dry    Vital signs and nursing notes reviewed.          LAB RESULTS  Recent Results (from the past 24 hour(s))   Comprehensive Metabolic Panel    Collection Time: 22  5:47 PM    Specimen: Blood   Result Value Ref Range    Glucose 138 (H) 65 - 99 mg/dL    BUN 21 8 - 23 mg/dL    Creatinine 1.11 0.76 - 1.27 mg/dL    Sodium 139 136 - 145 mmol/L    Potassium 4.3 3.5 - 5.2 mmol/L    Chloride 107 98 - 107 mmol/L    CO2 20.5 (L) 22.0 - 29.0 mmol/L    Calcium  8.5 (L) 8.6 - 10.5 mg/dL    Total Protein 6.1 6.0 - 8.5 g/dL    Albumin 3.60 3.50 - 5.20 g/dL    ALT (SGPT) 29 1 - 41 U/L    AST (SGOT) 20 1 - 40 U/L    Alkaline Phosphatase 94 39 - 117 U/L    Total Bilirubin 0.5 0.0 - 1.2 mg/dL    Globulin 2.5 gm/dL    A/G Ratio 1.4 g/dL    BUN/Creatinine Ratio 18.9 7.0 - 25.0    Anion Gap 11.5 5.0 - 15.0 mmol/L    eGFR 65.5 >60.0 mL/min/1.73   CBC Auto Differential    Collection Time: 07/19/22  5:47 PM    Specimen: Blood   Result Value Ref Range    WBC 17.24 (H) 3.40 - 10.80 10*3/mm3    RBC 3.89 (L) 4.14 - 5.80 10*6/mm3    Hemoglobin 13.0 13.0 - 17.7 g/dL    Hematocrit 37.3 (L) 37.5 - 51.0 %    MCV 95.9 79.0 - 97.0 fL    MCH 33.4 (H) 26.6 - 33.0 pg    MCHC 34.9 31.5 - 35.7 g/dL    RDW 13.2 12.3 - 15.4 %    RDW-SD 45.0 37.0 - 54.0 fl    MPV 10.9 6.0 - 12.0 fL    Platelets 200 140 - 450 10*3/mm3    Neutrophil % 86.0 (H) 42.7 - 76.0 %    Lymphocyte % 5.5 (L) 19.6 - 45.3 %    Monocyte % 7.8 5.0 - 12.0 %    Eosinophil % 0.0 (L) 0.3 - 6.2 %    Basophil % 0.1 0.0 - 1.5 %    Immature Grans % 0.6 (H) 0.0 - 0.5 %    Neutrophils, Absolute 14.81 (H) 1.70 - 7.00 10*3/mm3    Lymphocytes, Absolute 0.95 0.70 - 3.10 10*3/mm3    Monocytes, Absolute 1.35 (H) 0.10 - 0.90 10*3/mm3    Eosinophils, Absolute 0.00 0.00 - 0.40 10*3/mm3    Basophils, Absolute 0.02 0.00 - 0.20 10*3/mm3    Immature Grans, Absolute 0.11 (H) 0.00 - 0.05 10*3/mm3    nRBC 0.1 0.0 - 0.2 /100 WBC   COVID-19,[facility] IN-HOUSE CEPHEID/NEPTALI NP SWAB IN TRANSPORT MEDIA 8-12 HR TAT - Swab, Nasopharynx    Collection Time: 07/19/22  5:49 PM    Specimen: Nasopharynx; Swab   Result Value Ref Range    COVID19 Not Detected Not Detected - Ref. Range       Ordered the above labs and independently reviewed the results.        RADIOLOGY  CT Head Without Contrast   Preliminary Result       No evidence for acute traumatic intracranial pathology.       TECHNIQUE: CT scan of the cervical spine was obtained with 1 mm axial   bone algorithm and 2 mm axial  soft tissue algorithm images. Sagittal and   coronal reconstructed images were obtained.       FINDINGS:       There is no evidence for acute fracture or bony malalignment involving   the cervical spine.       Multilevel degenerative phenomena are incidentally noted. Disc   osteophyte complexes at C3-4, C5-6 and C6-7, in addition to bulging disc   material at the C4-5 level result in multilevel canal stenosis. At the   C4-5 level, there is at least a moderate, if not moderate-to-severe   degree of central canal stenosis secondary to bulging disc material.   Multilevel foraminal stenotic changes are noted within the cervical   spine from C3-4 down to C6-7 and these foraminal stenotic changes are   primarily secondary to uncovertebral joint hypertrophy which result in   up to severe degrees of foraminal narrowing.       IMPRESSION:       No evidence for acute fracture or bony malalignment involving the   cervical spine.       Incidental multilevel degenerative phenomena are appreciated within the   cervical spine primarily from C3-4 down to C6-7 where there are   multilevel canal and foraminal stenotic changes. Of note, there is a   moderate, if not moderate-to-severe degree of central canal stenosis at   the C4-5 level secondary to bulging disc.       These findings were discussed with Rosie Adams on 07/19/2022 at   approximately 4:31 PM.                       Radiation dose reduction techniques were utilized, including automated   exposure control and exposure modulation based on body size.              CT Cervical Spine Without Contrast   Preliminary Result       No evidence for acute traumatic intracranial pathology.       TECHNIQUE: CT scan of the cervical spine was obtained with 1 mm axial   bone algorithm and 2 mm axial soft tissue algorithm images. Sagittal and   coronal reconstructed images were obtained.       FINDINGS:       There is no evidence for acute fracture or bony malalignment involving   the  cervical spine.       Multilevel degenerative phenomena are incidentally noted. Disc   osteophyte complexes at C3-4, C5-6 and C6-7, in addition to bulging disc   material at the C4-5 level result in multilevel canal stenosis. At the   C4-5 level, there is at least a moderate, if not moderate-to-severe   degree of central canal stenosis secondary to bulging disc material.   Multilevel foraminal stenotic changes are noted within the cervical   spine from C3-4 down to C6-7 and these foraminal stenotic changes are   primarily secondary to uncovertebral joint hypertrophy which result in   up to severe degrees of foraminal narrowing.       IMPRESSION:       No evidence for acute fracture or bony malalignment involving the   cervical spine.       Incidental multilevel degenerative phenomena are appreciated within the   cervical spine primarily from C3-4 down to C6-7 where there are   multilevel canal and foraminal stenotic changes. Of note, there is a   moderate, if not moderate-to-severe degree of central canal stenosis at   the C4-5 level secondary to bulging disc.       These findings were discussed with Rosie Adams on 07/19/2022 at   approximately 4:31 PM.                       Radiation dose reduction techniques were utilized, including automated   exposure control and exposure modulation based on body size.              CT Chest Without Contrast Diagnostic   Final Result       1.  Acute mildly displaced fracture of lateral right rib 9. Acute   nondisplaced fracture of lateral right rib 10. Additional nondisplaced   subacute fractures are identified on the right, as above. No   pneumothorax.   2.  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 Recommend   further evaluation with contrast-enhanced MRI.       Discussed with Dr. Kothari at 4:43 PM.       This report was  finalized on 7/19/2022 4:43 PM by Dr. Goldie Reilly M.D.              I ordered the above noted radiological studies. Reviewed by me and discussed with radiologist.  See dictation for official radiology interpretation.    PROCEDURES  Procedures        MEDICATIONS GIVEN IN ER  Medications   nitroglycerin (NITROSTAT) SL tablet 0.4 mg (has no administration in time range)   acetaminophen (TYLENOL) tablet 650 mg (has no administration in time range)   ondansetron (ZOFRAN) tablet 4 mg (has no administration in time range)     Or   ondansetron (ZOFRAN) injection 4 mg (has no administration in time range)   melatonin tablet 3 mg (has no administration in time range)   HYDROcodone-acetaminophen (NORCO) 7.5-325 MG per tablet 1 tablet (has no administration in time range)   bacitracin 500 UNIT/GM ointment 1 application (1 application Topical Given 7/19/22 1509)             PROGRESS AND CONSULTS    DDX includes but not limited to skull fracture, brain bleed, C-spine fracture, contusions, abrasions    ED Course as of 07/19/22 1843   Tue Jul 19, 2022   1700 I reassessed the patient.  We discussed his imaging studies.  CT head and C-spine showed no acute traumatic findings other are chronic degenerative changes.  CT chest shows 2 acute rib fractures as well as multiple subacute rib fractures.  I recommended admission to the hospital for monitoring and pain control.  Patient refuses, prefers to go home.  We discussed the risks of going home including hypoxia, uncontrolled pain pneumonia.  He verbalized understanding of these but desires to be discharged.  He states his pain is very mild he is not having trouble breathing or shortness of breath and he does not want to be admitted to the hospital.  We will make sure the patient can ambulate and if so he can be discharged with an incentive spirometer, can take Tylenol for pain and he states he has lidocaine patches at home that he can use. [KA]   5211 I reassessed the patient.  He is  change his mind and is now willing to stay.  We will arrange admission.  I have offered the patient something for pain again and he declines. [KA]   1801 WBC(!): 17.24 [KA]   1801 Hemoglobin: 13.0 [KA]   1807 I discussed the patient with Dr. Caldera, hospitalist.  We discussed his history presentation and imaging and she agrees to admit for further evaluation and treatment. [KA]   1843 Glucose(!): 138 [KA]   1843 Creatinine: 1.11 [KA]      ED Course User Index  [KA] Rosie Adams PA             Patient was placed in face mask in first look. Patient was wearing facemask each time I entered the room and throughout our encounter. I wore protective equipment throughout this patient encounter including a face mask, eye shield and gloves. Hand hygiene was performed before donning protective equipment and after removal when leaving the room.        DIAGNOSIS  Final diagnoses:   Closed fracture of multiple ribs of right side, initial encounter   Contusion of scalp, initial encounter   Osteoarthritis of cervical spine, unspecified spinal osteoarthritis complication status   Multiple abrasions   Abnormal CT scan         Latest Documented Vital Signs:  As of 18:43 EDT  BP- 114/65 HR- 71 Temp- 97.7 °F (36.5 °C) O2 sat- 97%       Rosie Adams PA  07/19/22 1843

## 2022-07-20 ENCOUNTER — APPOINTMENT (OUTPATIENT)
Dept: GENERAL RADIOLOGY | Facility: HOSPITAL | Age: 82
End: 2022-07-20

## 2022-07-20 LAB
ANION GAP SERPL CALCULATED.3IONS-SCNC: 8.5 MMOL/L (ref 5–15)
BUN SERPL-MCNC: 22 MG/DL (ref 8–23)
BUN/CREAT SERPL: 20.4 (ref 7–25)
CALCIUM SPEC-SCNC: 8.6 MG/DL (ref 8.6–10.5)
CHLORIDE SERPL-SCNC: 107 MMOL/L (ref 98–107)
CO2 SERPL-SCNC: 21.5 MMOL/L (ref 22–29)
CREAT SERPL-MCNC: 1.08 MG/DL (ref 0.76–1.27)
DEPRECATED RDW RBC AUTO: 45.6 FL (ref 37–54)
EGFRCR SERPLBLD CKD-EPI 2021: 67.7 ML/MIN/1.73
ERYTHROCYTE [DISTWIDTH] IN BLOOD BY AUTOMATED COUNT: 13.1 % (ref 12.3–15.4)
GLUCOSE SERPL-MCNC: 154 MG/DL (ref 65–99)
HCT VFR BLD AUTO: 38 % (ref 37.5–51)
HGB BLD-MCNC: 12.7 G/DL (ref 13–17.7)
INR PPP: 0.98 (ref 0.9–1.1)
MCH RBC QN AUTO: 33 PG (ref 26.6–33)
MCHC RBC AUTO-ENTMCNC: 33.4 G/DL (ref 31.5–35.7)
MCV RBC AUTO: 98.7 FL (ref 79–97)
PLATELET # BLD AUTO: 193 10*3/MM3 (ref 140–450)
PMV BLD AUTO: 10.8 FL (ref 6–12)
POTASSIUM SERPL-SCNC: 4.5 MMOL/L (ref 3.5–5.2)
PROTHROMBIN TIME: 12.9 SECONDS (ref 11.7–14.2)
RBC # BLD AUTO: 3.85 10*6/MM3 (ref 4.14–5.8)
SODIUM SERPL-SCNC: 137 MMOL/L (ref 136–145)
WBC NRBC COR # BLD: 14.73 10*3/MM3 (ref 3.4–10.8)

## 2022-07-20 PROCEDURE — 97162 PT EVAL MOD COMPLEX 30 MIN: CPT

## 2022-07-20 PROCEDURE — 97116 GAIT TRAINING THERAPY: CPT

## 2022-07-20 PROCEDURE — 85610 PROTHROMBIN TIME: CPT | Performed by: NURSE PRACTITIONER

## 2022-07-20 PROCEDURE — G0378 HOSPITAL OBSERVATION PER HR: HCPCS

## 2022-07-20 PROCEDURE — 99213 OFFICE O/P EST LOW 20 MIN: CPT | Performed by: NURSE PRACTITIONER

## 2022-07-20 PROCEDURE — 36415 COLL VENOUS BLD VENIPUNCTURE: CPT | Performed by: INTERNAL MEDICINE

## 2022-07-20 PROCEDURE — 63710000001 METHOTREXATE PER 2.5 MG: Performed by: INTERNAL MEDICINE

## 2022-07-20 PROCEDURE — 97530 THERAPEUTIC ACTIVITIES: CPT

## 2022-07-20 PROCEDURE — 85027 COMPLETE CBC AUTOMATED: CPT | Performed by: INTERNAL MEDICINE

## 2022-07-20 PROCEDURE — 80048 BASIC METABOLIC PNL TOTAL CA: CPT | Performed by: INTERNAL MEDICINE

## 2022-07-20 PROCEDURE — 71045 X-RAY EXAM CHEST 1 VIEW: CPT

## 2022-07-20 RX ORDER — POLYETHYLENE GLYCOL 3350 17 G/17G
17 POWDER, FOR SOLUTION ORAL DAILY
Status: DISCONTINUED | OUTPATIENT
Start: 2022-07-20 | End: 2022-07-23 | Stop reason: HOSPADM

## 2022-07-20 RX ORDER — HYDROXYZINE PAMOATE 25 MG/1
25 CAPSULE ORAL 3 TIMES DAILY PRN
Status: DISCONTINUED | OUTPATIENT
Start: 2022-07-20 | End: 2022-07-23 | Stop reason: HOSPADM

## 2022-07-20 RX ORDER — DONEPEZIL HYDROCHLORIDE 5 MG/1
5 TABLET, FILM COATED ORAL DAILY
Status: DISCONTINUED | OUTPATIENT
Start: 2022-07-20 | End: 2022-07-20

## 2022-07-20 RX ORDER — ACETAMINOPHEN 500 MG
1000 TABLET ORAL 3 TIMES DAILY
Status: DISCONTINUED | OUTPATIENT
Start: 2022-07-20 | End: 2022-07-23 | Stop reason: HOSPADM

## 2022-07-20 RX ORDER — TRAMADOL HYDROCHLORIDE 50 MG/1
50 TABLET ORAL EVERY 6 HOURS PRN
Status: DISCONTINUED | OUTPATIENT
Start: 2022-07-20 | End: 2022-07-23 | Stop reason: HOSPADM

## 2022-07-20 RX ADMIN — DONEPEZIL HYDROCHLORIDE 5 MG: 5 TABLET, FILM COATED ORAL at 09:37

## 2022-07-20 RX ADMIN — ACETAMINOPHEN 1000 MG: 500 TABLET ORAL at 09:20

## 2022-07-20 RX ADMIN — ACETAMINOPHEN 1000 MG: 500 TABLET ORAL at 20:54

## 2022-07-20 RX ADMIN — HYDROCHLOROTHIAZIDE 50 MG: 50 TABLET ORAL at 06:11

## 2022-07-20 RX ADMIN — Medication 1 MG: at 09:21

## 2022-07-20 RX ADMIN — Medication 3 MG: at 20:54

## 2022-07-20 RX ADMIN — RIVASTIGMINE TARTRATE 1.5 MG: 1.5 CAPSULE ORAL at 09:21

## 2022-07-20 RX ADMIN — METHOTREXATE SODIUM 25 MG: 2.5 TABLET ORAL at 13:22

## 2022-07-20 RX ADMIN — CLOPIDOGREL 75 MG: 75 TABLET, FILM COATED ORAL at 06:11

## 2022-07-20 RX ADMIN — SENNOSIDES 1 TABLET: 8.6 TABLET, FILM COATED ORAL at 20:54

## 2022-07-20 RX ADMIN — TRAZODONE HYDROCHLORIDE 50 MG: 50 TABLET ORAL at 20:54

## 2022-07-20 RX ADMIN — ATORVASTATIN CALCIUM 40 MG: 20 TABLET, FILM COATED ORAL at 17:47

## 2022-07-20 RX ADMIN — ACETAMINOPHEN 1000 MG: 500 TABLET ORAL at 17:46

## 2022-07-20 RX ADMIN — SENNOSIDES 1 TABLET: 8.6 TABLET, FILM COATED ORAL at 03:27

## 2022-07-20 RX ADMIN — LISINOPRIL 40 MG: 40 TABLET ORAL at 17:47

## 2022-07-20 RX ADMIN — RIVASTIGMINE TARTRATE 1.5 MG: 1.5 CAPSULE ORAL at 20:53

## 2022-07-20 RX ADMIN — RIVASTIGMINE TARTRATE 1.5 MG: 1.5 CAPSULE ORAL at 03:27

## 2022-07-20 RX ADMIN — POLYETHYLENE GLYCOL 3350 17 G: 17 POWDER, FOR SOLUTION ORAL at 20:54

## 2022-07-20 RX ADMIN — SENNOSIDES 1 TABLET: 8.6 TABLET, FILM COATED ORAL at 09:21

## 2022-07-20 RX ADMIN — TRAZODONE HYDROCHLORIDE 50 MG: 50 TABLET ORAL at 03:27

## 2022-07-20 RX ADMIN — LIDOCAINE 1 PATCH: 50 PATCH CUTANEOUS at 03:27

## 2022-07-20 NOTE — PLAN OF CARE
Goal Outcome Evaluation:  Plan of Care Reviewed With: patient           Outcome Evaluation: VSS on room air. C/o rib pain treated with lidocaine patch. Rested well throughout shift. Will continue to monitor.

## 2022-07-20 NOTE — NURSING NOTE
CWON note: Consult received for skin tears POA. Skin tear standing orders placed into Epic. Please re-consult for any additional needs.

## 2022-07-20 NOTE — CONSULTS
Inpatient Thoracic Surgery Consult  Consult performed by: Christina Macias, BERNADETTE, APRN  Consult ordered by: Margaret Caldera MD          Patient Care Team:  Valerie Burnham MD as PCP - General (Internal Medicine)    Chief Complaint   Patient presents with   • Fall       Subjective     History of Present Illness    Mr. Yadav is an 82-year-old gentleman with a history of hyperlipidemia, hypertension, carotid stenosis.  He is anticoagulated on Plavix.  He presented to [facility] ER on 7/19/2022 after a fall on the escalator at [employer].  He reports he fell onto his right side.  Denies loss of consciousness.  Pain is on the right side that is increased with coughing and deep breaths.  CT of the cervical spine, chest and head were performed upon admission.  No acute findings to the cervical spine.  Acute fractures of the right ninth and 10th rib are seen, for which we have been asked to see this gentleman.    On exam today, the patient is up to chair and on room air.  He has some discomfort to the right chest wall that is increased with movement, but he is comfortable overall. He uses a cane at baseline to assist with mobility.  His last dose of Plavix was 6 AM this morning.      Review of Systems   HENT: Negative.    Eyes: Negative.    Respiratory: Negative for shortness of breath.    Cardiovascular: Positive for chest pain.   Gastrointestinal: Negative.    Endocrine: Negative.    Genitourinary: Negative.    Skin: Negative.    Allergic/Immunologic: Negative.    Neurological: Positive for weakness.   Hematological: Bruises/bleeds easily.            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   • Falls    • Fracture of ankle 1972   • Fracture of wrist 1947   • Fracture, clavicle 26 years ago   • Fracture, foot 26 year ago   • Groin rash     PT STATES LEFT GROIN AT TIMES   • History of transfusion    • Hyperlipidemia    • Hypertension    • Low back pain    • Low back strain ?   • Lumbar spinal stenosis    • Numbness and tingling     RIGHT ARM, RIGHT LEG & FOOT   • Peripheral neuropathy    • Peripheral vascular disease (HCC)    • Rheumatoid arthritis (HCC)     HANDS DIALLO   • Staph infection     2015  [facility] POST SX   • Tendency toward bleeding easily (HCC)     due to blood thinners     Past Surgical History:   Procedure Laterality Date   • ANGIOPLASTY FEMORAL ARTERY Left 11/7/2016    Procedure: ULTRA SOUND ACCESS RIGHT FEMORAL ARTERY, AIF BILATERAL RUNOFF, SELECTIVE CATHETERIZATION LEFT FEMORAL ARTERY.;  Surgeon: Errol Michael MD;  Location: [facility] OR 18/19;  Service:    • ANKLE OPEN REDUCTION INTERNAL FIXATION  1980   • ARTERIOVENOUS FISTULA/SHUNT SURGERY Left 12/13/2016    Procedure: LEFT ILEO-FEMORAL GORTEX GRAFT AND LEFT LEG ARERIOGRAM ;  Surgeon: Errol Michael MD;  Location: [facility] OR 18/19;  Service:    • CAROTID ENDARTERECTOMY Right 9/29/2020    Procedure: CAROTID ENDARTERECTOMY;  Surgeon: James Graham MD;  Location: [facility];  Service: Vascular;  Laterality: Right;   • CHOLECYSTECTOMY     • EPIDURAL BLOCK     • EYE SURGERY Left     as child   • FRACTURE SURGERY Left     arm   • ILIAC ARTERY STENT     • KNEE  SURGERY Left     ORIF   • ORIF ANKLE FRACTURE Left      Family History   Problem Relation Age of Onset   • Diabetes Mother    • Cancer Mother    • Cancer Father    • Diabetes Father    • Heart disease Maternal Grandmother    • Heart disease Maternal Grandfather    • Heart disease Paternal Grandmother    • Heart disease Paternal Grandfather    • Malig Hyperthermia Neg Hx      Social History     Socioeconomic History   • Marital status:    Tobacco Use   • Smoking status: Former Smoker     Packs/day: 2.00     Years: 0.00     Pack years: 0.00     Types: Cigarettes     Start date: 1968     Quit date: 1989     Years since quittin.1   • Smokeless tobacco: Never Used   • Tobacco comment: Did’nt inhale   Vaping Use   • Vaping Use: Never used   Substance and Sexual Activity   • Alcohol use: Not Currently     Alcohol/week: 0.0 standard drinks     Comment: 1 drink per day at most   • Drug use: Never   • Sexual activity: Not Currently     Partners: Female     Birth control/protection: Condom     Medications Prior to Admission   Medication Sig Dispense Refill Last Dose   • acetaminophen (TYLENOL) 650 MG 8 hr tablet Take 650 mg by mouth Every 8 (Eight) Hours As Needed for Mild Pain .   2022 at Unknown time   • atorvastatin (LIPITOR) 40 MG tablet Take 40 mg by mouth Every Evening.   2022 at Unknown time   • cetirizine (ZyrTEC) 10 MG tablet Take 10 mg by mouth Every Morning.   2022 at Unknown time   • clopidogrel (PLAVIX) 75 MG tablet Take 75 mg by mouth Every Morning.   2022 at Unknown time   • folic acid (FOLVITE) 1 MG tablet Take 1 mg by mouth Daily.   2022 at Unknown time   • hydrochlorothiazide (HYDRODIURIL) 50 MG tablet Take 50 mg by mouth Every Morning.   2022 at Unknown time   • lisinopril (PRINIVIL,ZESTRIL) 40 MG tablet Take 40 mg by mouth Every Evening.   2022 at Unknown time   • melatonin 5 MG tablet tablet Take 5 mg by mouth Every Night.   2022 at Unknown time    • methotrexate 2.5 MG tablet Take 2.5 mg by mouth. Wednesdays takes 2.5mg 10 tabs all together for total of 25mg   Past Week at Unknown time   • ONE TOUCH ULTRA TEST test strip   0 7/19/2022 at Unknown time   • rivastigmine (EXELON) 1.5 MG capsule Take 1.5 mg by mouth 2 (Two) Times a Day.   7/19/2022 at Unknown time   • Sennosides (SENNA LAX PO) Take 1 tablet by mouth 1 (One) Time.   7/19/2022 at Unknown time   • traZODone (DESYREL) 50 MG tablet Take 50 mg by mouth Every Night.   7/18/2022 at Unknown time   • triamcinolone (KENALOG) 0.025 % cream APPLY TWICE DAILY TO FOREHEAD AND CHEST   Past Week at Unknown time   • hydrOXYzine pamoate (VISTARIL) 25 MG capsule Take 25 mg by mouth 3 (Three) Times a Day As Needed.      • lidocaine (LIDODERM) 5 % Place 1 patch on the skin as directed by provider Daily. Remove & Discard patch within 12 hours or as directed by MD 15 each 0    • nystatin 814323 UNIT/GM powder APPLY TO RASH EVERY DAY AS NEEDED      • ondansetron ODT (ZOFRAN-ODT) 4 MG disintegrating tablet Place 1 tablet on the tongue 4 (Four) Times a Day As Needed for Nausea or Vomiting. 15 tablet 0      No Known Allergies    Objective      Vital Signs  Temp:  [97 °F (36.1 °C)-97.6 °F (36.4 °C)] 97 °F (36.1 °C)  Heart Rate:  [64-71] 64  Resp:  [18] 18  BP: (113-161)/(60-75) 161/60    Intake & Output (last day)       07/19 0701  07/20 0700 07/20 0701  07/21 0700    P.O. 240     Total Intake(mL/kg) 240 (2.8)     Net +240                 Physical Exam  Constitutional:       General: He is not in acute distress.     Appearance: Normal appearance.   HENT:      Head: Normocephalic and atraumatic.   Cardiovascular:      Rate and Rhythm: Normal rate.      Pulses: Normal pulses.   Pulmonary:      Effort: Pulmonary effort is normal. No respiratory distress.   Chest:      Chest wall: Tenderness present.   Musculoskeletal:      Cervical back: Normal range of motion.      Right lower leg: No edema.   Skin:     General: Skin is warm.       Findings: Bruising present.   Neurological:      General: No focal deficit present.      Mental Status: He is alert and oriented to person, place, and time. Mental status is at baseline.   Psychiatric:         Mood and Affect: Mood normal.         Results Review:    I reviewed the patient's new clinical results.  I reviewed the patient's new imaging results and agree with the interpretation.  I reviewed the patient's other test results and agree with the interpretation  Discussed with patient, RN and Dr. Hamlin    Imaging Results (Last 24 Hours)     Procedure Component Value Units Date/Time    XR Chest 1 View [[id number]] Collected: 07/20/22 1118     Updated: 07/20/22 1125    Narrative:      ONE VIEW PORTABLE CHEST     HISTORY: Right rib fractures. Evaluate for hemothorax.     FINDINGS: The lungs are well-expanded and clear except for some  localized atelectasis at the left base laterally that is new since study  of 12/06/2021. The patient has CT scan performed yesterday demonstrating  several acute and subacute right rib fractures in the right lung is  clear. There is no evidence of hemothorax. The heart remains slightly  enlarged.     This report was finalized on 7/20/2022 11:22 AM by Dr. Riaz Hernandez M.D.       CT Head Without Contrast [[id number]] Collected: 07/19/22 1748     Updated: 07/20/22 0655    Narrative:      CT HEAD AND CERVICAL SPINE WITHOUT CONTRAST     CLINICAL HISTORY:Patient lost balance and fell down a few flights on the  escalator. Head trauma.     TECHNIQUE: CT scan of the head was obtained with 2 mm axial bone  algorithm and 3 mm axial soft tissue algorithm images. Sagittal and  coronal reconstructed images were obtained.     Comparison is made to previous CT scan of the head dated 03/16/2022.     FINDINGS:     There is no evidence for a calvarial fracture. There is no evidence for  an acute extra-axial hemorrhage.     The ventricles, sulci, and cisterns are age appropriate. There are  mild  changes of chronic small vessel ischemic phenomena. The posterior fossa  structures are unremarkable. Atherosclerotic changes are appreciated  within the intracranial vasculature.     There is mild mucosal thickening within the ethmoid air cells and  mild-to-moderate mucosal thickening is seen within the right maxillary  sinus.       Impression:         No evidence for acute traumatic intracranial pathology.        TECHNIQUE: CT scan of the cervical spine was obtained with 1 mm axial  bone algorithm and 2 mm axial soft tissue algorithm images. Sagittal and  coronal reconstructed images were obtained.     FINDINGS:     There is no evidence for acute fracture or bony malalignment involving  the cervical spine.     Multilevel degenerative phenomena are incidentally noted. Disc  osteophyte complexes at C3-4, C5-6 and C6-7, in addition to bulging disc  material at the C4-5 level result in multilevel canal stenosis. At the  C4-5 level, there is at least a moderate, if not moderate-to-severe  degree of central canal stenosis secondary to bulging disc material.  Multilevel foraminal stenotic changes are noted within the cervical  spine from C3-4 down to C6-7 and these foraminal stenotic changes are  primarily secondary to uncovertebral joint hypertrophy which result in  up to severe degrees of foraminal narrowing.     IMPRESSION:     No evidence for acute fracture or bony malalignment involving the  cervical spine.     Incidental multilevel degenerative phenomena are appreciated within the  cervical spine primarily from C3-4 down to C6-7 where there are  multilevel canal and foraminal stenotic changes. Of note, there is at  least a moderate, if not moderate-to-severe degree of central canal  stenosis at the C4-5 level secondary to bulging disc material.     These findings were discussed with Rosie Adams on 07/19/2022 at  approximately 4:31 PM.                 Radiation dose reduction techniques were utilized,  including automated  exposure control and exposure modulation based on body size.     This report was finalized on 7/20/2022 6:52 AM by Dr. Dusty Quinn M.D.       CT Cervical Spine Without Contrast [[id number]] Collected: 07/19/22 1748     Updated: 07/20/22 0655    Narrative:      CT HEAD AND CERVICAL SPINE WITHOUT CONTRAST     CLINICAL HISTORY:Patient lost balance and fell down a few flights on the  escalator. Head trauma.     TECHNIQUE: CT scan of the head was obtained with 2 mm axial bone  algorithm and 3 mm axial soft tissue algorithm images. Sagittal and  coronal reconstructed images were obtained.     Comparison is made to previous CT scan of the head dated 03/16/2022.     FINDINGS:     There is no evidence for a calvarial fracture. There is no evidence for  an acute extra-axial hemorrhage.     The ventricles, sulci, and cisterns are age appropriate. There are mild  changes of chronic small vessel ischemic phenomena. The posterior fossa  structures are unremarkable. Atherosclerotic changes are appreciated  within the intracranial vasculature.     There is mild mucosal thickening within the ethmoid air cells and  mild-to-moderate mucosal thickening is seen within the right maxillary  sinus.       Impression:         No evidence for acute traumatic intracranial pathology.        TECHNIQUE: CT scan of the cervical spine was obtained with 1 mm axial  bone algorithm and 2 mm axial soft tissue algorithm images. Sagittal and  coronal reconstructed images were obtained.     FINDINGS:     There is no evidence for acute fracture or bony malalignment involving  the cervical spine.     Multilevel degenerative phenomena are incidentally noted. Disc  osteophyte complexes at C3-4, C5-6 and C6-7, in addition to bulging disc  material at the C4-5 level result in multilevel canal stenosis. At the  C4-5 level, there is at least a moderate, if not moderate-to-severe  degree of central canal stenosis secondary to bulging disc  material.  Multilevel foraminal stenotic changes are noted within the cervical  spine from C3-4 down to C6-7 and these foraminal stenotic changes are  primarily secondary to uncovertebral joint hypertrophy which result in  up to severe degrees of foraminal narrowing.     IMPRESSION:     No evidence for acute fracture or bony malalignment involving the  cervical spine.     Incidental multilevel degenerative phenomena are appreciated within the  cervical spine primarily from C3-4 down to C6-7 where there are  multilevel canal and foraminal stenotic changes. Of note, there is at  least a moderate, if not moderate-to-severe degree of central canal  stenosis at the C4-5 level secondary to bulging disc material.     These findings were discussed with Rosie Adams on 07/19/2022 at  approximately 4:31 PM.                 Radiation dose reduction techniques were utilized, including automated  exposure control and exposure modulation based on body size.     This report was finalized on 7/20/2022 6:52 AM by Dr. Dusty Quinn M.D.       CT Chest Without Contrast Diagnostic [[id number]] Collected: 07/19/22 1611     Updated: 07/19/22 1646    Narrative:      CT CHEST WO CONTRAST DIAGNOSTIC-     HISTORY:  Fall down escalator, right lateral chest wall pain.     TECHNIQUE: CT of the chest was performed without intravenous contrast.  Reformatted images were reviewed. Radiation dose reduction techniques  were utilized, including automated exposure control and exposure  modulation based on body size.     COMPARISON:  CT chest without contrast 12/06/2021        FINDINGS: Evaluation is limited by motion artifact. No pathologically  enlarged thoracic lymph nodes are identified within the limitations of  motion artifact and lack of intravenous contrast. The heart is enlarged.  There is no pericardial effusion. There is calcific coronary artery  atherosclerosis. There is advanced calcific aortic and branch vessel  atherosclerosis. The  ascending aorta is dilated to 4.3 cm at the level  of the pulmonary trunk, not significantly changed. The pulmonary trunk  is mildly dilated to 3.2 cm, not significant changed. There is no  significant pleural effusion.  Limited imaging through the upper abdomen demonstrates calcific  atherosclerosis of the visualized upper abdominal aorta. There are fluid  density lesions in the upper kidneys, which likely reflect cysts. There  is an approximately 5.7 cm predominantly fat density dorsal to the left  kidney, which is likely arising from the kidney and new from 2015. 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 There is thickening of the left greater than right  adrenal glands, not significantly changed.   There is mild bilateral gynecomastia. There is diffuse osseous  demineralization, which limits evaluation of fine osseous detail. There  is multilevel degenerative disc disease. There are old rib fractures.  There is an acute mildly displaced fracture of lateral right rib 9.  There is an acute nondisplaced fracture of lateral right rib 10. There  are nondisplaced subacute fractures of anterolateral right rib 7 and  anterior right rib 6.  There are trace septated secretions in the trachea. Evaluation of the  lung parenchyma is limited by respiratory motion artifact. There is mild  curvilinear atelectasis and/or scarring in the lingula. Otherwise, there  is no focal consolidation.          Impression:         1.  Acute mildly displaced fracture of lateral right rib 9. Acute  nondisplaced fracture of lateral right rib 10. Additional nondisplaced  subacute fractures are identified on the right, as above. No  pneumothorax.  2.  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 Recommend  further evaluation with contrast-enhanced MRI.      Discussed with Dr. Kothari at 4:43 PM.     This report was finalized on 7/19/2022 4:43 PM by Dr. Goldie Reilly M.D.             Lab Results:  Lab Results (last 24 hours)     Procedure Component Value Units Date/Time    Protime-INR [[id number]]  (Normal) Collected: 07/20/22 1028    Specimen: Blood from Hand, Left Updated: 07/20/22 1129     Protime 12.9 Seconds      INR 0.98    Basic Metabolic Panel [[id number]]  (Abnormal) Collected: 07/20/22 0340    Specimen: Blood Updated: 07/20/22 0439     Glucose 154 mg/dL      BUN 22 mg/dL      Creatinine 1.08 mg/dL      Sodium 137 mmol/L      Potassium 4.5 mmol/L      Comment: Slight hemolysis detected by analyzer. Results may be affected.        Chloride 107 mmol/L      CO2 21.5 mmol/L      Calcium 8.6 mg/dL      BUN/Creatinine Ratio 20.4     Anion Gap 8.5 mmol/L      eGFR 67.7 mL/min/1.73      Comment: National Kidney Foundation and American Society of Nephrology (ASN) Task Force recommended calculation based on the Chronic Kidney Disease Epidemiology Collaboration (CKD-EPI) equation refit without adjustment for race.       Narrative:      GFR Normal >60  Chronic Kidney Disease <60  Kidney Failure <15      CBC (No Diff) [[id number]]  (Abnormal) Collected: 07/20/22 0340    Specimen: Blood Updated: 07/20/22 0410     WBC 14.73 10*3/mm3      RBC 3.85 10*6/mm3      Hemoglobin 12.7 g/dL      Hematocrit 38.0 %      MCV 98.7 fL      MCH 33.0 pg      MCHC 33.4 g/dL      RDW 13.1 %      RDW-SD 45.6 fl      MPV 10.8 fL      Platelets 193 10*3/mm3     COVID PRE-OP / PRE-PROCEDURE SCREENING ORDER (NO ISOLATION) - Swab, Nasopharynx [[id number]]  (Normal) Collected: 07/19/22 1749    Specimen: Swab from Nasopharynx Updated: 07/19/22 1834    Narrative:      The following orders were created for panel order COVID PRE-OP / PRE-PROCEDURE SCREENING ORDER (NO ISOLATION) - Swab, Nasopharynx.  Procedure                               Abnormality         Status                     ---------                                -----------         ------                     COVID-19,[facility] IN-HOUSE...[[id number]]  Normal              Final result                 Please view results for these tests on the individual orders.    COVID-19,[facility] IN-HOUSE CEPHEID/NEPTALI NP SWAB IN TRANSPORT MEDIA 8-12 HR TAT - Swab, Nasopharynx [[id number]]  (Normal) Collected: 07/19/22 1749    Specimen: Swab from Nasopharynx Updated: 07/19/22 1834     COVID19 Not Detected    Narrative:      Fact sheet for providers: https://www.fda.gov/media/894155/download     Fact sheet for patients: https://www.fda.gov/media/765454/download    Comprehensive Metabolic Panel [[id number]]  (Abnormal) Collected: 07/19/22 1747    Specimen: Blood Updated: 07/19/22 1818     Glucose 138 mg/dL      BUN 21 mg/dL      Creatinine 1.11 mg/dL      Sodium 139 mmol/L      Potassium 4.3 mmol/L      Chloride 107 mmol/L      CO2 20.5 mmol/L      Calcium 8.5 mg/dL      Total Protein 6.1 g/dL      Albumin 3.60 g/dL      ALT (SGPT) 29 U/L      AST (SGOT) 20 U/L      Alkaline Phosphatase 94 U/L      Total Bilirubin 0.5 mg/dL      Globulin 2.5 gm/dL      A/G Ratio 1.4 g/dL      BUN/Creatinine Ratio 18.9     Anion Gap 11.5 mmol/L      eGFR 65.5 mL/min/1.73      Comment: National Kidney Foundation and American Society of Nephrology (ASN) Task Force recommended calculation based on the Chronic Kidney Disease Epidemiology Collaboration (CKD-EPI) equation refit without adjustment for race.       Narrative:      GFR Normal >60  Chronic Kidney Disease <60  Kidney Failure <15      CBC & Differential [[id number]]  (Abnormal) Collected: 07/19/22 1747    Specimen: Blood Updated: 07/19/22 1800    Narrative:      The following orders were created for panel order CBC & Differential.  Procedure                               Abnormality         Status                     ---------                               -----------         ------                     CBC Auto Differential[[id number]]         Abnormal            Final result                 Please view results for these tests on the individual orders.    CBC Auto Differential [[id number]]  (Abnormal) Collected: 07/19/22 3335    Specimen: Blood Updated: 07/19/22 1800     WBC 17.24 10*3/mm3      RBC 3.89 10*6/mm3      Hemoglobin 13.0 g/dL      Hematocrit 37.3 %      MCV 95.9 fL      MCH 33.4 pg      MCHC 34.9 g/dL      RDW 13.2 %      RDW-SD 45.0 fl      MPV 10.9 fL      Platelets 200 10*3/mm3      Neutrophil % 86.0 %      Lymphocyte % 5.5 %      Monocyte % 7.8 %      Eosinophil % 0.0 %      Basophil % 0.1 %      Immature Grans % 0.6 %      Neutrophils, Absolute 14.81 10*3/mm3      Lymphocytes, Absolute 0.95 10*3/mm3      Monocytes, Absolute 1.35 10*3/mm3      Eosinophils, Absolute 0.00 10*3/mm3      Basophils, Absolute 0.02 10*3/mm3      Immature Grans, Absolute 0.11 10*3/mm3      nRBC 0.1 /100 WBC               Assessment & Plan       Closed fracture of multiple ribs of right side, initial encounter      Assessment & Plan    I have independently reviewed the CT of the chest with 3D reconstruction performed upon admission which demonstrates mildly displaced rib fracture of the right ninth rib.  Nondisplaced acute fracture of the right 10th rib.  There is evidence of old rib fractures on the right side as well.  No pneumothorax or hemothorax.    Multiple right rib fractures: Recommend conservative management as patient is not an ideal surgical candidate.  We will initiate rib fracture protocol medications for adequate pain control.  Also advised aggressive pulmonary toilet with incentive spirometry 10 times per hour to prevent pneumonia.  Patient to mobilize and be out of bed as much as possible per PT recommendations.      Generalized weakness: PT has been consulted.  Continue treatment per their recommendations.    Nothing further to add from a thoracic perspective.  We will sign off.  Thank you for letting us participate in the care of Mr. Yadav.    I  discussed the patients findings and our recommendations with patient, nursing staff and Dr. Yakelin Macias, BERNADETTE, APRN  [facility]  07/20/22  13:13 EDT      I spent >62 minutes reviewing the patient's chart including medical history, notes, radiographic imaging, labs and performing an assessment and development of a plan and discussion with the patient/family at bedside.

## 2022-07-20 NOTE — PLAN OF CARE
Goal Outcome Evaluation:  Plan of Care Reviewed With: (P) patient, spouse           Outcome Evaluation: (P) Pt was seen this AM. Pt is an 82 y.o. M who presents after suffering a fall and being diagnosed with closed rib fx of 9 & 10 on R. PMH includes lumbar spinal stenosis that pt reports causes R side drop foot, a history of falls, HTN, and HLD. Pt lives at home with spouse and requires some assist for bed mobility and transfers, but uses a cane to ambulate ind. House contains 2 steps to entrance and about 8 stairs total inside. Pt was able to complete bed mobility with Min A, and was able to reposition with SBA. Pt was able to complete 3 STS with CGA, 1 with FFW and 2 with Cane. Pt utilized both a walker and then a cane to complete a total of 145'. Pt was more stable when using FWW vs cane. Pt educated on correct positioning of cane. Pt was able to trfer to chair 2x with CGA and use of FWW and cane. Pt demo generalized weakness, balance deficits, and endurance limitations. Skilled intervention is necessary to address deficits above and improve ability to use stairs. PT recommends HH vs SNF pending at d/c. PT will follow up.

## 2022-07-20 NOTE — PROGRESS NOTES
[facility]    PROGRESS NOTE    Name:  Albert Yadav   Age:  82 y.o.  Sex:  male  :  1940  MRN:  [id number]   Visit Number:  [id number]  Admission Date:  2022  Date Of Service:  22  Primary Care Physician:  Valerie Burnham MD     LOS: 0 days :  Patient Care Team:  Valerie Burnham MD as PCP - General (Internal Medicine):    History taken from:     patient chart    Chief Complaint:      Rib pain on the right    Subjective     Interval History:     Patient seen and examined today.  Reviewed history and physical, lab work, x-rays, chart.    82-year-old male with history of hypertension, hyperlipidemia, rheumatoid arthritis fell on escalator at "[employer]" yesterday.  He was noted to have multiple right-sided rib fractures without pneumothorax.  Cardiothoracic surgery has been consulted, they recommended medical management.    On CT of the chest, patient was noted to have fracture of the right lateral ribs 9 and right lateral rib 10.  There was also a 5.7 cm density dorsal to the left kidney.  MRI was recommended and this has been ordered.    Patient continues to have rib pain.  He denies any chest pressure, shortness of breath, nausea, vomiting.  He is ambulating with physical therapy.    Review of Systems:     All systems were reviewed and negative except for:  Musculoskeletal: positive for  Right-sided chest wall pain.    Objective     Vital Signs:    Temp:  [97 °F (36.1 °C)-97.6 °F (36.4 °C)] 97 °F (36.1 °C)  Heart Rate:  [64-71] 64  Resp:  [18] 18  BP: (113-161)/(60-75) 161/60    Physical Exam:    General: Alert and oriented x4, mild distress.  Heart: Regular rate and rhythm without murmur rub or thrill.  Lungs: Clear to auscultation bilaterally without use of accessory muscles or respiration.  Abdomen: Soft/nontender/nondistended.  No HSM noted.  MSK: 4/5 strength in upper/lower extremities bilaterally.     Results Review:      I reviewed the patient's new  clinical results.  I reviewed the patient's new imaging results and agree with the interpretation.  I reviewed the patient's other test results and agree with the interpretation    Labs:    Lab Results (last 24 hours)     Procedure Component Value Units Date/Time    Protime-INR [[id number]]  (Normal) Collected: 07/20/22 1028    Specimen: Blood from Hand, Left Updated: 07/20/22 1129     Protime 12.9 Seconds      INR 0.98    Basic Metabolic Panel [[id number]]  (Abnormal) Collected: 07/20/22 0340    Specimen: Blood Updated: 07/20/22 0439     Glucose 154 mg/dL      BUN 22 mg/dL      Creatinine 1.08 mg/dL      Sodium 137 mmol/L      Potassium 4.5 mmol/L      Comment: Slight hemolysis detected by analyzer. Results may be affected.        Chloride 107 mmol/L      CO2 21.5 mmol/L      Calcium 8.6 mg/dL      BUN/Creatinine Ratio 20.4     Anion Gap 8.5 mmol/L      eGFR 67.7 mL/min/1.73      Comment: National Kidney Foundation and American Society of Nephrology (ASN) Task Force recommended calculation based on the Chronic Kidney Disease Epidemiology Collaboration (CKD-EPI) equation refit without adjustment for race.       Narrative:      GFR Normal >60  Chronic Kidney Disease <60  Kidney Failure <15      CBC (No Diff) [[id number]]  (Abnormal) Collected: 07/20/22 0340    Specimen: Blood Updated: 07/20/22 0410     WBC 14.73 10*3/mm3      RBC 3.85 10*6/mm3      Hemoglobin 12.7 g/dL      Hematocrit 38.0 %      MCV 98.7 fL      MCH 33.0 pg      MCHC 33.4 g/dL      RDW 13.1 %      RDW-SD 45.6 fl      MPV 10.8 fL      Platelets 193 10*3/mm3     COVID PRE-OP / PRE-PROCEDURE SCREENING ORDER (NO ISOLATION) - Swab, Nasopharynx [[id number]]  (Normal) Collected: 07/19/22 1749    Specimen: Swab from Nasopharynx Updated: 07/19/22 0954    Narrative:      The following orders were created for panel order COVID PRE-OP / PRE-PROCEDURE SCREENING ORDER (NO ISOLATION) - Swab, Nasopharynx.  Procedure                               Abnormality          Status                     ---------                               -----------         ------                     COVID-19,[facility] IN-HOUSE...[[id number]]  Normal              Final result                 Please view results for these tests on the individual orders.    COVID-19,[facility] IN-HOUSE CEPHEID/NEPTALI NP SWAB IN TRANSPORT MEDIA 8-12 HR TAT - Swab, Nasopharynx [[id number]]  (Normal) Collected: 07/19/22 1749    Specimen: Swab from Nasopharynx Updated: 07/19/22 1834     COVID19 Not Detected    Narrative:      Fact sheet for providers: https://www.fda.gov/media/331485/download     Fact sheet for patients: https://www.fda.gov/media/355460/download    Comprehensive Metabolic Panel [[id number]]  (Abnormal) Collected: 07/19/22 1747    Specimen: Blood Updated: 07/19/22 1818     Glucose 138 mg/dL      BUN 21 mg/dL      Creatinine 1.11 mg/dL      Sodium 139 mmol/L      Potassium 4.3 mmol/L      Chloride 107 mmol/L      CO2 20.5 mmol/L      Calcium 8.5 mg/dL      Total Protein 6.1 g/dL      Albumin 3.60 g/dL      ALT (SGPT) 29 U/L      AST (SGOT) 20 U/L      Alkaline Phosphatase 94 U/L      Total Bilirubin 0.5 mg/dL      Globulin 2.5 gm/dL      A/G Ratio 1.4 g/dL      BUN/Creatinine Ratio 18.9     Anion Gap 11.5 mmol/L      eGFR 65.5 mL/min/1.73      Comment: National Kidney Foundation and American Society of Nephrology (ASN) Task Force recommended calculation based on the Chronic Kidney Disease Epidemiology Collaboration (CKD-EPI) equation refit without adjustment for race.       Narrative:      GFR Normal >60  Chronic Kidney Disease <60  Kidney Failure <15      CBC & Differential [[id number]]  (Abnormal) Collected: 07/19/22 1747    Specimen: Blood Updated: 07/19/22 1800    Narrative:      The following orders were created for panel order CBC & Differential.  Procedure                               Abnormality         Status                     ---------                               -----------         ------                      CBC Auto Differential[[id number]]        Abnormal            Final result                 Please view results for these tests on the individual orders.    CBC Auto Differential [[id number]]  (Abnormal) Collected: 07/19/22 1747    Specimen: Blood Updated: 07/19/22 1800     WBC 17.24 10*3/mm3      RBC 3.89 10*6/mm3      Hemoglobin 13.0 g/dL      Hematocrit 37.3 %      MCV 95.9 fL      MCH 33.4 pg      MCHC 34.9 g/dL      RDW 13.2 %      RDW-SD 45.0 fl      MPV 10.9 fL      Platelets 200 10*3/mm3      Neutrophil % 86.0 %      Lymphocyte % 5.5 %      Monocyte % 7.8 %      Eosinophil % 0.0 %      Basophil % 0.1 %      Immature Grans % 0.6 %      Neutrophils, Absolute 14.81 10*3/mm3      Lymphocytes, Absolute 0.95 10*3/mm3      Monocytes, Absolute 1.35 10*3/mm3      Eosinophils, Absolute 0.00 10*3/mm3      Basophils, Absolute 0.02 10*3/mm3      Immature Grans, Absolute 0.11 10*3/mm3      nRBC 0.1 /100 WBC            Radiology:    Imaging Results (Last 24 Hours)     Procedure Component Value Units Date/Time    XR Chest 1 View [[id number]] Collected: 07/20/22 1118     Updated: 07/20/22 1125    Narrative:      ONE VIEW PORTABLE CHEST     HISTORY: Right rib fractures. Evaluate for hemothorax.     FINDINGS: The lungs are well-expanded and clear except for some  localized atelectasis at the left base laterally that is new since study  of 12/06/2021. The patient has CT scan performed yesterday demonstrating  several acute and subacute right rib fractures in the right lung is  clear. There is no evidence of hemothorax. The heart remains slightly  enlarged.     This report was finalized on 7/20/2022 11:22 AM by Dr. Riaz Hernandez M.D.       CT Head Without Contrast [[id number]] Collected: 07/19/22 1748     Updated: 07/20/22 0655    Narrative:      CT HEAD AND CERVICAL SPINE WITHOUT CONTRAST     CLINICAL HISTORY:Patient lost balance and fell down a few flights on the  escalator. Head trauma.     TECHNIQUE: CT scan of the head was  obtained with 2 mm axial bone  algorithm and 3 mm axial soft tissue algorithm images. Sagittal and  coronal reconstructed images were obtained.     Comparison is made to previous CT scan of the head dated 03/16/2022.     FINDINGS:     There is no evidence for a calvarial fracture. There is no evidence for  an acute extra-axial hemorrhage.     The ventricles, sulci, and cisterns are age appropriate. There are mild  changes of chronic small vessel ischemic phenomena. The posterior fossa  structures are unremarkable. Atherosclerotic changes are appreciated  within the intracranial vasculature.     There is mild mucosal thickening within the ethmoid air cells and  mild-to-moderate mucosal thickening is seen within the right maxillary  sinus.       Impression:         No evidence for acute traumatic intracranial pathology.        TECHNIQUE: CT scan of the cervical spine was obtained with 1 mm axial  bone algorithm and 2 mm axial soft tissue algorithm images. Sagittal and  coronal reconstructed images were obtained.     FINDINGS:     There is no evidence for acute fracture or bony malalignment involving  the cervical spine.     Multilevel degenerative phenomena are incidentally noted. Disc  osteophyte complexes at C3-4, C5-6 and C6-7, in addition to bulging disc  material at the C4-5 level result in multilevel canal stenosis. At the  C4-5 level, there is at least a moderate, if not moderate-to-severe  degree of central canal stenosis secondary to bulging disc material.  Multilevel foraminal stenotic changes are noted within the cervical  spine from C3-4 down to C6-7 and these foraminal stenotic changes are  primarily secondary to uncovertebral joint hypertrophy which result in  up to severe degrees of foraminal narrowing.     IMPRESSION:     No evidence for acute fracture or bony malalignment involving the  cervical spine.     Incidental multilevel degenerative phenomena are appreciated within the  cervical spine  primarily from C3-4 down to C6-7 where there are  multilevel canal and foraminal stenotic changes. Of note, there is at  least a moderate, if not moderate-to-severe degree of central canal  stenosis at the C4-5 level secondary to bulging disc material.     These findings were discussed with Rosie Adams on 07/19/2022 at  approximately 4:31 PM.                 Radiation dose reduction techniques were utilized, including automated  exposure control and exposure modulation based on body size.     This report was finalized on 7/20/2022 6:52 AM by Dr. Dusty Quinn M.D.       CT Cervical Spine Without Contrast [[id number]] Collected: 07/19/22 1748     Updated: 07/20/22 0655    Narrative:      CT HEAD AND CERVICAL SPINE WITHOUT CONTRAST     CLINICAL HISTORY:Patient lost balance and fell down a few flights on the  escalator. Head trauma.     TECHNIQUE: CT scan of the head was obtained with 2 mm axial bone  algorithm and 3 mm axial soft tissue algorithm images. Sagittal and  coronal reconstructed images were obtained.     Comparison is made to previous CT scan of the head dated 03/16/2022.     FINDINGS:     There is no evidence for a calvarial fracture. There is no evidence for  an acute extra-axial hemorrhage.     The ventricles, sulci, and cisterns are age appropriate. There are mild  changes of chronic small vessel ischemic phenomena. The posterior fossa  structures are unremarkable. Atherosclerotic changes are appreciated  within the intracranial vasculature.     There is mild mucosal thickening within the ethmoid air cells and  mild-to-moderate mucosal thickening is seen within the right maxillary  sinus.       Impression:         No evidence for acute traumatic intracranial pathology.        TECHNIQUE: CT scan of the cervical spine was obtained with 1 mm axial  bone algorithm and 2 mm axial soft tissue algorithm images. Sagittal and  coronal reconstructed images were obtained.     FINDINGS:     There is no evidence  for acute fracture or bony malalignment involving  the cervical spine.     Multilevel degenerative phenomena are incidentally noted. Disc  osteophyte complexes at C3-4, C5-6 and C6-7, in addition to bulging disc  material at the C4-5 level result in multilevel canal stenosis. At the  C4-5 level, there is at least a moderate, if not moderate-to-severe  degree of central canal stenosis secondary to bulging disc material.  Multilevel foraminal stenotic changes are noted within the cervical  spine from C3-4 down to C6-7 and these foraminal stenotic changes are  primarily secondary to uncovertebral joint hypertrophy which result in  up to severe degrees of foraminal narrowing.     IMPRESSION:     No evidence for acute fracture or bony malalignment involving the  cervical spine.     Incidental multilevel degenerative phenomena are appreciated within the  cervical spine primarily from C3-4 down to C6-7 where there are  multilevel canal and foraminal stenotic changes. Of note, there is at  least a moderate, if not moderate-to-severe degree of central canal  stenosis at the C4-5 level secondary to bulging disc material.     These findings were discussed with Rosie Adams on 07/19/2022 at  approximately 4:31 PM.                 Radiation dose reduction techniques were utilized, including automated  exposure control and exposure modulation based on body size.     This report was finalized on 7/20/2022 6:52 AM by Dr. Dusty Quinn M.D.       CT Chest Without Contrast Diagnostic [[id number]] Collected: 07/19/22 1611     Updated: 07/19/22 1646    Narrative:      CT CHEST WO CONTRAST DIAGNOSTIC-     HISTORY:  Fall down escalator, right lateral chest wall pain.     TECHNIQUE: CT of the chest was performed without intravenous contrast.  Reformatted images were reviewed. Radiation dose reduction techniques  were utilized, including automated exposure control and exposure  modulation based on body size.     COMPARISON:  CT chest  without contrast 12/06/2021        FINDINGS: Evaluation is limited by motion artifact. No pathologically  enlarged thoracic lymph nodes are identified within the limitations of  motion artifact and lack of intravenous contrast. The heart is enlarged.  There is no pericardial effusion. There is calcific coronary artery  atherosclerosis. There is advanced calcific aortic and branch vessel  atherosclerosis. The ascending aorta is dilated to 4.3 cm at the level  of the pulmonary trunk, not significantly changed. The pulmonary trunk  is mildly dilated to 3.2 cm, not significant changed. There is no  significant pleural effusion.  Limited imaging through the upper abdomen demonstrates calcific  atherosclerosis of the visualized upper abdominal aorta. There are fluid  density lesions in the upper kidneys, which likely reflect cysts. There  is an approximately 5.7 cm predominantly fat density dorsal to the left  kidney, which is likely arising from the kidney and new from 2015. 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 There is thickening of the left greater than right  adrenal glands, not significantly changed.   There is mild bilateral gynecomastia. There is diffuse osseous  demineralization, which limits evaluation of fine osseous detail. There  is multilevel degenerative disc disease. There are old rib fractures.  There is an acute mildly displaced fracture of lateral right rib 9.  There is an acute nondisplaced fracture of lateral right rib 10. There  are nondisplaced subacute fractures of anterolateral right rib 7 and  anterior right rib 6.  There are trace septated secretions in the trachea. Evaluation of the  lung parenchyma is limited by respiratory motion artifact. There is mild  curvilinear atelectasis and/or scarring in the lingula. Otherwise, there  is no focal consolidation.          Impression:         1.  Acute mildly displaced fracture of lateral right rib 9.  Acute  nondisplaced fracture of lateral right rib 10. Additional nondisplaced  subacute fractures are identified on the right, as above. No  pneumothorax.  2.  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 Recommend  further evaluation with contrast-enhanced MRI.     Discussed with Dr. Kothari at 4:43 PM.     This report was finalized on 7/19/2022 4:43 PM by Dr. Goldie Reilly M.D.             Medication Review:     acetaminophen, 1,000 mg, Oral, TID  atorvastatin, 40 mg, Oral, Q PM  clopidogrel, 75 mg, Oral, QAM  folic acid, 1 mg, Oral, Daily  hydroCHLOROthiazide, 50 mg, Oral, QAM  lidocaine, 1 patch, Transdermal, Q24H  lisinopril, 40 mg, Oral, Q PM  methotrexate, 25 mg, Oral, Weekly  rivastigmine, 1.5 mg, Oral, BID  senna, 1 tablet, Oral, BID  traZODone, 50 mg, Oral, Nightly             Assessment & Plan     Active Problems:    Closed fracture of multiple ribs of right side, initial encounter      1.  Status post mechanical fall on escalator  2.  Closed fracture of multiple ribs on the right side  3.  5.7 cm density dorsal to the left kidney    Plan:    MRI ordered for kidney mass.  PT and OT to work with.  Continue incentive spirometry.  Cardiothoracic surgery has seen the patient.  No surgery recommended.  Lab work in the a.m.  If stable tomorrow, could possibly be discharged home.    Joselo Loya DO  07/20/22  13:54 EDT

## 2022-07-20 NOTE — PROGRESS NOTES
"Discharge Planning Assessment  [facility]     Patient Name: Albert Yadav  MRN: [id number]  Today's Date: 7/20/2022    Admit Date: 7/19/2022     Discharge Needs Assessment     Row Name 07/20/22 1704       Living Environment    People in Home spouse    Current Living Arrangements home    Primary Care Provided by self    Provides Primary Care For no one    Family Caregiver if Needed spouse    Family Caregiver Names Spouse Bushra    Quality of Family Relationships helpful;involved;supportive    Able to Return to Prior Arrangements yes       Resource/Environmental Concerns    Resource/Environmental Concerns none       Transition Planning    Patient/Family Anticipates Transition to home with family    Patient/Family Anticipated Services at Transition none    Transportation Anticipated family or friend will provide       Discharge Needs Assessment    Readmission Within the Last 30 Days no previous admission in last 30 days    Equipment Currently Used at Home cane, straight;other (see comments)  \" three wheel walker/scooter\"    Concerns to be Addressed adjustment to diagnosis/illness    Provided Post Acute Provider List? N/A    Provided Post Acute Provider Quality & Resource List? N/A               Discharge Plan     Row Name 07/20/22 0054       Plan    Plan Home with spouse    Patient/Family in Agreement with Plan yes    Plan Comments Met with patient at the bedside, facesheet verified. Pt lives in a multi level home with his spouse. There are two steps with railing to enter, once inside there are six more steps with railing to access main living area. Patient is IADL, he owns a car and drives. He uses a cane and a \"three wheel walker with brakes.\" He is planning to return home with the help of his spouse, he denies HH/SNF needs at this time. PCP is Dr. Burnham and preferred pharmacy is [facility]. Spouse will transport home.              Continued Care and Services - Admitted Since 7/19/2022  "   Coordination has not been started for this encounter.       Expected Discharge Date and Time     Expected Discharge Date Expected Discharge Time    Jul 21, 2022          Demographic Summary    No documentation.                Functional Status     Row Name 07/20/22 1705       Functional Status    Usual Activity Tolerance moderate       Functional Status, IADL    Medications independent    Meal Preparation independent    Housekeeping assistive equipment    Laundry independent    Shopping assistive equipment       Mental Status    General Appearance WDL WDL       Mental Status Summary    Recent Changes in Mental Status/Cognitive Functioning no changes               Psychosocial    No documentation.                Abuse/Neglect    No documentation.                Legal    No documentation.                Substance Abuse    No documentation.                Patient Forms    No documentation.                   Marycruz Chan, RN

## 2022-07-20 NOTE — THERAPY EVALUATION
Patient Name: Albert Yadav  : 1940    MRN: [id number]                              Today's Date: 2022       Admit Date: 2022    Visit Dx:     ICD-10-CM ICD-9-CM   1. Closed fracture of multiple ribs of right side, initial encounter  S22.41XA 807.09   2. Contusion of scalp, initial encounter  S00.03XA 920   3. Osteoarthritis of cervical spine, unspecified spinal osteoarthritis complication status  M47.812 721.0   4. Multiple abrasions  T07.XXXA 919.0   5. 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   • Falls    • Fracture of ankle    • Fracture of wrist    • Fracture, clavicle 26 years ago   • Fracture, foot 26 year ago   • Groin rash     PT STATES LEFT GROIN AT TIMES   • History of transfusion    • Hyperlipidemia    • Hypertension    • Low back pain    • Low back strain ?   • Lumbar spinal stenosis    • Numbness and tingling     RIGHT ARM, RIGHT LEG & FOOT   • Peripheral neuropathy    • Peripheral vascular disease (HCC)    • Rheumatoid arthritis (HCC)     HANDS DIALLO   • Staph infection     2015  [facility] POST SX   • Tendency toward bleeding easily (HCC)     due to blood thinners     Past Surgical  History:   Procedure Laterality Date   • ANGIOPLASTY FEMORAL ARTERY Left 11/7/2016    Procedure: ULTRA SOUND ACCESS RIGHT FEMORAL ARTERY, AIF BILATERAL RUNOFF, SELECTIVE CATHETERIZATION LEFT FEMORAL ARTERY.;  Surgeon: Errol Michael MD;  Location: [facility] OR 18/19;  Service:    • ANKLE OPEN REDUCTION INTERNAL FIXATION  1980   • ARTERIOVENOUS FISTULA/SHUNT SURGERY Left 12/13/2016    Procedure: LEFT ILEO-FEMORAL GORTEX GRAFT AND LEFT LEG ARERIOGRAM ;  Surgeon: Errol Michael MD;  Location: [facility] OR 18/19;  Service:    • CAROTID ENDARTERECTOMY Right 9/29/2020    Procedure: CAROTID ENDARTERECTOMY;  Surgeon: James Graham MD;  Location: [facility];  Service: Vascular;  Laterality: Right;   • CHOLECYSTECTOMY     • EPIDURAL BLOCK     • EYE SURGERY Left     as child   • FRACTURE SURGERY Left     arm   • ILIAC ARTERY STENT     • KNEE SURGERY Left     ORIF   • ORIF ANKLE FRACTURE Left       General Information     [facility] Name 07/20/22 1115          Physical Therapy Time and Intention    Document Type evaluation (P)   -     Mode of Treatment physical therapy;individual therapy (P)   -     Row Name 07/20/22 1115          General Information    Patient Profile Reviewed yes (P)   -GS     Prior Level of Function min assist:;gait;transfer;bed mobility (P)   Wife helps with trfer/bed mobility at times. Uses cane in R hand.  -     Existing Precautions/Restrictions fall (P)   -     Barriers to Rehab medically complex (P)   -     Row Name 07/20/22 1115          Living Environment    People in Home spouse (P)   -     Row Name 07/20/22 1115          Home Main Entrance    Number of Stairs, Main Entrance two (P)   -     Row Name 07/20/22 1115          Stairs Within Home, Primary    Stairs, Within Home, Primary About 8 total (P)   -     Row Name 07/20/22 1115          Cognition    Orientation Status (Cognition) oriented x 4 (P)   -     Row Name 07/20/22 1115          Safety Issues,  Functional Mobility    Safety Issues Affecting Function (Mobility) insight into deficits/self-awareness;safety precautions follow-through/compliance;safety precaution awareness;awareness of need for assistance;positioning of assistive device (P)   -GS     Impairments Affecting Function (Mobility) balance;endurance/activity tolerance;strength (P)   -GS           User Key  (r) = Recorded By, (t) = Taken By, (c) = Cosigned By    Initials Name Provider Type    GS Frantz Kirby PT Student PT Student               Mobility     Row Name 07/20/22 1118          Bed Mobility    Bed Mobility supine-sit (P)   -GS     Supine-Sit [place] (Bed Mobility) minimum assist (75% patient effort);1 person assist (P)   -GS     Assistive Device (Bed Mobility) bed rails;head of bed elevated (P)   -GS     Comment, (Bed Mobility) Given extra time to complete. Heavy support of railing and A from PT. Able to reposition with SBA (P)   -GS     Row Name 07/20/22 1118          Bed-Chair Transfer    Bed-Chair [place] (Transfers) contact guard;1 person assist;verbal cues;nonverbal cues (demo/gesture) (P)   -GS     Assistive Device (Bed-Chair Transfers) walker, front-wheeled (P)   -GS     Comment, (Bed-Chair Transfer) Cued on hand and LE placement. Was able to reposition with SBA (P)   -GS     Row Name 07/20/22 1118          Sit-Stand Transfer    Sit-Stand [place] (Transfers) contact guard;1 person assist;verbal cues (P)   -GS     Assistive Device (Sit-Stand Transfers) walker, front-wheeled (P)   -GS     Comment, (Sit-Stand Transfer) Performed 3x. 1 with FWW, 2 with cane (P)   -GS     Row Name 07/20/22 1118          Gait/Stairs (Locomotion)    [place] Level (Gait) contact guard;1 person assist;verbal cues;nonverbal cues (demo/gesture) (P)   -GS     Assistive Device (Gait) walker, front-wheeled (P)   -GS     Distance in Feet (Gait) FWW: 100', Cane in R:20',Cane in L:25' (P)   -GS     Deviations/Abnormal Patterns (Gait) josep  decreased;gait speed decreased;stride length decreased (P)   -GS     Left Sided Gait Deviations knee hyperextension (P)   -     Comment, (Gait/Stairs) A rest break was given after walking with FWW. Pt then completed both cane ambulations in one rep. Pt showed better stability and balance with FWW than cane. Pt educated on the correct side that the cane should be present on. Pt also educated on keeping DUTCH within walker. (P)   -           User Key  (r) = Recorded By, (t) = Taken By, (c) = Cosigned By    Initials Name Provider Type    Frantz Shankar PT Student PT Student               Obj/Interventions     Row Name 07/20/22 1127          Range of Motion Comprehensive    General Range of Motion no range of motion deficits identified (P)   -[facility] Name 07/20/22 1127          Strength Comprehensive (MMT)    General Manual Muscle Testing (MMT) Assessment lower extremity strength deficits identified (P)   -     Comment, General Manual Muscle Testing (MMT) Assessment Knee Ext: 4/5 BL, Hip flexion: 3+/5 BL, Dorsiflexion: R: 4/5 L:3+/5 (P)   -[facility] Name 07/20/22 1127          Balance    Balance Assessment sitting static balance;sitting dynamic balance;standing static balance;standing dynamic balance (P)   -     Static Sitting Balance standby assist (P)   -     Dynamic Sitting Balance contact guard (P)   -     Position, Sitting Balance unsupported;sitting edge of bed (P)   -     Static Standing Balance contact guard (P)   -     Dynamic Standing Balance contact guard (P)   -     Position/Device Used, Standing Balance walker, front-wheeled (P)   -     Comment, Balance Pt was able to tolerate 8 min of static sitting at EOB. Patient more unsteady when using cane vs FWW. (P)   -     Row Name 07/20/22 1127          Sensory Assessment (Somatosensory)    Sensory Assessment (Somatosensory) not tested (P)   -           User Key  (r) = Recorded By, (t) = Taken By, (c) = Cosigned By    Initials Name  Provider Type    Frantz Shankar, PT Student PT Student               Goals/Plan     Row Name 07/20/22 1146          Bed Mobility Goal 1 (PT)    Activity/Assistive Device (Bed Mobility Goal 1, PT) bed mobility activities, all (P)   -GS     [place] Level/Cues Needed (Bed Mobility Goal 1, PT) modified independence (P)   -GS     Time Frame (Bed Mobility Goal 1, PT) 2 weeks (P)   -     Row Name 07/20/22 1146          Transfer Goal 1 (PT)    Activity/Assistive Device (Transfer Goal 1, PT) sit-to-stand/stand-to-sit;bed-to-chair/chair-to-bed (P)   -GS     [place] Level/Cues Needed (Transfer Goal 1, PT) modified independence (P)   -GS     Time Frame (Transfer Goal 1, PT) 2 weeks (P)   -     Row Name 07/20/22 1146          Gait Training Goal 1 (PT)    Activity/Assistive Device (Gait Training Goal 1, PT) gait (walking locomotion);assistive device use (P)   -GS     [place] Level (Gait Training Goal 1, PT) modified independence (P)   -GS     Distance (Gait Training Goal 1, PT) 250' (P)   -     Row Name 07/20/22 1146          Stairs Goal 1 (PT)    Activity/Assistive Device (Stairs Goal 1, PT) ascending stairs;descending stairs (P)   -GS     [place] Level/Cues Needed (Stairs Goal 1, PT) standby assist (P)   -GS     Number of Stairs (Stairs Goal 1, PT) 8 (P)   -[facility] Name 07/20/22 1146          Therapy Assessment/Plan (PT)    Planned Therapy Interventions (PT) balance training;bed mobility training;gait training;patient/family education;strengthening;stair training;transfer training (P)   -           User Key  (r) = Recorded By, (t) = Taken By, (c) = Cosigned By    Initials Name Provider Type    Frantz Shankar, PT Student PT Student               Clinical Impression     Row Name 07/20/22 1130          Pain    Pretreatment Pain Rating 0/10 - no pain (P)   -GS     Posttreatment Pain Rating 0/10 - no pain (P)   -     Row Name 07/20/22 1130          Plan of Care Review    Plan of Care Reviewed  With patient;spouse (P)   -GS     Outcome Evaluation Pt was seen this AM. Pt is an 82 y.o. M who presents after suffering a fall and being diagnosed with closed rib fx of 9 & 10 on R. PMH includes lumbar spinal stenosis that pt reports causes R side drop foot, a history of falls, HTN, and HLD. Pt lives at home with spouse and requires some assist for bed mobility and transfers, but uses a cane to ambulate ind. House contains 2 steps to entrance and about 8 stairs total inside. Pt was able to complete bed mobility with Min A, and was able to reposition with SBA. Pt was able to complete 3 STS with CGA, 1 with FFW and 2 with Cane. Pt utilized both a walker and then a cane to complete a total of 145'. Pt was more stable when using FWW vs cane. Pt educated on correct positioning of cane. Pt was able to trfer to chair 2x with CGA and use of FWW and cane. Pt demo generalized weakness, balance deficits, and endurance limitations. Skilled intervention is necessary to address deficits above and improve ability to use stairs. PT recommends HH vs SNF pending at d/c. PT will follow up. (P)   -     Row Name 07/20/22 1130          Therapy Assessment/Plan (PT)    Rehab Potential (PT) good, to achieve stated therapy goals (P)   -     Criteria for Skilled Interventions Met (PT) yes (P)   -GS     Therapy Frequency (PT) 5 times/wk (P)   -GS     Row Name 07/20/22 1130          Vital Signs    O2 Delivery Pre Treatment room air (P)   -GS     O2 Delivery Intra Treatment room air (P)   -GS     O2 Delivery Post Treatment room air (P)   -     Row Name 07/20/22 1130          Positioning and Restraints    Pre-Treatment Position in bed (P)   -GS     Post Treatment Position chair (P)   -GS     In Chair notified nsg;reclined;call light within reach;encouraged to call for assist;exit alarm on;with nsg (P)   -GS           User Key  (r) = Recorded By, (t) = Taken By, (c) = Cosigned By    Initials Name Provider Type    Frantz Shankar, PT  Student PT Student               Outcome Measures     Row Name 07/20/22 1150          How much help from another person do you currently need...    Turning from your back to your side while in flat bed without using bedrails? 3 (P)   -GS     Moving from lying on back to sitting on the side of a flat bed without bedrails? 3 (P)   -GS     Moving to and from a bed to a chair (including a wheelchair)? 3 (P)   -GS     Standing up from a chair using your arms (e.g., wheelchair, bedside chair)? 3 (P)   -GS     Climbing 3-5 steps with a railing? 2 (P)   -GS     To walk in hospital room? 3 (P)   -GS     AM-PAC 6 Clicks Score (PT) 17 (P)   -GS     Highest level of mobility 5 --> Static standing (P)   -GS     Row Name 07/20/22 1150          Functional Assessment    Outcome Measure Options AM-PAC 6 Clicks Basic Mobility (PT) (P)   -GS           User Key  (r) = Recorded By, (t) = Taken By, (c) = Cosigned By    Initials Name Provider Type     Frantz Kirby, PT Student PT Student                             Physical Therapy Education                 Title: PT OT SLP Therapies (In Progress)     Topic: Physical Therapy (In Progress)     Point: Mobility training (Done)     Learning Progress Summary           Patient Acceptance, E, VU,NR by  at 7/20/2022 1151                   Point: Home exercise program (Not Started)     Learner Progress:  Not documented in this visit.          Point: Body mechanics (Done)     Learning Progress Summary           Patient Acceptance, E, VU,NR by  at 7/20/2022 1151                   Point: Precautions (Done)     Learning Progress Summary           Patient Acceptance, E, VU,NR by  at 7/20/2022 1151                               User Key     Initials Effective Dates Name Provider Type Discipline     07/01/22 -  Frantz Kirby, PT Student PT Student PT              PT Recommendation and Plan  Planned Therapy Interventions (PT): (P) balance training, bed mobility training, gait training,  patient/family education, strengthening, stair training, transfer training  Plan of Care Reviewed With: (P) patient, spouse  Outcome Evaluation: (P) Pt was seen this AM. Pt is an 82 y.o. M who presents after suffering a fall and being diagnosed with closed rib fx of 9 & 10 on R. PMH includes lumbar spinal stenosis that pt reports causes R side drop foot, a history of falls, HTN, and HLD. Pt lives at home with spouse and requires some assist for bed mobility and transfers, but uses a cane to ambulate ind. House contains 2 steps to entrance and about 8 stairs total inside. Pt was able to complete bed mobility with Min A, and was able to reposition with SBA. Pt was able to complete 3 STS with CGA, 1 with FFW and 2 with Cane. Pt utilized both a walker and then a cane to complete a total of 145'. Pt was more stable when using FWW vs cane. Pt educated on correct positioning of cane. Pt was able to trfer to chair 2x with CGA and use of FWW and cane. Pt demo generalized weakness, balance deficits, and endurance limitations. Skilled intervention is necessary to address deficits above and improve ability to use stairs. PT recommends HH vs SNF pending at d/c. PT will follow up.     Time Calculation:    PT Charges     Row Name 07/20/22 1151             Time Calculation    Start Time 0950 (P)   -      Stop Time 1029 (P)   -      Time Calculation (min) 39 min (P)   -      PT Received On 07/20/22 (P)   -      PT - Next Appointment 07/22/22 (P)   -      PT Goal Re-Cert Due Date 08/03/22 (P)   -              Time Calculation- PT    Total Timed Code Minutes- PT 25 minute(s) (P)   -              Timed Charges    56462 - Gait Training Minutes  8 (P)   -      27401 - PT Therapeutic Activity Minutes 17 (P)   -              Untimed Charges    PT Eval/Re-eval Minutes 10 (P)   -GS              Total Minutes    Timed Charges Total Minutes 25 (P)   -      Untimed Charges Total Minutes 10 (P)   -GS       Total Minutes 35  (P)   -[place]            User Key  (r) = Recorded By, (t) = Taken By, (c) = Cosigned By    Initials Name Provider Type    Frantz Shankar, PT Student PT Student              Therapy Charges for Today     Code Description Service Date Service Provider Modifiers Qty    [id number] HC GAIT TRAINING EA 15 MIN 7/20/2022 Frantz Kirby, PT Student GP 1    [id number]  PT THERAPEUTIC ACT EA 15 MIN 7/20/2022 Frantz Kirby, PT Student GP 1    [id number]  PT EVAL MOD COMPLEXITY 3 7/20/2022 Frantz Kirby, PT Student GP 1          PT G-Codes  Outcome Measure Options: (P) AM-PAC 6 Clicks Basic Mobility (PT)  AM-PAC 6 Clicks Score (PT): (P) 17    Frantz Kirby PT Student  7/20/2022

## 2022-07-21 LAB
ALBUMIN SERPL-MCNC: 3.3 G/DL (ref 3.5–5.2)
ANION GAP SERPL CALCULATED.3IONS-SCNC: 10.2 MMOL/L (ref 5–15)
BUN SERPL-MCNC: 22 MG/DL (ref 8–23)
BUN/CREAT SERPL: 22.2 (ref 7–25)
CALCIUM SPEC-SCNC: 8.4 MG/DL (ref 8.6–10.5)
CHLORIDE SERPL-SCNC: 103 MMOL/L (ref 98–107)
CO2 SERPL-SCNC: 21.8 MMOL/L (ref 22–29)
CREAT SERPL-MCNC: 0.99 MG/DL (ref 0.76–1.27)
DEPRECATED RDW RBC AUTO: 46.3 FL (ref 37–54)
EGFRCR SERPLBLD CKD-EPI 2021: 75.1 ML/MIN/1.73
ERYTHROCYTE [DISTWIDTH] IN BLOOD BY AUTOMATED COUNT: 13.1 % (ref 12.3–15.4)
GLUCOSE SERPL-MCNC: 126 MG/DL (ref 65–99)
HCT VFR BLD AUTO: 38.2 % (ref 37.5–51)
HGB BLD-MCNC: 12.7 G/DL (ref 13–17.7)
MCH RBC QN AUTO: 32.7 PG (ref 26.6–33)
MCHC RBC AUTO-ENTMCNC: 33.2 G/DL (ref 31.5–35.7)
MCV RBC AUTO: 98.5 FL (ref 79–97)
PHOSPHATE SERPL-MCNC: 4.1 MG/DL (ref 2.5–4.5)
PLATELET # BLD AUTO: 196 10*3/MM3 (ref 140–450)
PMV BLD AUTO: 10.9 FL (ref 6–12)
POTASSIUM SERPL-SCNC: 4.1 MMOL/L (ref 3.5–5.2)
RBC # BLD AUTO: 3.88 10*6/MM3 (ref 4.14–5.8)
SODIUM SERPL-SCNC: 135 MMOL/L (ref 136–145)
WBC NRBC COR # BLD: 11.28 10*3/MM3 (ref 3.4–10.8)

## 2022-07-21 PROCEDURE — G0378 HOSPITAL OBSERVATION PER HR: HCPCS

## 2022-07-21 PROCEDURE — 97530 THERAPEUTIC ACTIVITIES: CPT

## 2022-07-21 PROCEDURE — 97110 THERAPEUTIC EXERCISES: CPT

## 2022-07-21 PROCEDURE — 80069 RENAL FUNCTION PANEL: CPT | Performed by: INTERNAL MEDICINE

## 2022-07-21 PROCEDURE — 85027 COMPLETE CBC AUTOMATED: CPT | Performed by: INTERNAL MEDICINE

## 2022-07-21 RX ADMIN — HYDROCHLOROTHIAZIDE 50 MG: 50 TABLET ORAL at 06:21

## 2022-07-21 RX ADMIN — POLYETHYLENE GLYCOL 3350 17 G: 17 POWDER, FOR SOLUTION ORAL at 09:54

## 2022-07-21 RX ADMIN — ACETAMINOPHEN 1000 MG: 500 TABLET ORAL at 16:50

## 2022-07-21 RX ADMIN — Medication 1 MG: at 09:55

## 2022-07-21 RX ADMIN — SENNOSIDES 1 TABLET: 8.6 TABLET, FILM COATED ORAL at 09:55

## 2022-07-21 RX ADMIN — ACETAMINOPHEN 1000 MG: 500 TABLET ORAL at 19:57

## 2022-07-21 RX ADMIN — TRAZODONE HYDROCHLORIDE 50 MG: 50 TABLET ORAL at 19:56

## 2022-07-21 RX ADMIN — Medication 3 MG: at 19:57

## 2022-07-21 RX ADMIN — ATORVASTATIN CALCIUM 40 MG: 20 TABLET, FILM COATED ORAL at 16:49

## 2022-07-21 RX ADMIN — RIVASTIGMINE TARTRATE 1.5 MG: 1.5 CAPSULE ORAL at 19:56

## 2022-07-21 RX ADMIN — CLOPIDOGREL 75 MG: 75 TABLET, FILM COATED ORAL at 06:21

## 2022-07-21 RX ADMIN — SENNOSIDES 1 TABLET: 8.6 TABLET, FILM COATED ORAL at 19:56

## 2022-07-21 RX ADMIN — RIVASTIGMINE TARTRATE 1.5 MG: 1.5 CAPSULE ORAL at 09:55

## 2022-07-21 RX ADMIN — LISINOPRIL 40 MG: 40 TABLET ORAL at 16:50

## 2022-07-21 RX ADMIN — LIDOCAINE 1 PATCH: 50 PATCH CUTANEOUS at 09:54

## 2022-07-21 RX ADMIN — ACETAMINOPHEN 1000 MG: 500 TABLET ORAL at 09:54

## 2022-07-21 NOTE — PROGRESS NOTES
[facility]    [facility]    Name:  Albert Yadav   Age:  82 y.o.  Sex:  male  :  1940  MRN:  [id number]   Visit Number:  [id number]  Admission Date:  2022  Date Of Service:  22  Primary Care Physician:  Valerie Burnham MD     LOS: 0 days :  Patient Care Team:  Valerie Burnham MD as PCP - General (Internal Medicine):    History taken from:     patient chart    Chief Complaint:      Rib pain on the right    Subjective     Interval History:     Patient seen and examined again today.    82-year-old male with history of hypertension, hyperlipidemia, rheumatoid arthritis fell on escalator at [employer] yesterday.  He was noted to have multiple right-sided rib fractures without pneumothorax.  Cardiothoracic surgery has been consulted, they recommended medical management.    On CT of the chest, patient was noted to have fracture of the right lateral ribs 9 and right lateral rib 10.  There was also a 5.7 cm density dorsal to the left kidney.  MRI was recommended and this has been ordered.  Await results of this.    Patient has abrasions on his arms which was wrapped in the emergency room.  We will consult wound care to see the patient for opinion.    Patient continues to have rib pain.  He denies any chest pressure, shortness of breath, nausea, vomiting.  He is ambulating with physical therapy.  He will need home health.    Review of Systems:     All systems were reviewed and negative except for:  Musculoskeletal: positive for  Right-sided chest wall pain.    Objective     Vital Signs:    Temp:  [97.6 °F (36.4 °C)-97.9 °F (36.6 °C)] 97.7 °F (36.5 °C)  Heart Rate:  [61-66] 64  Resp:  [16-18] 18  BP: (121-144)/(68-80) 144/76    Physical Exam:    General: Alert and oriented x4, mild distress.  Heart: Regular rate and rhythm without murmur rub or thrill.  Lungs: Clear to auscultation bilaterally without use of accessory muscles or respiration.  Abdomen:  Soft/nontender/nondistended.  No HSM noted.  MSK: 4/5 strength in upper/lower extremities bilaterally.  Skin: Multiple abrasions noted on the arms.     Results Review:      I reviewed the patient's new clinical results.  I reviewed the patient's new imaging results and agree with the interpretation.  I reviewed the patient's other test results and agree with the interpretation    Labs:    Lab Results (last 24 hours)     Procedure Component Value Units Date/Time    CBC (No Diff) [[id number]]  (Abnormal) Collected: 07/21/22 0337    Specimen: Blood Updated: 07/21/22 0431     WBC 11.28 10*3/mm3      RBC 3.88 10*6/mm3      Hemoglobin 12.7 g/dL      Hematocrit 38.2 %      MCV 98.5 fL      MCH 32.7 pg      MCHC 33.2 g/dL      RDW 13.1 %      RDW-SD 46.3 fl      MPV 10.9 fL      Platelets 196 10*3/mm3     Renal Function Panel [[id number]]  (Abnormal) Collected: 07/21/22 0337    Specimen: Blood Updated: 07/21/22 0425     Glucose 126 mg/dL      BUN 22 mg/dL      Creatinine 0.99 mg/dL      Sodium 135 mmol/L      Potassium 4.1 mmol/L      Chloride 103 mmol/L      CO2 21.8 mmol/L      Calcium 8.4 mg/dL      Albumin 3.30 g/dL      Phosphorus 4.1 mg/dL      Anion Gap 10.2 mmol/L      BUN/Creatinine Ratio 22.2     eGFR 75.1 mL/min/1.73      Comment: National Kidney Foundation and American Society of Nephrology (ASN) Task Force recommended calculation based on the Chronic Kidney Disease Epidemiology Collaboration (CKD-EPI) equation refit without adjustment for race.       Narrative:      GFR Normal >60  Chronic Kidney Disease <60  Kidney Failure <15             Radiology:    Imaging Results (Last 24 Hours)     ** No results found for the last 24 hours. **          Medication Review:     acetaminophen, 1,000 mg, Oral, TID  atorvastatin, 40 mg, Oral, Q PM  clopidogrel, 75 mg, Oral, QAM  folic acid, 1 mg, Oral, Daily  hydroCHLOROthiazide, 50 mg, Oral, QAM  lidocaine, 1 patch, Transdermal, Q24H  lisinopril, 40 mg, Oral, Q  PM  methotrexate, 25 mg, Oral, Weekly  polyethylene glycol, 17 g, Oral, Daily  rivastigmine, 1.5 mg, Oral, BID  senna, 1 tablet, Oral, BID  traZODone, 50 mg, Oral, Nightly             Assessment & Plan     Active Problems:    Closed fracture of multiple ribs of right side, initial encounter      1.  Status post mechanical fall on escalator  2.  Closed fracture of multiple ribs on the right side  3.  5.7 cm density dorsal to the left kidney  4.  Multiple skin abrasions on the arms.  5.  Peripheral arterial disease with history of stent placement.  6.  History of carotid endarterectomy    Plan:    MRI ordered for kidney mass.  Await results of this.  PT and OT to work with.  Continue incentive spirometry.  Cardiothoracic surgery has seen the patient.  No surgery recommended.  Lab work in the a.m. wound care consulted.  Await results of MRI.  Possibly home today or tomorrow.    Joselo Loya DO  07/21/22  14:19 EDT

## 2022-07-21 NOTE — PLAN OF CARE
Goal Outcome Evaluation:  Plan of Care Reviewed With: patient           Outcome Evaluation: Pt agreeable and motivated for skilled PT, ambulated 120ft MIn A STC and HHA, pt uses cane in RUE (weaker side), instructed pt to use LUE however pt refused. Pt navgiated 3 steps w 1 HR and STC Min A non reciprocal. Discussed fall prevention in depth including better shoe wear, rollator use and improved cane use. Would rec [facility] vs OP PT for balance at [place].  .Patient was wearing a face mask during this therapy encounter. Therapist used appropriate personal protective equipment including eye protection, mask, and gloves.  Mask used was standard procedure mask. Appropriate PPE was worn during the entire therapy session. Hand hygiene was completed before and after therapy session. Patient is not in enhanced droplet precautions.

## 2022-07-21 NOTE — THERAPY TREATMENT NOTE
Acute Care - Physical Therapy Treatment Note  [facility]     Patient Name: Albert Yadav  : 1940  MRN: [id number]  Today's Date: 2022      Visit Dx:     ICD-10-CM ICD-9-CM   1. Closed fracture of multiple ribs of right side, initial encounter  S22.41XA 807.09   2. Contusion of scalp, initial encounter  S00.03XA 920   3. Osteoarthritis of cervical spine, unspecified spinal osteoarthritis complication status  M47.812 721.0   4. Multiple abrasions  T07.XXXA 919.0   5. 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   • Falls    • Fracture of ankle    • Fracture of wrist    • Fracture, clavicle 26 years ago   • Fracture, foot 26 year ago   • Groin rash     PT STATES LEFT GROIN AT TIMES   • History of transfusion    • Hyperlipidemia    • Hypertension    • Low back pain    • Low back strain ?   • Lumbar spinal stenosis    • Numbness and tingling     RIGHT ARM, RIGHT LEG & FOOT   • Peripheral neuropathy    • Peripheral vascular disease (HCC)    • Rheumatoid arthritis (HCC)     HANDS DIALLO   • Staph infection     2015  [facility] POST SX   • Tendency toward bleeding easily (HCC)     due to blood thinners     Past  Surgical History:   Procedure Laterality Date   • ANGIOPLASTY FEMORAL ARTERY Left 11/7/2016    Procedure: ULTRA SOUND ACCESS RIGHT FEMORAL ARTERY, AIF BILATERAL RUNOFF, SELECTIVE CATHETERIZATION LEFT FEMORAL ARTERY.;  Surgeon: Errol Michael MD;  [facility];  Service:    • ANKLE OPEN REDUCTION INTERNAL FIXATION  1980   • ARTERIOVENOUS FISTULA/SHUNT SURGERY Left 12/13/2016    Procedure: LEFT ILEO-FEMORAL GORTEX GRAFT AND LEFT LEG ARERIOGRAM ;  Surgeon: Errol Michael MD;  [facility];  Service:    • CAROTID ENDARTERECTOMY Right 9/29/2020    Procedure: CAROTID ENDARTERECTOMY;  Surgeon: James Graham MD;  Location: [facility];  Service: Vascular;  Laterality: Right;   • CHOLECYSTECTOMY     • EPIDURAL BLOCK     • EYE SURGERY Left     as child   • FRACTURE SURGERY Left     arm   • ILIAC ARTERY STENT     • KNEE SURGERY Left     ORIF   • ORIF ANKLE FRACTURE Left      PT Assessment (last 12 hours)     PT Evaluation and Treatment     Row Name 07/21/22 1044          Physical Therapy Time and Intention    Subjective Information no complaints  -     Document Type therapy note (daily note)  -     Mode of Treatment individual therapy;physical therapy  -     Patient Effort good  -     Symptoms Noted During/After Treatment none  -     Row Name 07/21/22 1044          General Information    Patient Profile Reviewed yes  -     Existing Precautions/Restrictions fall  -     Barriers to Rehab medically complex  -     Row Name 07/21/22 1044          Pain    Pretreatment Pain Rating 0/10 - no pain  -     Posttreatment Pain Rating 0/10 - no pain  -     Row Name 07/21/22 1044          Cognition    Orientation Status (Cognition) oriented x 4  -     Row Name 07/21/22 1044          Bed Mobility    Bed Mobility supine-sit;sit-supine  -     Supine-Sit [place] (Bed Mobility) standby assist  -     Sit-Supine [place] (Bed Mobility) minimum assist  (75% patient effort)  -     Assistive Device (Bed Mobility) bed rails;head of bed elevated  -     Row Name 07/21/22 1044          Transfers    Transfers toilet transfer  -     Sit-Stand [place] (Transfers) contact guard;verbal cues;nonverbal cues (demo/gesture)  -     [place] Level (Toilet Transfer) minimum assist (75% patient effort);verbal cues;nonverbal cues (demo/gesture)  -     Assistive Device (Toilet Transfer) commode;cane, straight;grab bars/safety frame  -     Row Name 07/21/22 1044          Sit-Stand Transfer    Assistive Device (Sit-Stand Transfers) walker, front-wheeled  -     Row Name 07/21/22 1044          Toilet Transfer    Type (Toilet Transfer) stand-sit;sit-stand  -     Row Name 07/21/22 1044          Gait/Stairs (Locomotion)    [place] Level (Gait) verbal cues;nonverbal cues (demo/gesture);minimum assist (75% patient effort)  -     Assistive Device (Gait) cane, straight  cane RUE and HHA LUE  -     Distance in Feet (Gait) 120  -     Pattern (Gait) step-through  -     Deviations/Abnormal Patterns (Gait) festinating/shuffling;bilateral deviations  -     Bilateral Gait Deviations forward flexed posture;heel strike decreased  -     [place] Level (Stairs) minimum assist (75% patient effort);verbal cues;nonverbal cues (demo/gesture)  -     Handrail Location (Stairs) left side (ascending);right side (descending)  cane opposite UE  -     Number of Steps (Stairs) 3  -     Ascending Technique (Stairs) step-to-step  -     Descending Technique (Stairs) step-to-step  -     Stairs, Safety Issues balance decreased during turns;sequencing ability decreased;weight-shifting ability decreased  -     Stairs, Impairments ROM decreased;strength decreased;impaired balance  -     Row Name 07/21/22 1044          Balance    Static Sitting Balance standby assist  -     Position, Sitting Balance supported;sitting edge of bed  -     Static Standing Balance  minimal assist  -     Position/Device Used, Standing Balance supported;cane, straight  -     Row Name 07/21/22 1044          Plan of Care Review    Plan of Care Reviewed With patient  -     Outcome Evaluation Pt agreeable and motivated for skilled PT, ambulated 120ft MIn A STC and HHA, pt uses cane in RUE (weaker side), instructed pt to use LUE however pt refused. Pt navgiated 3 steps w 1 HR and STC Min A non reciprocal. Discussed fall prevention in depth including better shoe wear, rollator use and improved cane use. Would rec HHC vs OP PT for balance at DC.  -     Row Name 07/21/22 1044          Positioning and Restraints    Pre-Treatment Position in bed  -     Post Treatment Position bed  -     In Bed fowlers;call light within reach;encouraged to call for assist;exit alarm on  [facility]           User Key  (r) = Recorded By, (t) = Taken By, (c) = Cosigned By    Initials Name Provider Type     Jyoti Perez, PT Physical Therapist                Physical Therapy Education                 Title: PT OT SLP Therapies (Done)     Topic: Physical Therapy (Done)     Point: Mobility training (Done)     Learning Progress Summary           Patient Nonacceptance, E, VU,NR by  at 7/21/2022 1050    Comment: fall prevention strategies    Acceptance, E, VU,NR by  at 7/20/2022 1151                   Point: Home exercise program (Done)     Learning Progress Summary           Patient Nonacceptance, E, VU,NR by  at 7/21/2022 1050    Comment: fall prevention strategies                   Point: Body mechanics (Done)     Learning Progress Summary           Patient Acceptance, E, VU,NR by  at 7/20/2022 1151                   Point: Precautions (Done)     Learning Progress Summary           Patient Acceptance, E, VU,NR by  at 7/20/2022 1151                               User Key     Initials Effective Dates Name Provider Type [facility] 06/16/21 -  Jyoti Perez, PT Physical Therapist PT     07/01/22 -  Mirta  Frantz, PT Student PT Student PT              PT Recommendation and Plan     Plan of Care Reviewed With: patient  Outcome Evaluation: Pt agreeable and motivated for skilled PT, ambulated 120ft MIn A STC and HHA, pt uses cane in RUE (weaker side), instructed pt to use LUE however pt refused. Pt navgiated 3 steps w 1 HR and STC Min A non reciprocal. Discussed fall prevention in depth including better shoe wear, rollator use and improved cane use. Would rec HHC vs OP PT for balance at [place].   Outcome Measures     Row Name 07/21/22 1000             How much help from another person do you currently need...    Turning from your back to your side while in flat bed without using bedrails? 3  -LH      Moving from lying on back to sitting on the side of a flat bed without bedrails? 3  -LH      Moving to and from a bed to a chair (including a wheelchair)? 3  -LH      Standing up from a chair using your arms (e.g., wheelchair, bedside chair)? 3  -LH      Climbing 3-5 steps with a railing? 3  -LH      To walk in hospital room? 3  -LH      AM-PAC 6 Clicks Score (PT) 18  -              Functional Assessment    Outcome Measure Options AM-PAC 6 Clicks Basic Mobility (PT)  -            User Key  (r) = Recorded By, (t) = Taken By, (c) = Cosigned By    Initials Name Provider Type     Jyoti Perez, PT Physical Therapist                 Time Calculation:    PT Charges     Row Name 07/21/22 1051             Time Calculation    Start Time 1005  -      Stop Time 1030  -      Time Calculation (min) 25 min  -      PT Received On 07/21/22  -      PT - Next Appointment 07/22/22  -            User Key  (r) = Recorded By, (t) = Taken By, (c) = Cosigned By    Initials Name Provider Type     Jyoti Perez, PT Physical Therapist              Therapy Charges for Today     Code Description Service Date Service Provider Modifiers Qty    [id number]  PT THER PROC EA 15 MIN 7/21/2022 Jyoti Perez, PT GP 1    [id number]  PT  THERAPEUTIC ACT EA 15 MIN 7/21/2022 Jyoti Perez, PT GP 1          PT G-Codes  Outcome Measure Options: AM-PAC 6 Clicks Basic Mobility (PT)  AM-PAC 6 Clicks Score (PT): 18    Jyoti Perez, PT  7/21/2022

## 2022-07-21 NOTE — PROGRESS NOTES
Discharge Planning Assessment  [facility]     Patient Name: Albert Yadav  MRN: [id number]  Today's Date: 7/21/2022    Admit Date: 7/19/2022     Discharge Needs Assessment    No documentation.                Discharge Plan     Row Name 07/21/22 1544       Plan    Plan Home with spouse and new referral to home health     Plan Comments Spoke with patient at bedside regarding discharge plans, patient is agreeable with home health for wound care and is agreeable with CCP making referrals. Devang GOODMAN              Continued Care and Services - Admitted Since 7/19/2022    Coordination has not been started for this encounter.       Expected Discharge Date and Time     Expected Discharge Date Expected Discharge Time    Jul 21, 2022          Demographic Summary    No documentation.                Functional Status    No documentation.                Psychosocial    No documentation.                Abuse/Neglect    No documentation.                Legal    No documentation.                Substance Abuse    No documentation.                Patient Forms    No documentation.                   Lore Tovar, RN

## 2022-07-22 ENCOUNTER — APPOINTMENT (OUTPATIENT)
Dept: MRI IMAGING | Facility: HOSPITAL | Age: 82
End: 2022-07-22

## 2022-07-22 LAB — GLUCOSE BLDC GLUCOMTR-MCNC: 102 MG/DL (ref 70–130)

## 2022-07-22 PROCEDURE — G0378 HOSPITAL OBSERVATION PER HR: HCPCS

## 2022-07-22 PROCEDURE — 97116 GAIT TRAINING THERAPY: CPT

## 2022-07-22 PROCEDURE — 97530 THERAPEUTIC ACTIVITIES: CPT

## 2022-07-22 PROCEDURE — 74183 MRI ABD W/O CNTR FLWD CNTR: CPT

## 2022-07-22 PROCEDURE — 0 GADOBENATE DIMEGLUMINE 529 MG/ML SOLUTION: Performed by: INTERNAL MEDICINE

## 2022-07-22 PROCEDURE — A9577 INJ MULTIHANCE: HCPCS | Performed by: INTERNAL MEDICINE

## 2022-07-22 PROCEDURE — 82962 GLUCOSE BLOOD TEST: CPT

## 2022-07-22 RX ORDER — TRAMADOL HYDROCHLORIDE 50 MG/1
50 TABLET ORAL EVERY 6 HOURS PRN
Qty: 20 TABLET | Refills: 0 | Status: SHIPPED | OUTPATIENT
Start: 2022-07-22 | End: 2022-07-30

## 2022-07-22 RX ADMIN — ACETAMINOPHEN 1000 MG: 500 TABLET ORAL at 20:01

## 2022-07-22 RX ADMIN — TRAZODONE HYDROCHLORIDE 50 MG: 50 TABLET ORAL at 20:01

## 2022-07-22 RX ADMIN — ACETAMINOPHEN 1000 MG: 500 TABLET ORAL at 10:01

## 2022-07-22 RX ADMIN — LISINOPRIL 40 MG: 40 TABLET ORAL at 16:30

## 2022-07-22 RX ADMIN — LIDOCAINE 1 PATCH: 50 PATCH CUTANEOUS at 10:08

## 2022-07-22 RX ADMIN — RIVASTIGMINE TARTRATE 1.5 MG: 1.5 CAPSULE ORAL at 20:01

## 2022-07-22 RX ADMIN — GADOBENATE DIMEGLUMINE 17.1 ML: 529 INJECTION, SOLUTION INTRAVENOUS at 07:22

## 2022-07-22 RX ADMIN — ATORVASTATIN CALCIUM 40 MG: 20 TABLET, FILM COATED ORAL at 16:30

## 2022-07-22 RX ADMIN — HYDROCHLOROTHIAZIDE 50 MG: 50 TABLET ORAL at 06:17

## 2022-07-22 RX ADMIN — ACETAMINOPHEN 1000 MG: 500 TABLET ORAL at 16:30

## 2022-07-22 RX ADMIN — SENNOSIDES 1 TABLET: 8.6 TABLET, FILM COATED ORAL at 20:01

## 2022-07-22 RX ADMIN — RIVASTIGMINE TARTRATE 1.5 MG: 1.5 CAPSULE ORAL at 10:02

## 2022-07-22 RX ADMIN — CLOPIDOGREL 75 MG: 75 TABLET, FILM COATED ORAL at 06:17

## 2022-07-22 RX ADMIN — Medication 3 MG: at 20:01

## 2022-07-22 RX ADMIN — SENNOSIDES 1 TABLET: 8.6 TABLET, FILM COATED ORAL at 10:02

## 2022-07-22 RX ADMIN — POLYETHYLENE GLYCOL 3350 17 G: 17 POWDER, FOR SOLUTION ORAL at 10:02

## 2022-07-22 RX ADMIN — Medication 1 MG: at 10:02

## 2022-07-22 NOTE — DISCHARGE SUMMARY
[facility]  [facility]      Name:  Albert Yadav   Age:  82 y.o.  Sex:  male  :  1940  MRN:  [id number]   Visit Number:  [id number]  Primary Care Physician:  Valerie Burnham MD  Date of Discharge:  2022  Admission Date:  2022      Discharge Diagnosis:     1.  Status post mechanical fall on escalator  2.  Closed fracture of multiple ribs on the right side  3.  5.7 cm density dorsal to the left kidney  4.  Multiple skin abrasions on the arms.  5.  Peripheral arterial disease with history of stent placement.  6.  History of carotid endarterectomy  Active Hospital Problems    Diagnosis  POA   • Closed fracture of multiple ribs of right side, initial encounter [S22.41XA]  Yes      Resolved Hospital Problems   No resolved problems to display.         Presenting Problem/History of Present Illness:    Abnormal CT scan [R93.89]  Multiple abrasions [T07.XXXA]  Contusion of scalp, initial encounter [S00.03XA]  Closed fracture of multiple ribs of right side, initial encounter [S22.41XA]  Osteoarthritis of cervical spine, unspecified spinal osteoarthritis complication status [M47.812]         Hospital Course:    82-year-old male with history of hypertension, hyperlipidemia, rheumatoid arthritis fell on escalator at [place] prior to admission.  He was noted to have multiple right-sided rib fractures without pneumothorax.  Cardiothoracic surgery has been consulted, they recommended medical management.     On CT of the chest, patient was noted to have fracture of the right lateral ribs 9 and right lateral rib 10.  There was also a 5.7 cm density dorsal to the left kidney.  MRI was recommended and this has been ordered.  Await results of this.     Patient has abrasions on his arms which was wrapped in the emergency room.  We will consult wound care to see the patient for opinion.     Patient continues to have rib pain.  He denies any chest pressure, shortness of breath,  nausea, vomiting.  He is ambulating with physical therapy.  He will need home health.    MRI was ordered on a 5.7 cm density, still await results.  Depending on the results of this, patient can be discharged home.  He worked with PT and did well.  He has Lidoderm patches at home.  Prescribed Ultram.  He will follow-up with the wound care clinic in 1 week.  Follow-up with PCP within the next week.  Discussed this with the patient and his wife.    Addendum: 7/22: Was called by radiology with results of MRI.  Suspicious for liposarcoma per radiology.  Urological surgery has been consulted, discussed this with the patient over the phone.  Also discussed with nursing.  Will cancel discharge at this point.    Procedures Performed:           Consults:     Consults     Date and Time Order Name Status Description    7/19/2022  7:20 PM Inpatient Thoracic Surgery Consult Completed     7/19/2022  5:48 PM [facility] (on-call MD unless specified) Details            Pertinent Test Results:         Results from last 7 days   Lab Units 07/21/22  0337 07/20/22  0340 07/19/22  1747   WBC 10*3/mm3 11.28* 14.73* 17.24*   HEMOGLOBIN g/dL 12.7* 12.7* 13.0   HEMATOCRIT % 38.2 38.0 37.3*   PLATELETS 10*3/mm3 196 193 200   MCV fL 98.5* 98.7* 95.9   SODIUM mmol/L 135* 137 139   POTASSIUM mmol/L 4.1 4.5 4.3   CHLORIDE mmol/L 103 107 107   CO2 mmol/L 21.8* 21.5* 20.5*   BUN mg/dL 22 22 21   CREATININE mg/dL 0.99 1.08 1.11   GLUCOSE mg/dL 126* 154* 138*   CALCIUM mg/dL 8.4* 8.6 8.5*          Results from last 7 days   Lab Units 07/21/22  0337 07/20/22  0340 07/19/22  1747   WBC 10*3/mm3 11.28* 14.73* 17.24*     Results from last 7 days   Lab Units 07/20/22  1028 07/19/22  1747   BILIRUBIN mg/dL  --  0.5   ALK PHOS U/L  --  94   ALT (SGPT) U/L  --  29   AST (SGOT) U/L  --  20   PROTIME Seconds 12.9  --    INR  0.98  --            Invalid input(s): TG, LDLCALC, LDLREALC      Brief Urine Lab Results     None            Results for orders placed during the  "hospital encounter of 09/29/20    Duplex Carotid - Right Ultrasound CAR    Interpretation Summary  · Proximal right internal carotid artery is normal.  · Imaging indicates a normal intra-op exam.      Results for orders placed in visit on 08/07/15    SCANNED - ECHOCARDIOGRAM        Condition on Discharge:      Stable    Vital Signs:    /70 (BP Location: Right arm, Patient Position: Sitting)   Pulse 79   Temp 97.5 °F (36.4 °C) (Oral)   Resp 18   Ht 175.3 cm (69\")   Wt 85.5 kg (188 lb 9.6 oz)   SpO2 98%   BMI 27.85 kg/m²     Physical Exam:    General: Alert and oriented x4, mild distress.  Heart: Regular rate and rhythm without murmur rub or thrill.  Lungs: Clear to auscultation bilaterally without use of accessory muscles or respiration.  Abdomen: Soft/nontender/nondistended.  No HSM noted.  MSK: 4/5 strength in upper/lower extremities bilaterally.  Skin: Multiple abrasions noted on the arms.    Discharge Disposition:    Home    Discharge Medication:       Discharge Medications      New Medications      Instructions Start Date   traMADol 50 MG tablet  Commonly known as: ULTRAM   50 mg, Oral, Every 6 Hours PRN         Continue These Medications      Instructions Start Date   acetaminophen 650 MG 8 hr tablet  Commonly known as: TYLENOL   650 mg, Oral, Every 8 Hours PRN      atorvastatin 40 MG tablet  Commonly known as: LIPITOR   40 mg, Oral, Every Evening      cetirizine 10 MG tablet  Commonly known as: zyrTEC   10 mg, Oral, Every Morning      clopidogrel 75 MG tablet  Commonly known as: PLAVIX   75 mg, Oral, Every Morning      folic acid 1 MG tablet  Commonly known as: FOLVITE   1 mg, Oral, Daily      hydroCHLOROthiazide 50 MG tablet  Commonly known as: HYDRODIURIL   50 mg, Oral, Every Morning      hydrOXYzine pamoate 25 MG capsule  Commonly known as: VISTARIL   25 mg, Oral, 3 Times Daily PRN      lidocaine 5 %  Commonly known as: LIDODERM   1 patch, Transdermal, Every 24 Hours, Remove & Discard patch " within 12 hours or as directed by MD      lisinopril 40 MG tablet  Commonly known as: PRINIVIL,ZESTRIL   40 mg, Oral, Every Evening      melatonin 5 MG tablet tablet   5 mg, Oral, Nightly      methotrexate 2.5 MG tablet   2.5 mg, Oral, Wednesdays takes 2.5mg 10 tabs all together for total of 25mg      nystatin 036836 UNIT/GM powder  Generic drug: nystatin   APPLY TO RASH EVERY DAY AS NEEDED      ondansetron ODT 4 MG disintegrating tablet  Commonly known as: ZOFRAN-ODT   4 mg, Translingual, 4 Times Daily PRN      rivastigmine 1.5 MG capsule  Commonly known as: EXELON   1.5 mg, Oral, 2 Times Daily      SENNA LAX PO   1 tablet, Oral, Once      traZODone 50 MG tablet  Commonly known as: DESYREL   50 mg, Oral, Nightly      triamcinolone 0.025 % cream  Commonly known as: KENALOG   APPLY TWICE DAILY TO FOREHEAD AND CHEST         Stop These Medications    ONE TOUCH ULTRA TEST test strip  Generic drug: glucose blood            Discharge Diet:     Diet Instructions     Diet: Regular, Cardiac      Discharge Diet:  Regular  Cardiac             Activity at Discharge:     Activity Instructions     Activity as Tolerated            Follow-up Appointments:    Future Appointments   Date Time Provider Department Center   10/18/2022  8:00 AM TREATMENT [facility]     Additional Instructions for the Follow-ups that You Need to Schedule     Discharge Follow-up with PCP   As directed       Currently Documented PCP:    Valerie Burnham MD    PCP Phone Number:    [phone]     Follow Up Details: 1 week         Discharge Follow-up with Specialty: wound care; 1 Week   As directed      Specialty: wound care    Follow Up: 1 Week               Test Results Pending at Discharge:           Joselo Loya DO  07/22/22  16:21 EDT

## 2022-07-22 NOTE — NURSING NOTE
Talked to urologist Dr. Gunn and informed him of the kidney mass for consult physician states it has been there and they could follow up outpt but checked with pt and he would like for the Urologist to follow him while he is there and discuss treatment plan and thoughts. Dr. Gunn stated they would follow up with him in the morning.

## 2022-07-22 NOTE — THERAPY TREATMENT NOTE
Patient Name: Albert Yadav  : 1940    MRN: [id number]                              Today's Date: 2022       Admit Date: 2022    Visit Dx:     ICD-10-CM ICD-9-CM   1. Closed fracture of multiple ribs of right side, initial encounter  S22.41XA 807.09   2. Contusion of scalp, initial encounter  S00.03XA 920   3. Osteoarthritis of cervical spine, unspecified spinal osteoarthritis complication status  M47.812 721.0   4. Multiple abrasions  T07.XXXA 919.0   5. 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   • Falls    • Fracture of ankle    • Fracture of wrist    • Fracture, clavicle 26 years ago   • Fracture, foot 26 year ago   • Groin rash     PT STATES LEFT GROIN AT TIMES   • History of transfusion    • Hyperlipidemia    • Hypertension    • Low back pain    • Low back strain ?   • Lumbar spinal stenosis    • Numbness and tingling     RIGHT ARM, RIGHT LEG & FOOT   • Peripheral neuropathy    • Peripheral vascular disease (HCC)    • Rheumatoid arthritis (HCC)     HANDS DIALLO   • Staph infection     2015  [facility] POST SX   • Tendency toward bleeding easily (HCC)     due to blood thinners     Past Surgical  History:   Procedure Laterality Date   • ANGIOPLASTY FEMORAL ARTERY Left 11/7/2016    Procedure: ULTRA SOUND ACCESS RIGHT FEMORAL ARTERY, AIF BILATERAL RUNOFF, SELECTIVE CATHETERIZATION LEFT FEMORAL ARTERY.;  Surgeon: Errol Michael MD;  Location: [facility] OR 18/19;  Service:    • ANKLE OPEN REDUCTION INTERNAL FIXATION  1980   • ARTERIOVENOUS FISTULA/SHUNT SURGERY Left 12/13/2016    Procedure: LEFT ILEO-FEMORAL GORTEX GRAFT AND LEFT LEG ARERIOGRAM ;  Surgeon: Errol Michael MD;  Location: [facility] OR 18/19;  Service:    • CAROTID ENDARTERECTOMY Right 9/29/2020    Procedure: CAROTID ENDARTERECTOMY;  Surgeon: James Graham MD;  Location: [facility];  Service: Vascular;  Laterality: Right;   • CHOLECYSTECTOMY     • EPIDURAL BLOCK     • EYE SURGERY Left     as child   • FRACTURE SURGERY Left     arm   • ILIAC ARTERY STENT     • KNEE SURGERY Left     ORIF   • ORIF ANKLE FRACTURE Left       General Information     Row Name 07/22/22 1510          Physical Therapy Time and Intention    Document Type therapy note (daily note)  -     Mode of Treatment individual therapy;physical therapy  -     Row Name 07/22/22 1510          General Information    Patient Profile Reviewed yes  -DJ     Existing Precautions/Restrictions fall  -     Row Name 07/22/22 1510          Cognition    Orientation Status (Cognition) oriented x 4  cooperative but aggrivated by situation  -     Row Name 07/22/22 1510          Safety Issues, Functional Mobility    Comment, Safety Issues/Impairments (Mobility) gt belt, rubber soled sandals  -           User Key  (r) = Recorded By, (t) = Taken By, (c) = Cosigned By    Initials Name Provider Type    Simona Gunn, PT Physical Therapist               Mobility     Row Name 07/22/22 1513          Bed Mobility    Bed Mobility supine-sit;sit-supine  -DJ     Supine-Sit [place] (Bed Mobility) standby assist;verbal cues  -DJ     Sit-Supine [place] (Bed Mobility)  standby assist;verbal cues  -DJ     Assistive Device (Bed Mobility) bed rails;head of bed elevated  -DJ     Comment, (Bed Mobility) difficulty repositioning self in bed  -DJ     Row Name 07/22/22 1513          Transfers    Comment, (Transfers) sit/stand from EOB x 2, from armchair x 1  -DJ     Row Name 07/22/22 1513          Bed-Chair Transfer    Bed-Chair [place] (Transfers) not tested  -DJ     Row Name 07/22/22 1513          Sit-Stand Transfer    Sit-Stand [place] (Transfers) contact guard;verbal cues;nonverbal cues (demo/gesture)  -DJ     Assistive Device (Sit-Stand Transfers) walker, front-wheeled  -DJ     Comment, (Sit-Stand Transfer) took 4 attempts to rise from armchair  -DJ     Row Name 07/22/22 1513          Gait/Stairs (Locomotion)    [place] Level (Gait) contact guard;minimum assist (75% patient effort);verbal cues  -DJ     Assistive Device (Gait) walker, front-wheeled  -DJ     Distance in Feet (Gait) 200'  -DJ     Deviations/Abnormal Patterns (Gait) festinating/shuffling;bilateral deviations;gait speed decreased;stride length decreased  -DJ     Bilateral Gait Deviations forward flexed posture;heel strike decreased  -DJ     [place] Level (Stairs) not tested  -DJ     Comment, (Gait/Stairs) Pt amb 200' with r wx and min A on level surfaces. Pace is slow, steps are shuffled and he often drags his feet increasing his risk of falls; posture is flexed, endurance is improving but still limits further amb distance. Pt is much more unsteady with cane vs r wx.  -DJ           User Key  (r) = Recorded By, (t) = Taken By, (c) = Cosigned By    Initials Name Provider Type    Simona Gunn, PT Physical Therapist               Obj/Interventions     Row Name 07/22/22 1517          Motor Skills    Therapeutic Exercise other (see comments)  AP, LAQ, seated hip flex - vc for all to perform with correct form  -DJ     Row Name 07/22/22 1517          Balance    Balance Assessment standing static  balance;standing dynamic balance  -DJ     Static Standing Balance contact guard;verbal cues  -DJ     Dynamic Standing Balance minimal assist;verbal cues  -DJ     Position/Device Used, Standing Balance walker, front-wheeled  -DJ     Balance Interventions sitting;standing;sit to stand;supported;weight shifting activity  -DJ           User Key  (r) = Recorded By, (t) = Taken By, (c) = Cosigned By    Initials Name Provider Type    Simona Gunn, PT Physical Therapist               Goals/Plan    No documentation.                Clinical Impression     Row Name 07/22/22 1518          Plan of Care Review    Plan of Care Reviewed With patient;spouse  -DJ     Progress improving  -DJ     Outcome Evaluation Pt resting in bed, concerned about recent MRI, wife present and helpful. Pt req CGA for bed mobility - slight assist to raise legs back into bed. He had difficulty repositioning self in bed. Pt amb 200' with r wx and min A on level surfaces. Pace is slow, steps are shuffled and he often drags his feet increasing his risk of falls; posture is flexed, endurance is improving but still limits further amb distance. Pt is much more unsteady with cane vs r wx. Pt performed seated LE ther ex but req constant vc for correct form. Strongly recommend use of r wx vs cane due to his balance issues including LE numbness as well as his hx of falls. He is at hgih risk for future falls if he continues to use his cane. Cont PT to address functional deficits and progress as tolerated to prepare for d/c.  -DJ     Row Name 07/22/22 1518          Therapy Assessment/Plan (PT)    Patient/Family Therapy Goals Statement (PT) home  -DJ     Criteria for Skilled Interventions Met (PT) skilled treatment is necessary  -DJ     Row Name 07/22/22 1518          Vital Signs    O2 Delivery Pre Treatment room air  -DJ     O2 Delivery Intra Treatment room air  -DJ     O2 Delivery Post Treatment room air  -DJ     Pre Patient Position Supine  -DJ     Intra  Patient Position Standing  -DJ     Post Patient Position Supine  -DJ     Row Name 07/22/22 1518          Positioning and Restraints    Pre-Treatment Position in bed  -DJ     Post Treatment Position bed  -DJ     In Bed supine;call light within reach;encouraged to call for assist;exit alarm on;with family/caregiver  -DJ           User Key  (r) = Recorded By, (t) = Taken By, (c) = Cosigned By    Initials Name Provider Type    Simona Gunn, YOUSIF Physical Therapist               Outcome Measures     Row Name 07/22/22 1522          How much help from another person do you currently need...    Turning from your back to your side while in flat bed without using bedrails? 4  -DJ     Moving from lying on back to sitting on the side of a flat bed without bedrails? 3  -DJ     Moving to and from a bed to a chair (including a wheelchair)? 3  -DJ     Standing up from a chair using your arms (e.g., wheelchair, bedside chair)? 3  -DJ     Climbing 3-5 steps with a railing? 3  -DJ     To walk in hospital room? 3  -DJ     AM-PAC 6 Clicks Score (PT) 19  -DJ     Highest level of mobility 6 --> Walked 10 steps or more  -DJ     Row Name 07/22/22 1522          Functional Assessment    Outcome Measure Options AM-PAC 6 Clicks Basic Mobility (PT)  -DJ           User Key  (r) = Recorded By, (t) = Taken By, (c) = Cosigned By    Initials Name Provider Type    Simona Gunn, PT Physical Therapist                             Physical Therapy Education                 Title: PT OT SLP Therapies (In Progress)     Topic: Physical Therapy (In Progress)     Point: Mobility training (In Progress)     Learning Progress Summary           Patient Acceptance, E, NR by GERMÁN at 7/22/2022 1522    Nonacceptance, E, VU,NR by  at 7/21/2022 1050    Comment: fall prevention strategies    Acceptance, E, VU,NR by HALINA at 7/20/2022 1151   Family Acceptance, E, NR by GERMÁN at 7/22/2022 1522                   Point: Home exercise program (In Progress)     Learning Progress  Summary           Patient Acceptance, E, NR by  at 7/22/2022 1522    Nonacceptance, E, VU,NR by  at 7/21/2022 1050    Comment: fall prevention strategies   Family Acceptance, E, NR by DJ at 7/22/2022 1522                   Point: Body mechanics (In Progress)     Learning Progress Summary           Patient Acceptance, E, NR by DJ at 7/22/2022 1522    Acceptance, E, VU,NR by  at 7/20/2022 1151   Family Acceptance, E, NR by DJ at 7/22/2022 1522                   Point: Precautions (In Progress)     Learning Progress Summary           Patient Acceptance, E, NR by DJ at 7/22/2022 1522    Acceptance, E, VU,NR by  at 7/20/2022 1151   Family Acceptance, E, NR by  at 7/22/2022 1522                               User Key     Initials Effective Dates Name Provider Type Discipline     06/16/21 -  Jyoti Perez, PT Physical Therapist PT     10/25/19 -  Simona Scott, PT Physical Therapist PT     07/01/22 -  Frantz Kirby PT Student PT Student PT              PT Recommendation and Plan     Plan of Care Reviewed With: patient, spouse  Progress: improving  Outcome Evaluation: Pt resting in bed, concerned about recent MRI, wife present and helpful. Pt req CGA for bed mobility - slight assist to raise legs back into bed. He had difficulty repositioning self in bed. Pt amb 200' with r wx and min A on level surfaces. Pace is slow, steps are shuffled and he often drags his feet increasing his risk of falls; posture is flexed, endurance is improving but still limits further amb distance. Pt is much more unsteady with cane vs r wx. Pt performed seated LE ther ex but req constant vc for correct form. Strongly recommend use of r wx vs cane due to his balance issues including LE numbness as well as his hx of falls. He is at hgih risk for future falls if he continues to use his cane. Cont PT to address functional deficits and progress as tolerated to prepare for d/c.     Time Calculation:    PT Charges     Row Name 07/22/22  1524             Time Calculation    Start Time 1446  -DJ      Stop Time 1510  -DJ      Time Calculation (min) 24 min  -DJ      PT Non-Billable Time (min) 10 min  -DJ      PT Received On 07/22/22  -DJ      PT - Next Appointment 07/23/22  -DJ            User Key  (r) = Recorded By, (t) = Taken By, (c) = Cosigned By    Initials Name Provider Type    Simona Gunn, PT Physical Therapist              Therapy Charges for Today     Code Description Service Date Service Provider Modifiers Qty    49084018283 HC PT THERAPEUTIC ACT EA 15 MIN 7/22/2022 Simona Scott, PT GP 1    59601942547 HC GAIT TRAINING EA 15 MIN 7/22/2022 Simona Scott, PT GP 1          PT G-Codes  Outcome Measure Options: AM-PAC 6 Clicks Basic Mobility (PT)  AM-PAC 6 Clicks Score (PT): 19    Simona Scott PT  7/22/2022

## 2022-07-22 NOTE — PLAN OF CARE
Goal Outcome Evaluation:  Plan of Care Reviewed With: patient, spouse        Progress: improving  Outcome Evaluation: Pt resting in bed, concerned about recent MRI, wife present and helpful. Pt req CGA for bed mobility - slight assist to raise legs back into bed. He had difficulty repositioning self in bed. Pt amb 200' with r wx and min A on level surfaces. Pace is slow, steps are shuffled and he often drags his feet increasing his risk of falls; posture is flexed, endurance is improving but still limits further amb distance. Pt is much more unsteady with cane vs r wx. Pt performed seated LE ther ex but req constant vc for correct form. Strongly recommend use of r wx vs cane due to his balance issues including LE numbness as well as his hx of falls. He is at hgih risk for future falls if he continues to use his cane. Cont PT to address functional deficits and progress as tolerated to prepare for d/c.  Patient was intermittently wearing a face mask during this therapy encounter. Therapist used appropriate personal protective equipment including mask and gloves.  Mask used was standard procedure mask. Appropriate PPE was worn during the entire therapy session. Hand hygiene was completed before and after therapy session. Patient is not in enhanced droplet precautions.

## 2022-07-22 NOTE — PROGRESS NOTES
Discharge Planning Assessment  [facility]     Patient Name: Albert Yadav  MRN: [id number]  Today's Date: 7/22/2022    Admit Date: 7/19/2022     Discharge Needs Assessment    No documentation.                Discharge Plan     Row Name 07/22/22 1430       Plan    Plan home with spouse and [facility] has accepted for home health    Plan Comments Spoke with patient at bedside regarding discharge plans and [facility] has accepted the patient. Devang GOODMAN              Continued Care and Services - Admitted Since 7/19/2022     Home Medical Care     Service Provider Request Status Selected Services Address Phone Fax Patient Preferred    [facility],[place]  Accepted N/A [address] [facility] [id number] [phone] --    [facility]  Pending - Request Sent N/A [address] [facility] [id number] [phone] --    [facility] [place]  Declined  [facility] cannot [facility] care at this time [facility] [id number] [facility] [address] [address] [phone] --    [facility]  Declined  unable to staff N/A [address] [facility] [address] [id number] [phone] --              Expected Discharge Date and Time     Expected Discharge Date Expected Discharge Time    Jul 21, 2022          Demographic Summary    No documentation.                Functional Status    No documentation.                Psychosocial    No documentation.                Abuse/Neglect    No documentation.                Legal    No documentation.                Substance Abuse    No documentation.                Patient Forms    No documentation.                   Lore Tovar, RN

## 2022-07-23 VITALS
RESPIRATION RATE: 16 BRPM | BODY MASS INDEX: 27.8 KG/M2 | HEART RATE: 72 BPM | DIASTOLIC BLOOD PRESSURE: 54 MMHG | SYSTOLIC BLOOD PRESSURE: 111 MMHG | TEMPERATURE: 97.4 F | WEIGHT: 187.7 LBS | OXYGEN SATURATION: 99 % | HEIGHT: 69 IN

## 2022-07-23 PROCEDURE — G0378 HOSPITAL OBSERVATION PER HR: HCPCS

## 2022-07-23 RX ADMIN — LIDOCAINE 1 PATCH: 50 PATCH CUTANEOUS at 09:53

## 2022-07-23 RX ADMIN — Medication 1 MG: at 09:53

## 2022-07-23 RX ADMIN — CLOPIDOGREL 75 MG: 75 TABLET, FILM COATED ORAL at 06:17

## 2022-07-23 RX ADMIN — SENNOSIDES 1 TABLET: 8.6 TABLET, FILM COATED ORAL at 09:53

## 2022-07-23 RX ADMIN — HYDROCHLOROTHIAZIDE 50 MG: 50 TABLET ORAL at 06:17

## 2022-07-23 RX ADMIN — ACETAMINOPHEN 1000 MG: 500 TABLET ORAL at 09:53

## 2022-07-23 RX ADMIN — RIVASTIGMINE TARTRATE 1.5 MG: 1.5 CAPSULE ORAL at 09:53

## 2022-07-23 NOTE — PLAN OF CARE
Goal Outcome Evaluation:  Plan of Care Reviewed With: patient        Progress: improving  Outcome Evaluation: discharge to home with home health, walker ordered

## 2022-07-23 NOTE — DISCHARGE PLACEMENT REQUEST
"Albert Yadav (82 y.o. Male)             Date of Birth   1940    Social Security Number       Address   [address] [place] [address]    Home Phone   [phone]    MRN   [id number]       [employer]   None    Marital Status                               Admission Date   7/19/22    Admission Type   Emergency    Admitting Provider   Margaret Caldera MD    Attending Provider   Joselo Loya DO    Department, Room/Bed   [address], [address]/1       Discharge Date       Discharge Disposition   Home or Self Care    Discharge Destination                               Attending Provider: Joselo Loya DO    Allergies: No Known Allergies    Isolation: None   Infection: None   Code Status: CPR   Advance Care Planning Activity    Ht: 175.3 cm (69\")   Wt: 85.1 kg (187 lb 11.2 oz)    Admission Cmt: None   Principal Problem: None                Active Insurance as of 7/19/2022     Primary Coverage     Payor Plan Insurance Group Employer/Plan Group    [facility] MEDICARE REPLACEMENT [facility] MEDICARE REPLACEMENT [id number]     Payor Plan Address Payor Plan Phone Number Payor Plan Fax Number Effective Dates    [address] [address]   1/1/2016 - None Entered    [facility] [id number]       Subscriber Name Subscriber Birth Date Member ID       ALBERT YADAV 1940 [id number]                 Emergency Contacts      (Rel.) Home Phone Work Phone Mobile Phone    Bushra Yadav (Spouse) [phone] -- [phone]    YadavCristian (Son) [phone] -- [phone]              "

## 2022-07-23 NOTE — DISCHARGE SUMMARY
[facility]  DISCHARGE SUMMARY      Name:  Albert Yadav   Age:  82 y.o.  Sex:  male  :  1940  MRN:  [id number]   Visit Number:  [id number]  Primary Care Physician:  Valerie Burnham MD  Date of Discharge:  2022  Admission Date:  2022      Discharge Diagnosis:     1.  Status post mechanical fall on escalator  2.  Closed fracture of multiple ribs on the right side  3.  5.7 cm density dorsal to the left kidney  4.  Multiple skin abrasions on the arms.  5.  Peripheral arterial disease with history of stent placement.  6.  History of carotid endarterectomy  Active Hospital Problems    Diagnosis  POA   • Closed fracture of multiple ribs of right side, initial encounter [S22.41XA]  Yes      Resolved Hospital Problems   No resolved problems to display.         Presenting Problem/History of Present Illness:    Abnormal CT scan [R93.89]  Multiple abrasions [T07.XXXA]  Contusion of scalp, initial encounter [S00.03XA]  Closed fracture of multiple ribs of right side, initial encounter [S22.41XA]  Osteoarthritis of cervical spine, unspecified spinal osteoarthritis complication status [M47.812]         Hospital Course:    82-year-old male with history of hypertension, hyperlipidemia, rheumatoid arthritis fell on escalator at [employer] prior to admission.  He was noted to have multiple right-sided rib fractures without pneumothorax.  Cardiothoracic surgery has been consulted, they recommended medical management.     On CT of the chest, patient was noted to have fracture of the right lateral ribs 9 and right lateral rib 10.  There was also a 5.7 cm density dorsal to the left kidney.  MRI was recommended and this has been ordered.  Await results of this.     Patient has abrasions on his arms which was wrapped in the emergency room.  We will consult wound care to see the patient for opinion.     Patient continues to have rib pain.  He denies any chest pressure, shortness of breath,  nausea, vomiting.  He is ambulating with physical therapy.  He will need home health.    MRI was ordered on a 5.7 cm density, still await results.  Depending on the results of this, patient can be discharged home.  He worked with PT and did well.  He has Lidoderm patches at home.  Prescribed Ultram.  He will follow-up with the wound care clinic in 1 week.  Follow-up with PCP within the next week.  Discussed this with the patient and his wife.    Was called by radiology with results of MRI.  Suspicious for liposarcoma per radiology.  Urological surgery has been consulted, discussed this with the patient over the phone.  Also discussed with nursing.  Discharge was held so urology can see the patient.    Urology recommended follow-up as an outpatient next week, patient was satisfied with this.  Patient will have home health as well as PT.  We will give him a rolling walker.  Follow-up with PCP next week.  He should follow-up with wound care regarding his wounds on his arms and legs.  He is otherwise stable for discharge home today.    Procedures Performed:           Consults:     Consults     Date and Time Order Name Status Description    7/22/2022  5:47 PM Inpatient Urology Consult      7/19/2022  7:20 PM Inpatient Thoracic Surgery Consult Completed     7/19/2022  5:48 PM [facility] (on-call MD unless specified) Details            Pertinent Test Results:         Results from last 7 days   Lab Units 07/21/22 0337 07/20/22 0340 07/19/22 1747   WBC 10*3/mm3 11.28* 14.73* 17.24*   HEMOGLOBIN g/dL 12.7* 12.7* 13.0   HEMATOCRIT % 38.2 38.0 37.3*   PLATELETS 10*3/mm3 196 193 200   MCV fL 98.5* 98.7* 95.9   SODIUM mmol/L 135* 137 139   POTASSIUM mmol/L 4.1 4.5 4.3   CHLORIDE mmol/L 103 107 107   CO2 mmol/L 21.8* 21.5* 20.5*   BUN mg/dL 22 22 21   CREATININE mg/dL 0.99 1.08 1.11   GLUCOSE mg/dL 126* 154* 138*   CALCIUM mg/dL 8.4* 8.6 8.5*          Results from last 7 days   Lab Units 07/21/22 0337 07/20/22 0340 07/19/22 1747  "  WBC 10*3/mm3 11.28* 14.73* 17.24*     Results from last 7 days   Lab Units 07/20/22  1028 07/19/22  1747   BILIRUBIN mg/dL  --  0.5   ALK PHOS U/L  --  94   ALT (SGPT) U/L  --  29   AST (SGOT) U/L  --  20   PROTIME Seconds 12.9  --    INR  0.98  --            Invalid input(s): TG, LDLCALC, LDLREALC      Brief Urine Lab Results     None            Results for orders placed during the hospital encounter of 09/29/20    Duplex Carotid - Right Ultrasound CAR    Interpretation Summary  · Proximal right internal carotid artery is normal.  · Imaging indicates a normal intra-op exam.      Results for orders placed in visit on 08/07/15    SCANNED - ECHOCARDIOGRAM        Condition on Discharge:      Stable    Vital Signs:    /54 (BP Location: Right arm, Patient Position: Lying)   Pulse 72   Temp 97.4 °F (36.3 °C) (Oral)   Resp 16   Ht 175.3 cm (69\")   Wt 85.1 kg (187 lb 11.2 oz)   SpO2 99%   BMI 27.72 kg/m²     Physical Exam:    General: Alert and oriented x4, mild distress.  Heart: Regular rate and rhythm without murmur rub or thrill.  Lungs: Clear to auscultation bilaterally without use of accessory muscles or respiration.  Abdomen: Soft/nontender/nondistended.  No HSM noted.  MSK: 4/5 strength in upper/lower extremities bilaterally.  Skin: Multiple abrasions noted on the arms.    Discharge Disposition:    Home or Self CareHome    Discharge Medication:       Discharge Medications      New Medications      Instructions Start Date   traMADol 50 MG tablet  Commonly known as: ULTRAM   50 mg, Oral, Every 6 Hours PRN         Continue These Medications      Instructions Start Date   acetaminophen 650 MG 8 hr tablet  Commonly known as: TYLENOL   650 mg, Oral, Every 8 Hours PRN      atorvastatin 40 MG tablet  Commonly known as: LIPITOR   40 mg, Oral, Every Evening      cetirizine 10 MG tablet  Commonly known as: zyrTEC   10 mg, Oral, Every Morning      clopidogrel 75 MG tablet  Commonly known as: PLAVIX   75 mg, Oral, " Every Morning      folic acid 1 MG tablet  Commonly known as: FOLVITE   1 mg, Oral, Daily      hydroCHLOROthiazide 50 MG tablet  Commonly known as: HYDRODIURIL   50 mg, Oral, Every Morning      hydrOXYzine pamoate 25 MG capsule  Commonly known as: VISTARIL   25 mg, Oral, 3 Times Daily PRN      lidocaine 5 %  Commonly known as: LIDODERM   1 patch, Transdermal, Every 24 Hours, Remove & Discard patch within 12 hours or as directed by MD      lisinopril 40 MG tablet  Commonly known as: PRINIVIL,ZESTRIL   40 mg, Oral, Every Evening      melatonin 5 MG tablet tablet   5 mg, Oral, Nightly      methotrexate 2.5 MG tablet   2.5 mg, Oral, Wednesdays takes 2.5mg 10 tabs all together for total of 25mg      nystatin 076274 UNIT/GM powder  Generic drug: nystatin   APPLY TO RASH EVERY DAY AS NEEDED      ondansetron ODT 4 MG disintegrating tablet  Commonly known as: ZOFRAN-ODT   4 mg, Translingual, 4 Times Daily PRN      rivastigmine 1.5 MG capsule  Commonly known as: EXELON   1.5 mg, Oral, 2 Times Daily      SENNA LAX PO   1 tablet, Oral, Once      traZODone 50 MG tablet  Commonly known as: DESYREL   50 mg, Oral, Nightly      triamcinolone 0.025 % cream  Commonly known as: KENALOG   APPLY TWICE DAILY TO FOREHEAD AND CHEST         Stop These Medications    ONE TOUCH ULTRA TEST test strip  Generic drug: glucose blood            Discharge Diet:     Diet Instructions     Diet: Regular, Cardiac      Discharge Diet:  Regular  Cardiac             Activity at Discharge:     Activity Instructions     Activity as Tolerated            Follow-up Appointments:    Future Appointments   Date Time Provider Department Center   10/18/2022  8:00 AM TREATMENT [facility]     Additional Instructions for the Follow-ups that You Need to Schedule     Ambulatory Referral to Home Health (Hospital)   As directed      Face to Face Visit Date: 7/23/2022    Follow-up provider for Plan of Care?: I treated the patient in an acute care facility  and will not continue treatment after discharge.    Follow-up provider: VALERIE VALENCIA [[id number]]    Reason/Clinical Findings: weakness, rib fractures, renal mass    Describe mobility limitations that make leaving home difficult: weakness, rib fractures, renal mass    Nursing/Therapeutic Services Requested: Skilled Nursing Physical Therapy    Skilled nursing orders: Medication education Cardiopulmonary assessments    PT orders: Therapeutic exercise Gait Training Strengthening Transfer training    Weight Bearing Status: As Tolerated    Frequency: 1 Week 1         Discharge Follow-up with PCP   As directed       Currently Documented PCP:    Valerie Valencia MD    PCP Phone Number:    [phone]     Follow Up Details: 1 week         Discharge Follow-up with Specialty: wound care; 1 Week   As directed      Specialty: wound care    Follow Up: 1 Week         Discharge Follow-up with Specified Provider: urology as scheduled   As directed      To: urology as scheduled               Test Results Pending at Discharge:           Joselo Loya DO  07/23/22  10:03 EDT

## 2022-07-23 NOTE — CONSULTS
Urology Consult  Patient Identification:  Name: Albert Yadav  Age: 82 y.o.  Sex: male  : 1940  MRN: [id number]   Chief Complaint: left renal mass  History of Present Illness:   1. 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   • Falls    • Fracture of ankle    • Fracture of wrist    • Fracture, clavicle 26 years ago   • Fracture, foot 26 year ago   • Groin rash     PT STATES LEFT GROIN AT TIMES   • History of transfusion    • Hyperlipidemia    • Hypertension    • Low back pain    • Low back strain ?   • Lumbar spinal stenosis    • Numbness and tingling     RIGHT ARM, RIGHT LEG & FOOT   • Peripheral neuropathy    • Peripheral vascular disease (HCC)    • Rheumatoid arthritis (HCC)     HANDS DIALLO   • Staph infection     2015  [facility] POST SX   • Tendency toward bleeding easily (HCC)     due to blood thinners     Past Surgical History:  Past Surgical History:   Procedure Laterality Date   • ANGIOPLASTY FEMORAL ARTERY Left 2016    Procedure: ULTRA SOUND ACCESS RIGHT FEMORAL ARTERY, AIF BILATERAL RUNOFF, SELECTIVE CATHETERIZATION LEFT FEMORAL ARTERY.;  Surgeon: Errol Michael MD;  Location: [facility] OR ;  Service:    • ANKLE OPEN REDUCTION INTERNAL FIXATION     • ARTERIOVENOUS FISTULA/SHUNT SURGERY Left 2016    Procedure: LEFT ILEO-FEMORAL GORTEX GRAFT AND LEFT LEG ARERIOGRAM ;  Surgeon: Errol Michael MD;  Location: [facility] OR ;  Service:    • CAROTID ENDARTERECTOMY Right 2020    Procedure:  CAROTID ENDARTERECTOMY;  Surgeon: James Graham MD;  Location: [facility] OR;  Service: Vascular;  Laterality: Right;   • CHOLECYSTECTOMY     • EPIDURAL BLOCK     • EYE SURGERY Left     as child   • FRACTURE SURGERY Left     arm   • ILIAC ARTERY STENT     • KNEE SURGERY Left     ORIF   • ORIF ANKLE FRACTURE Left       Home Meds:  Medications Prior to Admission   Medication Sig Dispense Refill Last Dose   • acetaminophen (TYLENOL) 650 MG 8 hr tablet Take 650 mg by mouth Every 8 (Eight) Hours As Needed for Mild Pain .   7/19/2022 at Unknown time   • atorvastatin (LIPITOR) 40 MG tablet Take 40 mg by mouth Every Evening.   7/18/2022 at Unknown time   • cetirizine (ZyrTEC) 10 MG tablet Take 10 mg by mouth Every Morning.   7/19/2022 at Unknown time   • clopidogrel (PLAVIX) 75 MG tablet Take 75 mg by mouth Every Morning.   7/19/2022 at Unknown time   • folic acid (FOLVITE) 1 MG tablet Take 1 mg by mouth Daily.   7/19/2022 at Unknown time   • hydrochlorothiazide (HYDRODIURIL) 50 MG tablet Take 50 mg by mouth Every Morning.   7/19/2022 at Unknown time   • lisinopril (PRINIVIL,ZESTRIL) 40 MG tablet Take 40 mg by mouth Every Evening.   7/18/2022 at Unknown time   • melatonin 5 MG tablet tablet Take 5 mg by mouth Every Night.   7/18/2022 at Unknown time   • methotrexate 2.5 MG tablet Take 2.5 mg by mouth. Wednesdays takes 2.5mg 10 tabs all together for total of 25mg   Past Week at Unknown time   • ONE TOUCH ULTRA TEST test strip   0 7/19/2022 at Unknown time   • rivastigmine (EXELON) 1.5 MG capsule Take 1.5 mg by mouth 2 (Two) Times a Day.   7/19/2022 at Unknown time   • Sennosides (SENNA LAX PO) Take 1 tablet by mouth 1 (One) Time.   7/19/2022 at Unknown time   • traZODone (DESYREL) 50 MG tablet Take 50 mg by mouth Every Night.   7/18/2022 at Unknown time   • triamcinolone (KENALOG) 0.025 % cream APPLY TWICE DAILY TO FOREHEAD AND CHEST   Past Week at Unknown time   • hydrOXYzine pamoate (VISTARIL) 25 MG capsule Take  25 mg by mouth 3 (Three) Times a Day As Needed.      • lidocaine (LIDODERM) 5 % Place 1 patch on the skin as directed by provider Daily. Remove & Discard patch within 12 hours or as directed by MD 15 each 0    • nystatin 665562 UNIT/GM powder APPLY TO RASH EVERY DAY AS NEEDED      • ondansetron ODT (ZOFRAN-ODT) 4 MG disintegrating tablet Place 1 tablet on the tongue 4 (Four) Times a Day As Needed for Nausea or Vomiting. 15 tablet 0      Current Meds:   [unfilled]  Allergies:  No Known Allergies  Immunizations:  Immunization History   Administered Date(s) Administered   • COVID-19 (PFIZER) PURPLE CAP 2021, 2021, 2021   • Covid-19 (Pfizer) Gray Cap 2022     Social History:   Social History     Tobacco Use   • Smoking status: Former Smoker     Packs/day: 2.00     Years: 0.00     Pack years: 0.00     Types: Cigarettes     Start date: 1968     Quit date: 1989     Years since quittin.1   • Smokeless tobacco: Never Used   • Tobacco comment: Did’nt inhale   Substance Use Topics   • Alcohol use: Not Currently     Alcohol/week: 0.0 standard drinks     Comment: 1 drink per day at most      Family History:  Family History   Problem Relation Age of Onset   • Diabetes Mother    • Cancer Mother    • Cancer Father    • Diabetes Father    • Heart disease Maternal Grandmother    • Heart disease Maternal Grandfather    • Heart disease Paternal Grandmother    • Heart disease Paternal Grandfather    • Malig Hyperthermia Neg Hx       Review of Systems  negative 12 point system review except: n/a  Objective:  tMax 24 hrs: Temp (24hrs), Av.7 °F (36.5 °C), Min:97.4 °F (36.3 °C), Max:98 °F (36.7 °C)    Vitals Ranges:   Temp:  [97.4 °F (36.3 °C)-98 °F (36.7 °C)] 97.4 °F (36.3 °C)  Heart Rate:  [61-79] 72  Resp:  [16-18] 16  BP: ()/(54-70) 111/54  Intake and Output Last 3 Shifts:   I/O last 3 completed shifts:  In: 300 [P.O.:300]  Out: 2300 [Urine:2300]  Exam:  /54 (BP Location: Right arm,  "Patient Position: Lying)   Pulse 72   Temp 97.4 °F (36.3 °C) (Oral)   Resp 16   Ht 175.3 cm (69\")   Wt 85.1 kg (187 lb 11.2 oz)   SpO2 99%   BMI 27.72 kg/m²       General Appearance: Alert, cooperative, no distress, appears stated age   Head: Normocephalic, without obvious abnormality, atraumatic   ENT: Normal hearing, external inspection, ears and nose   Eyes: Normal pupils/irises, external inspection, conjunctivae, eyelids   Back: No CVA tenderness   Respiratory: Normal effort, palpation, auscultation   CV: Normal auscultation, carotid pulses, no edema   Abdomen: No hernia, soft, nontender, no masses   :    Musculoskeletal: Normal extremities, nails, digits   Skin: Normal skin color, texture, no rashes or lesion   Neurologic/psych: Normal orientation, mood, affect           Data Review:  CBC:   Results from last 7 days   Lab Units 07/21/22  0337   WBC 10*3/mm3 11.28*   RBC 10*6/mm3 3.88*     BMP:   Results from last 7 days   Lab Units 07/21/22  0337   GLUCOSE mg/dL 126*   CO2 mmol/L 21.8*   BUN mg/dL 22   CREATININE mg/dL 0.99   CALCIUM mg/dL 8.4*      Assessment:    Closed fracture of multiple ribs of right side, initial encounter    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

## 2022-07-25 NOTE — CASE MANAGEMENT/SOCIAL WORK
Case Management Discharge Note      Final Note: DC'd home 7/23 with [facility] following    Provided Post Acute Provider List?: N/A  Provided Post Acute Provider Quality & Resource List?: N/A        Durable Medical Equipment Coordination complete.    Service Provider Selected Services Address Phone Fax Patient Preferred    [facility]  Durable Medical Equipment [address], [place] [address] [phone] --              Home Medical Care Coordination complete.    Service Provider Selected Services Address Phone Fax Patient Preferred    [facility]  [facility] [address], [address], [place] [address] [phone] --                     Transportation Services  Private: Car    Final Discharge Disposition Code: 06 - home with home health care

## 2022-10-13 ENCOUNTER — TELEPHONE (OUTPATIENT)
Dept: SURGERY | Facility: CLINIC | Age: 82
End: 2022-10-13

## 2022-10-13 ENCOUNTER — OFFICE VISIT (OUTPATIENT)
Dept: SURGERY | Facility: CLINIC | Age: 82
End: 2022-10-13

## 2022-10-13 VITALS — WEIGHT: 182 LBS | BODY MASS INDEX: 26.96 KG/M2 | HEIGHT: 69 IN

## 2022-10-13 DIAGNOSIS — R93.5 ABNORMAL MRI OF ABDOMEN: ICD-10-CM

## 2022-10-13 DIAGNOSIS — D49.0 RETROPERITONEAL TUMOR: Primary | ICD-10-CM

## 2022-10-13 PROCEDURE — 99204 OFFICE O/P NEW MOD 45 MIN: CPT

## 2022-10-13 NOTE — TELEPHONE ENCOUNTER
Returned Mr. Yadav's call.  He had a question regarding his after visit summary. Advised him that he will need to hold his Plavix 5 days prior to his surgery and to discuss with his cardiologist prior. Patient verbalized understanding and all questions were answered.

## 2022-10-13 NOTE — PROGRESS NOTES
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  Dr. Vargas and I discussed with him regarding proceeding with a CT abdomen/pelvis with IV contrast to re-evaluate this area as his MRI was completed in July.  Discussed if imaging is stable, would proceed with laparoscopic excision of left retroperitoneal mass. Discussed the nature of the procedure and the benefits and risks, including but not limited to bleeding, infection, damage to surrounding structures, converting to an open procedure, and possible partial or complete removal of his left kidney. Discussed he will likely need to stay in the hospital for one night, possibly longer depending on any additional findings. Dr. Vargas will discuss further surgical planning with Dr. Martinez (urology) once he reviews the CT scan. Patient will need to hold his Plavix 5 days prior to the procedure. Patient states he would like to have the surgery scheduled for sometime in December.  Patient verbalized understanding and all questions were answered.    CC: Retroperitoneal tumor     HPI: Mr. Yadav is an 82-year-old male who presents today for recent MRI findings of a retroperitoneal tumor.  This was an incidental finding on imaging after he was evaluated for a fall. In July 2022, patient fell on an escalator at [employer].  He stated he was try to get on the escalator when his cane got stuck.  He reports he frequently falls and uses a cane to ambulate.  He presented to [facility] ER for further evaluation after his fall.  His work-up included a CT head/chest.  He was noted to have multiple right-sided rib fractures. His CT chest also noted fat density near the left kidney.  He then underwent a MRI of the abdomen for further evaluation and was found to have a retroperitoneal tumor. He denies any abdominal pain or acute back pain.  He reports normal urination and bowel movements. He  states he occasionally will have constipation and need to take a laxative. He has a televisit with Dr. Martinez next Wednesday for a follow-up.    ENDOSCOPY:   • No recent colonoscopy or upper endoscopy    RADIOLOGY:   7/19/2022 CT Chest:   Approximately 5.7cm fat density adjacent to the left kidney, not completely visualized, recommended further evaluation with contrast-enhanced MRI    7/22/2022 MRI Abdomen w/ and w/out contrast:   Ill-defined T2 hyperintense, T1 hypointense lesion appearing to arise from the left kidney measuring 3 x 3.5 x 3.9 cm, corresponding with ill-defined lesion containing macroscopic fat seen on CT in 2015  IMPRESSION:  Ill-defined fat density lesion centered within the left posterior perirenal fascia and abutting the left kidney.    LABS:    • 8/15/2022 HgbA1C 6.3, GFR 59, Creatinine 1.17    PREVIOUS ABDOMINAL SURGERY    • Open cholecystectomy    OTHER SURGERY  · Carotid endarterectomy (9/2020), Left Ileo-femoral Goretex graft and left leg arteriogram (12/2016), Left Femoral artery angioplasty (11/2016), eye surgery, iliac artery stent, left knee surgery, left ankle ORIF    PMH:    • Diabetes mellitus, hypertension, hyperlipidemia, bilateral carotid stenosis, peripheral neuropathy, peripheral vascular disease, rheumatoid arthritis, spondylolisthesis, spinal stenosis    ALLERGIES:   • None    MEDICATIONS:   • Tylenol, atorvastatin, cetirizine, clopidogrel 75 mg, folic acid, hydrochlorothiazide, lisinopril, melatonin, methotrexate, rivastigmine, sennoside, trazodone, triamcinolone cream    FAMILY HISTORY:    • Colorectal cancer: Negative  • Liver cancer: Mother    SOCIAL HISTORY:   • Denies tobacco use, former smoker quit 1989  • Denies alcohol use    PHYSICAL EXAM:   • Constitutional: Well-developed, well-nourished, no acute distress  • Height 175.3 cm, Weight 82.6 kg, BMI 26.88  • Eyes: Conjunctiva normal, sclera nonicteric  • ENMT: Hearing grossly normal, oral mucosa moist  • Respiratory:  Normal inspiratory effort  • Cardiovascular: No peripheral edema, no jugular venous distention  • Gastrointestinal: Soft, nontender, no palpable mass, negative for hernia, prior Kocher incision noted in right upper quadrant, no CVA tenderness  • Skin: Warm, dry, no rash on visualized skin surfaces  • Musculoskeletal: Symmetric strength, normal gait; patient uses cane for assistance  • Psychiatric: Alert and oriented ×3, normal affect     ROS:    No cough, chest pain, shortness of air.  All other systems reviewed and negative other than presenting complaints.    Melanie Chapin PA-C  General Surgery     [facility], [facility]  [address], [address]  [place] [address]  P: [phone]  F: [phone]

## 2022-10-13 NOTE — TELEPHONE ENCOUNTER
Patient called and would like to know which medicines he is supposed to stop taking. He also had some follow up questions and would like to talk to you.

## 2022-10-14 ENCOUNTER — TELEPHONE (OUTPATIENT)
Dept: SURGERY | Facility: CLINIC | Age: 82
End: 2022-10-14

## 2022-10-14 NOTE — TELEPHONE ENCOUNTER
Patient left a voice mail stating he would like to continue his discussion with MADDIE Bowden regarding havinga sooner appointment.  Patient said they discussion started yesterday during his appointment with her.

## 2022-10-18 ENCOUNTER — TELEPHONE (OUTPATIENT)
Dept: SURGERY | Facility: CLINIC | Age: 82
End: 2022-10-18

## 2022-10-18 PROBLEM — D49.0 RETROPERITONEAL TUMOR: Status: ACTIVE | Noted: 2022-10-18

## 2022-10-19 ENCOUNTER — TELEPHONE (OUTPATIENT)
Dept: SURGERY | Facility: CLINIC | Age: 82
End: 2022-10-19

## 2022-10-19 NOTE — TELEPHONE ENCOUNTER
Caller: Albert Yadav    Relationship: Self    Best call back number: [phone]    What is the best time to reach you: ANYTIME    Who are you requesting to speak with (clinical staff, provider,  specific staff member): PROVIDER OR MA     What was the call regarding: PT REQUESTING TO SPEAK TO PROVIDER OR MA IN REGARDS TO A SPINAL INJECTION HE IS SCHEDULED FOR TOMORROW.   PT STATED HE KNOWS IT'S NOT RELATED BUT WOULD LIKE DR. MARTINEZ'S OPINION ON THE INJECTION.    Do you require a callback: YES

## 2022-10-20 ENCOUNTER — ANESTHESIA EVENT (OUTPATIENT)
Dept: PAIN MEDICINE | Facility: HOSPITAL | Age: 82
End: 2022-10-20

## 2022-10-20 ENCOUNTER — ANESTHESIA (OUTPATIENT)
Dept: PAIN MEDICINE | Facility: HOSPITAL | Age: 82
End: 2022-10-20

## 2022-10-20 ENCOUNTER — HOSPITAL ENCOUNTER (OUTPATIENT)
Dept: GENERAL RADIOLOGY | Facility: HOSPITAL | Age: 82
Discharge: HOME | End: 2022-10-20

## 2022-10-20 ENCOUNTER — HOSPITAL ENCOUNTER (OUTPATIENT)
Dept: PAIN MEDICINE | Facility: HOSPITAL | Age: 82
Discharge: HOME | End: 2022-10-20

## 2022-10-20 VITALS
RESPIRATION RATE: 16 BRPM | DIASTOLIC BLOOD PRESSURE: 77 MMHG | HEART RATE: 67 BPM | TEMPERATURE: 98.2 F | SYSTOLIC BLOOD PRESSURE: 130 MMHG | OXYGEN SATURATION: 98 %

## 2022-10-20 DIAGNOSIS — R52 PAIN: ICD-10-CM

## 2022-10-20 DIAGNOSIS — M48.062 SPINAL STENOSIS, LUMBAR REGION, WITH NEUROGENIC CLAUDICATION: ICD-10-CM

## 2022-10-20 PROCEDURE — 77003 FLUOROGUIDE FOR SPINE INJECT: CPT

## 2022-10-20 PROCEDURE — 25010000002 METHYLPREDNISOLONE PER 80 MG: Performed by: ANESTHESIOLOGY

## 2022-10-20 PROCEDURE — 0 IOPAMIDOL 41 % SOLUTION: Performed by: ANESTHESIOLOGY

## 2022-10-20 RX ORDER — METHYLPREDNISOLONE ACETATE 80 MG/ML
80 INJECTION, SUSPENSION INTRA-ARTICULAR; INTRALESIONAL; INTRAMUSCULAR; SOFT TISSUE ONCE
Status: COMPLETED | OUTPATIENT
Start: 2022-10-20 | End: 2022-10-20

## 2022-10-20 RX ADMIN — IOPAMIDOL 10 ML: 408 INJECTION, SOLUTION INTRATHECAL at 07:54

## 2022-10-20 RX ADMIN — METHYLPREDNISOLONE ACETATE 80 MG: 80 INJECTION, SUSPENSION INTRA-ARTICULAR; INTRALESIONAL; INTRAMUSCULAR; SOFT TISSUE at 07:54

## 2022-10-20 NOTE — ANESTHESIA PROCEDURE NOTES
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 All questions were answered. The patient was discharged with appropriate instructions.  Prep:  Pt Position:prone  Sterile Tech:cap, gloves, mask and sterile barrier  Prep:chlorhexidine gluconate and isopropyl alcohol  Monitoring:blood pressure monitoring, continuous pulse oximetry and EKG  Procedure:Sedation: no     Approach:left paramedian  Guidance: fluoroscopy  Location:lumbar  Level:4-5 (Interlaminar)  Needle Type:Tuohy  Needle Gauge:20 G  Aspiration:negative  Medications:  Preservative Free Saline:3mL  Isovue:2mL  Comments:Isovue dye spread was consistent with epidural placement.Depomedrol:80 mg  Post Assessment:  Dressing:occlusive dressing applied  Pt Tolerance:patient tolerated the procedure well with no apparent complications  Complications:no

## 2022-10-20 NOTE — H&P
[facility]    History and Physical    Patient Name: Albert Yadav  :  1940  MRN:  [id number]  Date of Admission: 10/20/2022    Subjective       Patient is an 82-year-old male who has pain which originates in his lower back and radiates to his right lower extremity.  He reports approximate 50% relief following his last injection.  Today he rates his pain as an 7/10.  He is here for lumbar epidural steroid injection #4.      The following portions of the patients history were reviewed and updated as appropriate: current medications, allergies, past medical history, past surgical history, past family history, past social history and problem list                Objective     Past Medical History:   Past Medical History:   Diagnosis Date   • Arthritis of back 10 years ago   • At risk for sleep apnea     STOP BANG = 5   • Broken bones     arm, collar bone, ankle   • Carotid artery disorder (HCC)    • Cervical disc disorder ?   • Cholelithiasis     Gall bladder removed   • Diabetes mellitus (HCC)     Type 2   • Falls    • Fracture of ankle    • Fracture of wrist    • Fracture, clavicle 26 years ago   • Fracture, foot 26 year ago   • Groin rash     PT STATES LEFT GROIN AT TIMES   • History of transfusion    • Hyperlipidemia    • Hypertension    • Low back pain    • Low back strain ?   • Lumbar spinal stenosis    • Numbness and tingling     RIGHT ARM, RIGHT LEG & FOOT   • Peripheral neuropathy    • Peripheral vascular disease (HCC)    • Rheumatoid arthritis (HCC)     HANDS DIALLO   • Staph infection     2015  [facility] POST SX   • Tendency toward bleeding easily ([facility])     due to blood thinners     Past Surgical History:   Past Surgical History:   Procedure Laterality Date   • ANGIOPLASTY FEMORAL ARTERY Left 2016    Procedure: ULTRA SOUND ACCESS RIGHT FEMORAL ARTERY, AIF BILATERAL RUNOFF, SELECTIVE CATHETERIZATION LEFT FEMORAL ARTERY.;  Surgeon: Errol Michael MD;  Location: [facility] OR  ;  Service:    • ANKLE OPEN REDUCTION INTERNAL FIXATION     • ARTERIOVENOUS FISTULA/SHUNT SURGERY Left 2016    Procedure: LEFT ILEO-FEMORAL GORTEX GRAFT AND LEFT LEG ARERIOGRAM ;  Surgeon: Errol Michael MD;  Location: [facility] OR ;  Service:    • CAROTID ENDARTERECTOMY Right 2020    Procedure: CAROTID ENDARTERECTOMY;  Surgeon: James Graham MD;  Location: [facility] MAIN OR;  Service: Vascular;  Laterality: Right;   • CHOLECYSTECTOMY     • EPIDURAL BLOCK     • EYE SURGERY Left     as child   • FRACTURE SURGERY Left     arm   • ILIAC ARTERY STENT     • KNEE SURGERY Left     ORIF   • ORIF ANKLE FRACTURE Left      Family History:   Family History   Problem Relation Age of Onset   • Diabetes Mother    • Cancer Mother         Cancer liver   • Cancer Father         Diabetic   • Diabetes Father    • Heart disease Maternal Grandmother    • Heart disease Maternal Grandfather    • Heart disease Paternal Grandmother    • Heart disease Paternal Grandfather    • Alcohol abuse Son         Alcoholic   • Cancer Brother    • Malig Hyperthermia Neg Hx      Social History:   Social History     Socioeconomic History   • Marital status:    Tobacco Use   • Smoking status: Former     Packs/day: 1.00     Years: 20.00     Pack years: 20.00     Types: Cigarettes     Start date: 1968     Quit date: 1989     Years since quittin.3   • Smokeless tobacco: Never   • Tobacco comments:     Did'nt inhale   Vaping Use   • Vaping Use: Never used   Substance and Sexual Activity   • Alcohol use: Not Currently     Alcohol/week: 1.0 standard drink     Types: 1 Shots of liquor per week     Comment: 1 drink per day at most   • Drug use: Never   • Sexual activity: Not Currently     Partners: Female     Birth control/protection: Condom       Vital Signs Range for the last 24 hours  Temperature:     Temp Source:     BP:     Pulse:     Respirations:     SPO2:     O2 Amount (l/min):     O2 Devices      Weight:           --------------------------------------------------------------------------------    Current Outpatient Medications   Medication Sig Dispense Refill   • acetaminophen (TYLENOL) 650 MG 8 hr tablet Take 650 mg by mouth Every 8 (Eight) Hours As Needed for Mild Pain .     • atorvastatin (LIPITOR) 40 MG tablet Take 40 mg by mouth Every Evening.     • cetirizine (ZyrTEC) 10 MG tablet Take 10 mg by mouth Every Morning.     • clopidogrel (PLAVIX) 75 MG tablet Take 75 mg by mouth Every Morning.     • folic acid (FOLVITE) 1 MG tablet Take 1 mg by mouth Daily.     • hydrochlorothiazide (HYDRODIURIL) 50 MG tablet Take 50 mg by mouth Every Morning.     • lisinopril (PRINIVIL,ZESTRIL) 40 MG tablet Take 40 mg by mouth Every Evening.     • melatonin 5 MG tablet tablet Take 5 mg by mouth Every Night.     • methotrexate 2.5 MG tablet Take 2.5 mg by mouth. Wednesdays takes 2.5mg 10 tabs all together for total of 25mg     • rivastigmine (EXELON) 1.5 MG capsule Take 1.5 mg by mouth 2 (Two) Times a Day.     • Sennosides (SENNA LAX PO) Take 1 tablet by mouth 1 (One) Time.     • traZODone (DESYREL) 50 MG tablet Take 50 mg by mouth Every Night.     • triamcinolone (KENALOG) 0.025 % cream APPLY TWICE DAILY TO FOREHEAD AND CHEST       No current facility-administered medications for this encounter.       --------------------------------------------------------------------------------  Assessment & Plan      Anesthesia Evaluation     Patient summary reviewed and Nursing notes reviewed   NPO Solid Status: > 8 hours  NPO Liquid Status: > 2 hours           Airway   Mallampati: II  TM distance: >3 FB  Neck ROM: full  Dental - normal exam     Pulmonary - normal exam    breath sounds clear to auscultation  (+) a smoker Former,   Cardiovascular - normal exam    Rhythm: regular  Rate: normal    (+) hypertension, PVD, hyperlipidemia,  carotid artery disease carotid bilateral  (-) angina, orthopnea, PND, BEAULIEU      Neuro/Psych  (+)  numbness,    GI/Hepatic/Renal/Endo    (+)   diabetes mellitus,     Musculoskeletal     Abdominal    Substance History - negative use     OB/GYN negative ob/gyn ROS         Other   arthritis,                 Diagnosis and Plan      Treatment Plan  ASA 3      Procedures: Lumbar Epidural Steroid Injection(LESI), With fluoroscopy,       Anesthetic plan and risks discussed with patient.          Diagnosis     * Spinal stenosis of lumbar region with neurogenic claudication [M48.062]

## 2022-10-21 ENCOUNTER — HOSPITAL ENCOUNTER (OUTPATIENT)
Dept: CT IMAGING | Facility: HOSPITAL | Age: 82
Discharge: HOME | End: 2022-10-21

## 2022-10-21 DIAGNOSIS — R93.5 ABNORMAL MRI OF ABDOMEN: ICD-10-CM

## 2022-10-21 LAB — CREAT BLDA-MCNC: 1.3 MG/DL (ref 0.6–1.3)

## 2022-10-21 PROCEDURE — 74177 CT ABD & PELVIS W/CONTRAST: CPT

## 2022-10-21 PROCEDURE — 82565 ASSAY OF CREATININE: CPT

## 2022-10-21 PROCEDURE — 25010000002 IOPAMIDOL 61 % SOLUTION

## 2022-10-21 RX ADMIN — IOPAMIDOL 85 ML: 612 INJECTION, SOLUTION INTRAVENOUS at 15:57

## 2022-10-25 ENCOUNTER — TELEPHONE (OUTPATIENT)
Dept: SURGERY | Facility: CLINIC | Age: 82
End: 2022-10-25

## 2022-10-25 NOTE — TELEPHONE ENCOUNTER
Fax request sent to Dr. Graham regarding when patient needs to stop his plavix prior to surgery on 12/20/22

## 2022-10-26 ENCOUNTER — TELEPHONE (OUTPATIENT)
Dept: SURGERY | Facility: CLINIC | Age: 82
End: 2022-10-26

## 2022-10-26 NOTE — TELEPHONE ENCOUNTER
Reviewed recent CT results, discussed with Dr. Vargas. Called patient to inform him there have been no changes and we will proceed with surgery as previously discussed. Patient verbalized understanding and all questions were answered.      Melanie Chapin PA-C  General Surgery    [facility]  [address], [address]  [place] [id number]  P: [phone]  F: [phone]

## 2022-10-27 ENCOUNTER — TELEPHONE (OUTPATIENT)
Dept: SURGERY | Facility: CLINIC | Age: 82
End: 2022-10-27

## 2022-10-27 NOTE — TELEPHONE ENCOUNTER
SPOKE TO PT REGARDING THE APPT REQUEST RECEIVED VIA [employer]. HE STATED THAT HE SPOKE TO LUDWIN WALKER YESTERDAY AND SHE ANSWERED ALL HIS QUESTIONS. HE DIDN'T FEEL THAT HE NEEDED AN APPT TO BE SEEN. MARKED MESSAGE AS HANDLED.

## 2022-11-29 ENCOUNTER — PRE-ADMISSION TESTING (OUTPATIENT)
Dept: PREADMISSION TESTING | Facility: HOSPITAL | Age: 82
End: 2022-11-29

## 2022-11-29 VITALS
DIASTOLIC BLOOD PRESSURE: 62 MMHG | OXYGEN SATURATION: 96 % | HEIGHT: 69 IN | TEMPERATURE: 97.3 F | RESPIRATION RATE: 16 BRPM | WEIGHT: 189.5 LBS | SYSTOLIC BLOOD PRESSURE: 104 MMHG | BODY MASS INDEX: 28.07 KG/M2 | HEART RATE: 65 BPM

## 2022-11-29 DIAGNOSIS — D49.0 RETROPERITONEAL TUMOR: ICD-10-CM

## 2022-11-29 LAB
ALBUMIN SERPL-MCNC: 3.3 G/DL (ref 3.5–5.2)
ALBUMIN/GLOB SERPL: 1.2 G/DL
ALP SERPL-CCNC: 88 U/L (ref 39–117)
ALT SERPL W P-5'-P-CCNC: 31 U/L (ref 1–41)
ANION GAP SERPL CALCULATED.3IONS-SCNC: 11 MMOL/L (ref 5–15)
AST SERPL-CCNC: 25 U/L (ref 1–40)
BASOPHILS # BLD AUTO: 0.03 10*3/MM3 (ref 0–0.2)
BASOPHILS NFR BLD AUTO: 0.3 % (ref 0–1.5)
BILIRUB SERPL-MCNC: 0.3 MG/DL (ref 0–1.2)
BUN SERPL-MCNC: 18 MG/DL (ref 8–23)
BUN/CREAT SERPL: 14.6 (ref 7–25)
CALCIUM SPEC-SCNC: 8.7 MG/DL (ref 8.6–10.5)
CHLORIDE SERPL-SCNC: 102 MMOL/L (ref 98–107)
CO2 SERPL-SCNC: 25 MMOL/L (ref 22–29)
CREAT SERPL-MCNC: 1.23 MG/DL (ref 0.76–1.27)
DEPRECATED RDW RBC AUTO: 45.7 FL (ref 37–54)
EGFRCR SERPLBLD CKD-EPI 2021: 57.9 ML/MIN/1.73
EOSINOPHIL # BLD AUTO: 0.08 10*3/MM3 (ref 0–0.4)
EOSINOPHIL NFR BLD AUTO: 0.9 % (ref 0.3–6.2)
ERYTHROCYTE [DISTWIDTH] IN BLOOD BY AUTOMATED COUNT: 13.1 % (ref 12.3–15.4)
GLOBULIN UR ELPH-MCNC: 2.7 GM/DL
GLUCOSE SERPL-MCNC: 120 MG/DL (ref 65–99)
HCT VFR BLD AUTO: 38.3 % (ref 37.5–51)
HGB BLD-MCNC: 12.9 G/DL (ref 13–17.7)
IMM GRANULOCYTES # BLD AUTO: 0.07 10*3/MM3 (ref 0–0.05)
IMM GRANULOCYTES NFR BLD AUTO: 0.8 % (ref 0–0.5)
LYMPHOCYTES # BLD AUTO: 1.64 10*3/MM3 (ref 0.7–3.1)
LYMPHOCYTES NFR BLD AUTO: 18.4 % (ref 19.6–45.3)
MCH RBC QN AUTO: 32.9 PG (ref 26.6–33)
MCHC RBC AUTO-ENTMCNC: 33.7 G/DL (ref 31.5–35.7)
MCV RBC AUTO: 97.7 FL (ref 79–97)
MONOCYTES # BLD AUTO: 0.9 10*3/MM3 (ref 0.1–0.9)
MONOCYTES NFR BLD AUTO: 10.1 % (ref 5–12)
NEUTROPHILS NFR BLD AUTO: 6.21 10*3/MM3 (ref 1.7–7)
NEUTROPHILS NFR BLD AUTO: 69.5 % (ref 42.7–76)
NRBC BLD AUTO-RTO: 0.1 /100 WBC (ref 0–0.2)
PLATELET # BLD AUTO: 204 10*3/MM3 (ref 140–450)
PMV BLD AUTO: 10.2 FL (ref 6–12)
POTASSIUM SERPL-SCNC: 4.3 MMOL/L (ref 3.5–5.2)
PROT SERPL-MCNC: 6 G/DL (ref 6–8.5)
QT INTERVAL: 408 MS
RBC # BLD AUTO: 3.92 10*6/MM3 (ref 4.14–5.8)
SODIUM SERPL-SCNC: 138 MMOL/L (ref 136–145)
WBC NRBC COR # BLD: 8.93 10*3/MM3 (ref 3.4–10.8)

## 2022-11-29 PROCEDURE — 85025 COMPLETE CBC W/AUTO DIFF WBC: CPT

## 2022-11-29 PROCEDURE — 36415 COLL VENOUS BLD VENIPUNCTURE: CPT

## 2022-11-29 PROCEDURE — 93010 ELECTROCARDIOGRAM REPORT: CPT | Performed by: INTERNAL MEDICINE

## 2022-11-29 PROCEDURE — 93005 ELECTROCARDIOGRAM TRACING: CPT

## 2022-11-29 PROCEDURE — 80053 COMPREHEN METABOLIC PANEL: CPT

## 2022-11-30 ENCOUNTER — TELEPHONE (OUTPATIENT)
Dept: SURGERY | Facility: CLINIC | Age: 82
End: 2022-11-30

## 2022-11-30 NOTE — TELEPHONE ENCOUNTER
12/1/2022 Called patient and reviewed labs from PAT. Will proceed with surgery as planned.     Melanie Chapin PA-C  General Surgery     [facility]  [address], [address]  [place] [address]  P: [phone]  F: [phone]      -------  Patient left a voice mail requesting to speak with Melanie Chapin PA-C to discuss results of labs from PAT done 11/29/2022.

## 2022-12-07 ENCOUNTER — APPOINTMENT (OUTPATIENT)
Dept: CT IMAGING | Facility: HOSPITAL | Age: 82
End: 2022-12-07

## 2022-12-07 ENCOUNTER — HOSPITAL ENCOUNTER (OUTPATIENT)
Facility: HOSPITAL | Age: 82
Setting detail: OBSERVATION
Discharge: NURSING, REHAB OR LONG-TERM CARE | End: 2022-12-10
Attending: EMERGENCY MEDICINE | Admitting: HOSPITALIST

## 2022-12-07 ENCOUNTER — APPOINTMENT (OUTPATIENT)
Dept: GENERAL RADIOLOGY | Facility: HOSPITAL | Age: 82
End: 2022-12-07

## 2022-12-07 DIAGNOSIS — M25.551 ACUTE RIGHT HIP PAIN: ICD-10-CM

## 2022-12-07 DIAGNOSIS — S22.31XA CLOSED FRACTURE OF ONE RIB OF RIGHT SIDE, INITIAL ENCOUNTER: Primary | ICD-10-CM

## 2022-12-07 DIAGNOSIS — S70.01XD CONTUSION OF RIGHT HIP, SUBSEQUENT ENCOUNTER: ICD-10-CM

## 2022-12-07 PROBLEM — M06.9 RHEUMATOID ARTHRITIS (HCC): Status: ACTIVE | Noted: 2022-12-07

## 2022-12-07 LAB
ALBUMIN SERPL-MCNC: 3.2 G/DL (ref 3.5–5.2)
ALBUMIN/GLOB SERPL: 1.2 G/DL
ALP SERPL-CCNC: 89 U/L (ref 39–117)
ALT SERPL W P-5'-P-CCNC: 46 U/L (ref 1–41)
ANION GAP SERPL CALCULATED.3IONS-SCNC: 8.8 MMOL/L (ref 5–15)
AST SERPL-CCNC: 23 U/L (ref 1–40)
BASOPHILS # BLD AUTO: 0.02 10*3/MM3 (ref 0–0.2)
BASOPHILS NFR BLD AUTO: 0.2 % (ref 0–1.5)
BILIRUB SERPL-MCNC: 0.4 MG/DL (ref 0–1.2)
BUN SERPL-MCNC: 16 MG/DL (ref 8–23)
BUN/CREAT SERPL: 15.1 (ref 7–25)
CALCIUM SPEC-SCNC: 8.6 MG/DL (ref 8.6–10.5)
CHLORIDE SERPL-SCNC: 105 MMOL/L (ref 98–107)
CO2 SERPL-SCNC: 24.2 MMOL/L (ref 22–29)
CREAT SERPL-MCNC: 1.06 MG/DL (ref 0.76–1.27)
DEPRECATED RDW RBC AUTO: 46.8 FL (ref 37–54)
EGFRCR SERPLBLD CKD-EPI 2021: 69.2 ML/MIN/1.73
EOSINOPHIL # BLD AUTO: 0.06 10*3/MM3 (ref 0–0.4)
EOSINOPHIL NFR BLD AUTO: 0.7 % (ref 0.3–6.2)
ERYTHROCYTE [DISTWIDTH] IN BLOOD BY AUTOMATED COUNT: 13.5 % (ref 12.3–15.4)
GLOBULIN UR ELPH-MCNC: 2.6 GM/DL
GLUCOSE SERPL-MCNC: 106 MG/DL (ref 65–99)
HCT VFR BLD AUTO: 38.2 % (ref 37.5–51)
HGB BLD-MCNC: 12.6 G/DL (ref 13–17.7)
IMM GRANULOCYTES # BLD AUTO: 0.07 10*3/MM3 (ref 0–0.05)
IMM GRANULOCYTES NFR BLD AUTO: 0.8 % (ref 0–0.5)
LYMPHOCYTES # BLD AUTO: 1.64 10*3/MM3 (ref 0.7–3.1)
LYMPHOCYTES NFR BLD AUTO: 18.5 % (ref 19.6–45.3)
MCH RBC QN AUTO: 32.5 PG (ref 26.6–33)
MCHC RBC AUTO-ENTMCNC: 33 G/DL (ref 31.5–35.7)
MCV RBC AUTO: 98.5 FL (ref 79–97)
MONOCYTES # BLD AUTO: 1.02 10*3/MM3 (ref 0.1–0.9)
MONOCYTES NFR BLD AUTO: 11.5 % (ref 5–12)
NEUTROPHILS NFR BLD AUTO: 6.07 10*3/MM3 (ref 1.7–7)
NEUTROPHILS NFR BLD AUTO: 68.3 % (ref 42.7–76)
NRBC BLD AUTO-RTO: 0 /100 WBC (ref 0–0.2)
PLATELET # BLD AUTO: 196 10*3/MM3 (ref 140–450)
PMV BLD AUTO: 10.4 FL (ref 6–12)
POTASSIUM SERPL-SCNC: 3.7 MMOL/L (ref 3.5–5.2)
PROT SERPL-MCNC: 5.8 G/DL (ref 6–8.5)
RBC # BLD AUTO: 3.88 10*6/MM3 (ref 4.14–5.8)
SODIUM SERPL-SCNC: 138 MMOL/L (ref 136–145)
WBC NRBC COR # BLD: 8.88 10*3/MM3 (ref 3.4–10.8)

## 2022-12-07 PROCEDURE — 72192 CT PELVIS W/O DYE: CPT

## 2022-12-07 PROCEDURE — 71250 CT THORAX DX C-: CPT

## 2022-12-07 PROCEDURE — 85025 COMPLETE CBC W/AUTO DIFF WBC: CPT | Performed by: EMERGENCY MEDICINE

## 2022-12-07 PROCEDURE — 73502 X-RAY EXAM HIP UNI 2-3 VIEWS: CPT

## 2022-12-07 PROCEDURE — 70450 CT HEAD/BRAIN W/O DYE: CPT

## 2022-12-07 PROCEDURE — G0378 HOSPITAL OBSERVATION PER HR: HCPCS

## 2022-12-07 PROCEDURE — 80053 COMPREHEN METABOLIC PANEL: CPT | Performed by: EMERGENCY MEDICINE

## 2022-12-07 PROCEDURE — 99284 EMERGENCY DEPT VISIT MOD MDM: CPT

## 2022-12-07 RX ORDER — SODIUM CHLORIDE 0.9 % (FLUSH) 0.9 %
10 SYRINGE (ML) INJECTION AS NEEDED
Status: DISCONTINUED | OUTPATIENT
Start: 2022-12-07 | End: 2022-12-08

## 2022-12-07 RX ORDER — LIDOCAINE 50 MG/G
1 PATCH TOPICAL
Status: DISCONTINUED | OUTPATIENT
Start: 2022-12-07 | End: 2022-12-10 | Stop reason: HOSPADM

## 2022-12-07 RX ADMIN — LIDOCAINE 1 PATCH: 700 PATCH TOPICAL at 20:28

## 2022-12-07 NOTE — ED NOTES
Pt was pushing cart, fell and pain to R hip, R skin tear to RW. + head injury. Denies back pain, neck pain, HA, no loc. On plavix for stents     Pt placed in mask at triage, this staff member wearing appropriate ppe

## 2022-12-07 NOTE — ED TRIAGE NOTES
Pt transferred himself from bed to  with very minimal assistance to bathroom. Mild stiffness and weakness noted but tolerated OK.

## 2022-12-08 PROBLEM — S70.01XA CONTUSION OF RIGHT HIP: Status: ACTIVE | Noted: 2022-12-08

## 2022-12-08 LAB
ALBUMIN SERPL-MCNC: 3 G/DL (ref 3.5–5.2)
ALBUMIN/GLOB SERPL: 1.2 G/DL
ALP SERPL-CCNC: 95 U/L (ref 39–117)
ALT SERPL W P-5'-P-CCNC: 42 U/L (ref 1–41)
ANION GAP SERPL CALCULATED.3IONS-SCNC: 10.1 MMOL/L (ref 5–15)
AST SERPL-CCNC: 23 U/L (ref 1–40)
BILIRUB SERPL-MCNC: 0.7 MG/DL (ref 0–1.2)
BUN SERPL-MCNC: 15 MG/DL (ref 8–23)
BUN/CREAT SERPL: 14.7 (ref 7–25)
CALCIUM SPEC-SCNC: 8.6 MG/DL (ref 8.6–10.5)
CHLORIDE SERPL-SCNC: 105 MMOL/L (ref 98–107)
CO2 SERPL-SCNC: 24.9 MMOL/L (ref 22–29)
CREAT SERPL-MCNC: 1.02 MG/DL (ref 0.76–1.27)
DEPRECATED RDW RBC AUTO: 44.2 FL (ref 37–54)
EGFRCR SERPLBLD CKD-EPI 2021: 72.5 ML/MIN/1.73
ERYTHROCYTE [DISTWIDTH] IN BLOOD BY AUTOMATED COUNT: 13 % (ref 12.3–15.4)
GLOBULIN UR ELPH-MCNC: 2.6 GM/DL
GLUCOSE SERPL-MCNC: 110 MG/DL (ref 65–99)
HCT VFR BLD AUTO: 36 % (ref 37.5–51)
HGB BLD-MCNC: 12.6 G/DL (ref 13–17.7)
MCH RBC QN AUTO: 32.8 PG (ref 26.6–33)
MCHC RBC AUTO-ENTMCNC: 35 G/DL (ref 31.5–35.7)
MCV RBC AUTO: 93.8 FL (ref 79–97)
PLATELET # BLD AUTO: 172 10*3/MM3 (ref 140–450)
PMV BLD AUTO: 10.5 FL (ref 6–12)
POTASSIUM SERPL-SCNC: 3.8 MMOL/L (ref 3.5–5.2)
PROT SERPL-MCNC: 5.6 G/DL (ref 6–8.5)
RBC # BLD AUTO: 3.84 10*6/MM3 (ref 4.14–5.8)
SODIUM SERPL-SCNC: 140 MMOL/L (ref 136–145)
WBC NRBC COR # BLD: 7.62 10*3/MM3 (ref 3.4–10.8)

## 2022-12-08 PROCEDURE — 97116 GAIT TRAINING THERAPY: CPT

## 2022-12-08 PROCEDURE — 36415 COLL VENOUS BLD VENIPUNCTURE: CPT | Performed by: HOSPITALIST

## 2022-12-08 PROCEDURE — 97161 PT EVAL LOW COMPLEX 20 MIN: CPT

## 2022-12-08 PROCEDURE — G0378 HOSPITAL OBSERVATION PER HR: HCPCS

## 2022-12-08 PROCEDURE — 80053 COMPREHEN METABOLIC PANEL: CPT | Performed by: HOSPITALIST

## 2022-12-08 PROCEDURE — 85027 COMPLETE CBC AUTOMATED: CPT | Performed by: HOSPITALIST

## 2022-12-08 RX ORDER — ONDANSETRON 2 MG/ML
4 INJECTION INTRAMUSCULAR; INTRAVENOUS EVERY 6 HOURS PRN
Status: DISCONTINUED | OUTPATIENT
Start: 2022-12-08 | End: 2022-12-10 | Stop reason: HOSPADM

## 2022-12-08 RX ORDER — ATORVASTATIN CALCIUM 20 MG/1
40 TABLET, FILM COATED ORAL EVERY EVENING
Status: DISCONTINUED | OUTPATIENT
Start: 2022-12-08 | End: 2022-12-10 | Stop reason: HOSPADM

## 2022-12-08 RX ORDER — TRAMADOL HYDROCHLORIDE 50 MG/1
25 TABLET ORAL EVERY 6 HOURS PRN
Status: DISCONTINUED | OUTPATIENT
Start: 2022-12-08 | End: 2022-12-10 | Stop reason: HOSPADM

## 2022-12-08 RX ORDER — POLYETHYLENE GLYCOL 3350 17 G/17G
17 POWDER, FOR SOLUTION ORAL DAILY PRN
Status: DISCONTINUED | OUTPATIENT
Start: 2022-12-08 | End: 2022-12-10 | Stop reason: HOSPADM

## 2022-12-08 RX ORDER — NITROGLYCERIN 0.4 MG/1
0.4 TABLET SUBLINGUAL
Status: DISCONTINUED | OUTPATIENT
Start: 2022-12-08 | End: 2022-12-10 | Stop reason: HOSPADM

## 2022-12-08 RX ORDER — ONDANSETRON 4 MG/1
4 TABLET, FILM COATED ORAL EVERY 6 HOURS PRN
Status: DISCONTINUED | OUTPATIENT
Start: 2022-12-08 | End: 2022-12-10 | Stop reason: HOSPADM

## 2022-12-08 RX ORDER — AMOXICILLIN 250 MG
2 CAPSULE ORAL 2 TIMES DAILY
Status: DISCONTINUED | OUTPATIENT
Start: 2022-12-08 | End: 2022-12-10 | Stop reason: HOSPADM

## 2022-12-08 RX ORDER — FOLIC ACID 1 MG/1
1 TABLET ORAL DAILY
Status: DISCONTINUED | OUTPATIENT
Start: 2022-12-08 | End: 2022-12-10 | Stop reason: HOSPADM

## 2022-12-08 RX ORDER — BISACODYL 5 MG/1
5 TABLET, DELAYED RELEASE ORAL DAILY PRN
Status: DISCONTINUED | OUTPATIENT
Start: 2022-12-08 | End: 2022-12-10 | Stop reason: HOSPADM

## 2022-12-08 RX ORDER — ACETAMINOPHEN 500 MG
1000 TABLET ORAL 3 TIMES DAILY
Status: COMPLETED | OUTPATIENT
Start: 2022-12-08 | End: 2022-12-09

## 2022-12-08 RX ORDER — LISINOPRIL 20 MG/1
40 TABLET ORAL EVERY EVENING
Status: DISCONTINUED | OUTPATIENT
Start: 2022-12-08 | End: 2022-12-10 | Stop reason: HOSPADM

## 2022-12-08 RX ORDER — UREA 10 %
3 LOTION (ML) TOPICAL NIGHTLY PRN
Status: DISCONTINUED | OUTPATIENT
Start: 2022-12-08 | End: 2022-12-10 | Stop reason: HOSPADM

## 2022-12-08 RX ORDER — BISACODYL 10 MG
10 SUPPOSITORY, RECTAL RECTAL DAILY PRN
Status: DISCONTINUED | OUTPATIENT
Start: 2022-12-08 | End: 2022-12-10 | Stop reason: HOSPADM

## 2022-12-08 RX ORDER — CHOLECALCIFEROL (VITAMIN D3) 125 MCG
5 CAPSULE ORAL NIGHTLY
Status: DISCONTINUED | OUTPATIENT
Start: 2022-12-08 | End: 2022-12-10 | Stop reason: HOSPADM

## 2022-12-08 RX ORDER — TRAZODONE HYDROCHLORIDE 50 MG/1
50 TABLET ORAL NIGHTLY
Status: DISCONTINUED | OUTPATIENT
Start: 2022-12-08 | End: 2022-12-10 | Stop reason: HOSPADM

## 2022-12-08 RX ORDER — RIVASTIGMINE TARTRATE 1.5 MG/1
1.5 CAPSULE ORAL 2 TIMES DAILY
Status: DISCONTINUED | OUTPATIENT
Start: 2022-12-08 | End: 2022-12-10 | Stop reason: HOSPADM

## 2022-12-08 RX ORDER — CLOPIDOGREL BISULFATE 75 MG/1
75 TABLET ORAL EVERY MORNING
Status: DISCONTINUED | OUTPATIENT
Start: 2022-12-08 | End: 2022-12-10 | Stop reason: HOSPADM

## 2022-12-08 RX ORDER — HYDROCHLOROTHIAZIDE 25 MG/1
50 TABLET ORAL EVERY MORNING
Status: DISCONTINUED | OUTPATIENT
Start: 2022-12-08 | End: 2022-12-10 | Stop reason: HOSPADM

## 2022-12-08 RX ADMIN — ACETAMINOPHEN 1000 MG: 500 TABLET ORAL at 16:16

## 2022-12-08 RX ADMIN — RIVASTIGMINE TARTRATE 1.5 MG: 1.5 CAPSULE ORAL at 09:39

## 2022-12-08 RX ADMIN — ATORVASTATIN CALCIUM 40 MG: 20 TABLET, FILM COATED ORAL at 16:17

## 2022-12-08 RX ADMIN — TRAZODONE HYDROCHLORIDE 50 MG: 50 TABLET ORAL at 21:10

## 2022-12-08 RX ADMIN — FOLIC ACID 1 MG: 1 TABLET ORAL at 09:39

## 2022-12-08 RX ADMIN — Medication 5 MG: at 20:39

## 2022-12-08 RX ADMIN — Medication 5 MG: at 02:29

## 2022-12-08 RX ADMIN — LIDOCAINE 1 PATCH: 700 PATCH TOPICAL at 09:42

## 2022-12-08 RX ADMIN — RIVASTIGMINE TARTRATE 1.5 MG: 1.5 CAPSULE ORAL at 20:39

## 2022-12-08 RX ADMIN — ACETAMINOPHEN 1000 MG: 500 TABLET ORAL at 09:39

## 2022-12-08 RX ADMIN — TRAZODONE HYDROCHLORIDE 50 MG: 50 TABLET ORAL at 02:29

## 2022-12-08 RX ADMIN — CLOPIDOGREL 75 MG: 75 TABLET, FILM COATED ORAL at 09:39

## 2022-12-08 RX ADMIN — TRAMADOL HYDROCHLORIDE 25 MG: 50 TABLET ORAL at 04:12

## 2022-12-08 RX ADMIN — HYDROCHLOROTHIAZIDE 50 MG: 50 TABLET ORAL at 09:39

## 2022-12-08 RX ADMIN — DOCUSATE SODIUM 50MG AND SENNOSIDES 8.6MG 2 TABLET: 8.6; 5 TABLET, FILM COATED ORAL at 21:10

## 2022-12-08 RX ADMIN — DOCUSATE SODIUM 50MG AND SENNOSIDES 8.6MG 2 TABLET: 8.6; 5 TABLET, FILM COATED ORAL at 09:39

## 2022-12-08 RX ADMIN — ACETAMINOPHEN 1000 MG: 500 TABLET ORAL at 20:39

## 2022-12-08 NOTE — ED NOTES
Nursing report ED to floor  Albert Yadav  82 y.o.  male    HPI :   Chief Complaint   Patient presents with    Hip Pain     R        Admitting doctor:   Anselmo Middleton MD    Admitting diagnosis:   The primary encounter diagnosis was Closed fracture of one rib of right side, initial encounter. A diagnosis of Acute right hip pain was also pertinent to this visit.    Code status:   Current Code Status       Date Active Code Status Order ID Comments User Context       12/8/2022 0048 CPR (Attempt to Resuscitate) [id number]  Anselmo Middleton MD ED        Question Answer    Code Status (Patient has no pulse and is not breathing) CPR (Attempt to Resuscitate)    Medical Interventions (Patient has pulse or is breathing) Full    Level Of Support Discussed With Patient                    Allergies:   Patient has no known allergies.    Isolation:   No active isolations    Intake and Output  No intake or output data in the 24 hours ending 12/08/22 0120    Weight:   There were no vitals filed for this visit.    Most recent vitals:   Vitals:    12/07/22 1721   BP: 149/72   Pulse: 75   Resp: 18   Temp: 97.8 °F (36.6 °C)   TempSrc: Tympanic   SpO2: 97%       Active LDAs/IV Access:   Lines, Drains & Airways       Active LDAs       Name Placement date Placement time Site Days    Peripheral IV 10/21/22 1517 Left Antecubital 10/21/22  1517  Antecubital  47    Peripheral IV 12/07/22 2147 Anterior;Left;Lower Forearm 12/07/22 2147  Forearm  less than 1                    Labs (abnormal labs have a star):   Labs Reviewed   COMPREHENSIVE METABOLIC PANEL - Abnormal; Notable for the following components:       Result Value    Glucose 106 (*)     Total Protein 5.8 (*)     Albumin 3.20 (*)     ALT (SGPT) 46 (*)     All other components within normal limits    Narrative:     GFR Normal >60  Chronic Kidney Disease <60  Kidney Failure <15    The GFR formula is only valid for adults with stable renal function between ages 18 and 70.   CBC WITH AUTO  DIFFERENTIAL - Abnormal; Notable for the following components:    RBC 3.88 (*)     Hemoglobin 12.6 (*)     MCV 98.5 (*)     Lymphocyte % 18.5 (*)     Immature Grans % 0.8 (*)     Monocytes, Absolute 1.02 (*)     Immature Grans, Absolute 0.07 (*)     All other components within normal limits   CBC (NO DIFF)   COMPREHENSIVE METABOLIC PANEL   CBC AND DIFFERENTIAL    Narrative:     The following orders were created for panel order CBC & Differential.  Procedure                               Abnormality         Status                     ---------                               -----------         ------                     CBC Auto Differential[[id number]]        Abnormal            Final result                 Please view results for these tests on the individual orders.       EKG:   No orders to display       Meds given in ED:   Medications   lidocaine (LIDODERM) 5 % 1 patch (1 patch Transdermal Medication Applied 12/7/22 2028)   hydroCHLOROthiazide (HYDRODIURIL) tablet 50 mg (has no administration in time range)   atorvastatin (LIPITOR) tablet 40 mg (has no administration in time range)   lisinopril (PRINIVIL,ZESTRIL) tablet 40 mg (has no administration in time range)   melatonin tablet 5 mg (has no administration in time range)   traZODone (DESYREL) tablet 50 mg (has no administration in time range)   clopidogrel (PLAVIX) tablet 75 mg (has no administration in time range)   folic acid (FOLVITE) tablet 1 mg (has no administration in time range)   rivastigmine (EXELON) capsule 1.5 mg (has no administration in time range)   sennosides-docusate (PERICOLACE) 8.6-50 MG per tablet 2 tablet (has no administration in time range)     And   polyethylene glycol (MIRALAX) packet 17 g (has no administration in time range)     And   bisacodyl (DULCOLAX) EC tablet 5 mg (has no administration in time range)     And   bisacodyl (DULCOLAX) suppository 10 mg (has no administration in time range)   nitroglycerin (NITROSTAT) SL tablet 0.4  mg (has no administration in time range)   ondansetron (ZOFRAN) tablet 4 mg (has no administration in time range)     Or   ondansetron (ZOFRAN) injection 4 mg (has no administration in time range)   melatonin tablet 3 mg (has no administration in time range)   acetaminophen (TYLENOL) tablet 1,000 mg (has no administration in time range)   traMADol (ULTRAM) tablet 25 mg (has no administration in time range)       Imaging results:  CT Head Without Contrast    Result Date: 12/7/2022  Negative.  This report was finalized on 12/7/2022 9:05 PM by Dr. Remi Perez M.D.      CT Chest Without Contrast Diagnostic    Result Date: 12/7/2022  1.  New right eighth rib fracture anteriorly not seen on 07/19/2022. The subtle surrounding callus formation suggestive of an acute to subacute injury. Incompletely united and healing right ninth and 10th rib fractures, as before. 2.  Mixed fat and soft tissue density lesion abutting the left kidney is grossly unchanged since 12/06/2021 and better evaluated on prior MRI. Please refer to this dictation for further information. 3.  Other findings as above.  This report was finalized on 12/7/2022 9:13 PM by Dr. Dwight Cevallos M.D.      CT Pelvis Without Contrast    Result Date: 12/8/2022  FINDINGS AND IMPRESSION: For the purposes of this dictation, the last well-formed disc space be referred to as L5-S1. There is mild anterolisthesis of L4 and L5, as before. Extensive atherosclerosis is present within the visualized pelvis.  There is generalized bony demineralization. While no definite right hip or pelvic fracture is seen, please note in the setting of bony demineralization and trauma, nondisplaced fractures can remain occult and if there is continued clinical concern for right hip injury, recommend further evaluation with right hip MRI.   This report was finalized on 12/8/2022 12:20 AM by Dr. Dwight Cevallos M.D.      XR Hip With or Without Pelvis 2 - 3 View Right    Result Date:  2022  FINDINGS AND IMPRESSION: Vascular stents overlie the left aspect of the pelvis. There is also surgical staples overlying the left hip.  Generalized bony demineralization is present. While no underlying right hip fracture is seen, please note that in the setting of trauma and bony demineralization, nondisplaced hip fractures can remain radiographically occult and if there is continued clinical concern right hip injury, recommend dedicated right hip MRI for further evaluation.  This report was finalized on 2022 6:29 PM by Dr. Dwight Cevallos M.D.       Ambulatory status:   - up with assistance    Social issues:   Social History     Socioeconomic History    Marital status:    Tobacco Use    Smoking status: Former     Packs/day: 1.00     Years: 20.00     Pack years: 20.00     Types: Cigarettes     Start date: 1968     Quit date: 1989     Years since quittin.5    Smokeless tobacco: Never    Tobacco comments:     Did'nt inhale   Vaping Use    Vaping Use: Never used   Substance and Sexual Activity    Alcohol use: Not Currently     Alcohol/week: 1.0 standard drink     Types: 1 Shots of liquor per week     Comment: 1 drink per day at most    Drug use: Never    Sexual activity: Not Currently     Partners: Female     Birth control/protection: Condom       NIH Stroke Scale:         Lea Amaro RN  22 01:20 EST

## 2022-12-08 NOTE — THERAPY EVALUATION
Patient Name: Albert Yadav  : 1940    MRN: [id number]                              Today's Date: 2022       Admit Date: 2022    Visit Dx:     ICD-10-CM ICD-9-CM   1. Closed fracture of one rib of right side, initial encounter  S22.31XA 807.01   2. 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, left leg (HCC)   • Spinal stenosis, lumbar region, with neurogenic claudication   • Carotid stenosis, asymptomatic, bilateral   • Spondylolisthesis at L4-L5 level   • Lumbar stenosis   • Closed fracture of multiple ribs of right side, initial encounter   • Retroperitoneal tumor   • Closed fracture of one rib of right side, initial encounter   • Rheumatoid arthritis (Formerly [facility])   • Contusion of right hip     Past Medical History:   Diagnosis Date   • Anesthesia     MILD VIOLENT BEHAVIOR AFTER ANESTHESIA LEG SURGERY   • Arthritis of back 10 years ago   • At risk for sleep apnea     STOP BANG = 5   • Broken bones     arm, collar bone, ankle   • Carotid artery disorder (Formerly [facility])    • Cervical disc disorder ?   • Cholelithiasis     Gall bladder removed   • Diabetes mellitus (Formerly [facility])     Type 2   • Falls    • Fracture of ankle    • Fracture of wrist    • Fracture, clavicle 26 years ago   • Fracture, foot 26 year ago   • Groin rash     PT STATES LEFT GROIN AT TIMES   • History of transfusion    • Hyperlipidemia    • Hypertension    • Low back pain    • Low back strain ?   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   • Rheumatoid arthritis (HCC)      HANDS DIALLO   • Staph infection     2015  [facility] POST SX   • Tendency toward bleeding easily (HCC)     due to blood thinners     Past Surgical History:   Procedure Laterality Date   • ANGIOPLASTY FEMORAL ARTERY Left 11/07/2016    Procedure: ULTRA SOUND ACCESS RIGHT FEMORAL ARTERY, AIF BILATERAL RUNOFF, SELECTIVE CATHETERIZATION LEFT FEMORAL ARTERY.;  Surgeon: Errol Michael MD;  Location: [facility] OR 18/19;  Service:    • ANKLE OPEN REDUCTION INTERNAL FIXATION  1980   • ARTERIOVENOUS FISTULA/SHUNT SURGERY Left 12/13/2016    Procedure: LEFT ILEO-FEMORAL GORTEX GRAFT AND LEFT LEG ARERIOGRAM ;  Surgeon: Errol Michael MD;  Location: [facility] OR 18/19;  Service:    • CAROTID ENDARTERECTOMY Right 09/29/2020    Procedure: CAROTID ENDARTERECTOMY;  Surgeon: James Graham MD;  Location: [facility] OR;  Service: Vascular;  Laterality: Right;   • CHOLECYSTECTOMY     • EPIDURAL BLOCK     • EYE SURGERY Left     as child   • FRACTURE SURGERY Left     arm   • ILIAC ARTERY STENT Left 2020   • KNEE SURGERY Left     ORIF   • ORIF ANKLE FRACTURE Left       General Information     [facility] Name 12/08/22 [facility]          Physical Therapy Time and Intention    Document Type evaluation  -     Mode of Treatment individual therapy;physical therapy  -[facility] Name 12/08/22 1452          General Information    Patient Profile Reviewed yes  -     Prior Level of Function independent:;gait;transfer;bed mobility  -     Existing Precautions/Restrictions fall  -     Barriers to Rehab none identified  -[facility] Name 12/08/22 1452          Living Environment    People in Home spouse  -[facility] Name 12/08/22 1452          Home Main Entrance    Number of Stairs, Main Entrance two  -[facility] Name 12/08/22 1452          Stairs Within Home, Primary    Number of Stairs, Within Home, Primary none  -     Row Name 12/08/22 1452          Cognition    Orientation Status (Cognition) oriented x 4  -[facility] Name 12/08/22  1452          Safety Issues, Functional Mobility    Impairments Affecting Function (Mobility) balance;endurance/activity tolerance;strength;pain;range of motion (ROM)  -           User Key  (r) = Recorded By, (t) = Taken By, (c) = Cosigned By    Initials Name Provider Type     Yola Crowe PT Physical Therapist               Mobility     Row Name 12/08/22 1452          Bed Mobility    Bed Mobility supine-sit  -     Supine-Sit [place] (Bed Mobility) minimum assist (75% patient effort);verbal cues  -     Assistive Device (Bed Mobility) bed rails;head of bed elevated  -     Row Name 12/08/22 1452          Sit-Stand Transfer    Sit-Stand [place] (Transfers) minimum assist (75% patient effort);2 person assist;verbal cues  -     Assistive Device (Sit-Stand Transfers) walker, front-wheeled  -[facility] Name 12/08/22 1452          Gait/Stairs (Locomotion)    [place] Level (Gait) verbal cues;contact guard  -     Assistive Device (Gait) walker, front-wheeled  -     Distance in Feet (Gait) 12ft  -     Deviations/Abnormal Patterns (Gait) antalgic;josep decreased;gait speed decreased;festinating/shuffling;stride length decreased;weight shifting decreased  -     Bilateral Gait Deviations forward flexed posture;heel strike decreased  -           User Key  (r) = Recorded By, (t) = Taken By, (c) = Cosigned By    Initials Name Provider Type     Yola Crowe PT Physical Therapist               Obj/Interventions     Row Name 12/08/22 1453          Range of Motion Comprehensive    General Range of Motion bilateral lower extremity ROM WFL  -     Row Name 12/08/22 1453          Strength Comprehensive (MMT)    General Manual Muscle Testing (MMT) Assessment lower extremity strength deficits identified  -     Comment, General Manual Muscle Testing (MMT) Assessment Generalized weakness, BLE grossly 4/5 except R hip 3+/5  -     Row Name 12/08/22 1453          Balance    Balance  Assessment sitting static balance;sitting dynamic balance;standing static balance;standing dynamic balance  -     Static Sitting Balance standby assist  -     Dynamic Sitting Balance standby assist  -     Position, Sitting Balance unsupported;sitting edge of bed  -     Static Standing Balance contact guard;verbal cues  -     Dynamic Standing Balance contact guard;verbal cues  -     Position/Device Used, Standing Balance supported;walker, front-wheeled  -     Balance Interventions sitting;standing;sit to stand;supported;static;dynamic  -[facility] Name 12/08/22 1456          Sensory Assessment (Somatosensory)    Sensory Assessment (Somatosensory) LE sensation intact  -           User Key  (r) = Recorded By, (t) = Taken By, (c) = Cosigned By    Initials Name Provider Type     Yola Crowe, PT Physical Therapist               Goals/Plan     [facility] Name 12/08/22 1508          Bed Mobility Goal 1 (PT)    Activity/Assistive Device (Bed Mobility Goal 1, PT) bed mobility activities, all  -     [place] Level/Cues Needed (Bed Mobility Goal 1, PT) standby assist  -     Time Frame (Bed Mobility Goal 1, PT) 1 week  -[facility] Name 12/08/22 1506          Transfer Goal 1 (PT)    Activity/Assistive Device (Transfer Goal 1, PT) transfers, all  -     [place] Level/Cues Needed (Transfer Goal 1, PT) standby assist  -     Time Frame (Transfer Goal 1, PT) 1 week  -[facility] Name 12/08/22 1505          Gait Training Goal 1 (PT)    Activity/Assistive Device (Gait Training Goal 1, PT) gait (walking locomotion)  -     [place] Level (Gait Training Goal 1, PT) standby assist  -     Distance (Gait Training Goal 1, PT) 80ft  -     Time Frame (Gait Training Goal 1, PT) 1 week  -[facility] Name 12/08/22 1502          Therapy Assessment/Plan (PT)    Planned Therapy Interventions (PT) balance training;bed mobility training;gait training;home exercise program;patient/family  education;strengthening;transfer training  -           User Key  (r) = Recorded By, (t) = Taken By, (c) = Cosigned By    Initials Name Provider Type     Yola Crowe, PT Physical Therapist               Clinical Impression     Row Name 12/08/22 4245          Pain    Pre/Posttreatment Pain Comment C/o R hip pain with any mobility, unable to give numerical value  -     Pain Intervention(s) Repositioned;Ambulation/increased activity;Rest  -     Row Name 12/08/22 0135          Plan of Care Review    Plan of Care Reviewed With patient  -     Outcome Evaluation Pt is an 83 yo M admitted with a fall - imaging revealed R rib fx, but no R hip fx. Per chart, planned MRI pending tolerance to ambulation today. Pt lives with his wife and reports he is normally independent with a cane, but has been needing more help from his wife recently. Pt has 2 USAMA with no steps inside. Pt presents to PT with impaired strength, endurance, and pain limiting overall mobility. Pt transferred to EOB with min A x1, STS with min A x2, and ambulated 12ft in room with rwx and CGA. Pt with poor RLE tolerance, short shuffled steps with pt fatiguing quickly and unable to safely progress gait distance today. Pt agreeable to sitting UIC, assisted with repositioning, and left with needs met. Pt may benefit from SNF placement at [place] as he reports increased difficulties with mobility at home and is currently unsafe to ambulate without assistance. PT will continue to follow to progress mobility as tolerated. Anticipate DC to SNF.  -     Row Name 12/08/22 8785          Therapy Assessment/Plan (PT)    Patient/Family Therapy Goals Statement (PT) Return to OF  -     Rehab Potential (PT) good, to achieve stated therapy goals  -     Criteria for Skilled Interventions Met (PT) yes  -     Therapy Frequency (PT) 6 times/wk  -     Row Name 12/08/22 4153          Vital Signs    O2 Delivery Pre Treatment room air  -     O2 Delivery Intra  Treatment room air  -     O2 Delivery Post Treatment room air  -     Row Name 12/08/22 1454          Positioning and Restraints    Pre-Treatment Position in bed  -     Post Treatment Position chair  -     In Chair sitting;call light within reach;encouraged to call for assist;notified ns  -           User Key  (r) = Recorded By, (t) = Taken By, (c) = Cosigned By    Initials Name Provider Type     Yola Crowe PT Physical Therapist               Outcome Measures     Row Name 12/08/22 1505          How much help from another person do you currently need...    Turning from your back to your side while in flat bed without using bedrails? 3  -BH     Moving from lying on back to sitting on the side of a flat bed without bedrails? 3  -BH     Moving to and from a bed to a chair (including a wheelchair)? 3  -BH     Standing up from a chair using your arms (e.g., wheelchair, bedside chair)? 3  -BH     Climbing 3-5 steps with a railing? 1  -     To walk in hospital room? 3  -     AM-PAC 6 Clicks Score (PT) 16  -     Highest level of mobility 5 --> Static standing  -     Row Name 12/08/22 1505          Functional Assessment    Outcome Measure Options AM-PAC 6 Clicks Basic Mobility (PT)  -           User Key  (r) = Recorded By, (t) = Taken By, (c) = Cosigned By    Initials Name Provider Type     Yola Crowe PT Physical Therapist                             Physical Therapy Education     Title: PT OT SLP Therapies (Done)     Topic: Physical Therapy (Done)     Point: Mobility training (Done)     Learning Progress Summary           Patient Acceptance, E,TB,D, VU,NR by  at 12/8/2022 1508                   Point: Home exercise program (Done)     Learning Progress Summary           Patient Acceptance, E,TB,D, VU,NR by  at 12/8/2022 1508                   Point: Body mechanics (Done)     Learning Progress Summary           Patient Acceptance, E,TB,D, VU,NR by  at 12/8/2022 1508                    Point: Precautions (Done)     Learning Progress Summary           Patient Acceptance, E,TB,D, VU,NR by  at 12/8/2022 1508                               User Key     Initials Effective Dates Name Provider Type Discipline     04/08/22 -  Yola Crowe, PT Physical Therapist PT              PT Recommendation and Plan  Planned Therapy Interventions (PT): balance training, bed mobility training, gait training, home exercise program, patient/family education, strengthening, transfer training  Plan of Care Reviewed With: patient  Outcome Evaluation: Pt is an 81 yo M admitted with a fall - imaging revealed R rib fx, but no R hip fx. Per chart, planned MRI pending tolerance to ambulation today. Pt lives with his wife and reports he is normally independent with a cane, but has been needing more help from his wife recently. Pt has 2 USAMA with no steps inside. Pt presents to PT with impaired strength, endurance, and pain limiting overall mobility. Pt transferred to EOB with min A x1, STS with min A x2, and ambulated 12ft in room with rwx and CGA. Pt with poor RLE tolerance, short shuffled steps with pt fatiguing quickly and unable to safely progress gait distance today. Pt agreeable to sitting UIC, assisted with repositioning, and left with needs met. Pt may benefit from SNF placement at [place] as he reports increased difficulties with mobility at home and is currently unsafe to ambulate without assistance. PT will continue to follow to progress mobility as tolerated. Anticipate DC to SNF.     Time Calculation:    PT Charges     Row Name 12/08/22 1508             Time Calculation    Start Time 0911  -      Stop Time 0923  -      Time Calculation (min) 12 min  -      PT Received On 12/08/22  -      PT - Next Appointment 12/09/22  [facility]      PT Goal Re-Cert Due Date 12/15/22  -         Time Calculation- PT    Total Timed Code Minutes- PT 10 minute(s)  -         Timed Charges    09044 - Gait Training Minutes  8  -       55170 - PT Therapeutic Activity Minutes 2  -BH         Untimed Charges    PT Eval/Re-eval Minutes 5  -BH         Total Minutes    Timed Charges Total Minutes 10  -BH      Untimed Charges Total Minutes 5  -BH       Total Minutes 15  -BH            User Key  (r) = Recorded By, (t) = Taken By, (c) = Cosigned By    Initials Name Provider Type     Yola Crowe, PT Physical Therapist              Therapy Charges for Today     Code Description Service Date Service Provider Modifiers Qty    54722731332 HC GAIT TRAINING EA 15 MIN 12/8/2022 Yola Crowe, PT GP 1    [id number] HC PT EVAL LOW COMPLEXITY 3 12/8/2022 Yola Crowe, PT GP 1          PT G-Codes  Outcome Measure Options: AM-PAC 6 Clicks Basic Mobility (PT)  AM-PAC 6 Clicks Score (PT): 16  PT Discharge Summary  Anticipated Discharge Disposition (PT): skilled nursing facility    Yola Crowe PT  12/8/2022

## 2022-12-08 NOTE — PLAN OF CARE
Problem: Adult Inpatient Plan of Care  Goal: Plan of Care Review  12/8/2022 0522 by Khushbu Bolton RN  Outcome: Ongoing, Progressing  12/8/2022 0522 by Khushbu Bolton RN  Outcome: Ongoing, Progressing  Flowsheets (Taken 12/8/2022 0522)  Progress: no change  Plan of Care Reviewed With: patient  Goal: Patient-Specific Goal (Individualized)  12/8/2022 0522 by Khushbu Bolton RN  Outcome: Ongoing, Progressing  12/8/2022 0522 by Khushbu Bolton RN  Outcome: Ongoing, Progressing  Goal: Absence of Hospital-Acquired Illness or Injury  12/8/2022 0522 by Khushbu Bolton RN  Outcome: Ongoing, Progressing  12/8/2022 0522 by Khushbu Bolton RN  Outcome: Ongoing, Progressing  Intervention: Identify and Manage Fall Risk  Recent Flowsheet Documentation  Taken 12/8/2022 0510 by Khushbu Bolton RN  Safety Promotion/Fall Prevention:   toileting scheduled   safety round/check completed  Taken 12/8/2022 0412 by Khushbu Bolton RN  Safety Promotion/Fall Prevention:   room organization consistent   safety round/check completed  Taken 12/8/2022 0206 by Khushbu Bolton RN  Safety Promotion/Fall Prevention:   safety round/check completed   room organization consistent  Intervention: Prevent Skin Injury  Recent Flowsheet Documentation  Taken 12/8/2022 0510 by Khushbu Bolton RN  Body Position: sitting up in bed  Taken 12/8/2022 0412 by Khushbu Bolton RN  Body Position:   weight shifting   left   tilted  Taken 12/8/2022 0206 by Khushbu Bolton RN  Body Position: sitting up in bed  Intervention: Prevent and Manage VTE (Venous Thromboembolism) Risk  Recent Flowsheet Documentation  Taken 12/8/2022 0500 by Khushbu Bolton RN  Activity Management: up to bedside commode  Taken 12/8/2022 0412 by Khushbu Bolton, RN  Activity Management: activity adjusted per tolerance  Taken 12/8/2022 0206 by Khushbu Bolton, RN  Activity Management: activity adjusted per  tolerance  Intervention: Prevent Infection  Recent Flowsheet Documentation  Taken 12/8/2022 0412 by Khushbu Bolton RN  Infection Prevention: rest/sleep promoted  Taken 12/8/2022 0206 by Khushbu Bolton RN  Infection Prevention: rest/sleep promoted  Goal: Optimal Comfort and Wellbeing  12/8/2022 0522 by Khushbu Bolton RN  Outcome: Ongoing, Progressing  12/8/2022 0522 by Khushbu Bolton RN  Outcome: Ongoing, Progressing  Intervention: Monitor Pain and Promote Comfort  Recent Flowsheet Documentation  Taken 12/8/2022 0412 by Khushbu Bolton RN  Pain Management Interventions:   see MAR   quiet environment facilitated   pillow support provided   pain management plan reviewed with patient/caregiver  Taken 12/8/2022 0206 by Khushbu Bolton RN  Pain Management Interventions: (lidoderm patch in place on R hip)   quiet environment facilitated   pain management plan reviewed with patient/caregiver   pillow support provided   position adjusted   other (see comments)  Intervention: Provide Person-Centered Care  Recent Flowsheet Documentation  Taken 12/8/2022 0206 by Khushbu Bolton RN  Trust Relationship/Rapport:   care explained   emotional support provided  Goal: Readiness for Transition of Care  12/8/2022 0522 by Khushbu Bolton RN  Outcome: Ongoing, Progressing  12/8/2022 0522 by Khushbu Bolton RN  Outcome: Ongoing, Progressing  Intervention: Mutually Develop Transition Plan  Recent Flowsheet Documentation  Taken 12/8/2022 0243 by Khushbu Bolton RN  Transportation Anticipated: family or friend will provide  Patient/Family Anticipated Services at Transition: none  Patient/Family Anticipates Transition to: home with family  Taken 12/8/2022 0241 by Khushbu Bolton RN  Equipment Currently Used at Home: cane, straight     Problem: Skin Injury Risk Increased  Goal: Skin Health and Integrity  12/8/2022 0522 by Khushbu Bolton RN  Outcome: Ongoing,  Progressing  12/8/2022 0522 by Khushbu Bolton RN  Outcome: Ongoing, Progressing  Intervention: Optimize Skin Protection  Recent Flowsheet Documentation  Taken 12/8/2022 0206 by Khushbu Bolton RN  Head of Bed (HOB) Positioning: HOB elevated     Problem: Fall Injury Risk  Goal: Absence of Fall and Fall-Related Injury  12/8/2022 0522 by Khushbu Bolton RN  Outcome: Ongoing, Progressing  12/8/2022 0522 by Khushbu Bolton RN  Outcome: Ongoing, Progressing  Intervention: Identify and Manage Contributors  Recent Flowsheet Documentation  Taken 12/8/2022 0412 by Khushbu Bolton RN  Medication Review/Management: medications reviewed  Taken 12/8/2022 0206 by Khushbu Bolton RN  Medication Review/Management: medications reviewed  Intervention: Promote Injury-Free Environment  Recent Flowsheet Documentation  Taken 12/8/2022 0510 by Khushbu Bolton RN  Safety Promotion/Fall Prevention:   toileting scheduled   safety round/check completed  Taken 12/8/2022 0412 by Khushbu Bolton RN  Safety Promotion/Fall Prevention:   room organization consistent   safety round/check completed  Taken 12/8/2022 0206 by Khushbu Bolton RN  Safety Promotion/Fall Prevention:   safety round/check completed   room organization consistent     Problem: Pain Acute  Goal: Acceptable Pain Control and Functional Ability  Outcome: Ongoing, Progressing  Intervention: Prevent or Manage Pain  Recent Flowsheet Documentation  Taken 12/8/2022 0412 by Khushbu Bolton RN  Medication Review/Management: medications reviewed  Taken 12/8/2022 0206 by Khushbu Bolton RN  Medication Review/Management: medications reviewed  Intervention: Develop Pain Management Plan  Recent Flowsheet Documentation  Taken 12/8/2022 0412 by Khushbu Bolton RN  Pain Management Interventions:   see MAR   quiet environment facilitated   pillow support provided   pain management plan reviewed with patient/caregiver  Taken  12/8/2022 0206 by Khushbu Bolton, RN  Pain Management Interventions: (lidoderm patch in place on R hip)   quiet environment facilitated   pain management plan reviewed with patient/caregiver   pillow support provided   position adjusted   other (see comments)  Intervention: Optimize Psychosocial Wellbeing  Recent Flowsheet Documentation  Taken 12/8/2022 0206 by Khushbu Bolton, RN  Diversional Activities:   television   smartphone   Goal Outcome Evaluation:  Plan of Care Reviewed With: patient        Progress: no change

## 2022-12-08 NOTE — CASE MANAGEMENT/SOCIAL WORK
"Continued Stay Note  [facility]     Patient Name: Albert Yadav  MRN: [id number]  Today's Date: 12/8/2022    Admit Date: 12/7/2022    Plan: SNF-referral pending to [facility]- will need pre-cert   Discharge Plan     Row Name 12/08/22 170       Plan    Plan Comments Patient did state that he is scheduled for a \"surgery on his kidney\" 12/20 so he is hoping to be out of rehab prior to that.  CCP will follow. Rosie DE OLIVEIRA RN    Row Name 12/08/22 7768       Plan    Plan SNF-referral pending to [facility]- will need pre-cert    Patient/Family in Agreement with Plan yes    Plan Comments Spoke with patient at bedside.  Introduced self and explained role.  Facesheet verified.  Patient lives in a multi level house with his wife, Bushra Yadav. There are handrails at all of the steps.  There are 2 steps to enter from the back, then an additional 4 to get to the kitchen, which is the main level.  There are approximately 8 steps to get to the basement.  Patient has a cane, 3 wheeled walker and portable commode.  At baseline, he is [place] and drives.  He used [facility]  in the past, but wasn't impressed.  At NM, he is considering SNF and would like a referral to [facility].  Referral placed in EPIC and message left for [facility].               Discharge Codes    No documentation.                     Rosie Koehler RN    "

## 2022-12-08 NOTE — PLAN OF CARE
Goal Outcome Evaluation:  Plan of Care Reviewed With: patient           Outcome Evaluation: Pt is an 83 yo M admitted with a fall - imaging revealed R rib fx, but no R hip fx. Per chart, planned MRI pending tolerance to ambulation today. Pt lives with his wife and reports he is normally independent with a cane, but has been needing more help from his wife recently. Pt has 2 USAMA with no steps inside. Pt presents to PT with impaired strength, endurance, and pain limiting overall mobility. Pt transferred to EOB with min A x1, STS with min A x2, and ambulated 12ft in room with rwx and CGA. Pt with poor RLE tolerance, short shuffled steps with pt fatiguing quickly and unable to safely progress gait distance today. Pt agreeable to sitting UIC, assisted with repositioning, and left with needs met. Pt may benefit from SNF placement at [place] as he reports increased difficulties with mobility at home and is currently unsafe to ambulate without assistance. PT will continue to follow to progress mobility as tolerated. Anticipate DC to SNF.

## 2022-12-08 NOTE — ED PROVIDER NOTES
MD ATTESTATION NOTE    The SAIDA and I have discussed this patient's history, physical exam, and treatment plan.  I have reviewed the documentation and personally had a face to face interaction with the patient. I affirm the documentation and agree with the treatment and plan.  The attached note describes my personal findings.    I provided a substantive portion of the care of this patient. I personally performed the physical exam, in its entirety.    Albert Yadav is a 82 y.o. male who presents to the ED c/o having a fall.  He reports that [place] was [place] [employer] [place] [employer] when he slipped and fell.  He reports he landed on his right side.  He reports injury to his right arm, right lateral chest.  He states that he did not hit his head.  He denies loss of consciousness.  He reports when he got up to go to the bathroom here he had pain only in his right lateral chest.  He reports he injured his right hip as well but it is no longer hurting.  He is on Plavix.      On exam:  GENERAL: Awake, alert, no acute distress  SKIN: Warm, dry  HENT: Normocephalic, atraumatic  EYES: no scleral icterus  CV: regular rhythm, regular rate  RESPIRATORY: normal effort, lungs clear.  Point tenderness in his right lateral lower chest along the rib.  ABDOMEN: soft, nontender, nondistended  MUSCULOSKELETAL: no deformity  NEURO: alert, moves all extremities, follows commands    Labs  No results found for this or any previous visit (from the past 24 hour(s)).    Radiology  XR Hip With or Without Pelvis 2 - 3 View Right    Result Date: 12/7/2022  Pelvis and right hip radiograph  HISTORY:Fall, pain  TECHNIQUE: AP pelvis and AP and lateral radiograph the right hip  COMPARISON:CT abdomen pelvis 10/21/2022      FINDINGS AND IMPRESSION: Vascular stents overlie the left aspect of the pelvis. There is also surgical staples overlying the left hip.  Generalized bony demineralization is present. While no underlying right hip fracture is seen, please note  that in the setting of trauma and bony demineralization, nondisplaced hip fractures can remain radiographically occult and if there is continued clinical concern right hip injury, recommend dedicated right hip MRI for further evaluation.  This report was finalized on 12/7/2022 6:29 PM by Dr. Dwight Cevallos M.D.        Medical Decision Making:       The patient has no pain in his hips on exam.  He has point tenderness in his right lateral chest.  Given his age and injury with hitting his head we will CT his head to rule out intracranial hemorrhage.  We will CT his chest to rule out rib fracture.  Given his brittle bones on pelvis x-ray, chest x-ray with ribs will be of little benefit.    Procedures:  Procedures      PPE: The patient wore a mask and I wore an N95 mask throughout the entire patient encounter.      The patient has a COVID HM Topic on their chart, and they are fully vaccinated.    Diagnosis  Final diagnoses:   None       Note Disclaimer: At [facility], we believe that sharing information builds trust and better relationships. You are receiving this note because you recently visited [facility]. It is possible you will see health information before a provider has talked with you about it. This kind of information can be easy to misunderstand. To help you fully understand what it means for your health, we urge you to discuss this note with your provider.

## 2022-12-08 NOTE — DISCHARGE PLACEMENT REQUEST
"Albert Yadav (82 y.o. Male)     Date of Birth   1940    Social Security Number       Address   [address] [place] [address]    Home Phone   [phone]    MRN   [id number]       P[employer]alism   None    Marital Status                               Admission Date   12/7/22    Admission Type   Emergency    Admitting Provider   Anselmo Middleton MD    Attending Provider   Davin Ray MD    Department, Room/Bed   [facility] OBSERVATION, 123/1       Discharge Date       Discharge Disposition       Discharge Destination                               Attending Provider: Davin Ray MD    Allergies: No Known Allergies    Isolation: None   Infection: None   Code Status: CPR    Ht: 175.3 cm (69\")   Wt: 84.9 kg (187 lb 2.7 oz)    Admission Cmt: None   Principal Problem: Closed fracture of one rib of right side, initial encounter [S22.31XA]                 Active Insurance as of 12/7/2022     Primary Coverage     Payor Plan Insurance Group Employer/Plan Group    [facility] MEDICARE REPLACEMENT [facility] MEDICARE REPLACEMENT [id number]     Payor Plan Address Payor Plan Phone Number Payor Plan Fax Number Effective Dates    [address] [address]   1/1/2016 - None Entered    [facility] [id number]       Subscriber Name Subscriber Birth Date Member ID       ALBERT YADAV 1940 [id number]                 Emergency Contacts      (Rel.) Home Phone Work Phone Mobile Phone    LeifBushra (Spouse) [phone] -- [phone]    Cristian Yadav (Son) [phone] -- [phone]              "

## 2022-12-08 NOTE — PROGRESS NOTES
Name: Albert Yadav ADMIT: 2022   : 1940  PCP: Valerie Burnham MD    MRN: [id number] LOS: 0 days   AGE/SEX: 82 y.o. male  ROOM: 123/1     Subjective   Subjective   His right hip hurts with any movement. Rib pain is tolerable. No SOB or cough.     Review of Systems     Objective   Objective   Vital Signs  Temp:  [97.8 °F (36.6 °C)-98.2 °F (36.8 °C)] 98.2 °F (36.8 °C)  Heart Rate:  [62-75] 68  Resp:  [16-20] 20  BP: (126-149)/(52-72) 126/64  SpO2:  [97 %-99 %] 98 %  on   ;   Device (Oxygen Therapy): room air  Body mass index is 27.64 kg/m².  Physical Exam  Vitals reviewed.   Constitutional:       Appearance: He is well-developed. He is obese. He is not ill-appearing.   HENT:      Head: Normocephalic and atraumatic.   Cardiovascular:      Rate and Rhythm: Normal rate and regular rhythm.   Pulmonary:      Effort: Pulmonary effort is normal. No respiratory distress.      Breath sounds: Normal breath sounds.   Abdominal:      General: Bowel sounds are normal. There is no distension.      Palpations: Abdomen is soft. There is no mass.      Tenderness: There is no abdominal tenderness.      Hernia: No hernia is present.   Musculoskeletal:         General: Tenderness (Right hip. limited ROM) and signs of injury present.      Comments: Right chest wall tenderness at rib fx no crepitus    Skin:     General: Skin is warm and dry.   Neurological:      Mental Status: He is alert and oriented to person, place, and time.   Psychiatric:         Behavior: Behavior normal.         Thought Content: Thought content normal.         Judgment: Judgment normal.       Results Review     I reviewed the patient's new clinical results.  Results from last 7 days   Lab Units 22  0722   WBC 10*3/mm3 7.62 8.88   HEMOGLOBIN g/dL 12.6* 12.6*   PLATELETS 10*3/mm3 172 196     Results from last 7 days   Lab Units 22  0722   SODIUM mmol/L 140 138   POTASSIUM mmol/L 3.8 3.7   CHLORIDE  mmol/L 105 105   CO2 mmol/L 24.9 24.2   BUN mg/dL 15 16   CREATININE mg/dL 1.02 1.06   GLUCOSE mg/dL 110* 106*   EGFR mL/min/1.73 72.5 69.2     Results from last 7 days   Lab Units 12/08/22  0725 12/07/22  2147   ALBUMIN g/dL 3.00* 3.20*   BILIRUBIN mg/dL 0.7 0.4   ALK PHOS U/L 95 89   AST (SGOT) U/L 23 23   ALT (SGPT) U/L 42* 46*     Results from last 7 days   Lab Units 12/08/22  0725 12/07/22  2147   CALCIUM mg/dL 8.6 8.6   ALBUMIN g/dL 3.00* 3.20*       No results found for: HGBA1C, POCGLU    CT Head Without Contrast    Result Date: 12/7/2022  Negative.  This report was finalized on 12/7/2022 9:05 PM by Dr. Remi Perez M.D.      CT Chest Without Contrast Diagnostic    Result Date: 12/7/2022  1.  New right eighth rib fracture anteriorly not seen on 07/19/2022. The subtle surrounding callus formation suggestive of an acute to subacute injury. Incompletely united and healing right ninth and 10th rib fractures, as before. 2.  Mixed fat and soft tissue density lesion abutting the left kidney is grossly unchanged since 12/06/2021 and better evaluated on prior MRI. Please refer to this dictation for further information. 3.  Other findings as above.  This report was finalized on 12/7/2022 9:13 PM by Dr. Dwight Cevallos M.D.      CT Pelvis Without Contrast    Result Date: 12/8/2022  FINDINGS AND IMPRESSION: For the purposes of this dictation, the last well-formed disc space be referred to as L5-S1. There is mild anterolisthesis of L4 and L5, as before. Extensive atherosclerosis is present within the visualized pelvis.  There is generalized bony demineralization. While no definite right hip or pelvic fracture is seen, please note in the setting of bony demineralization and trauma, nondisplaced fractures can remain occult and if there is continued clinical concern for right hip injury, recommend further evaluation with right hip MRI.   This report was finalized on 12/8/2022 12:20 AM by Dr. Dwight Cevallos M.D.      XR Hip  With or Without Pelvis 2 - 3 View Right    Result Date: 12/7/2022  FINDINGS AND IMPRESSION: Vascular stents overlie the left aspect of the pelvis. There is also surgical staples overlying the left hip.  Generalized bony demineralization is present. While no underlying right hip fracture is seen, please note that in the setting of trauma and bony demineralization, nondisplaced hip fractures can remain radiographically occult and if there is continued clinical concern right hip injury, recommend dedicated right hip MRI for further evaluation.  This report was finalized on 12/7/2022 6:29 PM by Dr. Dwight Cevallos M.D.      Scheduled Medications  acetaminophen, 1,000 mg, Oral, TID  atorvastatin, 40 mg, Oral, Q PM  clopidogrel, 75 mg, Oral, QAM  folic acid, 1 mg, Oral, Daily  hydroCHLOROthiazide, 50 mg, Oral, QAM  lidocaine, 1 patch, Transdermal, Q24H  lisinopril, 40 mg, Oral, Q PM  melatonin, 5 mg, Oral, Nightly  rivastigmine, 1.5 mg, Oral, BID  senna-docusate sodium, 2 tablet, Oral, BID  traZODone, 50 mg, Oral, Nightly    Infusions   Diet  Diet: Regular/House Diet; Texture: Regular Texture (IDDSI 7); Fluid Consistency: Thin (IDDSI 0)       Assessment/Plan     Active Hospital Problems    Diagnosis  POA   • **Closed fracture of one rib of right side, initial encounter [S22.31XA]  Yes   • Contusion of right hip [S70.01XA]  Yes   • Rheumatoid arthritis (HCC) [M06.9]  Yes   • Essential hypertension [I10]  Yes   • Carotid stenosis, bilateral [I65.23]  Yes      Resolved Hospital Problems   No resolved problems to display.       82 y.o. male admitted with Closed fracture of one rib of right side, initial encounter.      · Right rib fracture/hip pain/mechanical fall  Lidoderm patch/Ultram.  Hip CT negative for fracture.  PT to see.  Pain persists with gait impairment so will obtain MRI.     PAD status post revascularization    Left kidney mass-following with urology, Dr. Martinez to perform surgery later this month.    · SCDs for  DVT prophylaxis.  · Full code.  · Discussed with patient and nursing staff.  · Anticipate discharge [facility] De [facility]  [facility]  12/08/22  10:12 EST

## 2022-12-08 NOTE — ED PROVIDER NOTES
EMERGENCY DEPARTMENT ENCOUNTER    Room Number:  B07/07  Date of encounter:  12/7/2022  PCP: Valerie Burnham MD  Patient Care Team:  Valerie Burnham MD as PCP - General (Internal Medicine)   Historian: Patient, EMS    HPI:  Chief Complaint: Fall, hip pain  A complete HPI/ROS/PMH/PSH/SH/FH are unobtainable due to: Nothing    Context: Albert Yadav is a 82 y.o. male who presents to the ED c/o having a fall.  He reports that he was at the [employer] when he slipped and fell.  He reports he landed on his right side.  He reports injury to his right arm, right lateral chest.  He states that he did not hit his head.  He denies loss of consciousness.  He reports when he got up to go to the bathroom here he had pain only in his right lateral chest.  He reports he injured his right hip as well but it is no longer hurting.  He is on Plavix.            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, left leg (HCC) 12/13/2016   • Spinal stenosis, lumbar region, with neurogenic claudication 11/17/2017   • Carotid stenosis, asymptomatic, bilateral 09/29/2020   • Spondylolisthesis at L4-L5 level 01/20/2021   • Lumbar stenosis 10/19/2021   • Closed fracture of multiple ribs of right side, initial encounter 07/19/2022   • 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   • Cholelithiasis 1978   • Diabetes mellitus (HCC) 2005   • Falls    • Fracture of ankle 1972   • Fracture of wrist 1947   • Fracture, clavicle 26 years ago   • Fracture, foot 26 year ago   • Groin rash    • History of transfusion    • Hyperlipidemia    • Hypertension    • Low back pain    • Low back strain ?   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    PAST SURGICAL HISTORY  Past Surgical History:   Procedure Laterality Date   • ANGIOPLASTY FEMORAL ARTERY Left 11/07/2016    Procedure: ULTRA SOUND ACCESS RIGHT FEMORAL ARTERY, AIF BILATERAL RUNOFF, SELECTIVE CATHETERIZATION LEFT FEMORAL ARTERY.;  Surgeon: Errol Michael MD;  Location: [facility] OR 18/19;  Service:    • ANKLE OPEN REDUCTION INTERNAL FIXATION  1980   • ARTERIOVENOUS FISTULA/SHUNT SURGERY Left 12/13/2016    Procedure: LEFT ILEO-FEMORAL GORTEX GRAFT AND LEFT LEG ARERIOGRAM ;  Surgeon: Errol Michael MD;  Location: [facility] OR 18/19;  Service:    • CAROTID ENDARTERECTOMY Right 09/29/2020    Procedure: CAROTID ENDARTERECTOMY;  Surgeon: James Graham MD;  Location: [facility];  Service: Vascular;  Laterality: Right;   • CHOLECYSTECTOMY     • EPIDURAL BLOCK     • EYE SURGERY Left     as child   • FRACTURE SURGERY Left     arm   • ILIAC ARTERY STENT Left 2020   • KNEE SURGERY Left     ORIF   • ORIF ANKLE FRACTURE Left          FAMILY HISTORY  Family History   Problem Relation Age of Onset   • Diabetes Mother    • Cancer Mother         Cancer liver   • Cancer Father         Diabetic   • Diabetes Father    •  Heart disease Maternal Grandmother    • Heart disease Maternal Grandfather    • Heart disease Paternal Grandmother    • Heart disease Paternal Grandfather    • Alcohol abuse Son         Alcoholic   • Cancer Brother    • Malig Hyperthermia Neg Hx          SOCIAL HISTORY  Social History     Socioeconomic History   • Marital status:    Tobacco Use   • Smoking status: Former     Packs/day: 1.00     Years: 20.00     Pack years: 20.00     Types: Cigarettes     Start date: 1968     Quit date: 1989     Years since quittin.5   • Smokeless tobacco: Never   • Tobacco comments:     Did'nt inhale   Vaping Use   • Vaping Use: Never used   Substance and Sexual Activity   • Alcohol use: Not Currently     Alcohol/week: 1.0 standard drink     Types: 1 Shots of liquor per week     Comment: 1 drink per day at most   • Drug use: Never   • Sexual activity: Not Currently     Partners: Female     Birth control/protection: Condom         ALLERGIES  Patient has no known allergies.        REVIEW OF SYSTEMS  Review of Systems   No shortness of breath, no fever, no chills, no headache, no abdominal pain, no neck pain, no back pain, no focal weakness, no focal numbness  All systems reviewed and negative except for those discussed in HPI.       PHYSICAL EXAM    I have reviewed the triage vital signs and nursing notes.    ED Triage Vitals [22 1721]   Temp Heart Rate Resp BP SpO2   97.8 °F (36.6 °C) 75 18 149/72 97 %      Temp src Heart Rate Source Patient Position BP Location FiO2 (%)   Tympanic -- -- -- --       Physical Exam  GENERAL: Awake, alert, no acute distress  SKIN: Warm, dry  HENT: Normocephalic, atraumatic  EYES: no scleral icterus  CV: regular rhythm, regular rate  RESPIRATORY: normal effort, lungs clear.  Point tenderness in his right lateral lower chest along the rib.  ABDOMEN: soft, nontender, nondistended  MUSCULOSKELETAL: no deformity  NEURO: alert, moves all extremities, follows commands          LAB  RESULTS  Recent Results (from the past 24 hour(s))   Comprehensive Metabolic Panel    Collection Time: 12/07/22  9:47 PM    Specimen: Blood   Result Value Ref Range    Glucose 106 (H) 65 - 99 mg/dL    BUN 16 8 - 23 mg/dL    Creatinine 1.06 0.76 - 1.27 mg/dL    Sodium 138 136 - 145 mmol/L    Potassium 3.7 3.5 - 5.2 mmol/L    Chloride 105 98 - 107 mmol/L    CO2 24.2 22.0 - 29.0 mmol/L    Calcium 8.6 8.6 - 10.5 mg/dL    Total Protein 5.8 (L) 6.0 - 8.5 g/dL    Albumin 3.20 (L) 3.50 - 5.20 g/dL    ALT (SGPT) 46 (H) 1 - 41 U/L    AST (SGOT) 23 1 - 40 U/L    Alkaline Phosphatase 89 39 - 117 U/L    Total Bilirubin 0.4 0.0 - 1.2 mg/dL    Globulin 2.6 gm/dL    A/G Ratio 1.2 g/dL    BUN/Creatinine Ratio 15.1 7.0 - 25.0    Anion Gap 8.8 5.0 - 15.0 mmol/L    eGFR 69.2 >60.0 mL/min/1.73   CBC Auto Differential    Collection Time: 12/07/22  9:47 PM    Specimen: Blood   Result Value Ref Range    WBC 8.88 3.40 - 10.80 10*3/mm3    RBC 3.88 (L) 4.14 - 5.80 10*6/mm3    Hemoglobin 12.6 (L) 13.0 - 17.7 g/dL    Hematocrit 38.2 37.5 - 51.0 %    MCV 98.5 (H) 79.0 - 97.0 fL    MCH 32.5 26.6 - 33.0 pg    MCHC 33.0 31.5 - 35.7 g/dL    RDW 13.5 12.3 - 15.4 %    RDW-SD 46.8 37.0 - 54.0 fl    MPV 10.4 6.0 - 12.0 fL    Platelets 196 140 - 450 10*3/mm3    Neutrophil % 68.3 42.7 - 76.0 %    Lymphocyte % 18.5 (L) 19.6 - 45.3 %    Monocyte % 11.5 5.0 - 12.0 %    Eosinophil % 0.7 0.3 - 6.2 %    Basophil % 0.2 0.0 - 1.5 %    Immature Grans % 0.8 (H) 0.0 - 0.5 %    Neutrophils, Absolute 6.07 1.70 - 7.00 10*3/mm3    Lymphocytes, Absolute 1.64 0.70 - 3.10 10*3/mm3    Monocytes, Absolute 1.02 (H) 0.10 - 0.90 10*3/mm3    Eosinophils, Absolute 0.06 0.00 - 0.40 10*3/mm3    Basophils, Absolute 0.02 0.00 - 0.20 10*3/mm3    Immature Grans, Absolute 0.07 (H) 0.00 - 0.05 10*3/mm3    nRBC 0.0 0.0 - 0.2 /100 WBC       Ordered the above labs and independently reviewed the results.        RADIOLOGY  CT Head Without Contrast    Result Date: 12/7/2022  HEAD CT WITHOUT  CONTRAST  HISTORY: Fall, head trauma.  TECHNIQUE: Noncontrast head CT is provided. Correlation with PET/CT July 19, 2022.  Radiation dose reduction techniques were utilized, including automated exposure control and exposure modulation based on body size.  FINDINGS: The ventricles are normal in caliber. Parenchymal volume loss in the brain is again observed and appears unchanged. No hemorrhage or ischemic change is present. Extra-axial spaces appear normal. The bones of the skull are intact. There are secretions in the right maxillary sinus.      Negative.  This report was finalized on 12/7/2022 9:05 PM by Dr. Remi Perez M.D.      CT Chest Without Contrast Diagnostic    Result Date: 12/7/2022  CT CHEST WITHOUT IV CONTRAST  HISTORY: Fall, right rib pain  TECHNIQUE: Radiation dose reduction techniques were utilized, including automated exposure control and exposure modulation based on body size. 3 mm images were obtained through the chest without IV contrast.  COMPARISON: Multiple chest CTs dating back to 12/06/2021 and abdominal MRI 07/22/2022  FINDINGS:  The gallbladder is surgically absent. Somewhat nodular thickening of the left adrenal gland is present, grossly unchanged since 12/06/2021 and demonstrating density less than 10 Hounsfield units, likely representing an adrenal adenoma. Subcentimeter renal lesions are too small to characterize. Larger hypodense renal lesions imaging density less than 15 Hounsfield units are likely benign per Bosniak 2019 criteria. There is a intermixed soft tissue and fat density lesion within the right retroperitoneum and abutting the posterior aspect of the right kidney measuring up to approximately 3.3 x 3.7 cm, as seen on 12/06/2021. No mediastinal or axillary adenopathy is present by size criteria. Trace pericardial effusion is present, as before. No pulmonary consolidation, pleural effusion or pneumothorax is present. Bibasilar atelectasis and or pleural parenchymal  scarring is present, left greater then right, as seen on 12/06/2021. Sub-6 mm pulmonary nodules within the right upper and left lower lobes are grossly unchanged since 12/06/2021.  Incompletely united and healing right 10th and ninth rib fractures are present, as seen on 07/19/2022. There is a new right eighth rib fracture anteriorly which was not seen on 07/19/2022. There is the suggestion of surrounding callus formation.      1.  New right eighth rib fracture anteriorly not seen on 07/19/2022. The subtle surrounding callus formation suggestive of an acute to subacute injury. Incompletely united and healing right ninth and 10th rib fractures, as before. 2.  Mixed fat and soft tissue density lesion abutting the left kidney is grossly unchanged since 12/06/2021 and better evaluated on prior MRI. Please refer to this dictation for further information. 3.  Other findings as above.  This report was finalized on 12/7/2022 9:13 PM by Dr. Dwight Cevallos M.D.      XR Hip With or Without Pelvis 2 - 3 View Right    Result Date: 12/7/2022  Pelvis and right hip radiograph  HISTORY:Fall, pain  TECHNIQUE: AP pelvis and AP and lateral radiograph the right hip  COMPARISON:CT abdomen pelvis 10/21/2022      FINDINGS AND IMPRESSION: Vascular stents overlie the left aspect of the pelvis. There is also surgical staples overlying the left hip.  Generalized bony demineralization is present. While no underlying right hip fracture is seen, please note that in the setting of trauma and bony demineralization, nondisplaced hip fractures can remain radiographically occult and if there is continued clinical concern right hip injury, recommend dedicated right hip MRI for further evaluation.  This report was finalized on 12/7/2022 6:29 PM by Dr. Dwight Cevallos M.D.        I ordered the above noted radiological studies. Reviewed by me and discussed with radiologist.  See dictation for official radiology  interpretation.      PROCEDURES    Procedures      MEDICATIONS GIVEN IN ER    Medications   lidocaine (LIDODERM) 5 % 1 patch (1 patch Transdermal Medication Applied 12/7/22 2028)   sodium chloride 0.9 % flush 10 mL (has no administration in time range)         PROGRESS, DATA ANALYSIS, CONSULTS, AND MEDICAL DECISION MAKING    All labs have been independently reviewed by me.  All radiology studies have been reviewed by me and discussed with radiologist dictating the report.   EKG's independently viewed and interpreted by me.  Discussion below represents my analysis of pertinent findings related to patient's condition, differential diagnosis, treatment plan and final disposition.    Differential diagnosis includes but is not limited to rib fracture, hip fracture, pneumothorax, intracranial hemorrhage, spine fracture, cervical spine fracture.    ED Course as of 12/07/22 2300   Wed Dec 07, 2022   2004 The patient has no pain in his hips on exam.  He has point tenderness in his right lateral chest.  Given his age and injury with hitting his head we will CT his head to rule out intracranial hemorrhage.  We will CT his chest to rule out rib fracture.  Given his brittle bones on pelvis x-ray, chest x-ray with ribs will be of little benefit. [TR]   2119 The patient's head imaging shows no acute process.  The chest CT shows a single fracture of the eighth rib. [TR]   2123 I reviewed the work-up and findings with the patient at the bedside.  Answered all questions.  He has a single fracture of the right eighth rib.  Nursing has attempted to get him to the bathroom but he is unable to ambulate.  He is only able to stand and pivot.  He reports severe pain to his right hip with movement.  Plan to CT his hip to rule out fracture, obtain laboratory evaluation, and plan admission to the hospital for PT evaluation. [TR]   5489 Speaking with Vladimir with [facility].  Will admit to Dr. Middleton. [TR]      ED Course User Index  [TR] Alex Jenkins  MD CECILIA           PPE: The patient wore a mask and I wore an N95 mask throughout the entire patient encounter.       AS OF 23:00 EST VITALS:    BP - 149/72  HR - 75  TEMP - 97.8 °F (36.6 °C) (Tympanic)  O2 SATS - 97%        DIAGNOSIS  Final diagnoses:   Closed fracture of one rib of right side, initial encounter   Acute right hip pain         DISPOSITION  ED Disposition     ED Disposition   Decision to Admit    Condition   --    Comment   Level of Care: Telemetry [5]   Diagnosis: Closed fracture of one rib of right side, initial encounter [[id number]]   Admitting Physician: AMBAR VALDES [[id number]]   Attending Physician: AMBAR VALDES [[id number]]                  Note Disclaimer: At [facility], we believe that sharing information builds trust and better relationships. You are receiving this note because you recently visited [facility]. It is possible you will see health information before a provider has talked with you about it. This kind of information can be easy to misunderstand. To help you fully understand what it means for your health, we urge you to discuss this note with your provider.       Alex Jenkins MD  12/07/22 1945

## 2022-12-08 NOTE — PLAN OF CARE
Goal Outcome Evaluation:               VSS. Up frequently to use BSC; up with assist x 2 with gait belt and walker.

## 2022-12-08 NOTE — ED NOTES
Nursing report ED to floor  Albert Yadav  82 y.o.  male    HPI (triage note):   Chief Complaint   Patient presents with    Hip Pain     R        Admitting doctor:   Anselmo Middleton MD    Admitting diagnosis:   The primary encounter diagnosis was Closed fracture of one rib of right side, initial encounter. A diagnosis of Acute right hip pain was also pertinent to this visit.    Code status:   Current Code Status       Date Active Code Status Order ID Comments User Context       Prior            Allergies:   Patient has no known allergies.    Past Medical History:  Past Medical History:   Diagnosis Date    Anesthesia     MILD VIOLENT BEHAVIOR AFTER ANESTHESIA LEG SURGERY    Arthritis of back 10 years ago    At risk for sleep apnea     STOP BANG = 5    Broken bones     arm, collar bone, ankle    Carotid artery disorder (HCC)     Cervical disc disorder ?    Cholelithiasis 1978    Gall bladder removed    Diabetes mellitus (HCC) 2005    Type 2    Falls     Fracture of ankle 1972    Fracture of wrist 1947    Fracture, clavicle 26 years ago    Fracture, foot 26 year ago    Groin rash     PT STATES LEFT GROIN AT TIMES    History of transfusion     Hyperlipidemia     Hypertension     Low back pain     Low back strain ?    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    Most recent vitals:   Vitals:    12/07/22 1721   BP: 149/72   Pulse: 75   Resp: 18   Temp: 97.8 °F (36.6 °C)   TempSrc: Tympanic   SpO2: 97%       Active LDAs/IV Access:   Lines, Drains & Airways       Active LDAs       Name Placement date Placement time Site Days    Peripheral IV 10/21/22 1517 Left Antecubital  10/21/22  1517  Antecubital  47    Peripheral IV 12/07/22 2147 Anterior;Left;Lower Forearm 12/07/22 2147  Forearm  less than 1                    Labs (abnormal labs have a star):   Labs Reviewed   COMPREHENSIVE METABOLIC PANEL - Abnormal; Notable for the following components:       Result Value    Glucose 106 (*)     Total Protein 5.8 (*)     Albumin 3.20 (*)     ALT (SGPT) 46 (*)     All other components within normal limits    Narrative:     GFR Normal >60  Chronic Kidney Disease <60  Kidney Failure <15    The GFR formula is only valid for adults with stable renal function between ages 18 and 70.   CBC WITH AUTO DIFFERENTIAL - Abnormal; Notable for the following components:    RBC 3.88 (*)     Hemoglobin 12.6 (*)     MCV 98.5 (*)     Lymphocyte % 18.5 (*)     Immature Grans % 0.8 (*)     Monocytes, Absolute 1.02 (*)     Immature Grans, Absolute 0.07 (*)     All other components within normal limits   CBC AND DIFFERENTIAL    Narrative:     The following orders were created for panel order CBC & Differential.  Procedure                               Abnormality         Status                     ---------                               -----------         ------                     CBC Auto Differential[[id number]]        Abnormal            Final result                 Please view results for these tests on the individual orders.       EKG:   No orders to display       Meds given in ED:   Medications   lidocaine (LIDODERM) 5 % 1 patch (1 patch Transdermal Medication Applied 12/7/22 2028)   sodium chloride 0.9 % flush 10 mL (has no administration in time range)       Imaging results:  CT Head Without Contrast    Result Date: 12/7/2022  Negative.  This report was finalized on 12/7/2022 9:05 PM by Dr. Remi Perez M.D.      CT Chest Without Contrast Diagnostic    Result Date: 12/7/2022  1.  New right eighth rib fracture anteriorly not seen on 07/19/2022. The subtle surrounding callus formation suggestive of an  acute to subacute injury. Incompletely united and healing right ninth and 10th rib fractures, as before. 2.  Mixed fat and soft tissue density lesion abutting the left kidney is grossly unchanged since 2021 and better evaluated on prior MRI. Please refer to this dictation for further information. 3.  Other findings as above.  This report was finalized on 2022 9:13 PM by Dr. Dwight Cevallos M.D.      XR Hip With or Without Pelvis 2 - 3 View Right    Result Date: 2022  FINDINGS AND IMPRESSION: Vascular stents overlie the left aspect of the pelvis. There is also surgical staples overlying the left hip.  Generalized bony demineralization is present. While no underlying right hip fracture is seen, please note that in the setting of trauma and bony demineralization, nondisplaced hip fractures can remain radiographically occult and if there is continued clinical concern right hip injury, recommend dedicated right hip MRI for further evaluation.  This report was finalized on 2022 6:29 PM by Dr. Dwight Cevallos M.D.       Ambulatory status:   - assist x2    Social issues:   Social History     Socioeconomic History    Marital status:    Tobacco Use    Smoking status: Former     Packs/day: 1.00     Years: 20.00     Pack years: 20.00     Types: Cigarettes     Start date: 1968     Quit date: 1989     Years since quittin.5    Smokeless tobacco: Never    Tobacco comments:     Did'nt inhale   Vaping Use    Vaping Use: Never used   Substance and Sexual Activity    Alcohol use: Not Currently     Alcohol/week: 1.0 standard drink     Types: 1 Shots of liquor per week     Comment: 1 drink per day at most    Drug use: Never    Sexual activity: Not Currently     Partners: Female     Birth control/protection: Condom          NIH Stroke Scale:         Modesto Shannon RN  22 23:01 EST

## 2022-12-08 NOTE — H&P
Patient Name:  Albert Yadav  YOB: 1940  MRN:  [id number]  Admit Date:  12/7/2022  Patient Care Team:  Valerie Burnham MD as PCP - General (Internal Medicine)      Subjective   History Present Illness     Chief Complaint   Patient presents with   • Hip Pain     R        Mr. Yadav is a 82 y.o. male that presents to [facility] complaining of right hip pain.  He has a history of PAD and has had revascularization procedures before.  He takes Plavix for this.  He was shopping in [employer] yesterday when he states his shopping cart got away from him a little bit causing him to fall to the ground.  He landed on his right side injuring his right chest, forearm and hip.  He did not initially seek medical attention and is actually been walking around with a cane.  Came in because of uncontrolled pain.  Obviously is having some discomfort in his chest as well related to the rib fracture.  He denies any presyncope or syncope associated with his fall.  No chest pain or palpitations.  He had a fall on an escalator a few months ago as well that resulted in some rib fractures.    History of Present Illness  Review of Systems   Constitutional: Negative.    HENT: Negative.    Eyes: Negative.    Respiratory: Negative.    Cardiovascular: Positive for chest pain.   Gastrointestinal: Negative.    Endocrine: Negative.    Genitourinary: Negative.    Musculoskeletal: Positive for arthralgias and gait problem.   Skin: Negative.    Hematological: Negative.    Psychiatric/Behavioral: Negative.         Personal History     Past Medical History:   Diagnosis Date   • Anesthesia     MILD VIOLENT BEHAVIOR AFTER ANESTHESIA LEG SURGERY   • Arthritis of back 10 years ago   • At risk for sleep apnea     STOP BANG = 5   • Broken bones     arm, collar bone, ankle   • Carotid artery disorder (HCC)    • Cervical disc disorder ?   • Cholelithiasis 1978    Gall bladder removed   • Diabetes mellitus (HCC) 2005    Type  2   • Falls    • Fracture of ankle 1972   • Fracture of wrist 1947   • Fracture, clavicle 26 years ago   • Fracture, foot 26 year ago   • Groin rash     PT STATES LEFT GROIN AT TIMES   • History of transfusion    • Hyperlipidemia    • Hypertension    • Low back pain    • Low back strain ?   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   • Rheumatoid arthritis (HCC)     HANDS DIALLO   • Staph infection     2015  [facility] POST SX   • Tendency toward bleeding easily (HCC)     due to blood thinners     Past Surgical History:   Procedure Laterality Date   • ANGIOPLASTY FEMORAL ARTERY Left 11/07/2016    Procedure: ULTRA SOUND ACCESS RIGHT FEMORAL ARTERY, AIF BILATERAL RUNOFF, SELECTIVE CATHETERIZATION LEFT FEMORAL ARTERY.;  Surgeon: Errol Michael MD;  Location: [facility] OR 18/19;  Service:    • ANKLE OPEN REDUCTION INTERNAL FIXATION  1980   • ARTERIOVENOUS FISTULA/SHUNT SURGERY Left 12/13/2016    Procedure: LEFT ILEO-FEMORAL GORTEX GRAFT AND LEFT LEG ARERIOGRAM ;  Surgeon: Errol Michael MD;  Location: [facility] OR 18/19;  Service:    • CAROTID ENDARTERECTOMY Right 09/29/2020    Procedure: CAROTID ENDARTERECTOMY;  Surgeon: James Graham MD;  Location: [facility] OR;  Service: Vascular;  Laterality: Right;   • CHOLECYSTECTOMY     • EPIDURAL BLOCK     • EYE SURGERY Left     as child   • FRACTURE SURGERY Left     arm   • ILIAC ARTERY STENT Left 2020   • KNEE SURGERY Left     ORIF   • ORIF ANKLE FRACTURE Left      Family History   Problem Relation Age of Onset   • Diabetes Mother    • Cancer Mother         Cancer liver   • Cancer Father         Diabetic   • Diabetes Father    • Heart disease Maternal Grandmother    • Heart disease Maternal Grandfather    • Heart disease Paternal Grandmother    • Heart disease Paternal Grandfather    • Alcohol abuse Son          Alcoholic   • Cancer Brother    • Malalexa Hyperthermia Neg Hx      Social History     Tobacco Use   • Smoking status: Former     Packs/day: 1.00     Years: 20.00     Pack years: 20.00     Types: Cigarettes     Start date: 1968     Quit date: 1989     Years since quittin.5   • Smokeless tobacco: Never   • Tobacco comments:     Did'nt inhale   Vaping Use   • Vaping Use: Never used   Substance Use Topics   • Alcohol use: Not Currently     Alcohol/week: 1.0 standard drink     Types: 1 Shots of liquor per week     Comment: 1 drink per day at most   • Drug use: Never     No current facility-administered medications on file prior to encounter.     Current Outpatient Medications on File Prior to Encounter   Medication Sig Dispense Refill   • acetaminophen (TYLENOL) 650 MG 8 hr tablet Take 650 mg by mouth Every 8 (Eight) Hours As Needed for Mild Pain .     • atorvastatin (LIPITOR) 40 MG tablet Take 40 mg by mouth Every Evening.     • clopidogrel (PLAVIX) 75 MG tablet Take 75 mg by mouth Every Morning. TO STOP 7 DAYS BEFORE SURGERY     • folic acid (FOLVITE) 1 MG tablet Take 1 mg by mouth Daily.     • hydrochlorothiazide (HYDRODIURIL) 50 MG tablet Take 50 mg by mouth Every Morning.     • lisinopril (PRINIVIL,ZESTRIL) 40 MG tablet Take 40 mg by mouth Every Evening.     • melatonin 5 MG tablet tablet Take 5 mg by mouth Every Night.     • methotrexate 2.5 MG tablet Take 2.5 mg by mouth.  takes 2.5mg 10 tabs all together for total of 25mg     • rivastigmine (EXELON) 1.5 MG capsule Take 1.5 mg by mouth 2 (Two) Times a Day.     • Sennosides (SENNA LAX PO) Take 1 tablet by mouth As Needed.     • traZODone (DESYREL) 50 MG tablet Take 50 mg by mouth Every Night.     • triamcinolone (KENALOG) 0.025 % cream 1 application As Needed.     • cetirizine (ZyrTEC) 10 MG tablet Take 10 mg by mouth Every Morning.       No Known Allergies    Objective    Objective     Vital Signs  Temp:  [97.8 °F (36.6 °C)] 97.8 °F (36.6  °C)  Heart Rate:  [75] 75  Resp:  [18] 18  BP: (149)/(72) 149/72  SpO2:  [97 %] 97 %  on   ;   Device (Oxygen Therapy): room air  There is no height or weight on file to calculate BMI.    Physical Exam  Vitals reviewed.   Constitutional:       Appearance: Normal appearance. He is well-developed.   HENT:      Head: Normocephalic and atraumatic.   Eyes:      General: No scleral icterus.     Conjunctiva/sclera: Conjunctivae normal.   Neck:      Vascular: No JVD.   Cardiovascular:      Rate and Rhythm: Normal rate and regular rhythm.      Heart sounds: No murmur heard.  Pulmonary:      Effort: Pulmonary effort is normal. No respiratory distress.      Breath sounds: Normal breath sounds.   Chest:      Comments: Tender right anterior chest wall  Abdominal:      General: Bowel sounds are normal. There is no distension.      Palpations: Abdomen is soft.      Tenderness: There is no abdominal tenderness.   Musculoskeletal:         General: Tenderness (R hip) present. Normal range of motion.      Cervical back: Normal range of motion and neck supple.   Skin:     General: Skin is warm and dry.   Neurological:      Mental Status: He is alert and oriented to person, place, and time.      Cranial Nerves: No cranial nerve deficit.   Psychiatric:         Behavior: Behavior normal.         Results Review:  I reviewed the patient's new clinical results.  I reviewed the patient's new imaging results and agree with the interpretation.  I reviewed the patient's other test results and agree with the interpretation  I personally viewed and interpreted the patient's EKG/Telemetry data  Discussed with ED provider.    Lab Results (last 24 hours)     Procedure Component Value Units Date/Time    CBC & Differential [[id number]]  (Abnormal) Collected: 12/07/22 2147    Specimen: Blood Updated: 12/07/22 2159    Narrative:      The following orders were created for panel order CBC & Differential.  Procedure                               Abnormality          Status                     ---------                               -----------         ------                     CBC Auto Differential[[id number]]        Abnormal            Final result                 Please view results for these tests on the individual orders.    Comprehensive Metabolic Panel [[id number]]  (Abnormal) Collected: 12/07/22 2147    Specimen: Blood Updated: 12/07/22 2218     Glucose 106 mg/dL      BUN 16 mg/dL      Creatinine 1.06 mg/dL      Sodium 138 mmol/L      Potassium 3.7 mmol/L      Chloride 105 mmol/L      CO2 24.2 mmol/L      Calcium 8.6 mg/dL      Total Protein 5.8 g/dL      Albumin 3.20 g/dL      ALT (SGPT) 46 U/L      AST (SGOT) 23 U/L      Alkaline Phosphatase 89 U/L      Total Bilirubin 0.4 mg/dL      Globulin 2.6 gm/dL      A/G Ratio 1.2 g/dL      BUN/Creatinine Ratio 15.1     Anion Gap 8.8 mmol/L      eGFR 69.2 mL/min/1.73      Comment: National Kidney Foundation and American Society of Nephrology (ASN) Task Force recommended calculation based on the Chronic Kidney Disease Epidemiology Collaboration (CKD-EPI) equation refit without adjustment for race.       Narrative:      GFR Normal >60  Chronic Kidney Disease <60  Kidney Failure <15    The GFR formula is only valid for adults with stable renal function between ages 18 and 70.    CBC Auto Differential [[id number]]  (Abnormal) Collected: 12/07/22 2147    Specimen: Blood Updated: 12/07/22 2159     WBC 8.88 10*3/mm3      RBC 3.88 10*6/mm3      Hemoglobin 12.6 g/dL      Hematocrit 38.2 %      MCV 98.5 fL      MCH 32.5 pg      MCHC 33.0 g/dL      RDW 13.5 %      RDW-SD 46.8 fl      MPV 10.4 fL      Platelets 196 10*3/mm3      Neutrophil % 68.3 %      Lymphocyte % 18.5 %      Monocyte % 11.5 %      Eosinophil % 0.7 %      Basophil % 0.2 %      Immature Grans % 0.8 %      Neutrophils, Absolute 6.07 10*3/mm3      Lymphocytes, Absolute 1.64 10*3/mm3      Monocytes, Absolute 1.02 10*3/mm3      Eosinophils, Absolute 0.06 10*3/mm3       Basophils, Absolute 0.02 10*3/mm3      Immature Grans, Absolute 0.07 10*3/mm3      nRBC 0.0 /100 WBC           Imaging Results (Last 24 Hours)     Procedure Component Value Units Date/Time    CT Pelvis Without Contrast [[id number]] Collected: 12/08/22 0013     Updated: 12/08/22 0023    Narrative:      CT PELVIS WITHOUT IV CONTRAST     HISTORY: Fall, right hip pain     TECHNIQUE: Radiation dose reduction techniques were utilized, including  automated exposure control and exposure modulation based on body size.  To mm images were obtained through the pelvis without IV contrast.      COMPARISON: CT abdomen and pelvis 10/21/2022       Impression:      FINDINGS AND IMPRESSION:  For the purposes of this dictation, the last well-formed disc space be  referred to as L5-S1. There is mild anterolisthesis of L4 and L5, as  before. Extensive atherosclerosis is present within the visualized  pelvis.     There is generalized bony demineralization. While no definite right hip  or pelvic fracture is seen, please note in the setting of bony  demineralization and trauma, nondisplaced fractures can remain occult  and if there is continued clinical concern for right hip injury,  recommend further evaluation with right hip MRI.        This report was finalized on 12/8/2022 12:20 AM by Dr. Dwight Cevallos M.D.       CT Chest Without Contrast Diagnostic [[id number]] Collected: 12/07/22 2100     Updated: 12/07/22 2117    Narrative:      CT CHEST WITHOUT IV CONTRAST     HISTORY: Fall, right rib pain     TECHNIQUE: Radiation dose reduction techniques were utilized, including  automated exposure control and exposure modulation based on body size.   3 mm images were obtained through the chest without IV contrast.      COMPARISON: Multiple chest CTs dating back to 12/06/2021 and abdominal  MRI 07/22/2022     FINDINGS:     The gallbladder is surgically absent. Somewhat nodular thickening of the  left adrenal gland is present, grossly unchanged  since 12/06/2021 and  demonstrating density less than 10 Hounsfield units, likely representing  an adrenal adenoma. Subcentimeter renal lesions are too small to  characterize. Larger hypodense renal lesions imaging density less than  15 Hounsfield units are likely benign per Bosniak 2019 criteria. There  is a intermixed soft tissue and fat density lesion within the right  retroperitoneum and abutting the posterior aspect of the right kidney  measuring up to approximately 3.3 x 3.7 cm, as seen on 12/06/2021. No  mediastinal or axillary adenopathy is present by size criteria. Trace  pericardial effusion is present, as before. No pulmonary consolidation,  pleural effusion or pneumothorax is present. Bibasilar atelectasis and  or pleural parenchymal scarring is present, left greater then right, as  seen on 12/06/2021. Sub-6 mm pulmonary nodules within the right upper  and left lower lobes are grossly unchanged since 12/06/2021.     Incompletely united and healing right 10th and ninth rib fractures are  present, as seen on 07/19/2022. There is a new right eighth rib fracture  anteriorly which was not seen on 07/19/2022. There is the suggestion of  surrounding callus formation.       Impression:      1.  New right eighth rib fracture anteriorly not seen on 07/19/2022. The  subtle surrounding callus formation suggestive of an acute to subacute  injury. Incompletely united and healing right ninth and 10th rib  fractures, as before.  2.  Mixed fat and soft tissue density lesion abutting the left kidney is  grossly unchanged since 12/06/2021 and better evaluated on prior MRI.  Please refer to this dictation for further information.  3.  Other findings as above.     This report was finalized on 12/7/2022 9:13 PM by Dr. Dwight Cevallos M.D.       CT Head Without Contrast [[id number]] Collected: 12/07/22 2028     Updated: 12/07/22 2108    Narrative:      HEAD CT WITHOUT CONTRAST     HISTORY: Fall, head trauma.     TECHNIQUE:  Noncontrast head CT is provided. Correlation with PET/CT July 19, 2022.     Radiation dose reduction techniques were utilized, including automated  exposure control and exposure modulation based on body size.     FINDINGS: The ventricles are normal in caliber. Parenchymal volume loss  in the brain is again observed and appears unchanged. No hemorrhage or  ischemic change is present. Extra-axial spaces appear normal. The bones  of the skull are intact. There are secretions in the right maxillary  sinus.       Impression:      Negative.     This report was finalized on 12/7/2022 9:05 PM by Dr. Remi Perez M.D.       XR Hip With or Without Pelvis 2 - 3 View Right [[id number]] Collected: 12/07/22 1825     Updated: 12/07/22 1832    Narrative:      Pelvis and right hip radiograph     HISTORY:Fall, pain     TECHNIQUE: AP pelvis and AP and lateral radiograph the right hip     COMPARISON:CT abdomen pelvis 10/21/2022       Impression:      FINDINGS AND IMPRESSION:  Vascular stents overlie the left aspect of the pelvis. There is also  surgical staples overlying the left hip.     Generalized bony demineralization is present. While no underlying right  hip fracture is seen, please note that in the setting of trauma and bony  demineralization, nondisplaced hip fractures can remain radiographically  occult and if there is continued clinical concern right hip injury,  recommend dedicated right hip MRI for further evaluation.     This report was finalized on 12/7/2022 6:29 PM by Dr. Dwight Cevallos M.D.             Results for orders placed in visit on 08/07/15    SCANNED - ECHOCARDIOGRAM    No orders to display     Assessment/Plan   Assessment & Plan   Active Hospital Problems    Diagnosis  POA   • **Closed fracture of one rib of right side, initial encounter [S22.31XA]  Yes   • Contusion of right hip [S70.01XA]  Yes   • Rheumatoid arthritis (HCC) [M06.9]  Yes   • Essential hypertension [I10]  Yes   • Carotid stenosis,  bilateral [I65.23]  Yes      Resolved Hospital Problems   No resolved problems to display.       82 y.o. male admitted with Closed fracture of one rib of right side, initial encounter.    Will continue analgesics for his rib fracture and hip injury.  Currently on Tylenol, Lidoderm patch and Ultram.  Hip CT has come back negative for fracture.  Will see how he does with pain control and ambulation with physical therapy.  If still with uncontrolled pain consider MRI to rule out occult fracture.    He has a known mass in his left kidney seen on the CT scan above that he is scheduled for surgery later this month by Dr. Martinez.      · SCDs for DVT prophylaxis.  · Full code.  · Discussed with patient, nursing staff and ED provider.    Patient was seen prior to midnight despite note being completed later due to patient care related issues.    Anselmo Middleton MD  [facility]  12/08/22  00:54 EST

## 2022-12-09 ENCOUNTER — APPOINTMENT (OUTPATIENT)
Dept: MRI IMAGING | Facility: HOSPITAL | Age: 82
End: 2022-12-09

## 2022-12-09 LAB — GLUCOSE BLDC GLUCOMTR-MCNC: 149 MG/DL (ref 70–130)

## 2022-12-09 PROCEDURE — G0378 HOSPITAL OBSERVATION PER HR: HCPCS

## 2022-12-09 PROCEDURE — 82962 GLUCOSE BLOOD TEST: CPT

## 2022-12-09 PROCEDURE — 73721 MRI JNT OF LWR EXTRE W/O DYE: CPT

## 2022-12-09 RX ORDER — TRAMADOL HYDROCHLORIDE 50 MG/1
25 TABLET ORAL EVERY 6 HOURS PRN
Start: 2022-12-09 | End: 2022-12-15

## 2022-12-09 RX ORDER — LIDOCAINE 50 MG/G
1 PATCH TOPICAL
Start: 2022-12-10 | End: 2023-01-10

## 2022-12-09 RX ORDER — ONDANSETRON 4 MG/1
4 TABLET, FILM COATED ORAL EVERY 6 HOURS PRN
Start: 2022-12-09

## 2022-12-09 RX ORDER — AMOXICILLIN 250 MG
2 CAPSULE ORAL 2 TIMES DAILY
Start: 2022-12-09 | End: 2023-03-07 | Stop reason: HOSPADM

## 2022-12-09 RX ADMIN — LIDOCAINE 1 PATCH: 700 PATCH TOPICAL at 09:51

## 2022-12-09 RX ADMIN — CLOPIDOGREL 75 MG: 75 TABLET, FILM COATED ORAL at 06:24

## 2022-12-09 RX ADMIN — ATORVASTATIN CALCIUM 40 MG: 20 TABLET, FILM COATED ORAL at 18:29

## 2022-12-09 RX ADMIN — ACETAMINOPHEN 1000 MG: 500 TABLET ORAL at 20:27

## 2022-12-09 RX ADMIN — RIVASTIGMINE TARTRATE 1.5 MG: 1.5 CAPSULE ORAL at 14:24

## 2022-12-09 RX ADMIN — HYDROCHLOROTHIAZIDE 50 MG: 50 TABLET ORAL at 06:24

## 2022-12-09 RX ADMIN — ACETAMINOPHEN 1000 MG: 500 TABLET ORAL at 14:23

## 2022-12-09 RX ADMIN — FOLIC ACID 1 MG: 1 TABLET ORAL at 09:58

## 2022-12-09 RX ADMIN — ACETAMINOPHEN 1000 MG: 500 TABLET ORAL at 09:58

## 2022-12-09 RX ADMIN — Medication 5 MG: at 20:26

## 2022-12-09 RX ADMIN — LISINOPRIL 40 MG: 20 TABLET ORAL at 18:29

## 2022-12-09 RX ADMIN — DOCUSATE SODIUM 50MG AND SENNOSIDES 8.6MG 2 TABLET: 8.6; 5 TABLET, FILM COATED ORAL at 14:23

## 2022-12-09 RX ADMIN — TRAZODONE HYDROCHLORIDE 50 MG: 50 TABLET ORAL at 20:27

## 2022-12-09 NOTE — PROGRESS NOTES
Name: Albert Yadav ADMIT: 2022   : 1940  PCP: Valerie Burnham MD    MRN: [id number] LOS: 0 days   AGE/SEX: 82 y.o. male  ROOM: 123/1     Subjective   Subjective   His right hip hurts with any movement but otherwise tolerable at rest.. Rib pain is stable. No SOB or cough. No fever or chills.     Review of Systems     Objective   Objective   Vital Signs  Temp:  [97.5 °F (36.4 °C)-98 °F (36.7 °C)] 97.5 °F (36.4 °C)  Heart Rate:  [62-78] 78  Resp:  [16-20] 16  BP: (100-141)/(61-91) 141/80  SpO2:  [96 %-97 %] 97 %  on   ;   Device (Oxygen Therapy): room air  Body mass index is 27.64 kg/m².  Physical Exam  Vitals reviewed.   Constitutional:       Appearance: He is well-developed. He is obese. He is not ill-appearing.   HENT:      Head: Normocephalic and atraumatic.   Cardiovascular:      Rate and Rhythm: Normal rate and regular rhythm.   Pulmonary:      Effort: Pulmonary effort is normal. No respiratory distress.      Breath sounds: Normal breath sounds. No wheezing, rhonchi or rales.   Abdominal:      General: Bowel sounds are normal. There is no distension.      Palpations: Abdomen is soft. There is no mass.      Tenderness: There is no abdominal tenderness.      Hernia: No hernia is present.   Musculoskeletal:         General: Signs of injury present. No swelling. Tenderness: Right hip. limited ROM.      Right lower leg: No edema.      Left lower leg: No edema.      Comments: Right chest wall tenderness at rib fx no crepitus    Skin:     General: Skin is warm and dry.   Neurological:      Mental Status: He is alert and oriented to person, place, and time.   Psychiatric:         Mood and Affect: Mood normal.         Behavior: Behavior normal.         Thought Content: Thought content normal.         Judgment: Judgment normal.       Results Review     I reviewed the patient's new clinical results.  Results from last 7 days   Lab Units 22  0725 22  2147   WBC 10*3/mm3 7.62 8.88    HEMOGLOBIN g/dL 12.6* 12.6*   PLATELETS 10*3/mm3 172 196     Results from last 7 days   Lab Units 12/08/22  0725 12/07/22  2147   SODIUM mmol/L 140 138   POTASSIUM mmol/L 3.8 3.7   CHLORIDE mmol/L 105 105   CO2 mmol/L 24.9 24.2   BUN mg/dL 15 16   CREATININE mg/dL 1.02 1.06   GLUCOSE mg/dL 110* 106*   EGFR mL/min/1.73 72.5 69.2     Results from last 7 days   Lab Units 12/08/22  0725 12/07/22  2147   ALBUMIN g/dL 3.00* 3.20*   BILIRUBIN mg/dL 0.7 0.4   ALK PHOS U/L 95 89   AST (SGOT) U/L 23 23   ALT (SGPT) U/L 42* 46*     Results from last 7 days   Lab Units 12/08/22  0725 12/07/22  2147   CALCIUM mg/dL 8.6 8.6   ALBUMIN g/dL 3.00* 3.20*       Glucose   Date/Time Value Ref Range Status   12/09/2022 1302 149 (H) 70 - 130 mg/dL Final     Comment:     Meter: [id number] : [id number] Benjamín LOZANO       CT Head Without Contrast    Result Date: 12/7/2022  Negative.  This report was finalized on 12/7/2022 9:05 PM by Dr. Remi Perez M.D.      CT Chest Without Contrast Diagnostic    Result Date: 12/7/2022  1.  New right eighth rib fracture anteriorly not seen on 07/19/2022. The subtle surrounding callus formation suggestive of an acute to subacute injury. Incompletely united and healing right ninth and 10th rib fractures, as before. 2.  Mixed fat and soft tissue density lesion abutting the left kidney is grossly unchanged since 12/06/2021 and better evaluated on prior MRI. Please refer to this dictation for further information. 3.  Other findings as above.  This report was finalized on 12/7/2022 9:13 PM by Dr. Dwight Cevallos M.D.      CT Pelvis Without Contrast    Result Date: 12/8/2022  FINDINGS AND IMPRESSION: For the purposes of this dictation, the last well-formed disc space be referred to as L5-S1. There is mild anterolisthesis of L4 and L5, as before. Extensive atherosclerosis is present within the visualized pelvis.  There is generalized bony demineralization. While no definite right hip or pelvic fracture  is seen, please note in the setting of bony demineralization and trauma, nondisplaced fractures can remain occult and if there is continued clinical concern for right hip injury, recommend further evaluation with right hip MRI.   This report was finalized on 12/8/2022 12:20 AM by Dr. Dwight Cevallos M.D.      MRI Hip Right Without Contrast    Result Date: 12/9/2022  Contusion or strain involving the right gluteus medius muscle with overlying edema in the subcutaneous fat. No fracture or osteonecrosis.  This report was finalized on 12/9/2022 1:29 PM by Dr. Leonard Harris M.D.      XR Hip With or Without Pelvis 2 - 3 View Right    Result Date: 12/7/2022  FINDINGS AND IMPRESSION: Vascular stents overlie the left aspect of the pelvis. There is also surgical staples overlying the left hip.  Generalized bony demineralization is present. While no underlying right hip fracture is seen, please note that in the setting of trauma and bony demineralization, nondisplaced hip fractures can remain radiographically occult and if there is continued clinical concern right hip injury, recommend dedicated right hip MRI for further evaluation.  This report was finalized on 12/7/2022 6:29 PM by Dr. Dwight Cevallos M.D.      Scheduled Medications  acetaminophen, 1,000 mg, Oral, TID  atorvastatin, 40 mg, Oral, Q PM  clopidogrel, 75 mg, Oral, QAM  folic acid, 1 mg, Oral, Daily  hydroCHLOROthiazide, 50 mg, Oral, QAM  lidocaine, 1 patch, Transdermal, Q24H  lisinopril, 40 mg, Oral, Q PM  melatonin, 5 mg, Oral, Nightly  rivastigmine, 1.5 mg, Oral, BID  senna-docusate sodium, 2 tablet, Oral, BID  traZODone, 50 mg, Oral, Nightly    Infusions   Diet  Diet: Regular/House Diet; Texture: Regular Texture (IDDSI 7); Fluid Consistency: Thin (IDDSI 0)       Assessment/Plan     Active Hospital Problems    Diagnosis  POA   • **Closed fracture of one rib of right side, initial encounter [S22.31XA]  Yes   • Contusion of right hip [S70.01XA]  Yes   •  Rheumatoid arthritis (HCC) [M06.9]  Yes   • Essential hypertension [I10]  Yes   • Carotid stenosis, bilateral [I65.23]  Yes      Resolved Hospital Problems   No resolved problems to display.       82 y.o. male admitted with Closed fracture of one rib of right side, initial encounter.      · Right rib fracture/hip pain/mechanical fall  Lidoderm patch/Ultram.  Hip CT negative for fracture. Given persistent pain MRI obtained and also without fracture. Discharge to rehab         PAD status post revascularization    Left kidney mass-following with urology, Dr. Martinez to perform surgery later this month.     · SCDs for DVT prophylaxis.  · Full code.  · Discussed with patient and nursing staff.  · Anticipate discharge 12/10 to rehab      BETO Story  [facility]  12/09/22  14:02 EST

## 2022-12-09 NOTE — PLAN OF CARE
Goal Outcome Evaluation:  Plan of Care Reviewed With: patient        Progress: no change  Outcome Evaluation: Received pt in bed. Alert and oriented but forgetful. Stands at side of the bed with walker for balance and able to sidestep. c/o minimal pain to rt side with movement. Voids in urinal. All meds given as ordered. Will CTM.

## 2022-12-09 NOTE — DISCHARGE SUMMARY
Patient Name: Albert Yadav  : 1940  MRN: [id number]    Date of Admission: 2022  Date of Discharge:  2022  Primary Care Physician: Valerie Burnham MD      Chief Complaint:   Hip Pain (R )      Discharge Diagnoses     Active Hospital Problems    Diagnosis  POA   • **Closed fracture of one rib of right side, initial encounter [S22.31XA]  Yes   • Contusion of right hip [S70.01XA]  Yes   • Rheumatoid arthritis (HCC) [M06.9]  Yes   • Essential hypertension [I10]  Yes   • Carotid stenosis, bilateral [I65.23]  Yes      Resolved Hospital Problems   No resolved problems to display.        Hospital Course     Mr. Yadav is a 82 y.o. male with a history of rheumatoid arthritis, HTN, prior left hip surgery that presented with a fall at home.  He fell landing on his right side and sustained 1/8 rib fracture and contusion of his right hip.  X-ray of his hip was negative for fracture as well as CT.  Due to persistent pain and difficulty ambulating an MRI was obtained.  MRI was also negative for acute fracture but demonstrates a contusion/strain of his right gluteus medius muscle with mild edema.  Patient will benefit from subacute rehab for for continued physical therapy.  Rib pain and oxygenation stable this admission.  He will be discharged on 12/10.        Day of Discharge     Subjective:  Hip pain with ambulation.  Rib pain is tolerable with analgesics.  No other new complaints.    Physical Exam:  Temp:  [97.5 °F (36.4 °C)-98 °F (36.7 °C)] 97.5 °F (36.4 °C)  Heart Rate:  [62-78] 78  Resp:  [16-20] 16  BP: (100-141)/(61-91) 141/80  Body mass index is 27.64 kg/m².  Physical Exam  Vitals reviewed.   Constitutional:       Appearance: He is well-developed. He is obese. He is not ill-appearing.   HENT:      Head: Normocephalic and atraumatic.   Cardiovascular:      Rate and Rhythm: Normal rate and regular rhythm.   Pulmonary:      Effort: Pulmonary effort is normal. No respiratory distress.      Breath  sounds: Normal breath sounds.   Abdominal:      General: Bowel sounds are normal. There is no distension.      Palpations: Abdomen is soft. There is no mass.      Tenderness: There is no abdominal tenderness.      Hernia: No hernia is present.   Musculoskeletal:         General: Tenderness (Right hip) present.      Right lower leg: No edema.      Left lower leg: No edema.   Skin:     General: Skin is warm and dry.   Neurological:      General: No focal deficit present.      Mental Status: He is alert and oriented to person, place, and time.   Psychiatric:         Behavior: Behavior normal.         Thought Content: Thought content normal.         Judgment: Judgment normal.         Consultants     Consult Orders (all) (From admission, onward)     Start     Ordered    12/08/22 0244  Inpatient Case Management  Consult  Once        Provider:  (Not yet assigned)    12/08/22 0243    12/07/22 2220  [facility] (on-call MD unless specified) Details  Once,   Status:  Canceled        Specialty:  Hospitalist  Provider:  (Not yet assigned)    12/07/22 2219              Procedures     * Surgery not found *      Imaging Results (All)     Procedure Component Value Units Date/Time    MRI Hip Right Without Contrast [[id number]] Collected: 12/09/22 1311     Updated: 12/09/22 1332    Narrative:      MRI RIGHT HIP WITHOUT CONTRAST     HISTORY: Recent fall. Evaluate for fracture.     TECHNIQUE: MRI right hip includes axial T1, STIR as well as coronal T1,  STIR sequences through both hips and sagittal PD weighted sequence  through the right hip.     COMPARISON: CT pelvis without contrast 12/07/2022.     FINDINGS: There is edema within the lateral right gluteus medius muscle  and surrounding the right proximal iliotibial tract and within the  lateral hip subcutaneous fat. Edema is consistent with muscular  contusion or strain and there is no fluid collection.     There are mild arthritic changes at both hips without hip fracture  or  osteonecrosis.       Impression:      Contusion or strain involving the right gluteus medius  muscle with overlying edema in the subcutaneous fat. No fracture or  osteonecrosis.     This report was finalized on 12/9/2022 1:29 PM by Dr. Leonard Harris M.D.       CT Pelvis Without Contrast [[id number]] Collected: 12/08/22 0013     Updated: 12/08/22 0023    Narrative:      CT PELVIS WITHOUT IV CONTRAST     HISTORY: Fall, right hip pain     TECHNIQUE: Radiation dose reduction techniques were utilized, including  automated exposure control and exposure modulation based on body size.  To mm images were obtained through the pelvis without IV contrast.      COMPARISON: CT abdomen and pelvis 10/21/2022       Impression:      FINDINGS AND IMPRESSION:  For the purposes of this dictation, the last well-formed disc space be  referred to as L5-S1. There is mild anterolisthesis of L4 and L5, as  before. Extensive atherosclerosis is present within the visualized  pelvis.     There is generalized bony demineralization. While no definite right hip  or pelvic fracture is seen, please note in the setting of bony  demineralization and trauma, nondisplaced fractures can remain occult  and if there is continued clinical concern for right hip injury,  recommend further evaluation with right hip MRI.        This report was finalized on 12/8/2022 12:20 AM by Dr. Dwight Cevallos M.D.       CT Chest Without Contrast Diagnostic [[id number]] Collected: 12/07/22 2100     Updated: 12/07/22 2117    Narrative:      CT CHEST WITHOUT IV CONTRAST     HISTORY: Fall, right rib pain     TECHNIQUE: Radiation dose reduction techniques were utilized, including  automated exposure control and exposure modulation based on body size.   3 mm images were obtained through the chest without IV contrast.      COMPARISON: Multiple chest CTs dating back to 12/06/2021 and abdominal  MRI 07/22/2022     FINDINGS:     The gallbladder is surgically absent. Somewhat  nodular thickening of the  left adrenal gland is present, grossly unchanged since 12/06/2021 and  demonstrating density less than 10 Hounsfield units, likely representing  an adrenal adenoma. Subcentimeter renal lesions are too small to  characterize. Larger hypodense renal lesions imaging density less than  15 Hounsfield units are likely benign per Bosniak 2019 criteria. There  is a intermixed soft tissue and fat density lesion within the right  retroperitoneum and abutting the posterior aspect of the right kidney  measuring up to approximately 3.3 x 3.7 cm, as seen on 12/06/2021. No  mediastinal or axillary adenopathy is present by size criteria. Trace  pericardial effusion is present, as before. No pulmonary consolidation,  pleural effusion or pneumothorax is present. Bibasilar atelectasis and  or pleural parenchymal scarring is present, left greater then right, as  seen on 12/06/2021. Sub-6 mm pulmonary nodules within the right upper  and left lower lobes are grossly unchanged since 12/06/2021.     Incompletely united and healing right 10th and ninth rib fractures are  present, as seen on 07/19/2022. There is a new right eighth rib fracture  anteriorly which was not seen on 07/19/2022. There is the suggestion of  surrounding callus formation.       Impression:      1.  New right eighth rib fracture anteriorly not seen on 07/19/2022. The  subtle surrounding callus formation suggestive of an acute to subacute  injury. Incompletely united and healing right ninth and 10th rib  fractures, as before.  2.  Mixed fat and soft tissue density lesion abutting the left kidney is  grossly unchanged since 12/06/2021 and better evaluated on prior MRI.  Please refer to this dictation for further information.  3.  Other findings as above.     This report was finalized on 12/7/2022 9:13 PM by Dr. Dwight Cevallos M.D.       CT Head Without Contrast [[id number]] Collected: 12/07/22 2028     Updated: 12/07/22 2108    Narrative:       HEAD CT WITHOUT CONTRAST     HISTORY: Fall, head trauma.     TECHNIQUE: Noncontrast head CT is provided. Correlation with PET/CT July 19, 2022.     Radiation dose reduction techniques were utilized, including automated  exposure control and exposure modulation based on body size.     FINDINGS: The ventricles are normal in caliber. Parenchymal volume loss  in the brain is again observed and appears unchanged. No hemorrhage or  ischemic change is present. Extra-axial spaces appear normal. The bones  of the skull are intact. There are secretions in the right maxillary  sinus.       Impression:      Negative.     This report was finalized on 12/7/2022 9:05 PM by Dr. Remi Perez M.D.       XR Hip With or Without Pelvis 2 - 3 View Right [[id number]] Collected: 12/07/22 1825     Updated: 12/07/22 1832    Narrative:      Pelvis and right hip radiograph     HISTORY:Fall, pain     TECHNIQUE: AP pelvis and AP and lateral radiograph the right hip     COMPARISON:CT abdomen pelvis 10/21/2022       Impression:      FINDINGS AND IMPRESSION:  Vascular stents overlie the left aspect of the pelvis. There is also  surgical staples overlying the left hip.     Generalized bony demineralization is present. While no underlying right  hip fracture is seen, please note that in the setting of trauma and bony  demineralization, nondisplaced hip fractures can remain radiographically  occult and if there is continued clinical concern right hip injury,  recommend dedicated right hip MRI for further evaluation.     This report was finalized on 12/7/2022 6:29 PM by Dr. Dwight Cevallos M.D.           Results for orders placed during the hospital encounter of 09/29/20    Duplex Carotid - Right Ultrasound CAR    Interpretation Summary  · Proximal right internal carotid artery is normal.  · Imaging indicates a normal intra-op exam.    Results for orders placed in visit on 08/07/15    SCANNED - ECHOCARDIOGRAM    Pertinent Labs     Results from last  7 days   Lab Units 12/08/22  0725 12/07/22 2147   WBC 10*3/mm3 7.62 8.88   HEMOGLOBIN g/dL 12.6* 12.6*   PLATELETS 10*3/mm3 172 196     Results from last 7 days   Lab Units 12/08/22  0725 12/07/22  2147   SODIUM mmol/L 140 138   POTASSIUM mmol/L 3.8 3.7   CHLORIDE mmol/L 105 105   CO2 mmol/L 24.9 24.2   BUN mg/dL 15 16   CREATININE mg/dL 1.02 1.06   GLUCOSE mg/dL 110* 106*   EGFR mL/min/1.73 72.5 69.2     Results from last 7 days   Lab Units 12/08/22  0725 12/07/22  2147   ALBUMIN g/dL 3.00* 3.20*   BILIRUBIN mg/dL 0.7 0.4   ALK PHOS U/L 95 89   AST (SGOT) U/L 23 23   ALT (SGPT) U/L 42* 46*     Results from last 7 days   Lab Units 12/08/22  0725 12/07/22  2147   CALCIUM mg/dL 8.6 8.6   ALBUMIN g/dL 3.00* 3.20*               Invalid input(s): LDLCALC          Test Results Pending at Discharge       Discharge Details        Discharge Medications      New Medications      Instructions Start Date   lidocaine 5 %  Commonly known as: LIDODERM   1 patch, Transdermal, Every 24 Hours Scheduled, Remove & Discard patch within 12 hours or as directed by MD   Start Date: December 10, 2022     ondansetron 4 MG tablet  Commonly known as: ZOFRAN   4 mg, Oral, Every 6 Hours PRN      sennosides-docusate 8.6-50 MG per tablet  Commonly known as: PERICOLACE   2 tablets, Oral, 2 Times Daily      traMADol 50 MG tablet  Commonly known as: ULTRAM   25 mg, Oral, Every 6 Hours PRN         Continue These Medications      Instructions Start Date   acetaminophen 650 MG 8 hr tablet  Commonly known as: TYLENOL   650 mg, Oral, Every 8 Hours PRN      atorvastatin 40 MG tablet  Commonly known as: LIPITOR   40 mg, Oral, Every Evening      cetirizine 10 MG tablet  Commonly known as: zyrTEC   10 mg, Oral, Every Morning      clopidogrel 75 MG tablet  Commonly known as: PLAVIX   75 mg, Oral, Every Morning, TO STOP 7 DAYS BEFORE SURGERY      folic acid 1 MG tablet  Commonly known as: FOLVITE   1 mg, Oral, Daily      hydroCHLOROthiazide 50 MG  tablet  Commonly known as: HYDRODIURIL   50 mg, Oral, Every Morning      lisinopril 40 MG tablet  Commonly known as: PRINIVIL,ZESTRIL   40 mg, Oral, Every Evening      melatonin 5 MG tablet tablet   5 mg, Oral, Nightly      methotrexate 2.5 MG tablet   2.5 mg, Oral, Wednesdays takes 2.5mg 10 tabs all together for total of 25mg      rivastigmine 1.5 MG capsule  Commonly known as: EXELON   1.5 mg, Oral, 2 Times Daily      SENNA LAX PO   1 tablet, Oral, As Needed      traZODone 50 MG tablet  Commonly known as: DESYREL   50 mg, Oral, Nightly      triamcinolone 0.025 % cream  Commonly known as: KENALOG   1 application, As Needed             No Known Allergies    Discharge Disposition:        Discharge Diet:  Diet Order   Procedures   • Diet: Regular/House Diet; Texture: Regular Texture (IDDSI 7); Fluid Consistency: Thin (IDDSI 0)       Discharge Activity: as tolerated.       CODE STATUS:    Code Status and Medical Interventions:   Ordered at: 12/08/22 0048     Level Of Support Discussed With:    Patient     Code Status (Patient has no pulse and is not breathing):    CPR (Attempt to Resuscitate)     Medical Interventions (Patient has pulse or is breathing):    Full       No future appointments.    Time Spent on Discharge:  Greater than 37 minutes      BETO Story  [facility]  12/09/22  14:13 EST

## 2022-12-09 NOTE — CASE MANAGEMENT/SOCIAL WORK
Continued Stay Note  [facility]     Patient Name: Albert Yadav  MRN: [id number]  Today's Date: 12/9/2022    Admit Date: 12/7/2022    Plan: DC to accepting facility   Discharge Plan     Row Name 12/09/22 1142       Plan    Plan DC to accepting facility    Plan Comments CCP called Kenia with [facility] [phone] x [phone] and left  asking for callback regarding bed availity.  CCP spoke to pts wife and she also wants to make referrals [facility]  Referrals made.  CCP called [facility] at [phone] and she has a bed today, but will need precert from insurance.  CCP called Mari with [facility] and left her a voice mail.  CCP will continue to follow. Lucero Aguilar  11:51 DEON Guerra [facility] [place] not [facility] any [facility].Kenia from [facility] called and they can accept pt tomorrow pending insurance authorization.  CCP updated pts wife.   Lucero Aguilar  14:12 EST  Sharon called and insurance approved pt transfer to [facility].  CCP updated pt and his wife.  MD plans dc tomorrow.  Pt will need Covid test day of transfer.  W/C transport scheduled at 12noon on 12/10.  Informed pts nurse, pt  and Trudy lambert RN to call report to [phone], fax [phone]. CCP will continue to follow. Lucero Aguilar                 Discharge Codes    No documentation.                     Lucero Molina

## 2022-12-10 VITALS
RESPIRATION RATE: 18 BRPM | TEMPERATURE: 97.9 F | DIASTOLIC BLOOD PRESSURE: 60 MMHG | BODY MASS INDEX: 27.72 KG/M2 | HEIGHT: 69 IN | OXYGEN SATURATION: 98 % | SYSTOLIC BLOOD PRESSURE: 126 MMHG | HEART RATE: 70 BPM | WEIGHT: 187.17 LBS

## 2022-12-10 PROCEDURE — 0202U NFCT DS 22 TRGT SARS-COV-2: CPT | Performed by: HOSPITALIST

## 2022-12-10 PROCEDURE — G0378 HOSPITAL OBSERVATION PER HR: HCPCS

## 2022-12-10 RX ADMIN — LIDOCAINE 1 PATCH: 700 PATCH TOPICAL at 09:07

## 2022-12-10 RX ADMIN — RIVASTIGMINE TARTRATE 1.5 MG: 1.5 CAPSULE ORAL at 09:07

## 2022-12-10 RX ADMIN — FOLIC ACID 1 MG: 1 TABLET ORAL at 09:07

## 2022-12-10 RX ADMIN — CLOPIDOGREL 75 MG: 75 TABLET, FILM COATED ORAL at 09:07

## 2022-12-10 RX ADMIN — HYDROCHLOROTHIAZIDE 50 MG: 50 TABLET ORAL at 09:07

## 2022-12-10 NOTE — PLAN OF CARE
Goal Outcome Evaluation:  Plan of Care Reviewed With: patient      Outcome Evaluation: VSS, pt slept some during shift, up c/ x 2 assist, gait belt, & walker, MRI R hip completed, using IS, plan DC to [facility] tomorrow transport scheduled for 1200, pt's wife at bedside

## 2022-12-10 NOTE — PLAN OF CARE
Goal Outcome Evaluation:  Plan of Care Reviewed With: patient        Progress: no change  Outcome Evaluation: VSS. 2L NC applied during sleep. answers questions appropriately but very forgetful at times. plan for d/c today.

## 2022-12-13 ENCOUNTER — APPOINTMENT (OUTPATIENT)
Dept: CT IMAGING | Facility: HOSPITAL | Age: 82
End: 2022-12-13

## 2022-12-20 ENCOUNTER — ANESTHESIA EVENT (OUTPATIENT)
Dept: PERIOP | Facility: HOSPITAL | Age: 82
DRG: 658 | End: 2022-12-20
Payer: MEDICARE

## 2022-12-20 ENCOUNTER — APPOINTMENT (OUTPATIENT)
Dept: GENERAL RADIOLOGY | Facility: HOSPITAL | Age: 82
DRG: 658 | End: 2022-12-20
Payer: MEDICARE

## 2022-12-20 ENCOUNTER — ANESTHESIA (OUTPATIENT)
Dept: PERIOP | Facility: HOSPITAL | Age: 82
DRG: 658 | End: 2022-12-20
Payer: MEDICARE

## 2022-12-20 ENCOUNTER — HOSPITAL ENCOUNTER (INPATIENT)
Facility: HOSPITAL | Age: 82
LOS: 2 days | Discharge: NURSING, REHAB OR LONG-TERM CARE | DRG: 658 | End: 2022-12-22
Attending: SURGERY | Admitting: SURGERY
Payer: MEDICARE

## 2022-12-20 DIAGNOSIS — D49.0 RETROPERITONEAL TUMOR: ICD-10-CM

## 2022-12-20 LAB
ABO GROUP BLD: NORMAL
BLD GP AB SCN SERPL QL: NEGATIVE
GLUCOSE BLDC GLUCOMTR-MCNC: 124 MG/DL (ref 70–130)
GLUCOSE BLDC GLUCOMTR-MCNC: 144 MG/DL (ref 70–130)
RH BLD: POSITIVE
T&S EXPIRATION DATE: NORMAL

## 2022-12-20 PROCEDURE — 25010000002 PROPOFOL 10 MG/ML EMULSION: Performed by: NURSE ANESTHETIST, CERTIFIED REGISTERED

## 2022-12-20 PROCEDURE — 86901 BLOOD TYPING SEROLOGIC RH(D): CPT | Performed by: ANESTHESIOLOGY

## 2022-12-20 PROCEDURE — 86850 RBC ANTIBODY SCREEN: CPT | Performed by: ANESTHESIOLOGY

## 2022-12-20 PROCEDURE — 88377 M/PHMTRC ALYS ISHQUANT/SEMIQ: CPT

## 2022-12-20 PROCEDURE — 88304 TISSUE EXAM BY PATHOLOGIST: CPT | Performed by: SURGERY

## 2022-12-20 PROCEDURE — 25010000002 FLUCONAZOLE PER 200 MG: Performed by: NURSE ANESTHETIST, CERTIFIED REGISTERED

## 2022-12-20 PROCEDURE — 25010000002 MIDAZOLAM PER 1 MG: Performed by: ANESTHESIOLOGY

## 2022-12-20 PROCEDURE — 25010000002 ONDANSETRON PER 1 MG: Performed by: NURSE ANESTHETIST, CERTIFIED REGISTERED

## 2022-12-20 PROCEDURE — 25010000002 FENTANYL CITRATE (PF) 50 MCG/ML SOLUTION: Performed by: NURSE ANESTHETIST, CERTIFIED REGISTERED

## 2022-12-20 PROCEDURE — 25010000002 CEFAZOLIN IN DEXTROSE 2-4 GM/100ML-% SOLUTION: Performed by: UROLOGY

## 2022-12-20 PROCEDURE — 25010000002 DEXAMETHASONE SODIUM PHOSPHATE 20 MG/5ML SOLUTION: Performed by: NURSE ANESTHETIST, CERTIFIED REGISTERED

## 2022-12-20 PROCEDURE — S0260 H&P FOR SURGERY: HCPCS | Performed by: SURGERY

## 2022-12-20 PROCEDURE — 82962 GLUCOSE BLOOD TEST: CPT

## 2022-12-20 PROCEDURE — 49329 UNLSTD LAPS PX ABD PERTM&OMN: CPT | Performed by: SURGERY

## 2022-12-20 PROCEDURE — 25010000002 PHENYLEPHRINE 10 MG/ML SOLUTION 5 ML VIAL: Performed by: NURSE ANESTHETIST, CERTIFIED REGISTERED

## 2022-12-20 PROCEDURE — 0WBH4ZZ EXCISION OF RETROPERITONEUM, PERCUTANEOUS ENDOSCOPIC APPROACH: ICD-10-PCS | Performed by: UROLOGY

## 2022-12-20 PROCEDURE — 86900 BLOOD TYPING SEROLOGIC ABO: CPT | Performed by: ANESTHESIOLOGY

## 2022-12-20 PROCEDURE — 86923 COMPATIBILITY TEST ELECTRIC: CPT

## 2022-12-20 PROCEDURE — 73030 X-RAY EXAM OF SHOULDER: CPT

## 2022-12-20 PROCEDURE — 25010000002 FENTANYL CITRATE (PF) 50 MCG/ML SOLUTION: Performed by: ANESTHESIOLOGY

## 2022-12-20 RX ORDER — FENTANYL CITRATE 50 UG/ML
INJECTION, SOLUTION INTRAMUSCULAR; INTRAVENOUS AS NEEDED
Status: DISCONTINUED | OUTPATIENT
Start: 2022-12-20 | End: 2022-12-20 | Stop reason: SURG

## 2022-12-20 RX ORDER — CEFAZOLIN SODIUM 2 G/100ML
2 INJECTION, SOLUTION INTRAVENOUS ONCE
Status: COMPLETED | OUTPATIENT
Start: 2022-12-20 | End: 2022-12-20

## 2022-12-20 RX ORDER — FLUMAZENIL 0.1 MG/ML
0.2 INJECTION INTRAVENOUS AS NEEDED
Status: DISCONTINUED | OUTPATIENT
Start: 2022-12-20 | End: 2022-12-20 | Stop reason: HOSPADM

## 2022-12-20 RX ORDER — SODIUM CHLORIDE 0.9 % (FLUSH) 0.9 %
10 SYRINGE (ML) INJECTION EVERY 12 HOURS SCHEDULED
Status: DISCONTINUED | OUTPATIENT
Start: 2022-12-20 | End: 2022-12-20 | Stop reason: HOSPADM

## 2022-12-20 RX ORDER — ATORVASTATIN CALCIUM 20 MG/1
40 TABLET, FILM COATED ORAL EVERY EVENING
Status: DISCONTINUED | OUTPATIENT
Start: 2022-12-20 | End: 2022-12-22 | Stop reason: HOSPADM

## 2022-12-20 RX ORDER — FLUCONAZOLE 2 MG/ML
INJECTION, SOLUTION INTRAVENOUS AS NEEDED
Status: DISCONTINUED | OUTPATIENT
Start: 2022-12-20 | End: 2022-12-20 | Stop reason: SURG

## 2022-12-20 RX ORDER — SODIUM CHLORIDE, SODIUM LACTATE, POTASSIUM CHLORIDE, CALCIUM CHLORIDE 600; 310; 30; 20 MG/100ML; MG/100ML; MG/100ML; MG/100ML
INJECTION, SOLUTION INTRAVENOUS CONTINUOUS PRN
Status: DISCONTINUED | OUTPATIENT
Start: 2022-12-20 | End: 2022-12-20 | Stop reason: SURG

## 2022-12-20 RX ORDER — KETAMINE HCL IN NACL, ISO-OSM 100MG/10ML
SYRINGE (ML) INJECTION AS NEEDED
Status: DISCONTINUED | OUTPATIENT
Start: 2022-12-20 | End: 2022-12-20 | Stop reason: SURG

## 2022-12-20 RX ORDER — DIPHENHYDRAMINE HYDROCHLORIDE 50 MG/ML
12.5 INJECTION INTRAMUSCULAR; INTRAVENOUS
Status: DISCONTINUED | OUTPATIENT
Start: 2022-12-20 | End: 2022-12-20 | Stop reason: HOSPADM

## 2022-12-20 RX ORDER — FENTANYL CITRATE 50 UG/ML
50 INJECTION, SOLUTION INTRAMUSCULAR; INTRAVENOUS
Status: DISCONTINUED | OUTPATIENT
Start: 2022-12-20 | End: 2022-12-20 | Stop reason: HOSPADM

## 2022-12-20 RX ORDER — LISINOPRIL 20 MG/1
40 TABLET ORAL EVERY EVENING
Status: DISCONTINUED | OUTPATIENT
Start: 2022-12-20 | End: 2022-12-22 | Stop reason: HOSPADM

## 2022-12-20 RX ORDER — CHOLECALCIFEROL (VITAMIN D3) 125 MCG
5 CAPSULE ORAL NIGHTLY
Status: DISCONTINUED | OUTPATIENT
Start: 2022-12-20 | End: 2022-12-22 | Stop reason: HOSPADM

## 2022-12-20 RX ORDER — OXYCODONE AND ACETAMINOPHEN 7.5; 325 MG/1; MG/1
1 TABLET ORAL EVERY 4 HOURS PRN
Status: DISCONTINUED | OUTPATIENT
Start: 2022-12-20 | End: 2022-12-20 | Stop reason: HOSPADM

## 2022-12-20 RX ORDER — HYDROMORPHONE HYDROCHLORIDE 1 MG/ML
0.5 INJECTION, SOLUTION INTRAMUSCULAR; INTRAVENOUS; SUBCUTANEOUS
Status: DISCONTINUED | OUTPATIENT
Start: 2022-12-20 | End: 2022-12-20 | Stop reason: HOSPADM

## 2022-12-20 RX ORDER — PHENYLEPHRINE HCL IN 0.9% NACL 1 MG/10 ML
SYRINGE (ML) INTRAVENOUS AS NEEDED
Status: DISCONTINUED | OUTPATIENT
Start: 2022-12-20 | End: 2022-12-20 | Stop reason: SURG

## 2022-12-20 RX ORDER — ONDANSETRON 2 MG/ML
4 INJECTION INTRAMUSCULAR; INTRAVENOUS EVERY 6 HOURS PRN
Status: DISCONTINUED | OUTPATIENT
Start: 2022-12-20 | End: 2022-12-22 | Stop reason: HOSPADM

## 2022-12-20 RX ORDER — ONDANSETRON 2 MG/ML
4 INJECTION INTRAMUSCULAR; INTRAVENOUS ONCE AS NEEDED
Status: DISCONTINUED | OUTPATIENT
Start: 2022-12-20 | End: 2022-12-20 | Stop reason: HOSPADM

## 2022-12-20 RX ORDER — MIDAZOLAM HYDROCHLORIDE 1 MG/ML
0.5 INJECTION INTRAMUSCULAR; INTRAVENOUS
Status: DISCONTINUED | OUTPATIENT
Start: 2022-12-20 | End: 2022-12-20 | Stop reason: HOSPADM

## 2022-12-20 RX ORDER — ONDANSETRON 2 MG/ML
INJECTION INTRAMUSCULAR; INTRAVENOUS AS NEEDED
Status: DISCONTINUED | OUTPATIENT
Start: 2022-12-20 | End: 2022-12-20 | Stop reason: SURG

## 2022-12-20 RX ORDER — SODIUM CHLORIDE 0.9 % (FLUSH) 0.9 %
10 SYRINGE (ML) INJECTION AS NEEDED
Status: DISCONTINUED | OUTPATIENT
Start: 2022-12-20 | End: 2022-12-20 | Stop reason: HOSPADM

## 2022-12-20 RX ORDER — OXYCODONE HYDROCHLORIDE 5 MG/1
5 TABLET ORAL EVERY 4 HOURS PRN
Status: DISCONTINUED | OUTPATIENT
Start: 2022-12-20 | End: 2022-12-22 | Stop reason: HOSPADM

## 2022-12-20 RX ORDER — FENTANYL CITRATE 50 UG/ML
INJECTION, SOLUTION INTRAMUSCULAR; INTRAVENOUS
Status: COMPLETED | OUTPATIENT
Start: 2022-12-20 | End: 2022-12-20

## 2022-12-20 RX ORDER — NALOXONE HCL 0.4 MG/ML
0.1 VIAL (ML) INJECTION
Status: DISCONTINUED | OUTPATIENT
Start: 2022-12-20 | End: 2022-12-22 | Stop reason: HOSPADM

## 2022-12-20 RX ORDER — HYDRALAZINE HYDROCHLORIDE 20 MG/ML
5 INJECTION INTRAMUSCULAR; INTRAVENOUS
Status: DISCONTINUED | OUTPATIENT
Start: 2022-12-20 | End: 2022-12-20 | Stop reason: HOSPADM

## 2022-12-20 RX ORDER — HYDROCODONE BITARTRATE AND ACETAMINOPHEN 7.5; 325 MG/1; MG/1
1 TABLET ORAL ONCE AS NEEDED
Status: DISCONTINUED | OUTPATIENT
Start: 2022-12-20 | End: 2022-12-20 | Stop reason: HOSPADM

## 2022-12-20 RX ORDER — ENOXAPARIN SODIUM 100 MG/ML
40 INJECTION SUBCUTANEOUS DAILY
Status: DISCONTINUED | OUTPATIENT
Start: 2022-12-21 | End: 2022-12-22 | Stop reason: HOSPADM

## 2022-12-20 RX ORDER — MAGNESIUM HYDROXIDE 1200 MG/15ML
LIQUID ORAL AS NEEDED
Status: DISCONTINUED | OUTPATIENT
Start: 2022-12-20 | End: 2022-12-20 | Stop reason: HOSPADM

## 2022-12-20 RX ORDER — RIVASTIGMINE TARTRATE 1.5 MG/1
1.5 CAPSULE ORAL 2 TIMES DAILY
Status: DISCONTINUED | OUTPATIENT
Start: 2022-12-20 | End: 2022-12-22 | Stop reason: HOSPADM

## 2022-12-20 RX ORDER — PROPOFOL 10 MG/ML
VIAL (ML) INTRAVENOUS AS NEEDED
Status: DISCONTINUED | OUTPATIENT
Start: 2022-12-20 | End: 2022-12-20 | Stop reason: SURG

## 2022-12-20 RX ORDER — HYDROCHLOROTHIAZIDE 50 MG/1
50 TABLET ORAL EVERY MORNING
Status: DISCONTINUED | OUTPATIENT
Start: 2022-12-21 | End: 2022-12-22 | Stop reason: HOSPADM

## 2022-12-20 RX ORDER — NALOXONE HCL 0.4 MG/ML
0.2 VIAL (ML) INJECTION AS NEEDED
Status: DISCONTINUED | OUTPATIENT
Start: 2022-12-20 | End: 2022-12-20 | Stop reason: HOSPADM

## 2022-12-20 RX ORDER — MIDAZOLAM HYDROCHLORIDE 1 MG/ML
INJECTION INTRAMUSCULAR; INTRAVENOUS
Status: COMPLETED | OUTPATIENT
Start: 2022-12-20 | End: 2022-12-20

## 2022-12-20 RX ORDER — PROMETHAZINE HYDROCHLORIDE 25 MG/1
25 SUPPOSITORY RECTAL ONCE AS NEEDED
Status: DISCONTINUED | OUTPATIENT
Start: 2022-12-20 | End: 2022-12-20 | Stop reason: HOSPADM

## 2022-12-20 RX ORDER — PROMETHAZINE HYDROCHLORIDE 25 MG/1
25 TABLET ORAL ONCE AS NEEDED
Status: DISCONTINUED | OUTPATIENT
Start: 2022-12-20 | End: 2022-12-20 | Stop reason: HOSPADM

## 2022-12-20 RX ORDER — DIPHENHYDRAMINE HCL 25 MG
25 CAPSULE ORAL
Status: DISCONTINUED | OUTPATIENT
Start: 2022-12-20 | End: 2022-12-20 | Stop reason: HOSPADM

## 2022-12-20 RX ORDER — SODIUM CHLORIDE, SODIUM LACTATE, POTASSIUM CHLORIDE, CALCIUM CHLORIDE 600; 310; 30; 20 MG/100ML; MG/100ML; MG/100ML; MG/100ML
9 INJECTION, SOLUTION INTRAVENOUS CONTINUOUS
Status: DISCONTINUED | OUTPATIENT
Start: 2022-12-20 | End: 2022-12-20

## 2022-12-20 RX ORDER — BUPIVACAINE HYDROCHLORIDE AND EPINEPHRINE 5; 5 MG/ML; UG/ML
INJECTION, SOLUTION EPIDURAL; INTRACAUDAL; PERINEURAL AS NEEDED
Status: DISCONTINUED | OUTPATIENT
Start: 2022-12-20 | End: 2022-12-20 | Stop reason: HOSPADM

## 2022-12-20 RX ORDER — SODIUM CHLORIDE 9 MG/ML
40 INJECTION, SOLUTION INTRAVENOUS AS NEEDED
Status: DISCONTINUED | OUTPATIENT
Start: 2022-12-20 | End: 2022-12-20 | Stop reason: HOSPADM

## 2022-12-20 RX ORDER — HYDROMORPHONE HYDROCHLORIDE 1 MG/ML
0.5 INJECTION, SOLUTION INTRAMUSCULAR; INTRAVENOUS; SUBCUTANEOUS
Status: DISCONTINUED | OUTPATIENT
Start: 2022-12-20 | End: 2022-12-21

## 2022-12-20 RX ORDER — ACETAMINOPHEN 325 MG/1
650 TABLET ORAL EVERY 6 HOURS PRN
Status: DISCONTINUED | OUTPATIENT
Start: 2022-12-20 | End: 2022-12-22 | Stop reason: HOSPADM

## 2022-12-20 RX ORDER — EPHEDRINE SULFATE 50 MG/ML
5 INJECTION, SOLUTION INTRAVENOUS ONCE AS NEEDED
Status: DISCONTINUED | OUTPATIENT
Start: 2022-12-20 | End: 2022-12-20 | Stop reason: HOSPADM

## 2022-12-20 RX ORDER — AMOXICILLIN 250 MG
2 CAPSULE ORAL 2 TIMES DAILY
Status: DISCONTINUED | OUTPATIENT
Start: 2022-12-20 | End: 2022-12-22 | Stop reason: HOSPADM

## 2022-12-20 RX ORDER — LIDOCAINE HYDROCHLORIDE 20 MG/ML
INJECTION, SOLUTION INFILTRATION; PERINEURAL AS NEEDED
Status: DISCONTINUED | OUTPATIENT
Start: 2022-12-20 | End: 2022-12-20 | Stop reason: SURG

## 2022-12-20 RX ORDER — FAMOTIDINE 10 MG/ML
20 INJECTION, SOLUTION INTRAVENOUS ONCE
Status: COMPLETED | OUTPATIENT
Start: 2022-12-20 | End: 2022-12-20

## 2022-12-20 RX ORDER — DEXAMETHASONE SODIUM PHOSPHATE 4 MG/ML
INJECTION, SOLUTION INTRA-ARTICULAR; INTRALESIONAL; INTRAMUSCULAR; INTRAVENOUS; SOFT TISSUE AS NEEDED
Status: DISCONTINUED | OUTPATIENT
Start: 2022-12-20 | End: 2022-12-20 | Stop reason: SURG

## 2022-12-20 RX ORDER — ROCURONIUM BROMIDE 10 MG/ML
INJECTION, SOLUTION INTRAVENOUS AS NEEDED
Status: DISCONTINUED | OUTPATIENT
Start: 2022-12-20 | End: 2022-12-20 | Stop reason: SURG

## 2022-12-20 RX ORDER — TRAZODONE HYDROCHLORIDE 50 MG/1
50 TABLET ORAL NIGHTLY
Status: DISCONTINUED | OUTPATIENT
Start: 2022-12-20 | End: 2022-12-22 | Stop reason: HOSPADM

## 2022-12-20 RX ORDER — LABETALOL HYDROCHLORIDE 5 MG/ML
5 INJECTION, SOLUTION INTRAVENOUS
Status: DISCONTINUED | OUTPATIENT
Start: 2022-12-20 | End: 2022-12-20 | Stop reason: HOSPADM

## 2022-12-20 RX ORDER — SODIUM CHLORIDE 9 MG/ML
INJECTION, SOLUTION INTRAVENOUS AS NEEDED
Status: DISCONTINUED | OUTPATIENT
Start: 2022-12-20 | End: 2022-12-20 | Stop reason: HOSPADM

## 2022-12-20 RX ADMIN — FLUCONAZOLE 400 MG: 2 INJECTION, SOLUTION INTRAVENOUS at 07:45

## 2022-12-20 RX ADMIN — FENTANYL CITRATE 50 MCG: 50 INJECTION INTRAMUSCULAR; INTRAVENOUS at 06:56

## 2022-12-20 RX ADMIN — RIVASTIGMINE TARTRATE 1.5 MG: 1.5 CAPSULE ORAL at 20:31

## 2022-12-20 RX ADMIN — Medication 5 MG: at 20:42

## 2022-12-20 RX ADMIN — Medication 500 MCG: at 07:50

## 2022-12-20 RX ADMIN — Medication 100 MCG: at 07:54

## 2022-12-20 RX ADMIN — ROCURONIUM BROMIDE 50 MG: 10 INJECTION, SOLUTION INTRAVENOUS at 07:33

## 2022-12-20 RX ADMIN — LIDOCAINE HYDROCHLORIDE 100 MG: 20 INJECTION, SOLUTION INFILTRATION; PERINEURAL at 07:33

## 2022-12-20 RX ADMIN — Medication 50 MG: at 07:45

## 2022-12-20 RX ADMIN — ONDANSETRON 4 MG: 2 INJECTION INTRAMUSCULAR; INTRAVENOUS at 08:37

## 2022-12-20 RX ADMIN — ATORVASTATIN CALCIUM 40 MG: 20 TABLET, FILM COATED ORAL at 20:42

## 2022-12-20 RX ADMIN — PHENYLEPHRINE HYDROCHLORIDE 0.4 MCG/KG/MIN: 10 INJECTION INTRAVENOUS at 07:55

## 2022-12-20 RX ADMIN — PROPOFOL 150 MG: 10 INJECTION, EMULSION INTRAVENOUS at 07:33

## 2022-12-20 RX ADMIN — FAMOTIDINE 20 MG: 10 INJECTION INTRAVENOUS at 06:44

## 2022-12-20 RX ADMIN — Medication 200 MCG: at 07:36

## 2022-12-20 RX ADMIN — ROCURONIUM BROMIDE 20 MG: 10 INJECTION, SOLUTION INTRAVENOUS at 08:19

## 2022-12-20 RX ADMIN — DEXAMETHASONE SODIUM PHOSPHATE 8 MG: 4 INJECTION, SOLUTION INTRAMUSCULAR; INTRAVENOUS at 07:33

## 2022-12-20 RX ADMIN — TRAZODONE HYDROCHLORIDE 50 MG: 50 TABLET ORAL at 20:31

## 2022-12-20 RX ADMIN — LISINOPRIL 40 MG: 20 TABLET ORAL at 20:32

## 2022-12-20 RX ADMIN — CEFAZOLIN SODIUM 2 G: 2 INJECTION, SOLUTION INTRAVENOUS at 07:38

## 2022-12-20 RX ADMIN — CEFAZOLIN SODIUM 2 G: 2 INJECTION, SOLUTION INTRAVENOUS at 07:19

## 2022-12-20 RX ADMIN — FENTANYL CITRATE 100 MCG: 50 INJECTION, SOLUTION INTRAMUSCULAR; INTRAVENOUS at 07:33

## 2022-12-20 RX ADMIN — SODIUM CHLORIDE, POTASSIUM CHLORIDE, SODIUM LACTATE AND CALCIUM CHLORIDE: 600; 310; 30; 20 INJECTION, SOLUTION INTRAVENOUS at 07:26

## 2022-12-20 RX ADMIN — SUGAMMADEX 300 MG: 100 INJECTION, SOLUTION INTRAVENOUS at 08:43

## 2022-12-20 RX ADMIN — Medication 200 MCG: at 07:40

## 2022-12-20 RX ADMIN — SODIUM CHLORIDE, POTASSIUM CHLORIDE, SODIUM LACTATE AND CALCIUM CHLORIDE: 600; 310; 30; 20 INJECTION, SOLUTION INTRAVENOUS at 08:39

## 2022-12-20 RX ADMIN — Medication 100 MCG: at 07:47

## 2022-12-20 RX ADMIN — SODIUM CHLORIDE, POTASSIUM CHLORIDE, SODIUM LACTATE AND CALCIUM CHLORIDE 9 ML/HR: 600; 310; 30; 20 INJECTION, SOLUTION INTRAVENOUS at 07:19

## 2022-12-20 RX ADMIN — MIDAZOLAM 0.5 MG: 1 INJECTION INTRAMUSCULAR; INTRAVENOUS at 06:56

## 2022-12-20 NOTE — PLAN OF CARE
Goal Outcome Evaluation:  Plan of Care Reviewed With: patient, family        Progress: no change  Outcome Evaluation: pt recieved from PACU, VSS on RA, continuous pulse ox. 4 lap sites, dermabond and open to air. Wound documented on coccyx, pt and family report it being present prior to admission. Pt turned q2 hrs. Pt disoriented, and agitated at times. Family at bed side intermittently. Bed alarm on zone 2. pt up to bathroom with assit of 2 w/ walker and gait belt. Pt voiding after surgery, urinal at bedside.

## 2022-12-20 NOTE — OP NOTE
PREOPERATIVE DIAGNOSIS:  Left side perinephric retroperitoneal lipomatous tumor    POSTOPERATIVE DIAGNOSIS (FINDINGS):  Same    PROCEDURE:  Laparoscopic excision of left side retroperitoneal perinephric lipomatous tumor, grossly excised specimen measured approximately 12 cm    CO-SURGEONS:  MD Remi Sanford MD    ANESTHESIA:  General    EBL:  Minimal    SPECIMEN(S):  Retroperitoneal tumor    DESCRIPTION:  In supine position under general anesthetic prepped and draped usual sterile manner after turning to left side up lateral decubitus position.  Small incision was made in the left lower quadrant and 5 mm Optiview trocar was inserted without difficulty.  Abdomen was insufflated to 15 mmHg.  The initial trocar was changed to a 12 mm trocar and a lateral 5 and 2 medial 5 mm trochars were introduced.  The descending colon was mobilized with the harmonic scalpel.  The left kidney was identified and the known cyst at the apical aspect of the left kidney was also identified.  Lateral and inferior to the cystic lesion as expected from the preoperative imaging was an abnormal fatty growth adjacent to the kidney with ill-defined margins.  We excised the fatty tissue that appeared abnormal as well as a reasonable amount of normal appearing perinephric fat and placed in the Endo Catch bag and removed through the 12 mm trocar site.  No injury or dissection into the kidney occurred.  Good hemostasis was noted.  CO2 was released.  Fascia of the extraction site was closed with running 0 Vicryl.  Skin was closed with 5-0 Vicryl subcuticular followed by Exofin.  Tolerated well, stable to recovery room.    Moy Vargas M.D.

## 2022-12-20 NOTE — ANESTHESIA PROCEDURE NOTES
Airway  Urgency: elective    Date/Time: 12/20/2022 7:35 AM  Airway not difficult    General Information and Staff    Patient location during procedure: OR  CRNA/CAA: Jordyn Jones CRNA    Indications and Patient Condition  Indications for airway management: airway protection    Preoxygenated: yes  Mask difficulty assessment: 1 - vent by mask    Final Airway Details  Final airway type: endotracheal airway      Successful airway: ETT  Cuffed: yes   Successful intubation technique: direct laryngoscopy  Facilitating devices/methods: intubating stylet  Endotracheal tube insertion site: oral  Blade: Jim  Blade size: 4  ETT size (mm): 7.5  Cormack-Lehane Classification: grade I - full view of glottis  Placement verified by: chest auscultation and capnometry   Cuff volume (mL): 8  Measured from: lips  Number of attempts at approach: 1  Assessment: lips, teeth, and gum same as pre-op and atraumatic intubation

## 2022-12-20 NOTE — ANESTHESIA POSTPROCEDURE EVALUATION
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        Post Anesthesia Care and Evaluation    Patient location during evaluation: PACU  Patient participation: complete - patient participated  Level of consciousness: awake and alert  Pain management: adequate    Airway patency: patent  Anesthetic complications: No anesthetic complications    Cardiovascular status: acceptable  Respiratory status: acceptable  Hydration status: acceptable    Comments: --------------------            12/20/22               1315     --------------------   BP:       143/93     Pulse:      86       Resp:       14       Temp:                SpO2:      98%      --------------------

## 2022-12-20 NOTE — NURSING NOTE
Nursing assistant was helping patient to the restroom and the patient lost his balance and fell.   Nasal mucosa clear.  Mouth with normal mucosa.  Throat has no vesicles, no oropharyngeal exudates and uvula is midline.

## 2022-12-20 NOTE — ANESTHESIA PROCEDURE NOTES
Arterial Line      Patient reassessed immediately prior to procedure    Patient location during procedure: pre-op  Start time: 12/20/2022 7:00 AM  Stop Time:12/20/2022 7:06 AM       Line placed for hemodynamic monitoring.  Performed By   Anesthesiologist: Loc Fernandez MD   Preanesthetic Checklist  Completed: patient identified, IV checked, site marked, risks and benefits discussed, surgical consent, monitors and equipment checked, pre-op evaluation and timeout performed  Arterial Line Prep    Sterile Tech: gloves and cap  Prep: ChloraPrep  Patient monitoring: blood pressure monitoring, continuous pulse oximetry and EKG  Arterial Line Procedure   Laterality:left  Location:  radial artery  Catheter size: 20 G   Guidance: ultrasound guided and landmark technique  Number of attempts: 1  Successful placement: yes   Post Assessment   Dressing Type: occlusive dressing applied, secured with tape and wrist guard applied.   Complications no  Circ/Move/Sens Assessment: normal.   Patient Tolerance: patient tolerated the procedure well with no apparent complications  Additional Notes  Ultrasound guided needle acces to artery

## 2022-12-20 NOTE — H&P
ASSESSMENT/PLAN:    82-year-old gentleman with left retroperitoneal mass adjacent to the left kidney.  The mass has remained relatively stable on follow-up CT scans.  He understands options of continued observation versus surgical resection and pros and cons of each approach and wishes to proceed with laparoscopic excision of this mass.  He understands nature of the procedure and the risks including but not limited to bleeding, infection, and conversion to open procedure.    CC:     Retroperitoneal mass    HPI:    82-year-old gentleman with incidental finding of retroperitoneal mass adjacent to left kidney    SOCIAL HISTORY:   • Denies tobacco use  • Denies alcohol use    FAMILY HISTORY:    • Colorectal cancer: Negative  • Liver cancer: Mother    PREVIOUS ABDOMINAL SURGERY    • Open cholecystectomy    PAST MEDICAL HISTORY:    • Peripheral vascular disease  • Diabetes  • Hypertension  • Hyperlipidemia  • Rheumatoid arthritis    MEDICATIONS:   • Reviewed    ALLERGIES:   • None    PHYSICAL EXAM:   • Constitutional: Well-developed well-nourished, no acute distress  • Cardiovascular: Regular rate, no jugular venous distention  • Gastrointestinal: Soft, nontender  • Psychiatric: Alert and oriented ×3, normal affect     ROS:    No cough, chest pain, shortness of air.  All other systems reviewed and negative other than presenting complaints.    MARBIN MARTINEZ M.D.

## 2022-12-20 NOTE — OP NOTE
Operative Report     ESME [facility] OR    Patient: Albert Yadav  Age:      82 y.o.  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    Lipomatous mass lateral to the lower pole of the left kidney consistent with abnormal finding on patient's recent CT scan.    Description of procedure: I met with the patient in the preoperative area and discussed the risks of the procedure, including the risks of radical nephrectomy, including pain, infection, bleeding, loss of renal function, need for additional procedures, failure to eradicate the mass, as well as the risks of anesthesia. The patient expressed understanding.    I entered the OR after Dr. Vargas's team had positioned the patient, prepped and draped, and had placed laparoscopic ports. The left colonic mesentery was mobilized medially, exposing the retroperitoneum overlying the lower pole of the left kidney. The left renal cyst prominently seen on CT scan was identified. Inferior to this cyst and lateral to the kidney, there was a fatty mass with distinct color compared to the surrounding perinephric fat. This area was widely excised from the renal capsule on the medial side, to the sidewall on the lateral side, down to the psoas fascia. The resected tissue was placed in a Endocatch bag and removed. The laparoscopic ports were removed. Dr. Vargas's team closed the laparoscopic incisions. No complications were encountered.    Estimated Blood Loss: minimal    Specimens:   Order Name Source Comment Collection Info  Order Time   TYPE AND SCREEN   Collected By: Dorothea Guerrero RN 12/20/2022  7:09 AM     Release to patient   Routine Release        PREPARE RBC Other   12/20/2022  7:27 AM     When to Transfuse?   Hold          Indication for Transfusion   Surgery          Surgery Date   12/20/2022        TISSUE PATHOLOGY EXAM Retroperitoneum  Collected By: Moy Vargas MD 12/20/2022  8:29 AM     Release to patient   Routine Release            Fluids/Drains: none    Remi Martinez Jr., MD  12/20/2022  08:46 EST

## 2022-12-20 NOTE — ANESTHESIA PREPROCEDURE EVALUATION
Anesthesia Evaluation     Patient summary reviewed and Nursing notes reviewed   no history of anesthetic complications:  NPO Solid Status: > 6 hours  NPO Liquid Status: > 6 hours           Airway   Mallampati: II  TM distance: >3 FB  Neck ROM: full  no difficulty expected and No difficulty expected  Dental - normal exam     Pulmonary - negative pulmonary ROS and normal exam    breath sounds clear to auscultation  (-) rhonchi, decreased breath sounds, wheezes, rales, stridor  Cardiovascular - normal exam    NYHA Classification: I  ECG reviewed  Rhythm: regular  Rate: normal    (+) hypertension, PVD, hyperlipidemia,  carotid artery disease carotid bilateral  (-) murmur, weak pulses, friction rub, systolic click, carotid bruits, JVD, peripheral edema      Neuro/Psych- negative ROS  GI/Hepatic/Renal/Endo    (+)   diabetes mellitus type 2 well controlled,     Musculoskeletal     Abdominal  - normal exam    Abdomen: soft.   Substance History - negative use     OB/GYN negative ob/gyn ROS         Other   arthritis,                      Anesthesia Plan    ASA 3     general     intravenous induction     Anesthetic plan, risks, benefits, and alternatives have been provided, discussed and informed consent has been obtained with: patient.        CODE STATUS:

## 2022-12-21 LAB — SARS-COV-2 RNA PNL SPEC NAA+PROBE: NOT DETECTED

## 2022-12-21 PROCEDURE — 97161 PT EVAL LOW COMPLEX 20 MIN: CPT | Performed by: PHYSICAL THERAPIST

## 2022-12-21 PROCEDURE — 87635 SARS-COV-2 COVID-19 AMP PRB: CPT | Performed by: SURGERY

## 2022-12-21 PROCEDURE — 99024 POSTOP FOLLOW-UP VISIT: CPT | Performed by: SURGERY

## 2022-12-21 PROCEDURE — 25010000002 ENOXAPARIN PER 10 MG: Performed by: SURGERY

## 2022-12-21 PROCEDURE — 97530 THERAPEUTIC ACTIVITIES: CPT | Performed by: PHYSICAL THERAPIST

## 2022-12-21 RX ADMIN — LISINOPRIL 40 MG: 20 TABLET ORAL at 18:17

## 2022-12-21 RX ADMIN — ENOXAPARIN SODIUM 40 MG: 100 INJECTION SUBCUTANEOUS at 08:01

## 2022-12-21 RX ADMIN — Medication 5 MG: at 20:23

## 2022-12-21 RX ADMIN — DOCUSATE SODIUM 50MG AND SENNOSIDES 8.6MG 2 TABLET: 8.6; 5 TABLET, FILM COATED ORAL at 20:23

## 2022-12-21 RX ADMIN — DOCUSATE SODIUM 50MG AND SENNOSIDES 8.6MG 2 TABLET: 8.6; 5 TABLET, FILM COATED ORAL at 08:01

## 2022-12-21 RX ADMIN — ATORVASTATIN CALCIUM 40 MG: 20 TABLET, FILM COATED ORAL at 18:17

## 2022-12-21 RX ADMIN — RIVASTIGMINE TARTRATE 1.5 MG: 1.5 CAPSULE ORAL at 08:01

## 2022-12-21 RX ADMIN — RIVASTIGMINE TARTRATE 1.5 MG: 1.5 CAPSULE ORAL at 20:23

## 2022-12-21 RX ADMIN — TRAZODONE HYDROCHLORIDE 50 MG: 50 TABLET ORAL at 20:23

## 2022-12-21 RX ADMIN — HYDROCHLOROTHIAZIDE 50 MG: 50 TABLET ORAL at 07:58

## 2022-12-21 NOTE — PLAN OF CARE
Goal Outcome Evaluation:  Plan of Care Reviewed With: patient, spouse        Progress: no change  Outcome Evaluation: pt disoriented to time and situatuation. VSS on RA. PT worked with him today. urinal within reach, assist of 1 w/ gait belt and walker to BS. up in chair today. chair alarm in use, bed alarm on zone 2. no c/o pain. planning to d/c around 2pm tomorrow to [facility], where pt was prior to admission, via wheelchair transport. Covid swab to be done this shift.

## 2022-12-21 NOTE — DISCHARGE PLACEMENT REQUEST
Albert Yadav (82 y.o. Male)     Date of Birth   1940    Social Security Number       Address   [address] [place] [address]    Home Phone   [phone]    MRN   [id number]       Caodaism   None    Marital Status                               Admission Date   12/20/22    Admission Type   Elective    Admitting Provider   Moy Vargas MD    Attending Provider   Moy Vargas MD    Department, Room/Bed   [address], P378/1       Discharge Date       Discharge Disposition       Discharge Destination                               Attending Provider: Moy Vargas MD    Allergies: No Known Allergies    Isolation: None   Infection: None   Code Status: CPR    Ht: 175.3 cm (69\")   Wt: 84 kg (185 lb 3 oz)    Admission Cmt: None   Principal Problem: 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]                 Active Insurance as of 12/20/2022     Primary Coverage     Payor Plan Insurance Group Employer/Plan Group    [facility] MEDICARE REPLACEMENT [facility] MEDICARE REPLACEMENT [id number]     Payor Plan Address Payor Plan Phone Number Payor Plan Fax Number Effective Dates    [address] [address]   1/1/2016 - None Entered    [facility] [id number]       Subscriber Name Subscriber Birth Date Member ID       ALBERT YADAV 1940 [id number]                 Emergency Contacts      (Rel.) Home Phone Work Phone Mobile Phone    Bushra Yadav (Spouse) [phone] -- [phone]    LeifCristian (Son) [phone] -- [phone]    Cyndy Jiang (Daughter) -- -- [phone]

## 2022-12-21 NOTE — THERAPY EVALUATION
Patient Name: Albert Yadav  : 1940    MRN: [id number]                              Today's Date: 2022       Admit Date: 2022    Visit Dx:     ICD-10-CM ICD-9-CM   1. 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, left leg (HCC)   • Spinal stenosis, lumbar region, with neurogenic claudication   • Carotid stenosis, asymptomatic, bilateral   • Spondylolisthesis at L4-L5 level   • Lumbar stenosis   • Closed fracture of multiple ribs of right side, initial encounter   • Retroperitoneal tumor   • Closed fracture of one rib of right side, initial encounter   • Rheumatoid arthritis (HCC)   • Contusion of right hip     Past Medical History:   Diagnosis Date   • Anesthesia     MILD VIOLENT BEHAVIOR AFTER ANESTHESIA LEG SURGERY   • Arthritis of back 10 years ago   • At risk for sleep apnea     STOP BANG = 5   • Broken bones     arm, collar bone, ankle   • Carotid artery disorder (HCC)    • Cervical disc disorder ?   • Cholelithiasis     Gall bladder removed   • Diabetes mellitus ([facility])     Type 2   • Falls    • Fracture of ankle    • Fracture of wrist    • Fracture, clavicle 26 years ago   • Fracture, foot 26 year ago   • Groin rash     PT STATES LEFT GROIN AT TIMES   • History of transfusion    • Hyperlipidemia    • Hypertension    • Low back pain    • Low back strain ?   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   • Rheumatoid arthritis (HCC)     HANDS DIALLO   • Staph infection     2015  [facility] POST SX   • Tendency toward bleeding  easily (HCC)     due to blood thinners     Past Surgical History:   Procedure Laterality Date   • ANGIOPLASTY FEMORAL ARTERY Left 11/07/2016    Procedure: ULTRA SOUND ACCESS RIGHT FEMORAL ARTERY, AIF BILATERAL RUNOFF, SELECTIVE CATHETERIZATION LEFT FEMORAL ARTERY.;  Surgeon: Errol Michael MD;  Location: [facility] OR 18/19;  Service:    • ANKLE OPEN REDUCTION INTERNAL FIXATION  1980   • ARTERIOVENOUS FISTULA/SHUNT SURGERY Left 12/13/2016    Procedure: LEFT ILEO-FEMORAL GORTEX GRAFT AND LEFT LEG ARERIOGRAM ;  Surgeon: Errol Michael MD;  Location: [facility] OR 18/19;  Service:    • CAROTID ENDARTERECTOMY Right 09/29/2020    Procedure: CAROTID ENDARTERECTOMY;  Surgeon: James Graham MD;  Location: [facility] OR;  Service: Vascular;  Laterality: Right;   • CHOLECYSTECTOMY     • EPIDURAL BLOCK     • EYE SURGERY Left     as child   • FRACTURE SURGERY Left     arm   • ILIAC ARTERY STENT Left 2020   • KNEE SURGERY Left     ORIF   • ORIF ANKLE FRACTURE Left       General Information     Row Name 12/21/22 0912          Physical Therapy Time and Intention    Document Type evaluation  -EL     Mode of Treatment individual therapy;physical therapy  -EL     Row Name 12/21/22 0912          General Information    Patient Profile Reviewed yes  -EL     Prior Level of Function independent:;all household mobility;community mobility;bed mobility;ADL's  -EL     Existing Precautions/Restrictions fall  -EL     Barriers to Rehab medically complex  -EL     Row Name 12/21/22 0912          Living Environment    People in Home facility resident;spouse  Patient currently at [facility]  -EL     Row Name 12/21/22 0912          Home Main Entrance    Number of Stairs, Main Entrance other (see comments)  Pt states 2-10 stairs inside/outside house with 1 HR  -EL     Row Name 12/21/22 0912          Cognition    Orientation Status (Cognition) oriented x 4  -EL     Row Name 12/21/22 0912          Safety Issues, Functional  Mobility    Safety Issues Affecting Function (Mobility) positioning of assistive device  -EL     Impairments Affecting Function (Mobility) balance  -           User Key  (r) = Recorded By, (t) = Taken By, (c) = Cosigned By    Initials Name Provider Type    Lea Brown PT Physical Therapist               Mobility     Row Name 12/21/22 0914          Bed Mobility    Bed Mobility supine-sit  -EL     Supine-Sit Luna (Bed Mobility) minimum assist (75% patient effort)  -EL     Assistive Device (Bed Mobility) bed rails  -     Row Name 12/21/22 0914          Sit-Stand Transfer    Sit-Stand Luna (Transfers) minimum assist (75% patient effort)  -EL     Assistive Device (Sit-Stand Transfers) walker, front-wheeled  -     Row Name 12/21/22 0914          Gait/Stairs (Locomotion)    Luna Level (Gait) contact guard;nonverbal cues (demo/gesture);other (see comments)  Pt picking up rw at times, verbal cues for AD use  -EL     Assistive Device (Gait) walker, front-wheeled  -EL     Distance in Feet (Gait) 20  -EL     Bilateral Gait Deviations forward flexed posture  -           User Key  (r) = Recorded By, (t) = Taken By, (c) = Cosigned By    Initials Name Provider Type    Lea Brown PT Physical Therapist               Obj/Interventions     Row Name 12/21/22 0915          Range of Motion Comprehensive    General Range of Motion no range of motion deficits identified  -     Row Name 12/21/22 0915          Strength Comprehensive (MMT)    General Manual Muscle Testing (MMT) Assessment lower extremity strength deficits identified  -     Comment, General Manual Muscle Testing (MMT) Assessment B LEs grossly 4/5 in all planes  -     Row Name 12/21/22 0915          Balance    Balance Assessment sitting dynamic balance;sitting static balance;standing static balance;standing dynamic balance  -EL     Static Sitting Balance supervision  -EL     Dynamic Sitting Balance supervision  -EL      Static Standing Balance contact guard  -EL     Dynamic Standing Balance contact guard  -EL           User Key  (r) = Recorded By, (t) = Taken By, (c) = Cosigned By    Initials Name Provider Type    Lea Brown PT Physical Therapist               Goals/Plan     Row Name 12/21/22 0919          Bed Mobility Goal 1 (PT)    Activity/Assistive Device (Bed Mobility Goal 1, PT) supine to sit  -EL     Warren Level/Cues Needed (Bed Mobility Goal 1, PT) supervision required  -EL     Time Frame (Bed Mobility Goal 1, PT) 1 week  -EL     Row Name 12/21/22 0919          Transfer Goal 1 (PT)    Activity/Assistive Device (Transfer Goal 1, PT) bed-to-chair/chair-to-bed;walker, rolling  -EL     Warren Level/Cues Needed (Transfer Goal 1, PT) standby assist  -EL     Time Frame (Transfer Goal 1, PT) 1 week  -EL     Row Name 12/21/22 0919          Gait Training Goal 1 (PT)    Activity/Assistive Device (Gait Training Goal 1, PT) gait (walking locomotion);improve balance and speed;walker, rolling  -EL     Warren Level (Gait Training Goal 1, PT) supervision required  -EL     Distance (Gait Training Goal 1, PT) 50  -EL     Time Frame (Gait Training Goal 1, PT) 1 week  -EL           User Key  (r) = Recorded By, (t) = Taken By, (c) = Cosigned By    Initials Name Provider Type    Lea Brown PT Physical Therapist               Clinical Impression     Row Name 12/21/22 0915          Pain    Pretreatment Pain Rating 2/10  -EL     Pain Location - abdomen  -EL     Pain Intervention(s) Repositioned  -EL     Row Name 12/21/22 0915          Plan of Care Review    Plan of Care Reviewed With patient;spouse  -EL     Outcome Evaluation Pt admitted to the hospital with retroperitoneal tumor with laparscopic excision on 12/20/22. Upon evaluation today, he exhibits decreased static and dynamic balance and decreased functional mobility. Per RN, he had a fall last night. RN also states that the patient is disoriented but he  appeared oriented x 4 in today's evaluation. He would continue to benefit from skilled physical therapy to improve functional mobility and safety with ADLs. May benefit from SNF/IPR at discharge.  -EL     Row Name 12/21/22 0915          Therapy Assessment/Plan (PT)    Patient/Family Therapy Goals Statement (PT) Decrease leaning forward and falling, wants to do stairs  -EL     Rehab Potential (PT) good, to achieve stated therapy goals  -EL     Criteria for Skilled Interventions Met (PT) yes;meets criteria;skilled treatment is necessary  -EL     Therapy Frequency (PT) 3 times/wk  -EL     Row Name 12/21/22 0915          Positioning and Restraints    Pre-Treatment Position in bed  -EL     Post Treatment Position chair  -EL     In Chair notified nsg;call light within reach;encouraged to call for assist  -EL           User Key  (r) = Recorded By, (t) = Taken By, (c) = Cosigned By    Initials Name Provider Type    Lea Brown PT Physical Therapist               Outcome Measures     Row Name 12/21/22 0919          How much help from another person do you currently need...    Turning from your back to your side while in flat bed without using bedrails? 3  -EL     Moving from lying on back to sitting on the side of a flat bed without bedrails? 3  -EL     Moving to and from a bed to a chair (including a wheelchair)? 3  -EL     Standing up from a chair using your arms (e.g., wheelchair, bedside chair)? 3  -EL     Climbing 3-5 steps with a railing? 2  -EL     To walk in hospital room? 3  -EL     AM-PAC 6 Clicks Score (PT) 17  -EL     Highest level of mobility 5 --> Static standing  -EL     Row Name 12/21/22 0919          Functional Assessment    Outcome Measure Options AM-PAC 6 Clicks Basic Mobility (PT)  -EL           User Key  (r) = Recorded By, (t) = Taken By, (c) = Cosigned By    Initials Name Provider Type    Lea Brown PT Physical Therapist                             Physical Therapy Education      Title: PT OT SLP Therapies (In Progress)     Topic: Physical Therapy (In Progress)     Point: Mobility training (Done)     Learning Progress Summary           Patient Acceptance, E,TB, VU,NR by  at 12/21/2022 0920                   Point: Home exercise program (Not Started)     Learner Progress:  Not documented in this visit.          Point: Body mechanics (Done)     Learning Progress Summary           Patient Acceptance, E,TB, VU,NR by  at 12/21/2022 0920                   Point: Precautions (Not Started)     Learner Progress:  Not documented in this visit.                      User Key     Initials Effective Dates Name Provider Type Discipline     11/16/21 -  Lea Mulligan PT Physical Therapist PT              PT Recommendation and Plan     Plan of Care Reviewed With: patient, spouse  Outcome Evaluation: Pt admitted to the hospital with retroperitoneal tumor with laparscopic excision on 12/20/22. Upon evaluation today, he exhibits decreased static and dynamic balance and decreased functional mobility. Per RN, he had a fall last night. RN also states that the patient is disoriented but he appeared oriented x 4 in today's evaluation. He would continue to benefit from skilled physical therapy to improve functional mobility and safety with ADLs. May benefit from SNF/IPR at discharge.     Time Calculation:    PT Charges     Row Name 12/21/22 0921             Time Calculation    Start Time 0853  -EL      Stop Time 0910  -EL      Time Calculation (min) 17 min  -EL      PT Received On 12/21/22  -EL      PT - Next Appointment 12/23/22  -EL      PT Goal Re-Cert Due Date 12/28/22  -EL         Time Calculation- PT    Total Timed Code Minutes- PT 12 minute(s)  -EL            User Key  (r) = Recorded By, (t) = Taken By, (c) = Cosigned By    Initials Name Provider Type    Lea Brown PT Physical Therapist              Therapy Charges for Today     Code Description Service Date Service Provider Modifiers Qty     69712921699 HC PT THERAPEUTIC ACT EA 15 MIN 12/21/2022 Lea Mulligan, PT GP 1    99450496758 HC PT EVAL LOW COMPLEXITY 1 12/21/2022 Lea Mulligan, PT GP 1          PT G-Codes  Outcome Measure Options: AM-PAC 6 Clicks Basic Mobility (PT)  AM-PAC 6 Clicks Score (PT): 17  PT Discharge Summary  Anticipated Discharge Disposition (PT): inpatient rehabilitation facility, skilled nursing facility    Lea Mulligan, PT  12/21/2022

## 2022-12-21 NOTE — PROGRESS NOTES
POD1 s/p resection of perinephric lipomatous mass    Pleasant confusion overnight. Denies pain, nausea, vomiting. Eager to return to [facility]. Voiding.    AVSS  NAD, awake and alert.  Abd soft, nondistended  Inc c/d/i    No labs.    Plan:  - advance diet  - ambulate  - possible discharge to [facility] today

## 2022-12-21 NOTE — CASE MANAGEMENT/SOCIAL WORK
Continued Stay Note  Psychiatric     Patient Name: Albert Yadav  MRN: [id number]  Today's Date: 12/21/2022    Admit Date: 12/20/2022    Plan: [facility] SNF for STR once pre-cert is approved   Discharge Plan     Row Name 12/21/22 1325       Plan    Plan [facility] SNF for STR once pre-cert is approved    Plan Comments CCP received a return call from [facility] this date at 9:45am stating patient is a pre-register at [facility] and she will ask the facility to start pre-cert. CCP following.               Discharge Codes    No documentation.

## 2022-12-21 NOTE — CASE MANAGEMENT/SOCIAL WORK
Discharge Planning Assessment  [facility]     Patient Name: Albert Yadav  MRN: [id number]  Today's Date: 12/21/2022    Admit Date: 12/20/2022    Plan: Return to [facility] to complete STR   Discharge Needs Assessment     Row Name 12/21/22 0952       Living Environment    People in Home spouse;facility resident    Current Living Arrangements extended care facility    Primary Care Provided by self    Provides Primary Care For no one    Family Caregiver if Needed spouse    Family Caregiver Names Bushra    Quality of Family Relationships helpful;involved;supportive    Able to Return to Prior Arrangements yes       Resource/Environmental Concerns    Resource/Environmental Concerns none    Transportation Concerns none       Transition Planning    Patient/Family Anticipates Transition to inpatient rehabilitation facility    Patient/Family Anticipated Services at Transition rehabilitation services;skilled nursing    Transportation Anticipated health plan transportation       Discharge Needs Assessment    Equipment Currently Used at Home cane, straight    Concerns to be Addressed no discharge needs identified;denies needs/concerns at this time    Anticipated Changes Related to Illness none    Equipment Needed After Discharge none    Outpatient/Agency/Support Group Needs skilled nursing facility;inpatient rehabilitation facility    Discharge Facility/Level of Care Needs rehabilitation facility;nursing facility, skilled    Provided Post Acute Provider List? N/A    Provided Post Acute Provider Quality & Resource List? N/A    Patient's Choice of Community Agency(s) [facility]               Discharge Plan     Row Name 12/21/22 0953       Plan    Plan Return to [facility] to complete STR    Patient/Family in Agreement with Plan yes    Plan Comments CCP met with patient and his wife Bushra with patient’s verbal permission. CCP introduced self and explained role. Patient and wife confirmed  information on patient’s face sheet is accurate. Patient states that he lives at home with his wife Bushra. Patient confirmed his PCP is Valerie Burnham. Patient states he has worked with [facility] in the past but was not pleased with their services. Patient states that he was admitted from [facility] and plans to return for short term rehab. CCP made an outbound call to [facility] with [place] to confirm, awaiting a return call. Patient states he uses a cane for ambulation. Patient states there are 9 steps to enter the home but they are broken up into small sections. Patient states there is a handrail on the left hand side. Patient states his bedroom, bathroom, and kitchen are all located on the main level of the home. Patient states he does have a basement that he enjoys going to and it has a full flight of steps with a handrail on the right hand side. Patient and wife state patient will need wheelchair transportation at discharge. CCP to follow for discharge needs.              Continued Care and Services - Admitted Since 12/20/2022    Coordination has not been started for this encounter.          Demographic Summary     Row Name 12/21/22 0970       General Information    Admission Type inpatient    Arrived From PACU/recovery room    Reason for Consult discharge planning    Preferred Language English               Functional Status     Row Name 12/21/22 0971       Functional Status    Usual Activity Tolerance moderate    Current Activity Tolerance moderate       Functional Status, IADL    Medications assistive equipment    Meal Preparation assistive equipment    Housekeeping assistive equipment    Laundry assistive equipment    Shopping assistive equipment       Mental Status Summary    Recent Changes in Mental Status/Cognitive Functioning no changes       Employment/    Employment Status retired               Psychosocial    No documentation.                Abuse/Neglect    No  documentation.                Legal    No documentation.                Substance Abuse    No documentation.                Patient Forms    No documentation.

## 2022-12-21 NOTE — PLAN OF CARE
Goal Outcome Evaluation:  Plan of Care Reviewed With: patient, spouse           Outcome Evaluation: Pt admitted to the hospital with retroperitoneal tumor with laparscopic excision on 12/20/22. Upon evaluation today, he exhibits decreased static and dynamic balance and decreased functional mobility. Per RN, he had a fall last night. RN also states that the patient is disoriented but he appeared oriented x 4 in today's evaluation. He would continue to benefit from skilled physical therapy to improve functional mobility and safety with ADLs. May benefit from SNF/IPR at discharge.

## 2022-12-21 NOTE — CASE MANAGEMENT/SOCIAL WORK
Continued Stay Note  [facility]     Patient Name: Albert Yadav  MRN: [id number]  Today's Date: 12/21/2022    Admit Date: 12/20/2022    Plan: [facility] SNF   Discharge Plan     Row Name 12/21/22 1541       Plan    Plan [facility]    Plan Comments CCP received an inbound call from [facility] this date at 3:20pm stating patient's pre-cert has been approved and he will need a RAPID COVID with negative results prior to discharge. CCP made an outbound call to [facility] this date at 3:25pm spoke with Mina and scheduled wheelchair transport to [facility]. [facility] sent a secure chat to Dr. Vargas to inform him of discharge/transportation arrangements and to ask for a RAPID COVID test. [facility] informed patient's nurse this date via face to face. [facility] informed patient at bedside and he was calling his wife to inform her as CCP was leaving patients room.    Row Name 12/21/22 1325       Plan    Plan [facility] for STR once pre-cert is approved    Plan Comments CCP received a return call from [facility] this date at 9:45am stating patient is a pre-register at [facility] and she will ask the facility to start pre-cert. CCP following.               Discharge Codes    No documentation.               Expected Discharge Date and Time     Expected Discharge Date Expected Discharge Time    Dec 22, 2022

## 2022-12-21 NOTE — NURSING NOTE
12/21/22 0822   Wound 12/20/22 1437 Bilateral coccyx Pressure Injury   Placement Date/Time: 12/20/22 1437   Present on Hospital Admission: Yes  Side: Bilateral  Location: coccyx  Primary Wound Type: Pressure Injury   Base clean;moist;pink  (linear wound in gluteal cleft, more related to moisture)   Periwound intact;blanchable   Periwound Temperature warm   Periwound Skin Turgor soft   Wound Length (cm) 1.5 cm   Wound Width (cm) 0.3 cm   Wound Depth (cm) 0.1 cm   Wound Surface Area (cm^2) 0.45 cm^2   Wound Volume (cm^3) 0.045 cm^3   Drainage Characteristics/Odor serosanguineous   Drainage Amount scant   Care, Wound cleansed with;sterile normal saline   Dressing Care dressing changed;silicone;border dressing   Skin Interventions   Pressure Reduction Devices pressure-redistributing mattress utilized   Pressure Reduction Techniques heels elevated off bed;positioned off wounds;pressure points protected   Skin Protection silicone foam dressing in place     CWON note: pt seen for evaluation of sacral skin issues POA. Pt is alert and oriented, able to turn and reposition in bed with minimal assistance. Pt noted to have linear breakdown in gluteal cleft, secondary to combination of moisture and pressure. Optifoam dressing is in place. Will have staff order a waffle mattress overlay for his bed and I have instructed pt in need to turn side to side to off-load his sacrum. He can use a waffle cushion while in the chair as well. I have discussed all with RN. Wound care and prevention standing orders have been place into Epic. Please re-consult for any additional needs.

## 2022-12-21 NOTE — PROGRESS NOTES
Postoperative day 1    Overall he is doing well.  He did follow through the night but shoulder x-rays were negative and he seems to be fine now.  Abdomen is soft and incisions look good.    Plan is likely for discharge to [facility] tomorrow.

## 2022-12-22 VITALS
OXYGEN SATURATION: 97 % | HEIGHT: 69 IN | WEIGHT: 185.19 LBS | DIASTOLIC BLOOD PRESSURE: 70 MMHG | RESPIRATION RATE: 18 BRPM | SYSTOLIC BLOOD PRESSURE: 126 MMHG | TEMPERATURE: 97.2 F | HEART RATE: 73 BPM | BODY MASS INDEX: 27.43 KG/M2

## 2022-12-22 LAB
ANION GAP SERPL CALCULATED.3IONS-SCNC: 7 MMOL/L (ref 5–15)
BUN SERPL-MCNC: 11 MG/DL (ref 8–23)
BUN/CREAT SERPL: 11 (ref 7–25)
CALCIUM SPEC-SCNC: 8.6 MG/DL (ref 8.6–10.5)
CHLORIDE SERPL-SCNC: 102 MMOL/L (ref 98–107)
CO2 SERPL-SCNC: 29 MMOL/L (ref 22–29)
CREAT SERPL-MCNC: 1 MG/DL (ref 0.76–1.27)
DEPRECATED RDW RBC AUTO: 44.3 FL (ref 37–54)
EGFRCR SERPLBLD CKD-EPI 2021: 74.2 ML/MIN/1.73
ERYTHROCYTE [DISTWIDTH] IN BLOOD BY AUTOMATED COUNT: 13.1 % (ref 12.3–15.4)
GLUCOSE SERPL-MCNC: 111 MG/DL (ref 65–99)
HCT VFR BLD AUTO: 34.8 % (ref 37.5–51)
HGB BLD-MCNC: 12.3 G/DL (ref 13–17.7)
MCH RBC QN AUTO: 32.9 PG (ref 26.6–33)
MCHC RBC AUTO-ENTMCNC: 35.3 G/DL (ref 31.5–35.7)
MCV RBC AUTO: 93 FL (ref 79–97)
PLATELET # BLD AUTO: 234 10*3/MM3 (ref 140–450)
PMV BLD AUTO: 10.6 FL (ref 6–12)
POTASSIUM SERPL-SCNC: 3.6 MMOL/L (ref 3.5–5.2)
RBC # BLD AUTO: 3.74 10*6/MM3 (ref 4.14–5.8)
SODIUM SERPL-SCNC: 138 MMOL/L (ref 136–145)
WBC NRBC COR # BLD: 13.45 10*3/MM3 (ref 3.4–10.8)

## 2022-12-22 PROCEDURE — 25010000002 ENOXAPARIN PER 10 MG: Performed by: SURGERY

## 2022-12-22 PROCEDURE — 85027 COMPLETE CBC AUTOMATED: CPT | Performed by: SURGERY

## 2022-12-22 PROCEDURE — 80048 BASIC METABOLIC PNL TOTAL CA: CPT | Performed by: SURGERY

## 2022-12-22 RX ORDER — BISACODYL 10 MG
10 SUPPOSITORY, RECTAL RECTAL DAILY
Status: DISCONTINUED | OUTPATIENT
Start: 2022-12-22 | End: 2022-12-22 | Stop reason: HOSPADM

## 2022-12-22 RX ADMIN — DOCUSATE SODIUM 50MG AND SENNOSIDES 8.6MG 2 TABLET: 8.6; 5 TABLET, FILM COATED ORAL at 09:07

## 2022-12-22 RX ADMIN — HYDROCHLOROTHIAZIDE 50 MG: 50 TABLET ORAL at 08:04

## 2022-12-22 RX ADMIN — ENOXAPARIN SODIUM 40 MG: 100 INJECTION SUBCUTANEOUS at 09:07

## 2022-12-22 RX ADMIN — BISACODYL 10 MG: 10 SUPPOSITORY RECTAL at 08:03

## 2022-12-22 RX ADMIN — RIVASTIGMINE TARTRATE 1.5 MG: 1.5 CAPSULE ORAL at 09:07

## 2022-12-22 NOTE — PROGRESS NOTES
POD2 s/p Excision of perinephric lipomatous mass    Comfortable. Feels bloated. No flatus. Some confusion overnight but now alert and oriented.    AVSS  Abd soft, nondistended  Inc c/d/i    Plan:  - dulcolax suppository  - plan for d/c to [facility] this afternoon

## 2022-12-22 NOTE — DISCHARGE INSTRUCTIONS
1.  Activity as tolerated, no restrictions  2.  He does not need to follow-up with me  3.  Regular diet  4.  May shower anytime

## 2022-12-22 NOTE — NURSING NOTE
I took over this patient around 0415 due to nurse going home sick. The patient can answer many questions correctly but then goes off on multiple tangents with a flight of ideas. Confused but orients times 4. Spoke with wife who said to make sure [facility] knows the patients mental status and physical status before he goes there. She says he is much more confused and much weaker and she is afraid he might fall. I conveyed this to Dr. Vargas and to the day shift nurse. Ms. Giang. [facility].

## 2022-12-22 NOTE — DISCHARGE SUMMARY
DATE OF ADMIT: 12/20/2022    DATE OF DISCHARGE: 12/22/2022    DIAGNOSIS: Left side perinephric retroperitoneal lipomatous tumor    FINAL PATHOLOGY: Benign lipomatous tumor on preliminary pathology    PROCEDURES: Laparoscopic excision of left side retroperitoneal perinephric lipomatous tumor 12/20/2022    SUMMARY OF HOSPITAL COURSE:   Uneventful postop course.  On postoperative day 1 he had some mild confusion and also had a fall on his left shoulder.  X-rays were obtained and were negative.  By date of discharge, he was tolerating diet, incisions in good order and afebrile with stable vital signs.  No prescription pain medication was required.    DIET: Regular    ACTIVITY: Ad zeus., no restrictions    MEDICATIONS: Refer to MAR, no changes were made to his preadmission medication list    FOLLOW-UP: As needed    Moy Vargas M.D.

## 2022-12-23 LAB
BH BB BLOOD EXPIRATION DATE: NORMAL
BH BB BLOOD EXPIRATION DATE: NORMAL
BH BB BLOOD TYPE BARCODE: 6200
BH BB BLOOD TYPE BARCODE: 6200
BH BB DISPENSE STATUS: NORMAL
BH BB DISPENSE STATUS: NORMAL
BH BB PRODUCT CODE: NORMAL
BH BB PRODUCT CODE: NORMAL
BH BB UNIT NUMBER: NORMAL
BH BB UNIT NUMBER: NORMAL
CROSSMATCH INTERPRETATION: NORMAL
CROSSMATCH INTERPRETATION: NORMAL
LAB AP CASE REPORT: NORMAL
LAB AP DIAGNOSIS COMMENT: NORMAL
PATH REPORT.ADDENDUM SPEC: NORMAL
PATH REPORT.FINAL DX SPEC: NORMAL
PATH REPORT.GROSS SPEC: NORMAL
UNIT  ABO: NORMAL
UNIT  ABO: NORMAL
UNIT  RH: NORMAL
UNIT  RH: NORMAL

## 2022-12-23 NOTE — CASE MANAGEMENT/SOCIAL WORK
Case Management Discharge Note      Final Note: Patient discharged to [facility].    Provided Post Acute Provider List?: N/A  Provided Post Acute Provider Quality & Resource List?: N/A    Selected Continued Care - Discharged on 12/22/2022 Admission date: 12/20/2022 - Discharge disposition: Skilled Nursing Facility (DC - External)    Destination Coordination complete.    Service Provider Selected Services Address Phone Fax Patient Preferred    [facility] [address] [place] [address] [phone] --          Durable Medical Equipment    No services have been selected for the patient.              Dialysis/Infusion    No services have been selected for the patient.              Home Medical Care    No services have been selected for the patient.              Therapy    No services have been selected for the patient.              Community Resources    No services have been selected for the patient.              Community & DME    No services have been selected for the patient.                       Final Discharge Disposition Code: 03 - skilled nursing facility (SNF)

## 2023-01-06 ENCOUNTER — TRANSCRIBE ORDERS (OUTPATIENT)
Dept: ADMINISTRATIVE | Facility: HOSPITAL | Age: 83
End: 2023-01-06
Payer: MEDICARE

## 2023-01-06 DIAGNOSIS — M43.16 SPONDYLOLISTHESIS OF LUMBAR REGION: Primary | ICD-10-CM

## 2023-01-10 ENCOUNTER — OFFICE VISIT (OUTPATIENT)
Dept: SURGERY | Facility: CLINIC | Age: 83
End: 2023-01-10
Payer: MEDICARE

## 2023-01-10 VITALS — WEIGHT: 185 LBS | HEIGHT: 69 IN | BODY MASS INDEX: 27.4 KG/M2

## 2023-01-10 DIAGNOSIS — Z09 ENCOUNTER FOR FOLLOW-UP: Primary | ICD-10-CM

## 2023-01-10 PROCEDURE — 99024 POSTOP FOLLOW-UP VISIT: CPT | Performed by: PHYSICIAN ASSISTANT

## 2023-01-10 NOTE — PROGRESS NOTES
This is an 82-year-old gentleman returning to the office today status post laparoscopic excision of left sided retroperitoneal perinephritic lipomatous tumor measuring 12 cm in size that was performed on 12/20/2022.  Overall he is doing well since surgery with his biggest complaint being fatigue.  He did state that initially after surgery he had some combativeness due to what he believes to be the anesthetic and was concerned about that.  Once this resolved he has slowly been returning to his normal self.  I cautioned him to slowly return to his normal activities using his body as his guide and cautioned him on driving as he was concerned about this.  Pathology was reviewed with him which indicated this was indeed a lipoma.  His incisions are all healing very well.  All questions were answered and he was willing to proceed with all recommendations.    Brooks Madison PA-C

## 2023-01-24 ENCOUNTER — HOSPITAL ENCOUNTER (OUTPATIENT)
Dept: PAIN MEDICINE | Facility: HOSPITAL | Age: 83
Discharge: HOME | End: 2023-01-24
Payer: MEDICARE

## 2023-01-24 ENCOUNTER — ANESTHESIA EVENT (OUTPATIENT)
Dept: PAIN MEDICINE | Facility: HOSPITAL | Age: 83
End: 2023-01-24
Payer: MEDICARE

## 2023-01-24 ENCOUNTER — HOSPITAL ENCOUNTER (OUTPATIENT)
Dept: GENERAL RADIOLOGY | Facility: HOSPITAL | Age: 83
Discharge: HOME | End: 2023-01-24
Payer: MEDICARE

## 2023-01-24 ENCOUNTER — ANESTHESIA (OUTPATIENT)
Dept: PAIN MEDICINE | Facility: HOSPITAL | Age: 83
End: 2023-01-24
Payer: MEDICARE

## 2023-01-24 VITALS
BODY MASS INDEX: 16.14 KG/M2 | WEIGHT: 109 LBS | HEART RATE: 75 BPM | SYSTOLIC BLOOD PRESSURE: 129 MMHG | HEIGHT: 69 IN | DIASTOLIC BLOOD PRESSURE: 65 MMHG | TEMPERATURE: 97.5 F | RESPIRATION RATE: 16 BRPM | OXYGEN SATURATION: 99 %

## 2023-01-24 DIAGNOSIS — R52 PAIN: ICD-10-CM

## 2023-01-24 DIAGNOSIS — M48.062 SPINAL STENOSIS, LUMBAR REGION, WITH NEUROGENIC CLAUDICATION: Primary | ICD-10-CM

## 2023-01-24 LAB — GLUCOSE BLDC GLUCOMTR-MCNC: 109 MG/DL (ref 70–130)

## 2023-01-24 PROCEDURE — 77003 FLUOROGUIDE FOR SPINE INJECT: CPT

## 2023-01-24 PROCEDURE — 0 IOPAMIDOL 41 % SOLUTION: Performed by: ANESTHESIOLOGY

## 2023-01-24 PROCEDURE — 82962 GLUCOSE BLOOD TEST: CPT

## 2023-01-24 PROCEDURE — 25010000002 METHYLPREDNISOLONE PER 80 MG: Performed by: ANESTHESIOLOGY

## 2023-01-24 RX ORDER — LIDOCAINE HYDROCHLORIDE 10 MG/ML
1 INJECTION, SOLUTION INFILTRATION; PERINEURAL ONCE AS NEEDED
Status: DISCONTINUED | OUTPATIENT
Start: 2023-01-24 | End: 2023-01-25 | Stop reason: HOSPADM

## 2023-01-24 RX ORDER — MIDAZOLAM HYDROCHLORIDE 1 MG/ML
1 INJECTION INTRAMUSCULAR; INTRAVENOUS AS NEEDED
Status: DISCONTINUED | OUTPATIENT
Start: 2023-01-24 | End: 2023-01-25 | Stop reason: HOSPADM

## 2023-01-24 RX ORDER — SODIUM CHLORIDE 0.9 % (FLUSH) 0.9 %
1-10 SYRINGE (ML) INJECTION AS NEEDED
Status: DISCONTINUED | OUTPATIENT
Start: 2023-01-24 | End: 2023-01-25 | Stop reason: HOSPADM

## 2023-01-24 RX ORDER — METHYLPREDNISOLONE ACETATE 80 MG/ML
80 INJECTION, SUSPENSION INTRA-ARTICULAR; INTRALESIONAL; INTRAMUSCULAR; SOFT TISSUE ONCE
Status: COMPLETED | OUTPATIENT
Start: 2023-01-24 | End: 2023-01-24

## 2023-01-24 RX ORDER — FENTANYL CITRATE 50 UG/ML
50 INJECTION, SOLUTION INTRAMUSCULAR; INTRAVENOUS AS NEEDED
Status: DISCONTINUED | OUTPATIENT
Start: 2023-01-24 | End: 2023-01-25 | Stop reason: HOSPADM

## 2023-01-24 RX ADMIN — IOPAMIDOL 10 ML: 408 INJECTION, SOLUTION INTRATHECAL at 08:25

## 2023-01-24 RX ADMIN — METHYLPREDNISOLONE ACETATE 80 MG: 80 INJECTION, SUSPENSION INTRA-ARTICULAR; INTRALESIONAL; INTRAMUSCULAR; SOFT TISSUE at 08:26

## 2023-01-24 NOTE — ANESTHESIA PROCEDURE NOTES
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 All questions were answered. The patient was discharged with appropriate instructions.  Prep:  Pt Position:prone  Sterile Tech:cap, gloves, mask and sterile barrier  Prep:chlorhexidine gluconate and isopropyl alcohol  Monitoring:blood pressure monitoring, continuous pulse oximetry and EKG  Procedure:Sedation: no     Approach:right paramedian  Guidance: fluoroscopy  Location:lumbar  Level:4-5 (Interlaminar)  Needle Type:Tuohy  Needle Gauge:20 G  Aspiration:negative  Medications:  Preservative Free Saline:3mL  Isovue:2mL  Comments:Isovue dye spread was consistent with epidural placement.Depomedrol:80 mg  Post Assessment:  Dressing:occlusive dressing applied  Pt Tolerance:patient tolerated the procedure well with no apparent complications  Complications:no

## 2023-01-24 NOTE — H&P
[facility]    History and Physical    Patient Name: Albert Yadav  :  1940  MRN:  [id number]  Date of Admission: 2023    Subjective       The patient is an 82-year-old male who has severe acute on chronic pain which originates in his lower back and radiates down the posterior aspect of his right lower extremity.  Pain is aggravated by activity and is relieved by pain medications, lying down and epidural steroid injections.  On a pain scale from 0-10, he rates his pain as a 4 while at rest and 9 with activity.  He describes the pain as dull and aching in nature.  He has responded favorably to lumbar epidural steroid injections in the past.  He is here today for a new series.    His most recent lumbar MRI results show anterolisthesis at L4-5 causing severe spinal stenosis and severe lateral recess stenosis.    The following portions of the patients history were reviewed and updated as appropriate: current medications, allergies, past medical history, past surgical history, past family history, past social history and problem list                Objective     Past Medical History:   Past Medical History:   Diagnosis Date   • Anesthesia     MILD VIOLENT BEHAVIOR AFTER ANESTHESIA LEG SURGERY   • Arthritis of back 10 years ago   • At risk for sleep apnea     STOP BANG = 5   • Broken bones     arm, collar bone, ankle   • Carotid artery disorder (HCC)    • Cervical disc disorder ?   • Cholelithiasis     Gall bladder removed   • Diabetes mellitus ([facility])     Type 2   • Falls    • Fracture of ankle    • Fracture of wrist    • Fracture, clavicle 26 years ago   • Fracture, foot 26 year ago   • Groin rash     PT STATES LEFT GROIN AT TIMES   • History of transfusion    • Hyperlipidemia    • Hypertension    • Low back pain    • Low back strain ?   • Lumbar spinal stenosis    • Numbness and tingling     RIGHT ARM, RIGHT LEG & FOOT   • Peripheral neuropathy    • Peripheral vascular disease (HCC)     • Retroperitoneal mass 11/2022    LEFT   • Rheumatoid arthritis (HCC)     HANDS DIALLO   • Staph infection     2015  [facility] POST SX   • Tendency toward bleeding easily (HCC)     due to blood thinners     Past Surgical History:   Past Surgical History:   Procedure Laterality Date   • ADRENALECTOMY Left 12/20/2022    Procedure: LAPAROSCOPIC EXCISION LEFT RETROPERITONEAL MASS;  Surgeon: Moy Vargas MD;  Location: [facility];  Service: General;  Laterality: Left;   • ANGIOPLASTY FEMORAL ARTERY Left 11/07/2016    Procedure: ULTRA SOUND ACCESS RIGHT FEMORAL ARTERY, AIF BILATERAL RUNOFF, SELECTIVE CATHETERIZATION LEFT FEMORAL ARTERY.;  Surgeon: Errol Michael MD;  Location: [facility] OR 18/19;  Service:    • ANKLE OPEN REDUCTION INTERNAL FIXATION  1980   • ARTERIOVENOUS FISTULA/SHUNT SURGERY Left 12/13/2016    Procedure: LEFT ILEO-FEMORAL GORTEX GRAFT AND LEFT LEG ARERIOGRAM ;  Surgeon: Errol Michael MD;  Location: [facility] OR 18/19;  Service:    • CAROTID ENDARTERECTOMY Right 09/29/2020    Procedure: CAROTID ENDARTERECTOMY;  Surgeon: James Graham MD;  Location: [facility] OR;  Service: Vascular;  Laterality: Right;   • CHOLECYSTECTOMY     • EPIDURAL BLOCK     • EYE SURGERY Left     as child   • FRACTURE SURGERY Left     arm   • ILIAC ARTERY STENT Left 2020   • KNEE SURGERY Left     ORIF   • ORIF ANKLE FRACTURE Left      Family History:   Family History   Problem Relation Age of Onset   • Diabetes Mother    • Cancer Mother         Cancer liver   • Cancer Father         Diabetic   • Diabetes Father    • Heart disease Maternal Grandmother    • Heart disease Maternal Grandfather    • Heart disease Paternal Grandmother    • Heart disease Paternal Grandfather    • Alcohol abuse Son         Alcoholic   • Cancer Brother    • Malig Hyperthermia Neg Hx      Social History:   Social History     Socioeconomic History   • Marital status:    Tobacco Use   • Smoking status: Former      Packs/day: 1.00     Years: 20.00     Pack years: 20.00     Types: Cigarettes     Start date: 1968     Quit date: 1989     Years since quittin.6   • Smokeless tobacco: Never   • Tobacco comments:     Did'nt inhale   Vaping Use   • Vaping Use: Never used   Substance and Sexual Activity   • Alcohol use: Not Currently     Alcohol/week: 1.0 standard drink     Types: 1 Shots of liquor per week     Comment: 1 drink per day at most   • Drug use: Never   • Sexual activity: Not Currently     Partners: Female     Birth control/protection: Condom       Vital Signs Range for the last 24 hours  Temperature: Temp:  [36.4 °C (97.5 °F)] 36.4 °C (97.5 °F)   Temp Source: Temp src: Oral   BP: BP: (128)/(99) 128/99   Pulse: Heart Rate:  [79] 79   Respirations: Resp:  [16] 16   SPO2: SpO2:  [100 %] 100 %   O2 Amount (l/min):     O2 Devices Device (Oxygen Therapy): room air   Weight: Weight:  [49.4 kg (109 lb)] 49.4 kg (109 lb)         --------------------------------------------------------------------------------    Current Outpatient Medications   Medication Sig Dispense Refill   • clopidogrel (PLAVIX) 75 MG tablet Take 75 mg by mouth Every Morning. TO STOP 7 DAYS BEFORE SURGERY     • acetaminophen (TYLENOL) 650 MG 8 hr tablet Take 650 mg by mouth Every 8 (Eight) Hours As Needed for Mild Pain .     • atorvastatin (LIPITOR) 40 MG tablet Take 40 mg by mouth Every Evening.     • cetirizine (ZyrTEC) 10 MG tablet Take 10 mg by mouth Every Morning.     • folic acid (FOLVITE) 1 MG tablet Take 1 mg by mouth Daily.     • hydrochlorothiazide (HYDRODIURIL) 50 MG tablet Take 50 mg by mouth Every Morning.     • lisinopril (PRINIVIL,ZESTRIL) 40 MG tablet Take 40 mg by mouth Every Evening.     • melatonin 5 MG tablet tablet Take 5 mg by mouth Every Night.     • methotrexate 2.5 MG tablet Take 2.5 mg by mouth.  takes 2.5mg 10 tabs all together for total of 25mg     • ondansetron (ZOFRAN) 4 MG tablet Take 1 tablet by mouth Every  6 (Six) Hours As Needed for Nausea or Vomiting.     • rivastigmine (EXELON) 1.5 MG capsule Take 1.5 mg by mouth 2 (Two) Times a Day.     • Sennosides (SENNA LAX PO) Take 1 tablet by mouth As Needed.     • sennosides-docusate (PERICOLACE) 8.6-50 MG per tablet Take 2 tablets by mouth 2 (Two) Times a Day.     • traZODone (DESYREL) 50 MG tablet Take 50 mg by mouth Every Night.     • triamcinolone (KENALOG) 0.025 % cream 1 application As Needed.       Current Facility-Administered Medications   Medication Dose Route Frequency Provider Last Rate Last Admin   • fentaNYL citrate (PF) (SUBLIMAZE) injection 50 mcg  50 mcg Intravenous PRN Dyllan Lugo MD       • iopamidol (ISOVUE-M 200) injection 41%  12 mL Epidural Once in imaging Dyllan Lugo MD       • lidocaine (XYLOCAINE) 1 % injection 1 mL  1 mL Intradermal Once PRN Dyllan Lugo MD       • methylPREDNISolone acetate (DEPO-medrol) injection 80 mg  80 mg Intra-articular Once Dyllan Lugo MD       • midazolam (VERSED) injection 1 mg  1 mg Intravenous PRN Dyllan Lugo MD       • sodium chloride 0.9 % flush 1-10 mL  1-10 mL Intravenous PRN Dyllan Lugo MD           --------------------------------------------------------------------------------  Assessment & Plan      Anesthesia Evaluation     Patient summary reviewed and Nursing notes reviewed   NPO Solid Status: > 8 hours  NPO Liquid Status: > 2 hours    Pain impairs ability to perform ADLs: Bathing, Housekeeping, Working and Exercise/Activity  Modalities previously tried to control pain with limited effectiveness within the last 4-6 weeks: Rest and OTC medications     Airway   Mallampati: II  TM distance: >3 FB  Neck ROM: full  Dental - normal exam     Pulmonary - normal exam    breath sounds clear to auscultation  (+) a smoker Former,   Cardiovascular - normal exam    Rhythm: regular  Rate: normal    (+) hypertension, PVD, hyperlipidemia,  carotid artery disease carotid bilateral  (-) angina,  orthopnea, PND, BEAULIEU      Neuro/Psych  (+) numbness,    GI/Hepatic/Renal/Endo    (+)   diabetes mellitus type 2,     Musculoskeletal     Abdominal    Substance History - negative use     OB/GYN negative ob/gyn ROS         Other   arthritis, autoimmune disease rheumatoid arthritis,               Diagnosis and Plan      Treatment Plan  ASA 3      Procedures: Lumbar Epidural Steroid Injection(LESI), With fluoroscopy,       Anesthetic plan and risks discussed with patient.        Alternative management options include physical therapy, medical management with nonsteroidal anti-inflammatory medications or narcotics, chiropractic manipulation and surgical intervention.    1.  Lumbar epidural steroid injections, up to 3, spaced 1-2 weeks apart.  If pain control is acceptable after 1 or 2 injections, it was discussed with the patient that they may return for the subsequent injections if and when their pain returns.  The risks were discussed with the patient including failure of relief, worsening pain, Headache (post dural puncture headache), bleeding (epidural hematoma) and infection (epidural abscess or skin infection).  2.  Physical therapy exercises at home as prescribed by physical therapy or from the pain clinic handout (given to the patient).  Continuation of these exercises every day, or multiple times per week, even when the patient has good pain relief, was stressed to the patient as a preventative measure to decrease the frequency and severity of future pain episodes.  3.  Continue pain medicines as already prescribed.  If patient not currently taking any, it is recommended to begin Acetaminophen 1000 mg po q 8 hours.  If other medicines containing Acetaminophen are currently prescribed, maintain daily dose at 3000 mg.    4.  If they can tolerate NSAIDS, it is recommended to take Ibuprofen 600 mg po q 6 hours for 7 days during pain exacerbations.  Alternatively, they may substitute an NSAID of their choice (e.g.  Aleve).  This may be taken at the same time as Acetaminophen.  5.  Heat and ice to the affected area as tolerated for pain control.  It was discussed that heating pads can cause burns.  6.  Daily low impact exercise such as walking or water exercise was recommended to maintain overall health and aid in weight control.   7.  Follow up as needed for subsequent injections.  8.  Patient was counseled to abstain from tobacco products.    Diagnosis     * Spinal stenosis of lumbar region with neurogenic claudication [M48.062]

## 2023-01-24 NOTE — DISCHARGE INSTRUCTIONS

## 2023-02-28 ENCOUNTER — APPOINTMENT (OUTPATIENT)
Dept: CT IMAGING | Facility: HOSPITAL | Age: 83
DRG: 177 | End: 2023-02-28
Payer: MEDICARE

## 2023-02-28 ENCOUNTER — HOSPITAL ENCOUNTER (INPATIENT)
Facility: HOSPITAL | Age: 83
LOS: 5 days | Discharge: NURSING, REHAB OR LONG-TERM CARE | DRG: 177 | End: 2023-03-07
Attending: EMERGENCY MEDICINE | Admitting: INTERNAL MEDICINE
Payer: MEDICARE

## 2023-02-28 ENCOUNTER — APPOINTMENT (OUTPATIENT)
Dept: GENERAL RADIOLOGY | Facility: HOSPITAL | Age: 83
DRG: 177 | End: 2023-02-28
Payer: MEDICARE

## 2023-02-28 DIAGNOSIS — R29.90 STROKE-LIKE SYMPTOMS: ICD-10-CM

## 2023-02-28 DIAGNOSIS — U07.1 COVID-19: Primary | ICD-10-CM

## 2023-02-28 LAB
ALBUMIN SERPL-MCNC: 3.4 G/DL (ref 3.5–5.2)
ALBUMIN/GLOB SERPL: 1.3 G/DL
ALP SERPL-CCNC: 122 U/L (ref 39–117)
ALT SERPL W P-5'-P-CCNC: 21 U/L (ref 1–41)
ANION GAP SERPL CALCULATED.3IONS-SCNC: 9 MMOL/L (ref 5–15)
AST SERPL-CCNC: 16 U/L (ref 1–40)
B PARAPERT DNA SPEC QL NAA+PROBE: NOT DETECTED
B PERT DNA SPEC QL NAA+PROBE: NOT DETECTED
BASOPHILS # BLD AUTO: 0.02 10*3/MM3 (ref 0–0.2)
BASOPHILS NFR BLD AUTO: 0.3 % (ref 0–1.5)
BILIRUB SERPL-MCNC: 0.4 MG/DL (ref 0–1.2)
BILIRUB UR QL STRIP: NEGATIVE
BUN SERPL-MCNC: 19 MG/DL (ref 8–23)
BUN/CREAT SERPL: 14 (ref 7–25)
C PNEUM DNA NPH QL NAA+NON-PROBE: NOT DETECTED
CALCIUM SPEC-SCNC: 8.4 MG/DL (ref 8.6–10.5)
CHLORIDE SERPL-SCNC: 102 MMOL/L (ref 98–107)
CLARITY UR: CLEAR
CO2 SERPL-SCNC: 23 MMOL/L (ref 22–29)
COLOR UR: YELLOW
CREAT SERPL-MCNC: 1.36 MG/DL (ref 0.76–1.27)
DEPRECATED RDW RBC AUTO: 46.7 FL (ref 37–54)
EGFRCR SERPLBLD CKD-EPI 2021: 51.3 ML/MIN/1.73
EOSINOPHIL # BLD AUTO: 0 10*3/MM3 (ref 0–0.4)
EOSINOPHIL NFR BLD AUTO: 0 % (ref 0.3–6.2)
ERYTHROCYTE [DISTWIDTH] IN BLOOD BY AUTOMATED COUNT: 13.2 % (ref 12.3–15.4)
FLUAV SUBTYP SPEC NAA+PROBE: NOT DETECTED
FLUBV RNA ISLT QL NAA+PROBE: NOT DETECTED
GLOBULIN UR ELPH-MCNC: 2.6 GM/DL
GLUCOSE SERPL-MCNC: 122 MG/DL (ref 65–99)
GLUCOSE UR STRIP-MCNC: NEGATIVE MG/DL
HADV DNA SPEC NAA+PROBE: NOT DETECTED
HCOV 229E RNA SPEC QL NAA+PROBE: NOT DETECTED
HCOV HKU1 RNA SPEC QL NAA+PROBE: NOT DETECTED
HCOV NL63 RNA SPEC QL NAA+PROBE: NOT DETECTED
HCOV OC43 RNA SPEC QL NAA+PROBE: NOT DETECTED
HCT VFR BLD AUTO: 39.2 % (ref 37.5–51)
HGB BLD-MCNC: 13 G/DL (ref 13–17.7)
HGB UR QL STRIP.AUTO: NEGATIVE
HMPV RNA NPH QL NAA+NON-PROBE: NOT DETECTED
HPIV1 RNA ISLT QL NAA+PROBE: NOT DETECTED
HPIV2 RNA SPEC QL NAA+PROBE: NOT DETECTED
HPIV3 RNA NPH QL NAA+PROBE: NOT DETECTED
HPIV4 P GENE NPH QL NAA+PROBE: NOT DETECTED
IMM GRANULOCYTES # BLD AUTO: 0.07 10*3/MM3 (ref 0–0.05)
IMM GRANULOCYTES NFR BLD AUTO: 0.9 % (ref 0–0.5)
KETONES UR QL STRIP: NEGATIVE
LEUKOCYTE ESTERASE UR QL STRIP.AUTO: NEGATIVE
LYMPHOCYTES # BLD AUTO: 0.35 10*3/MM3 (ref 0.7–3.1)
LYMPHOCYTES NFR BLD AUTO: 4.6 % (ref 19.6–45.3)
M PNEUMO IGG SER IA-ACNC: NOT DETECTED
MCH RBC QN AUTO: 32.8 PG (ref 26.6–33)
MCHC RBC AUTO-ENTMCNC: 33.2 G/DL (ref 31.5–35.7)
MCV RBC AUTO: 99 FL (ref 79–97)
MONOCYTES # BLD AUTO: 1.3 10*3/MM3 (ref 0.1–0.9)
MONOCYTES NFR BLD AUTO: 16.9 % (ref 5–12)
NEUTROPHILS NFR BLD AUTO: 5.93 10*3/MM3 (ref 1.7–7)
NEUTROPHILS NFR BLD AUTO: 77.3 % (ref 42.7–76)
NITRITE UR QL STRIP: NEGATIVE
NRBC BLD AUTO-RTO: 0 /100 WBC (ref 0–0.2)
NT-PROBNP SERPL-MCNC: 315 PG/ML (ref 0–1800)
PH UR STRIP.AUTO: <=5 [PH] (ref 5–8)
PLATELET # BLD AUTO: 181 10*3/MM3 (ref 140–450)
PMV BLD AUTO: 10.4 FL (ref 6–12)
POTASSIUM SERPL-SCNC: 4 MMOL/L (ref 3.5–5.2)
PROT SERPL-MCNC: 6 G/DL (ref 6–8.5)
PROT UR QL STRIP: NEGATIVE
RBC # BLD AUTO: 3.96 10*6/MM3 (ref 4.14–5.8)
RHINOVIRUS RNA SPEC NAA+PROBE: NOT DETECTED
RSV RNA NPH QL NAA+NON-PROBE: NOT DETECTED
SARS-COV-2 RNA NPH QL NAA+NON-PROBE: DETECTED
SODIUM SERPL-SCNC: 134 MMOL/L (ref 136–145)
SP GR UR STRIP: 1.02 (ref 1–1.03)
TROPONIN T SERPL HS-MCNC: 31 NG/L
TROPONIN T SERPL HS-MCNC: 31 NG/L
UROBILINOGEN UR QL STRIP: NORMAL
WBC NRBC COR # BLD: 7.67 10*3/MM3 (ref 3.4–10.8)

## 2023-02-28 PROCEDURE — 80053 COMPREHEN METABOLIC PANEL: CPT | Performed by: EMERGENCY MEDICINE

## 2023-02-28 PROCEDURE — 85652 RBC SED RATE AUTOMATED: CPT | Performed by: NURSE PRACTITIONER

## 2023-02-28 PROCEDURE — 93005 ELECTROCARDIOGRAM TRACING: CPT | Performed by: EMERGENCY MEDICINE

## 2023-02-28 PROCEDURE — 81003 URINALYSIS AUTO W/O SCOPE: CPT | Performed by: EMERGENCY MEDICINE

## 2023-02-28 PROCEDURE — 84484 ASSAY OF TROPONIN QUANT: CPT | Performed by: EMERGENCY MEDICINE

## 2023-02-28 PROCEDURE — P9612 CATHETERIZE FOR URINE SPEC: HCPCS

## 2023-02-28 PROCEDURE — 82728 ASSAY OF FERRITIN: CPT | Performed by: NURSE PRACTITIONER

## 2023-02-28 PROCEDURE — 0202U NFCT DS 22 TRGT SARS-COV-2: CPT | Performed by: EMERGENCY MEDICINE

## 2023-02-28 PROCEDURE — G0378 HOSPITAL OBSERVATION PER HR: HCPCS

## 2023-02-28 PROCEDURE — 93010 ELECTROCARDIOGRAM REPORT: CPT | Performed by: INTERNAL MEDICINE

## 2023-02-28 PROCEDURE — 85025 COMPLETE CBC W/AUTO DIFF WBC: CPT | Performed by: EMERGENCY MEDICINE

## 2023-02-28 PROCEDURE — 83880 ASSAY OF NATRIURETIC PEPTIDE: CPT | Performed by: EMERGENCY MEDICINE

## 2023-02-28 PROCEDURE — 70450 CT HEAD/BRAIN W/O DYE: CPT

## 2023-02-28 PROCEDURE — 71045 X-RAY EXAM CHEST 1 VIEW: CPT

## 2023-02-28 PROCEDURE — 99285 EMERGENCY DEPT VISIT HI MDM: CPT

## 2023-02-28 RX ORDER — ENOXAPARIN SODIUM 100 MG/ML
40 INJECTION SUBCUTANEOUS DAILY
Status: DISCONTINUED | OUTPATIENT
Start: 2023-02-28 | End: 2023-03-07 | Stop reason: HOSPADM

## 2023-02-28 RX ORDER — ATORVASTATIN CALCIUM 80 MG/1
80 TABLET, FILM COATED ORAL NIGHTLY
Status: DISCONTINUED | OUTPATIENT
Start: 2023-02-28 | End: 2023-03-07 | Stop reason: HOSPADM

## 2023-02-28 RX ORDER — DEXTROMETHORPHAN POLISTIREX 30 MG/5ML
60 SUSPENSION ORAL EVERY 12 HOURS PRN
Status: DISCONTINUED | OUTPATIENT
Start: 2023-02-28 | End: 2023-03-07 | Stop reason: HOSPADM

## 2023-02-28 RX ORDER — ASPIRIN 325 MG
325 TABLET ORAL DAILY
Status: DISCONTINUED | OUTPATIENT
Start: 2023-03-01 | End: 2023-03-01

## 2023-02-28 RX ORDER — SODIUM CHLORIDE 0.9 % (FLUSH) 0.9 %
10 SYRINGE (ML) INJECTION AS NEEDED
Status: DISCONTINUED | OUTPATIENT
Start: 2023-02-28 | End: 2023-03-07 | Stop reason: HOSPADM

## 2023-02-28 RX ORDER — ACETAMINOPHEN 650 MG/1
650 SUPPOSITORY RECTAL EVERY 4 HOURS PRN
Status: DISCONTINUED | OUTPATIENT
Start: 2023-02-28 | End: 2023-03-07 | Stop reason: HOSPADM

## 2023-02-28 RX ORDER — ASPIRIN 300 MG/1
300 SUPPOSITORY RECTAL DAILY
Status: DISCONTINUED | OUTPATIENT
Start: 2023-03-01 | End: 2023-03-01

## 2023-02-28 RX ORDER — ALBUTEROL SULFATE 90 UG/1
2 AEROSOL, METERED RESPIRATORY (INHALATION) EVERY 6 HOURS PRN
Status: DISCONTINUED | OUTPATIENT
Start: 2023-02-28 | End: 2023-03-07 | Stop reason: HOSPADM

## 2023-02-28 RX ORDER — SODIUM CHLORIDE 9 MG/ML
40 INJECTION, SOLUTION INTRAVENOUS AS NEEDED
Status: DISCONTINUED | OUTPATIENT
Start: 2023-02-28 | End: 2023-03-07 | Stop reason: HOSPADM

## 2023-02-28 RX ORDER — BENZONATATE 100 MG/1
100 CAPSULE ORAL 3 TIMES DAILY PRN
Status: DISCONTINUED | OUTPATIENT
Start: 2023-02-28 | End: 2023-03-07 | Stop reason: HOSPADM

## 2023-02-28 RX ORDER — BENZONATATE 100 MG/1
100 CAPSULE ORAL ONCE
Status: COMPLETED | OUTPATIENT
Start: 2023-02-28 | End: 2023-02-28

## 2023-02-28 RX ORDER — ACETAMINOPHEN 325 MG/1
650 TABLET ORAL EVERY 4 HOURS PRN
Status: DISCONTINUED | OUTPATIENT
Start: 2023-02-28 | End: 2023-03-07 | Stop reason: HOSPADM

## 2023-02-28 RX ORDER — SODIUM CHLORIDE 0.9 % (FLUSH) 0.9 %
10 SYRINGE (ML) INJECTION EVERY 12 HOURS SCHEDULED
Status: DISCONTINUED | OUTPATIENT
Start: 2023-02-28 | End: 2023-03-07 | Stop reason: HOSPADM

## 2023-02-28 RX ORDER — IPRATROPIUM BROMIDE AND ALBUTEROL SULFATE 2.5; .5 MG/3ML; MG/3ML
3 SOLUTION RESPIRATORY (INHALATION) EVERY 6 HOURS PRN
Status: DISCONTINUED | OUTPATIENT
Start: 2023-02-28 | End: 2023-03-07 | Stop reason: HOSPADM

## 2023-02-28 RX ADMIN — BENZONATATE 100 MG: 100 CAPSULE ORAL at 23:22

## 2023-03-01 ENCOUNTER — APPOINTMENT (OUTPATIENT)
Dept: CARDIOLOGY | Facility: HOSPITAL | Age: 83
DRG: 177 | End: 2023-03-01
Payer: MEDICARE

## 2023-03-01 ENCOUNTER — APPOINTMENT (OUTPATIENT)
Dept: MRI IMAGING | Facility: HOSPITAL | Age: 83
DRG: 177 | End: 2023-03-01
Payer: MEDICARE

## 2023-03-01 PROBLEM — E11.65 TYPE 2 DIABETES MELLITUS WITH HYPERGLYCEMIA: Status: ACTIVE | Noted: 2023-03-01

## 2023-03-01 PROBLEM — D64.9 ANEMIA: Status: ACTIVE | Noted: 2023-03-01

## 2023-03-01 PROBLEM — R77.8 ELEVATED TROPONIN: Status: ACTIVE | Noted: 2023-03-01

## 2023-03-01 PROBLEM — N17.9 AKI (ACUTE KIDNEY INJURY) (HCC): Status: ACTIVE | Noted: 2023-03-01

## 2023-03-01 PROBLEM — R47.01 EXPRESSIVE APHASIA: Status: ACTIVE | Noted: 2023-03-01

## 2023-03-01 PROBLEM — G31.84 MILD COGNITIVE IMPAIRMENT: Status: ACTIVE | Noted: 2023-03-01

## 2023-03-01 PROBLEM — R79.89 ELEVATED TROPONIN: Status: ACTIVE | Noted: 2023-03-01

## 2023-03-01 PROBLEM — E11.9 TYPE 2 DIABETES MELLITUS (HCC): Status: ACTIVE | Noted: 2023-03-01

## 2023-03-01 PROBLEM — R47.02 DYSPHASIA: Status: ACTIVE | Noted: 2023-03-01

## 2023-03-01 PROBLEM — D75.89 MACROCYTOSIS: Status: ACTIVE | Noted: 2023-03-01

## 2023-03-01 LAB
ALBUMIN SERPL-MCNC: 3.2 G/DL (ref 3.5–5.2)
ALBUMIN/GLOB SERPL: 1 G/DL
ALP SERPL-CCNC: 112 U/L (ref 39–117)
ALT SERPL W P-5'-P-CCNC: 25 U/L (ref 1–41)
ANION GAP SERPL CALCULATED.3IONS-SCNC: 13.3 MMOL/L (ref 5–15)
AORTIC DIMENSIONLESS INDEX: 0.8 (DI)
ASCENDING AORTA: 3.4 CM
AST SERPL-CCNC: 24 U/L (ref 1–40)
BASOPHILS # BLD AUTO: 0.02 10*3/MM3 (ref 0–0.2)
BASOPHILS NFR BLD AUTO: 0.3 % (ref 0–1.5)
BH CV ECHO MEAS - AO MAX PG: 4.3 MMHG
BH CV ECHO MEAS - AO MEAN PG: 2.47 MMHG
BH CV ECHO MEAS - AO V2 MAX: 103.8 CM/SEC
BH CV ECHO MEAS - AO V2 VTI: 19.5 CM
BH CV ECHO MEAS - AVA(I,D): 3.7 CM2
BH CV ECHO MEAS - EDV(CUBED): 54.9 ML
BH CV ECHO MEAS - EDV(MOD-SP2): 101 ML
BH CV ECHO MEAS - EDV(MOD-SP4): 148 ML
BH CV ECHO MEAS - EF(MOD-BP): 61.3 %
BH CV ECHO MEAS - EF(MOD-SP2): 59.4 %
BH CV ECHO MEAS - EF(MOD-SP4): 63.5 %
BH CV ECHO MEAS - ESV(CUBED): 19.3 ML
BH CV ECHO MEAS - ESV(MOD-SP2): 41 ML
BH CV ECHO MEAS - ESV(MOD-SP4): 54 ML
BH CV ECHO MEAS - FS: 29.5 %
BH CV ECHO MEAS - LAT PEAK E' VEL: 8.6 CM/SEC
BH CV ECHO MEAS - LV MAX PG: 2.31 MMHG
BH CV ECHO MEAS - LV MEAN PG: 1.23 MMHG
BH CV ECHO MEAS - LV V1 MAX: 76 CM/SEC
BH CV ECHO MEAS - LV V1 VTI: 14.8 CM
BH CV ECHO MEAS - LVIDD: 3.8 CM
BH CV ECHO MEAS - LVIDS: 2.7 CM
BH CV ECHO MEAS - LVOT AREA: 4.9 CM2
BH CV ECHO MEAS - LVOT DIAM: 2.5 CM
BH CV ECHO MEAS - LVPWD: 1.07 CM
BH CV ECHO MEAS - MED PEAK E' VEL: 7.7 CM/SEC
BH CV ECHO MEAS - MV A MAX VEL: 77.7 CM/SEC
BH CV ECHO MEAS - MV DEC SLOPE: 344.2 CM/SEC2
BH CV ECHO MEAS - MV DEC TIME: 194 MSEC
BH CV ECHO MEAS - MV E MAX VEL: 78.8 CM/SEC
BH CV ECHO MEAS - MV E/A: 1.01
BH CV ECHO MEAS - MV MAX PG: 2.8 MMHG
BH CV ECHO MEAS - MV MEAN PG: 1.17 MMHG
BH CV ECHO MEAS - MV P1/2T: 60.6 MSEC
BH CV ECHO MEAS - MV V2 VTI: 17.8 CM
BH CV ECHO MEAS - MVA(P1/2T): 3.6 CM2
BH CV ECHO MEAS - MVA(VTI): 4.1 CM2
BH CV ECHO MEAS - PA ACC TIME: 0.08 SEC
BH CV ECHO MEAS - PA PR(ACCEL): 43.3 MMHG
BH CV ECHO MEAS - PA V2 MAX: 98.8 CM/SEC
BH CV ECHO MEAS - PULM A REVS DUR: 0.13 SEC
BH CV ECHO MEAS - PULM A REVS VEL: 36.7 CM/SEC
BH CV ECHO MEAS - PULM DIAS VEL: 33.3 CM/SEC
BH CV ECHO MEAS - PULM S/D: 1
BH CV ECHO MEAS - PULM SYS VEL: 33.3 CM/SEC
BH CV ECHO MEAS - RV MAX PG: 3.9 MMHG
BH CV ECHO MEAS - RV V1 MAX: 99 CM/SEC
BH CV ECHO MEAS - RV V1 VTI: 17.5 CM
BH CV ECHO MEAS - SV(LVOT): 73 ML
BH CV ECHO MEAS - SV(MOD-SP2): 60 ML
BH CV ECHO MEAS - SV(MOD-SP4): 94 ML
BH CV ECHO MEASUREMENTS AVERAGE E/E' RATIO: 9.67
BH CV ECHO SHUNT ASSESSMENT PERFORMED (HIDDEN SCRIPTING): 1
BH CV XLRA - TDI S': 11.6 CM/SEC
BILIRUB SERPL-MCNC: 0.6 MG/DL (ref 0–1.2)
BUN SERPL-MCNC: 18 MG/DL (ref 8–23)
BUN/CREAT SERPL: 14.1 (ref 7–25)
CALCIUM SPEC-SCNC: 8 MG/DL (ref 8.6–10.5)
CHLORIDE SERPL-SCNC: 100 MMOL/L (ref 98–107)
CHOLEST SERPL-MCNC: 141 MG/DL (ref 0–200)
CO2 SERPL-SCNC: 21.7 MMOL/L (ref 22–29)
CREAT SERPL-MCNC: 1.28 MG/DL (ref 0.76–1.27)
CRP SERPL-MCNC: 6.71 MG/DL (ref 0–0.5)
DEPRECATED RDW RBC AUTO: 45.9 FL (ref 37–54)
EGFRCR SERPLBLD CKD-EPI 2021: 55.2 ML/MIN/1.73
EOSINOPHIL # BLD AUTO: 0 10*3/MM3 (ref 0–0.4)
EOSINOPHIL NFR BLD AUTO: 0 % (ref 0.3–6.2)
ERYTHROCYTE [DISTWIDTH] IN BLOOD BY AUTOMATED COUNT: 13.1 % (ref 12.3–15.4)
ERYTHROCYTE [SEDIMENTATION RATE] IN BLOOD: 30 MM/HR (ref 0–20)
FERRITIN SERPL-MCNC: 322 NG/ML (ref 30–400)
GEN 5 2HR TROPONIN T REFLEX: 40 NG/L
GLOBULIN UR ELPH-MCNC: 3.2 GM/DL
GLUCOSE BLDC GLUCOMTR-MCNC: 104 MG/DL (ref 70–130)
GLUCOSE BLDC GLUCOMTR-MCNC: 123 MG/DL (ref 70–130)
GLUCOSE BLDC GLUCOMTR-MCNC: 126 MG/DL (ref 70–130)
GLUCOSE BLDC GLUCOMTR-MCNC: 132 MG/DL (ref 70–130)
GLUCOSE BLDC GLUCOMTR-MCNC: 99 MG/DL (ref 70–130)
GLUCOSE SERPL-MCNC: 125 MG/DL (ref 65–99)
HBA1C MFR BLD: 6 % (ref 4.8–5.6)
HCT VFR BLD AUTO: 37.4 % (ref 37.5–51)
HDLC SERPL-MCNC: 45 MG/DL (ref 40–60)
HGB BLD-MCNC: 12.8 G/DL (ref 13–17.7)
IMM GRANULOCYTES # BLD AUTO: 0.06 10*3/MM3 (ref 0–0.05)
IMM GRANULOCYTES NFR BLD AUTO: 0.9 % (ref 0–0.5)
LDLC SERPL CALC-MCNC: 82 MG/DL (ref 0–100)
LDLC/HDLC SERPL: 1.82 {RATIO}
LEFT ATRIUM VOLUME INDEX: 23.2 ML/M2
LYMPHOCYTES # BLD AUTO: 0.71 10*3/MM3 (ref 0.7–3.1)
LYMPHOCYTES NFR BLD AUTO: 10.1 % (ref 19.6–45.3)
MAXIMAL PREDICTED HEART RATE: 136 BPM
MCH RBC QN AUTO: 33.3 PG (ref 26.6–33)
MCHC RBC AUTO-ENTMCNC: 34.2 G/DL (ref 31.5–35.7)
MCV RBC AUTO: 97.4 FL (ref 79–97)
MONOCYTES # BLD AUTO: 1.32 10*3/MM3 (ref 0.1–0.9)
MONOCYTES NFR BLD AUTO: 18.8 % (ref 5–12)
NEUTROPHILS NFR BLD AUTO: 4.93 10*3/MM3 (ref 1.7–7)
NEUTROPHILS NFR BLD AUTO: 69.9 % (ref 42.7–76)
NRBC BLD AUTO-RTO: 0 /100 WBC (ref 0–0.2)
PLATELET # BLD AUTO: 198 10*3/MM3 (ref 140–450)
PMV BLD AUTO: 10.3 FL (ref 6–12)
POTASSIUM SERPL-SCNC: 4 MMOL/L (ref 3.5–5.2)
PROT SERPL-MCNC: 6.4 G/DL (ref 6–8.5)
QT INTERVAL: 359 MS
RBC # BLD AUTO: 3.84 10*6/MM3 (ref 4.14–5.8)
SINUS: 3.6 CM
SODIUM SERPL-SCNC: 135 MMOL/L (ref 136–145)
STJ: 3.1 CM
STRESS TARGET HR: 116 BPM
TRIGL SERPL-MCNC: 71 MG/DL (ref 0–150)
TROPONIN T DELTA: 9 NG/L
VLDLC SERPL-MCNC: 14 MG/DL (ref 5–40)
WBC NRBC COR # BLD: 7.04 10*3/MM3 (ref 3.4–10.8)

## 2023-03-01 PROCEDURE — 97162 PT EVAL MOD COMPLEX 30 MIN: CPT

## 2023-03-01 PROCEDURE — 93306 TTE W/DOPPLER COMPLETE: CPT | Performed by: INTERNAL MEDICINE

## 2023-03-01 PROCEDURE — 82962 GLUCOSE BLOOD TEST: CPT

## 2023-03-01 PROCEDURE — 70551 MRI BRAIN STEM W/O DYE: CPT

## 2023-03-01 PROCEDURE — G0378 HOSPITAL OBSERVATION PER HR: HCPCS

## 2023-03-01 PROCEDURE — 36415 COLL VENOUS BLD VENIPUNCTURE: CPT | Performed by: NURSE PRACTITIONER

## 2023-03-01 PROCEDURE — 97166 OT EVAL MOD COMPLEX 45 MIN: CPT

## 2023-03-01 PROCEDURE — 92610 EVALUATE SWALLOWING FUNCTION: CPT

## 2023-03-01 PROCEDURE — 80061 LIPID PANEL: CPT | Performed by: NURSE PRACTITIONER

## 2023-03-01 PROCEDURE — 83036 HEMOGLOBIN GLYCOSYLATED A1C: CPT | Performed by: NURSE PRACTITIONER

## 2023-03-01 PROCEDURE — 99223 1ST HOSP IP/OBS HIGH 75: CPT | Performed by: PSYCHIATRY & NEUROLOGY

## 2023-03-01 PROCEDURE — 97110 THERAPEUTIC EXERCISES: CPT

## 2023-03-01 PROCEDURE — 25010000002 ENOXAPARIN PER 10 MG: Performed by: NURSE PRACTITIONER

## 2023-03-01 PROCEDURE — 97535 SELF CARE MNGMENT TRAINING: CPT

## 2023-03-01 PROCEDURE — 93306 TTE W/DOPPLER COMPLETE: CPT

## 2023-03-01 PROCEDURE — 84484 ASSAY OF TROPONIN QUANT: CPT | Performed by: EMERGENCY MEDICINE

## 2023-03-01 PROCEDURE — 86140 C-REACTIVE PROTEIN: CPT | Performed by: NURSE PRACTITIONER

## 2023-03-01 PROCEDURE — 25010000002 REMDESIVIR 100 MG/20ML SOLUTION 1 EACH VIAL: Performed by: HOSPITALIST

## 2023-03-01 PROCEDURE — 85025 COMPLETE CBC W/AUTO DIFF WBC: CPT | Performed by: NURSE PRACTITIONER

## 2023-03-01 PROCEDURE — 99203 OFFICE O/P NEW LOW 30 MIN: CPT | Performed by: INTERNAL MEDICINE

## 2023-03-01 PROCEDURE — 92523 SPEECH SOUND LANG COMPREHEN: CPT

## 2023-03-01 PROCEDURE — 80053 COMPREHEN METABOLIC PANEL: CPT | Performed by: NURSE PRACTITIONER

## 2023-03-01 PROCEDURE — 25510000001 PERFLUTREN (DEFINITY) 8.476 MG IN SODIUM CHLORIDE (PF) 0.9 % 10 ML INJECTION: Performed by: PSYCHIATRY & NEUROLOGY

## 2023-03-01 RX ORDER — ONDANSETRON 4 MG/1
4 TABLET, FILM COATED ORAL EVERY 6 HOURS PRN
Status: DISCONTINUED | OUTPATIENT
Start: 2023-03-01 | End: 2023-03-07 | Stop reason: HOSPADM

## 2023-03-01 RX ORDER — IBUPROFEN 600 MG/1
1 TABLET ORAL
Status: DISCONTINUED | OUTPATIENT
Start: 2023-03-01 | End: 2023-03-02

## 2023-03-01 RX ORDER — CETIRIZINE HYDROCHLORIDE 10 MG/1
10 TABLET ORAL EVERY MORNING
Status: DISCONTINUED | OUTPATIENT
Start: 2023-03-01 | End: 2023-03-07 | Stop reason: HOSPADM

## 2023-03-01 RX ORDER — TRAZODONE HYDROCHLORIDE 50 MG/1
50 TABLET ORAL NIGHTLY
Status: DISCONTINUED | OUTPATIENT
Start: 2023-03-01 | End: 2023-03-07 | Stop reason: HOSPADM

## 2023-03-01 RX ORDER — INSULIN LISPRO 100 [IU]/ML
0-7 INJECTION, SOLUTION INTRAVENOUS; SUBCUTANEOUS
Status: DISCONTINUED | OUTPATIENT
Start: 2023-03-01 | End: 2023-03-02

## 2023-03-01 RX ORDER — GUAIFENESIN 200 MG/10ML
200 LIQUID ORAL EVERY 6 HOURS PRN
Status: DISCONTINUED | OUTPATIENT
Start: 2023-03-01 | End: 2023-03-07 | Stop reason: HOSPADM

## 2023-03-01 RX ORDER — CLOPIDOGREL BISULFATE 75 MG/1
75 TABLET ORAL EVERY MORNING
Status: DISCONTINUED | OUTPATIENT
Start: 2023-03-01 | End: 2023-03-07 | Stop reason: HOSPADM

## 2023-03-01 RX ORDER — FOLIC ACID 1 MG/1
1 TABLET ORAL DAILY
Status: DISCONTINUED | OUTPATIENT
Start: 2023-03-01 | End: 2023-03-07 | Stop reason: HOSPADM

## 2023-03-01 RX ORDER — NICOTINE POLACRILEX 4 MG
15 LOZENGE BUCCAL
Status: DISCONTINUED | OUTPATIENT
Start: 2023-03-01 | End: 2023-03-02

## 2023-03-01 RX ORDER — ASPIRIN 81 MG/1
81 TABLET ORAL DAILY
Status: DISCONTINUED | OUTPATIENT
Start: 2023-03-01 | End: 2023-03-07 | Stop reason: HOSPADM

## 2023-03-01 RX ORDER — LISINOPRIL 40 MG/1
40 TABLET ORAL EVERY EVENING
Status: DISCONTINUED | OUTPATIENT
Start: 2023-03-01 | End: 2023-03-03

## 2023-03-01 RX ORDER — DEXTROSE MONOHYDRATE 25 G/50ML
25 INJECTION, SOLUTION INTRAVENOUS
Status: DISCONTINUED | OUTPATIENT
Start: 2023-03-01 | End: 2023-03-02

## 2023-03-01 RX ADMIN — ATORVASTATIN CALCIUM 80 MG: 80 TABLET, FILM COATED ORAL at 20:26

## 2023-03-01 RX ADMIN — Medication 10 ML: at 02:01

## 2023-03-01 RX ADMIN — CLOPIDOGREL 75 MG: 75 TABLET, FILM COATED ORAL at 11:44

## 2023-03-01 RX ADMIN — BENZONATATE 100 MG: 100 CAPSULE ORAL at 02:01

## 2023-03-01 RX ADMIN — FOLIC ACID 1 MG: 1 TABLET ORAL at 11:45

## 2023-03-01 RX ADMIN — CETIRIZINE HYDROCHLORIDE 10 MG: 10 TABLET ORAL at 11:44

## 2023-03-01 RX ADMIN — Medication 10 ML: at 20:26

## 2023-03-01 RX ADMIN — ATORVASTATIN CALCIUM 80 MG: 80 TABLET, FILM COATED ORAL at 02:01

## 2023-03-01 RX ADMIN — ACETAMINOPHEN 650 MG: 325 TABLET, FILM COATED ORAL at 16:58

## 2023-03-01 RX ADMIN — PERFLUTREN 2 ML: 6.52 INJECTION, SUSPENSION INTRAVENOUS at 14:54

## 2023-03-01 RX ADMIN — ENOXAPARIN SODIUM 40 MG: 100 INJECTION SUBCUTANEOUS at 02:01

## 2023-03-01 RX ADMIN — REMDESIVIR 200 MG: 100 INJECTION, POWDER, LYOPHILIZED, FOR SOLUTION INTRAVENOUS at 11:45

## 2023-03-01 RX ADMIN — TRAZODONE HYDROCHLORIDE 50 MG: 50 TABLET ORAL at 20:26

## 2023-03-01 RX ADMIN — BENZONATATE 100 MG: 100 CAPSULE ORAL at 14:53

## 2023-03-01 RX ADMIN — ASPIRIN 81 MG: 81 TABLET, COATED ORAL at 11:44

## 2023-03-01 RX ADMIN — Medication 10 ML: at 11:46

## 2023-03-01 RX ADMIN — LISINOPRIL 40 MG: 40 TABLET ORAL at 16:58

## 2023-03-01 NOTE — THERAPY EVALUATION
Patient Name: Albert Yadav  : 1940    MRN: [id number]                              Today's Date: 3/1/2023       Admit Date: 2023    Visit Dx:     ICD-10-CM ICD-9-CM   1. COVID-19  U07.1 079.89   2. 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, left leg ([facility])   • Spinal stenosis, lumbar region, with neurogenic claudication   • Carotid stenosis, asymptomatic, bilateral   • Spondylolisthesis at L4-L5 level   • Lumbar stenosis   • Closed fracture of multiple ribs of right side, initial encounter   • Retroperitoneal tumor   • Closed fracture of one rib of right side, initial encounter   • Rheumatoid arthritis ([facility])   • Contusion of right hip   • COVID-19   • PRACHI (acute kidney injury) ([facility])   • Anemia   • Macrocytosis   • Type 2 diabetes mellitus with hyperglycemia ([facility])   • Mild cognitive impairment   • Expressive aphasia   • Elevated troponin     Past Medical History:   Diagnosis Date   • Anesthesia     MILD VIOLENT BEHAVIOR AFTER ANESTHESIA LEG SURGERY   • Arthritis of back 10 years ago   • At risk for sleep apnea     STOP BANG = 5   • Broken bones     arm, collar bone, ankle   • Carotid artery disorder ([facility])    • Cervical disc disorder ?   • Cholelithiasis     Gall bladder removed   • Diabetes mellitus ([facility])     Type 2   • Falls    • Fracture of ankle    • Fracture of wrist    • Fracture, clavicle 26 years ago   • Fracture, foot 26 year ago   • Groin rash     PT STATES LEFT GROIN AT TIMES   • History of transfusion    • Hyperlipidemia    • Hypertension    • Low back pain    • Low back strain ?   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    LEFT   • Rheumatoid arthritis ([facility]CC)     HANDS DIALLO   • Staph infection     2015  [facility] POST SX   • Tendency toward bleeding easily ([facility]CC)     due to blood thinners     Past Surgical History:   Procedure Laterality Date   • ADRENALECTOMY Left 12/20/2022    Procedure: LAPAROSCOPIC EXCISION LEFT RETROPERITONEAL MASS;  Surgeon: Moy Vargas MD;  Location: [facility] OR;  Service: General;  Laterality: Left;   • ANGIOPLASTY FEMORAL ARTERY Left 11/07/2016    Procedure: ULTRA SOUND ACCESS RIGHT FEMORAL ARTERY, AIF BILATERAL RUNOFF, SELECTIVE CATHETERIZATION LEFT FEMORAL ARTERY.;  Surgeon: Errol Michael MD;  Location: [facility] OR 18/19;  Service:    • ANKLE OPEN REDUCTION INTERNAL FIXATION  1980   • ARTERIOVENOUS FISTULA/SHUNT SURGERY Left 12/13/2016    Procedure: LEFT ILEO-FEMORAL GORTEX GRAFT AND LEFT LEG ARERIOGRAM ;  Surgeon: Errol Michael MD;  Location: [facility] OR 18/19;  Service:    • CAROTID ENDARTERECTOMY Right 09/29/2020    Procedure: CAROTID ENDARTERECTOMY;  Surgeon: James Graham MD;  Location: [facility];  Service: Vascular;  Laterality: Right;   • CHOLECYSTECTOMY     • EPIDURAL BLOCK     • EYE SURGERY Left     as child   • FRACTURE SURGERY Left     arm   • ILIAC ARTERY STENT Left 2020   • KNEE SURGERY Left     ORIF   • ORIF ANKLE FRACTURE Left       General Information     Row Name 03/01/23 1522          OT Time and Intention    Document Type evaluation  Simultaneous filing. User may be unaware of other data.  -RB     Mode of Treatment occupational therapy;co-treatment;physical therapy  Simultaneous filing. User may be unaware of other data.  -RB     Row Name 03/01/23 1522          General Information    Patient Profile Reviewed yes  Simultaneous filing. User may be unaware of other data.  -RB     Prior Level of Function independent:;ADL's;transfer  Simultaneous  filing. User may be unaware of other data.  -RB     Existing Precautions/Restrictions fall  Simultaneous filing. User may be unaware of other data.  -RB     Barriers to Rehab medically complex  Simultaneous filing. User may be unaware of other data.  -RB     Row Name 03/01/23 1522          Living Environment    People in Home spouse  -RB     Row Name 03/01/23 1522          Stairs Within Home, Primary    Number of Stairs, Within Home, Primary three  -RB     Stair Railings, Within Home, Primary railings safe and in good condition  -RB     Row Name 03/01/23 1522          Cognition    Orientation Status (Cognition) oriented to;person;verbal cues/prompts needed for orientation  Simultaneous filing. User may be unaware of other data.  -RB     Row Name 03/01/23 1522          Safety Issues, Functional Mobility    Safety Issues Affecting Function (Mobility) safety precautions follow-through/compliance;safety precaution awareness;awareness of need for assistance;ability to follow commands;insight into deficits/self-awareness;judgment;problem-solving;sequencing abilities;impulsivity  -RB     Impairments Affecting Function (Mobility) balance;cognition;endurance/activity tolerance;coordination;motor control;strength;range of motion (ROM);postural/trunk control  -RB           User Key  (r) = Recorded By, (t) = Taken By, (c) = Cosigned By    Initials Name Provider Type    RB Kiara Hobbs OT Occupational Therapist                 Mobility/ADL's     Row Name 03/01/23 1522          Bed Mobility    Bed Mobility supine-sit;sit-supine  Simultaneous filing. User may be unaware of other data.  -RB     Supine-Sit [place] (Bed Mobility) moderate assist (50% patient effort);2 person assist;verbal cues;nonverbal cues (demo/gesture)  -RB     Sit-Supine [place] (Bed Mobility) maximum assist (25% patient effort);2 person assist;verbal cues;nonverbal cues (demo/gesture)  -RB     Assistive Device (Bed Mobility) bed rails  -RB      Row Name 03/01/23 1522          Transfers    Transfers sit-stand transfer;stand-sit transfer  -RB     Comment, (Transfers) shuffled side steps along the EOB. Posterior lean.  -RB     Row Name 03/01/23 1522          Sit-Stand Transfer    Sit-Stand [place] (Transfers) minimum assist (75% patient effort);moderate assist (50% patient effort);2 person assist;verbal cues  -RB     Assistive Device (Sit-Stand Transfers) walker, front-wheeled  -RB     Row Name 03/01/23 1522          Stand-Sit Transfer    Stand-Sit [place] (Transfers) minimum assist (75% patient effort);moderate assist (50% patient effort);2 person assist;verbal cues  -RB     Assistive Device (Stand-Sit Transfers) walker, front-wheeled  -RB     Row Name 03/01/23 1522          Functional Mobility    Functional Mobility- Ind. Level unable to perform  -RB     Row Name 03/01/23 1522          Activities of Daily Living    BADL Assessment/Intervention lower body dressing;feeding  -RB     Row Name 03/01/23 1522          Lower Body Dressing Assessment/Training    [place] Level (Lower Body Dressing) lower body dressing skills;maximum assist (25% patient effort)  -RB     Position (Lower Body Dressing) edge of bed sitting  -RB     Comment, (Lower Body Dressing) strong posterior lean with BUE involvement  -RB     Row Name 03/01/23 1522          Self-Feeding Assessment/Training    [place] Level (Feeding) feeding skills;scoop food and bring to mouth;minimum assist (75% patient effort)  -RB     Position (Self-Feeding) edge of bed sitting  -RB     Comment, (Feeding) tremoring and  coordination deficits limiting  -RB           User Key  (r) = Recorded By, (t) = Taken By, (c) = Cosigned By    Initials Name Provider Type    RB Kiara Hobbs OT Occupational Therapist               Obj/Interventions     Row Name 03/01/23 1524          Sensory Assessment (Somatosensory)    Sensory Assessment (Somatosensory) sensation intact  -RB     Row Name 03/01/23 1524           Vision Assessment/Intervention    Visual Impairment/Limitations WFL  -RB     Row Name 03/01/23 1524          Range of Motion Comprehensive    General Range of Motion no range of motion deficits identified  -RB     Row Name 03/01/23 1524          Strength Comprehensive (MMT)    Comment, General Manual Muscle Testing (MMT) Assessment BUE 3+/5, generalized weakness present + falls reported in his home.  -RB     Row Name 03/01/23 1524          Motor Skills    Motor Skills coordination  -RB     Coordination fine motor deficit;bilateral;upper extremity;minimal impairment  -RB     Row Name 03/01/23 1524          Balance    Comment, Balance CGA->Mod A for sitting balance, posterior lean.  -RB           User Key  (r) = Recorded By, (t) = Taken By, (c) = Cosigned By    Initials Name Provider Type    RB Kiara Hobbs OT Occupational Therapist               Goals/Plan     Row Name 03/01/23 1525          Bed Mobility Goal 1 (OT)    Activity/Assistive Device (Bed Mobility Goal 1, OT) bed mobility activities, all  -RB     [place] Level/Cues Needed (Bed Mobility Goal 1, OT) modified independence  -RB     Time Frame (Bed Mobility Goal 1, OT) short term goal (STG);2 weeks  -RB     Progress/Outcomes (Bed Mobility Goal 1, OT) continuing progress toward goal  -RB     Row Name 03/01/23 1525          Transfer Goal 1 (OT)    Activity/Assistive Device (Transfer Goal 1, OT) transfers, all  -RB     [place] Level/Cues Needed (Transfer Goal 1, OT) modified independence  -RB     Time Frame (Transfer Goal 1, OT) short term goal (STG);2 weeks  -RB     Progress/Outcome (Transfer Goal 1, OT) continuing progress toward goal  -RB     Row Name 03/01/23 1525          Dressing Goal 1 (OT)    Activity/Device (Dressing Goal 1, OT) dressing skills, all  -RB     [place]/Cues Needed (Dressing Goal 1, OT) modified independence  -RB     Time Frame (Dressing Goal 1, OT) short term goal (STG);2 weeks  -RB     Progress/Outcome  (Dressing Goal 1, OT) continuing progress toward goal  -RB     Row Name 03/01/23 1525          Toileting Goal 1 (OT)    Activity/Device (Toileting Goal 1, OT) toileting skills, all  -RB     [place] Level/Cues Needed (Toileting Goal 1, OT) minimum assist (75% or more patient effort)  -RB     Time Frame (Toileting Goal 1, OT) short term goal (STG);2 weeks  -RB     Progress/Outcome (Toileting Goal 1, OT) continuing progress toward goal  -RB     Row Name 03/01/23 1525          Grooming Goal 1 (OT)    Activity/Device (Grooming Goal 1, OT) grooming skills, all  -RB     [place] (Grooming Goal 1, OT) supervision required  -RB     Time Frame (Grooming Goal 1, OT) short term goal (STG);2 weeks  -RB     Progress/Outcome (Grooming Goal 1, OT) continuing progress toward goal  -RB     Row Name 03/01/23 1525          Therapy Assessment/Plan (OT)    Planned Therapy Interventions (OT) transfer/mobility retraining;strengthening exercise;ROM/therapeutic exercise;activity tolerance training;adaptive equipment training;BADL retraining;occupation/activity based interventions;functional balance retraining;patient/caregiver education/training  -RB           User Key  (r) = Recorded By, (t) = Taken By, (c) = Cosigned By    Initials Name Provider Type    RB Kiara Hobbs, OT Occupational Therapist               Clinical Impression     Row Name 03/01/23 1525          Pain Assessment    Pretreatment Pain Rating 0/10 - no pain  -RB     Posttreatment Pain Rating 0/10 - no pain  -RB     Row Name 03/01/23 1525          Plan of Care Review    Plan of Care Reviewed With patient;spouse  -RB     Progress improving  -RB     Row Name 03/01/23 1525          Therapy Assessment/Plan (OT)    Rehab Potential (OT) good, to achieve stated therapy goals  -RB     Criteria for Skilled Therapeutic Interventions Met (OT) yes;skilled treatment is necessary  -RB     Therapy Frequency (OT) 5 times/wk  -RB     Row Name 03/01/23 1525          Therapy Plan  Review/Discharge Plan (OT)    Anticipated Discharge Disposition (OT) inpatient rehabilitation facility  -RB     Row Name 03/01/23 1525          Vital Signs    O2 Delivery Pre Treatment room air  -RB     O2 Delivery Intra Treatment room air  -RB     O2 Delivery Post Treatment room air  -RB     Pre Patient Position Supine  -RB     Intra Patient Position Standing  -RB     Post Patient Position Supine  -RB     Row Name 03/01/23 1525          Positioning and Restraints    Pre-Treatment Position in bed  -RB     Post Treatment Position bed  -RB     In Bed notified nsg;supine;call light within reach;encouraged to call for assist;fowlers;exit alarm on;patient within staff view  -RB           User Key  (r) = Recorded By, (t) = Taken By, (c) = Cosigned By    Initials Name Provider Type    Kiara Dockery OT Occupational Therapist               Outcome Measures     Row Name 03/01/23 1526          How much help from another is currently needed...    Putting on and taking off regular lower body clothing? 2  -RB     Bathing (including washing, rinsing, and drying) 2  -RB     Toileting (which includes using toilet bed pan or urinal) 1  -RB     Putting on and taking off regular upper body clothing 2  -RB     Taking care of personal grooming (such as brushing teeth) 2  -RB     Eating meals 3  -RB     AM-PAC 6 Clicks Score (OT) 12  -RB     Row Name 03/01/23 1526          Modified [place] Scale    Modified [place] Scale 4 - Moderately severe disability.  Unable to walk without assistance, and unable to attend to own bodily needs without assistance.  -RB     Row Name 03/01/23 1526          Functional Assessment    Outcome Measure Options AM-PAC 6 Clicks Daily Activity (OT);Modified [place]  -RB           User Key  (r) = Recorded By, (t) = Taken By, (c) = Cosigned By    Initials Name Provider Type    Kiara Dockery OT Occupational Therapist                Occupational Therapy Education     Title: PT OT SLP Therapies (In  Progress)     Topic: Occupational Therapy (Not Started)     Point: ADL training (Not Started)     Description:   Instruct learner(s) on proper safety adaptation and remediation techniques during self care or transfers.   Instruct in proper use of assistive devices.              Learner Progress:  Not documented in this visit.          Point: Home exercise program (Not Started)     Description:   Instruct learner(s) on appropriate technique for monitoring, assisting and/or progressing therapeutic exercises/activities.              Learner Progress:  Not documented in this visit.          Point: Precautions (Not Started)     Description:   Instruct learner(s) on prescribed precautions during self-care and functional transfers.              Learner Progress:  Not documented in this visit.          Point: Body mechanics (Not Started)     Description:   Instruct learner(s) on proper positioning and spine alignment during self-care, functional mobility activities and/or exercises.              Learner Progress:  Not documented in this visit.                          OT Recommendation and Plan  Planned Therapy Interventions (OT): transfer/mobility retraining, strengthening exercise, ROM/therapeutic exercise, activity tolerance training, adaptive equipment training, BADL retraining, occupation/activity based interventions, functional balance retraining, patient/caregiver education/training  Therapy Frequency (OT): 5 times/wk  Plan of Care Review  Plan of Care Reviewed With: patient, spouse  Progress: improving     Time Calculation:    Time Calculation- OT     Row Name 03/01/23 1519             Time Calculation- OT    OT Start Time 1435  -RB      OT Stop Time 1503  -RB      OT Time Calculation (min) 28 min  -RB      Total Timed Code Minutes- OT 10 minute(s)  -RB      OT Received On 03/01/23  -RB      OT - Next Appointment 03/02/23  -RB      OT Goal Re-Cert Due Date 03/15/23  -RB         Timed Charges    69938 - OT Self  Care/Mgmt Minutes 10  -RB         Untimed Charges    OT Eval/Re-eval Minutes 18  -RB         Total Minutes    Timed Charges Total Minutes 10  -RB      Untimed Charges Total Minutes 18  -RB       Total Minutes 28  -RB            User Key  (r) = Recorded By, (t) = Taken By, (c) = Cosigned By    Initials Name Provider Type    RB Kiara Hobbs OT Occupational Therapist              Therapy Charges for Today     Code Description Service Date Service Provider Modifiers Qty    [id number] HC OT SELF CARE/MGMT/TRAIN EA 15 MIN 3/1/2023 Kiara Hobbs OT GO 1    [id number]  OT EVAL MOD COMPLEXITY 3 3/1/2023 Kiara Hobbs OT GO 1               Kiara Hobbs OT  3/1/2023

## 2023-03-01 NOTE — ED TRIAGE NOTES
Pt presents to ED via [facility] after family was concerned for new onset of weakness since 2200 last night. Pt is A&Ox4 at baseline, presents that way at triage-is usually able to ambulate independently but has not been able to last night. No unilateral deficits, no facial droop, but family reports to EMS that patient has been having some periods of aphasia.

## 2023-03-01 NOTE — ED NOTES
"Pt is A&O x4. Family reports pt was driving around yesterday and walking normally. And today \"has had a very rough day\". Pt was unable to move at all, even with assistance from two family members. Pt having difficulty following commands and difficulty finding words. No slurred speech.   "

## 2023-03-01 NOTE — PLAN OF CARE
Goal Outcome Evaluation:  Plan of Care Reviewed With: patient  Pt arrived from ER via stretcher at 0030am. Upon arrival pt's weight was obtained on bed scale. Pt's vitals were obtained, admission intake questions were obtained. Admission assessment was done. MRI screening sheet was done and faxed to MRI. MRI to be done this am. Transport came to get pt for MRI and was unable to transport due to pt's covid status, said another transporter would come back later to get pt for MRI when available.     Problem: Fall Injury Risk  Goal: Absence of Fall and Fall-Related Injury  Outcome: Ongoing, Progressing  Intervention: Identify and Manage Contributors  Recent Flowsheet Documentation  Taken 3/1/2023 0400 by Joanna Chambers RN  Medication Review/Management: medications reviewed  Taken 3/1/2023 0200 by Joanna Chambers RN  Medication Review/Management: medications reviewed  Intervention: Promote Injury-Free Environment  Recent Flowsheet Documentation  Taken 3/1/2023 0400 by Joanna Chambers RN  Safety Promotion/Fall Prevention:   activity supervised   assistive device/personal items within reach   clutter free environment maintained   safety round/check completed  Taken 3/1/2023 0200 by Joanna Chambers RN  Safety Promotion/Fall Prevention:   activity supervised   assistive device/personal items within reach   clutter free environment maintained   safety round/check completed   nonskid shoes/slippers when out of bed   room organization consistent  Goal: Absence of Fall and Fall-Related Injury  Outcome: Ongoing, Progressing  Intervention: Identify and Manage Contributors  Recent Flowsheet Documentation  Taken 3/1/2023 0400 by Joanna Chambers RN  Medication Review/Management: medications reviewed  Taken 3/1/2023 0200 by Joanna Chambers RN  Medication Review/Management: medications reviewed  Intervention: Promote Injury-Free Environment  Recent Flowsheet Documentation  Taken 3/1/2023 0400 by Joanna Chambers RN  Safety Promotion/Fall Prevention:    activity supervised   assistive device/personal items within reach   clutter free environment maintained   safety round/check completed  Taken 3/1/2023 0200 by Joanna Chambers RN  Safety Promotion/Fall Prevention:   activity supervised   assistive device/personal items within reach   clutter free environment maintained   safety round/check completed   nonskid shoes/slippers when out of bed   room organization consistent     Problem: Skin Injury Risk Increased  Goal: Skin Health and Integrity  Outcome: Ongoing, Progressing  Intervention: Optimize Skin Protection  Recent Flowsheet Documentation  Taken 3/1/2023 0400 by Joanna Chambers RN  Head of Bed (HOB) Positioning: HOB elevated  Taken 3/1/2023 0200 by Joanna Chambers RN  Head of Bed (HOB) Positioning: HOB elevated     Problem: Adjustment to Illness (Stroke, Ischemic/Transient Ischemic Attack)  Goal: Optimal Coping  Outcome: Ongoing, Progressing     Problem: Bowel Elimination Impaired (Stroke, Ischemic/Transient Ischemic Attack)  Goal: Effective Bowel Elimination  Outcome: Ongoing, Progressing     Problem: Cerebral Tissue Perfusion (Stroke, Ischemic/Transient Ischemic Attack)  Goal: Optimal Cerebral Tissue Perfusion  Outcome: Ongoing, Progressing     Problem: Cognitive Impairment (Stroke, Ischemic/Transient Ischemic Attack)  Goal: Optimal Cognitive Function  Outcome: Ongoing, Progressing     Problem: Communication Impairment (Stroke, Ischemic/Transient Ischemic Attack)  Goal: Improved Communication Skills  Outcome: Ongoing, Progressing  Intervention: Optimize Communication Skills  Recent Flowsheet Documentation  Taken 3/1/2023 0114 by Joanna Chambers RN  Communication Enhancement Strategies: call light answered in person     Problem: Functional Ability Impaired (Stroke, Ischemic/Transient Ischemic Attack)  Goal: Optimal Functional Ability  Outcome: Ongoing, Progressing     Problem: Respiratory Compromise (Stroke, Ischemic/Transient Ischemic Attack)  Goal: Effective Oxygenation  and Ventilation  Outcome: Ongoing, Progressing  Intervention: Optimize Oxygenation and Ventilation  Recent Flowsheet Documentation  Taken 3/1/2023 0400 by Joanna Chambers RN  Head of Bed (HOB) Positioning: HOB elevated  Taken 3/1/2023 0200 by Joanna Chambers RN  Head of Bed (HOB) Positioning: HOB elevated     Problem: Sensorimotor Impairment (Stroke, Ischemic/Transient Ischemic Attack)  Goal: Improved Sensorimotor Function  Outcome: Ongoing, Progressing     Problem: Swallowing Impairment (Stroke, Ischemic/Transient Ischemic Attack)  Goal: Optimal Eating and Swallowing without Aspiration  Outcome: Ongoing, Progressing     Problem: Urinary Elimination Impaired (Stroke, Ischemic/Transient Ischemic Attack)  Goal: Effective Urinary Elimination  Outcome: Ongoing, Progressing     Problem: Adult Inpatient Plan of Care  Goal: Plan of Care Review  Outcome: Ongoing, Progressing  Flowsheets (Taken 3/1/2023 0522)  Progress: improving  Plan of Care Reviewed With: patient  Goal: Patient-Specific Goal (Individualized)  Outcome: Ongoing, Progressing  Goal: Absence of Hospital-Acquired Illness or Injury  Outcome: Ongoing, Progressing  Intervention: Identify and Manage Fall Risk  Recent Flowsheet Documentation  Taken 3/1/2023 0400 by Joanna Chambers RN  Safety Promotion/Fall Prevention:   activity supervised   assistive device/personal items within reach   clutter free environment maintained   safety round/check completed  Taken 3/1/2023 0200 by Joanna Chambers RN  Safety Promotion/Fall Prevention:   activity supervised   assistive device/personal items within reach   clutter free environment maintained   safety round/check completed   nonskid shoes/slippers when out of bed   room organization consistent  Intervention: Prevent Skin Injury  Recent Flowsheet Documentation  Taken 3/1/2023 0400 by Joanna Chambers RN  Body Position: supine  Taken 3/1/2023 0200 by Joanna Chambers RN  Body Position: supine  Intervention: Prevent and Manage VTE (Venous  Thromboembolism) Risk  Recent Flowsheet Documentation  Taken 3/1/2023 0114 by Joanna Chambers RN  VTE Prevention/Management:   bilateral   sequential compression devices on  Goal: Optimal Comfort and Wellbeing  Outcome: Ongoing, Progressing  Intervention: Provide Person-Centered Care  Recent Flowsheet Documentation  Taken 3/1/2023 0114 by Joanna Chambers RN  Trust Relationship/Rapport:   care explained   reassurance provided  Goal: Readiness for Transition of Care  Outcome: Ongoing, Progressing  Intervention: Mutually Develop Transition Plan  Recent Flowsheet Documentation  Taken 3/1/2023 0443 by Joanna Chambers RN  Transportation Anticipated: family or friend will provide  Patient/Family Anticipated Services at Transition: none  Patient/Family Anticipates Transition to: home with family  Taken 3/1/2023 0436 by Joanna Chambers, RN  Equipment Currently Used at Home: (pt has straight tip cane) walker, rolling           Progress: improving

## 2023-03-01 NOTE — THERAPY EVALUATION
Acute Care - Physical Therapy Initial Evaluation  [facility]     Patient Name: Albert Yadav  : 1940  MRN: [id number]  Today's Date: 3/1/2023      Visit Dx:     ICD-10-CM ICD-9-CM   1. COVID-19  U07.1 079.89   2. 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, left leg ([facility])   • Spinal stenosis, lumbar region, with neurogenic claudication   • Carotid stenosis, asymptomatic, bilateral   • Spondylolisthesis at L4-L5 level   • Lumbar stenosis   • Closed fracture of multiple ribs of right side, initial encounter   • Retroperitoneal tumor   • Closed fracture of one rib of right side, initial encounter   • Rheumatoid arthritis ([facility])   • Contusion of right hip   • COVID-19   • PRACHI (acute kidney injury) ([facility])   • Anemia   • Macrocytosis   • Type 2 diabetes mellitus with hyperglycemia ([facility])   • Mild cognitive impairment   • Expressive aphasia   • Elevated troponin     Past Medical History:   Diagnosis Date   • Anesthesia     MILD VIOLENT BEHAVIOR AFTER ANESTHESIA LEG SURGERY   • Arthritis of back 10 years ago   • At risk for sleep apnea     STOP BANG = 5   • Broken bones     arm, collar bone, ankle   • Carotid artery disorder ([facility])    • Cervical disc disorder ?   • Cholelithiasis     Gall bladder removed   • Diabetes mellitus ([facility])     Type 2   • Falls    • Fracture of ankle    • Fracture of wrist    • Fracture, clavicle 26 years ago   • Fracture, foot 26 year ago   • Groin rash     PT STATES LEFT GROIN AT TIMES   • History of transfusion    • Hyperlipidemia    • Hypertension    • Low back pain    • Low back strain ?   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    LEFT   • Rheumatoid arthritis ([facility])     HANDS DIALLO   • Staph infection     2015  [facility] POST SX   • Tendency toward bleeding easily ([facility]CC)     due to blood thinners     Past Surgical History:   Procedure Laterality Date   • ADRENALECTOMY Left 12/20/2022    Procedure: LAPAROSCOPIC EXCISION LEFT RETROPERITONEAL MASS;  Surgeon: Moy Vargas MD;  Location: [facility];  Service: General;  Laterality: Left;   • ANGIOPLASTY FEMORAL ARTERY Left 11/07/2016    Procedure: ULTRA SOUND ACCESS RIGHT FEMORAL ARTERY, AIF BILATERAL RUNOFF, SELECTIVE CATHETERIZATION LEFT FEMORAL ARTERY.;  Surgeon: Errol Michael MD;  Location: [facility] OR 18/19;  Service:    • ANKLE OPEN REDUCTION INTERNAL FIXATION  1980   • ARTERIOVENOUS FISTULA/SHUNT SURGERY Left 12/13/2016    Procedure: LEFT ILEO-FEMORAL GORTEX GRAFT AND LEFT LEG ARERIOGRAM ;  Surgeon: Errol Michael MD;  Location: [facility] OR 18/19;  Service:    • CAROTID ENDARTERECTOMY Right 09/29/2020    Procedure: CAROTID ENDARTERECTOMY;  Surgeon: James Graham MD;  Location: [facility];  Service: Vascular;  Laterality: Right;   • CHOLECYSTECTOMY     • EPIDURAL BLOCK     • EYE SURGERY Left     as child   • FRACTURE SURGERY Left     arm   • ILIAC ARTERY STENT Left 2020   • KNEE SURGERY Left     ORIF   • ORIF ANKLE FRACTURE Left      PT Assessment (last 12 hours)     PT Evaluation and Treatment     Row Name 03/01/23 1524 03/01/23 1522       Physical Therapy Time and Intention    Subjective Information no complaints  -LH no complaints  -LH    Document Type evaluation  -LH --    Mode of Treatment co-treatment;physical therapy;occupational therapy  -LH --    Patient Effort -- good  -LH    Comment -- co tx appropriate due to complexity of pt  -LH    Row Name 03/01/23 1524 03/01/23 1522       General Information    Patient Profile Reviewed  yes  - --    General Observations of Patient -- pt supine in bed no acute distress, spouse bedside  -    Prior Level of Function independent:  - --    Equipment Currently Used at Home -- cane, straight;walker, rolling  -    Pertinent History of Current Functional Problem -- COVID +  -    Existing Precautions/Restrictions fall  - --    Benefits Reviewed patient and family:  - patient:  -    Barriers to Rehab cognitive status;medically complex  - --    [facility] Name 03/01/23 1522          Living Environment    Current Living Arrangements home  -[facility] Name 03/01/23 1524          Pain    Pretreatment Pain Rating 0/10 - no pain  -     Posttreatment Pain Rating 0/10 - no pain  -[facility] Name 03/01/23 1522          Pain Scale: FACES Pre/Post-Treatment    Pain: FACES Scale, Pretreatment 0-->no hurt  -[facility] Name 03/01/23 1524 03/01/23 1522       Cognition    Affect/Mental Status (Cognition) -- confused  -    Orientation Status (Cognition) oriented to;person  - --    [facility] Name 03/01/23 1522          Range of Motion Comprehensive    Comment, General Range of Motion LLE lacking full knee extension, noted scab on knee pt spouse reports recent fall  -[facility] Name 03/01/23 1522          Strength Comprehensive (MMT)    Comment, General Manual Muscle Testing (MMT) Assessment BLEs grossly at least 3/5  -[facility] Name 03/01/23 1524          Bed Mobility    Supine-Sit [place] (Bed Mobility) moderate assist (50% patient effort);2 person assist;verbal cues;nonverbal cues (demo/gesture)  -     Sit-Supine [place] (Bed Mobility) maximum assist (25% patient effort);2 person assist;verbal cues;nonverbal cues (demo/gesture)  -     Assistive Device (Bed Mobility) bed rails;head of bed elevated  -[facility] Name 03/01/23 1524          Transfers    Transfers stand-sit transfer;sit-stand transfer  -[facility] Name 03/01/23 1524          Sit-Stand Transfer    Sit-Stand [place] (Transfers)  moderate assist (50% patient effort);minimum assist (75% patient effort);2 person assist;set up;verbal cues;nonverbal cues (demo/gesture)  -     Assistive Device (Sit-Stand Transfers) walker, front-wheeled  -     Row Name 03/01/23 1524          Stand-Sit Transfer    Stand-Sit [place] (Transfers) 2 person assist;set up;verbal cues;nonverbal cues (demo/gesture)  -     Assistive Device (Stand-Sit Transfers) walker, front-wheeled  -     Row Name 03/01/23 1524          Gait/Stairs (Locomotion)    [place] Level (Gait) minimum assist (75% patient effort);2 person assist;set up;verbal cues;nonverbal cues (demo/gesture);moderate assist (50% patient effort)  -     Assistive Device (Gait) walker, front-wheeled  -     Distance in Feet (Gait) 3 side steps to HOB  -     Pattern (Gait) step-to  -LH     Deviations/Abnormal Patterns (Gait) josep decreased  -     Bilateral Gait Deviations forward flexed posture;heel strike decreased  -[facility] Name 03/01/23 1524          Safety Issues, Functional Mobility    Safety Issues Affecting Function (Mobility) ability to follow commands;problem-solving;positioning of assistive device;sequencing abilities  -     Impairments Affecting Function (Mobility) balance;cognition;endurance/activity tolerance;coordination;motor control;strength;range of motion (ROM);postural/trunk control  -     Row Name 03/01/23 1524          Balance    Balance Assessment sitting static balance;standing static balance  -     Static Sitting Balance minimal assist;contact guard  -     Position, Sitting Balance supported;sitting edge of bed  -     Static Standing Balance minimal assist;moderate assist;2-person assist;set-up  -     Position/Device Used, Standing Balance supported;walker, front-wheeled  -     Row Name 03/01/23 1524          Motor Skills    Therapeutic Exercise --  APs, LAQs x 5  -[facility] Name 03/01/23 1524          Plan of Care Review    Plan of Care Reviewed  With patient;spouse  -     Outcome Evaluation Pt admitted to [facility] 2/2 to difficulty walking, AMS and aphasia. MRI pending, neuro states likely a left MCA or left thalamic stroke. Also + COVID 19. Pmhx includes hypertension, diabetes, hyperlipidemia, mild cognitive impairment. Multiple falls reported at home. On PT exam pt required Mod A x 2 rwx standing, a couple shuffled steps to HOB, gross weakness. Pt may benefit from skilled PT acutely to address functional deficits and assist w DC Planning, anticipate SNU.  -     Row Name 03/01/23 1524          Positioning and Restraints    Pre-Treatment Position in bed  -     Post Treatment Position bed  -     In Bed supine;call light within reach;encouraged to call for assist;exit alarm on;with family/caregiver;notified nsg  -     Row Name 03/01/23 1524          Therapy Assessment/Plan (PT)    Rehab Potential (PT) fair, will monitor progress closely  -     Criteria for Skilled Interventions Met (PT) yes  -     Therapy Frequency (PT) 3 times/wk  -     Row Name 03/01/23 1524          PT Evaluation Complexity    History, PT Evaluation Complexity 3 or more personal factors and/or comorbidities  -     Examination of Body Systems (PT Eval Complexity) total of 3 or more elements  -     Clinical Presentation (PT Evaluation Complexity) evolving  -     Clinical Decision Making (PT Evaluation Complexity) moderate complexity  -     Overall Complexity (PT Evaluation Complexity) moderate complexity  -     Row Name 03/01/23 1524          Physical Therapy Goals    Bed Mobility Goal Selection (PT) bed mobility, PT goal 1  -     Transfer Goal Selection (PT) transfer, PT goal 1  -     Gait Training Goal Selection (PT) gait training, PT goal 1  -     Row Name 03/01/23 1524          Bed Mobility Goal 1 (PT)    Activity/Assistive Device (Bed Mobility Goal 1, PT) bed mobility activities, all  -     [place] Level/Cues Needed (Bed Mobility Goal 1, PT) contact  guard required  -     Time Frame (Bed Mobility Goal 1, PT) 2 weeks  -     Row Name 03/01/23 1524          Transfer Goal 1 (PT)    Activity/Assistive Device (Transfer Goal 1, PT) sit-to-stand/stand-to-sit;bed-to-chair/chair-to-bed;walker, rolling  -LH     [place] Level/Cues Needed (Transfer Goal 1, PT) contact guard required  -     Time Frame (Transfer Goal 1, PT) 2 weeks  -     Row Name 03/01/23 1524          Gait Training Goal 1 (PT)    Activity/Assistive Device (Gait Training Goal 1, PT) assistive device use;walker, rolling  -LH     [place] Level (Gait Training Goal 1, PT) contact guard required  -     Distance (Gait Training Goal 1, PT) 80  -     Time Frame (Gait Training Goal 1, PT) 2 weeks  -           User Key  (r) = Recorded By, (t) = Taken By, (c) = Cosigned By    Initials Name Provider Type     Jyoti Perez, PT Physical Therapist                Physical Therapy Education     Title: PT OT SLP Therapies (In Progress)     Topic: Physical Therapy (In Progress)     Point: Mobility training (In Progress)     Learning Progress Summary           Patient Acceptance, E, NR by  at 3/1/2023 1531                   Point: Home exercise program (In Progress)     Learning Progress Summary           Patient Acceptance, E, NR by  at 3/1/2023 1531                   Point: Body mechanics (In Progress)     Learning Progress Summary           Patient Acceptance, E, NR by  at 3/1/2023 1531                   Point: Precautions (In Progress)     Learning Progress Summary           Patient Acceptance, E, NR by  at 3/1/2023 1531                               User Key     Initials Effective Dates Name Provider Type [facility] 06/16/21 -  Jyoti Perez, PT Physical Therapist PT              PT Recommendation and Plan  Anticipated Discharge Disposition (PT): skilled nursing facility  Planned Therapy Interventions (PT): balance training, bed mobility training, gait training, home exercise program,  ROM (range of motion), stair training, strengthening, stretching, transfer training  Therapy Frequency (PT): 3 times/wk  Plan of Care Reviewed With: patient, spouse  Outcome Evaluation: Pt admitted to [facility] 2/2 to difficulty walking, AMS and aphasia. MRI pending, neuro states likely a left MCA or left thalamic stroke. Also + COVID 19. Pmhx includes hypertension, diabetes, hyperlipidemia, mild cognitive impairment. Multiple falls reported at home. On PT exam pt required Mod A x 2 rwx standing, a couple shuffled steps to HOB, gross weakness. Pt may benefit from skilled PT acutely to address functional deficits and assist w DC Planning, anticipate SNU.   Outcome Measures     Row Name 03/01/23 1532             How much help from another person do you currently need...    Turning from your back to your side while in flat bed without using bedrails? 2  -LH      Moving from lying on back to sitting on the side of a flat bed without bedrails? 2  -LH      Moving to and from a bed to a chair (including a wheelchair)? 2  -LH      Standing up from a chair using your arms (e.g., wheelchair, bedside chair)? 2  -LH      Climbing 3-5 steps with a railing? 1  -LH      To walk in hospital room? 1  -      AM-PAC 6 Clicks Score (PT) 10  -         Functional Assessment    Outcome Measure Options AM-PAC 6 Clicks Basic Mobility (PT)  -            User Key  (r) = Recorded By, (t) = Taken By, (c) = Cosigned By    Initials Name Provider Type     Jyoti Perez, PT Physical Therapist                 Time Calculation:    PT Charges     Row Name 03/01/23 1532             Time Calculation    Start Time 1435  -      Stop Time 1502  -      Time Calculation (min) 27 min  -      PT Received On 03/01/23  -      PT - Next Appointment 03/03/23  -      PT Goal Re-Cert Due Date 03/15/23  -         Time Calculation- PT    Total Timed Code Minutes- PT 20 minute(s)  -            User Key  (r) = Recorded By, (t) = Taken By, (c) = Cosigned  By    Initials Name Provider Type     Jyoti Perez, PT Physical Therapist              Therapy Charges for Today     Code Description Service Date Service Provider Modifiers Qty    60640506167 HC PT EVAL MOD COMPLEXITY 3 3/1/2023 Jyoti Perez, PT GP 1    19699157090 HC PT THER PROC EA 15 MIN 3/1/2023 Jyoti Perez, PT GP 1    34303740338 HC PT THER SUPP EA 15 MIN 3/1/2023 Jyoti Perez, PT GP 1          PT G-Codes  Outcome Measure Options: AM-PAC 6 Clicks Basic Mobility (PT)  AM-PAC 6 Clicks Score (PT): 10  AM-PAC 6 Clicks Score (OT): 12  Modified [place] Scale: 4 - Moderately severe disability.  Unable to walk without assistance, and unable to attend to own bodily needs without assistance.    Jyoti Perez, PT  3/1/2023

## 2023-03-01 NOTE — ED NOTES
"Nursing report ED to floor  Albert Yadav  82 y.o.  male    HPI :   Chief Complaint   Patient presents with    Weakness - Generalized       Admitting doctor:   Lai Albarran MD    Admitting diagnosis:   The primary encounter diagnosis was COVID-19. A diagnosis of Stroke-like symptoms was also pertinent to this visit.    Code status:   Current Code Status       Date Active Code Status Order ID Comments User Context       2/28/2023 2342 CPR (Attempt to Resuscitate) [id number]  Chica Hook APRN ED        Question Answer    Code Status (Patient has no pulse and is not breathing) CPR (Attempt to Resuscitate)    Medical Interventions (Patient has pulse or is breathing) Full Support                    Allergies:   Patient has no known allergies.    Isolation:   Enhanced Droplet/Contact     Intake and Output    Intake/Output Summary (Last 24 hours) at 2/28/2023 2345  Last data filed at 2/28/2023 2118  Gross per 24 hour   Intake --   Output 200 ml   Net -200 ml       Weight:       02/28/23 2046   Weight: 72.6 kg (160 lb)       Most recent vitals:   Vitals:    02/28/23 2025 02/28/23 2046   BP: 151/80    BP Location: Right arm    Patient Position: Sitting    Pulse: 95    Resp: 16    Temp: 99.6 °F (37.6 °C)    TempSrc: Tympanic    SpO2: 97%    Weight:  72.6 kg (160 lb)   Height: 182.9 cm (72\")        Active LDAs/IV Access:   Lines, Drains & Airways       Active LDAs       Name Placement date Placement time Site Days    Peripheral IV 02/28/23 2045 Right Antecubital 02/28/23 2045  Antecubital  less than 1                    Labs (abnormal labs have a star):   Labs Reviewed   RESPIRATORY PANEL PCR W/ COVID-19 (SARS-COV-2) [facility] IN-HOUSE, NP SWAB IN UTM/VTP, 3-4 HR TAT - Abnormal; Notable for the following components:       Result Value    COVID19 Detected (*)     All other components within normal limits    Narrative:     In the setting of a positive respiratory panel with a viral " infection PLUS a negative procalcitonin without other underlying concern for bacterial infection, consider observing off antibiotics or discontinuation of antibiotics and continue supportive care. If the respiratory panel is positive for atypical bacterial infection (Bordetella pertussis, Chlamydophila pneumoniae, or Mycoplasma pneumoniae), consider antibiotic de-escalation to target atypical bacterial infection.   COMPREHENSIVE METABOLIC PANEL - Abnormal; Notable for the following components:    Glucose 122 (*)     Creatinine 1.36 (*)     Sodium 134 (*)     Calcium 8.4 (*)     Albumin 3.4 (*)     Alkaline Phosphatase 122 (*)     eGFR 51.3 (*)     All other components within normal limits    Narrative:     GFR Normal >60  Chronic Kidney Disease <60  Kidney Failure <15    The GFR formula is only valid for adults with stable renal function between ages 18 and 70.   SINGLE HSTROPONIN T - Abnormal; Notable for the following components:    HS Troponin T 31 (*)     All other components within normal limits    Narrative:     High Sensitive Troponin T Reference Range:  <10.0 ng/L- Negative Female for AMI  <15.0 ng/L- Negative Male for AMI  >=10 - Abnormal Female indicating possible myocardial injury.  >=15 - Abnormal Male indicating possible myocardial injury.   Clinicians would have to utilize clinical acumen, EKG, Troponin, and serial changes to determine if it is an Acute Myocardial Infarction or myocardial injury due to an underlying chronic condition.        TROPONIN - Abnormal; Notable for the following components:    HS Troponin T 31 (*)     All other components within normal limits    Narrative:     High Sensitive Troponin T Reference Range:  <10.0 ng/L- Negative Female for AMI  <15.0 ng/L- Negative Male for AMI  >=10 - Abnormal Female indicating possible myocardial injury.  >=15 - Abnormal Male indicating possible myocardial injury.   Clinicians would have to utilize clinical acumen, EKG, Troponin, and serial  changes to determine if it is an Acute Myocardial Infarction or myocardial injury due to an underlying chronic condition.        CBC WITH AUTO DIFFERENTIAL - Abnormal; Notable for the following components:    RBC 3.96 (*)     MCV 99.0 (*)     Neutrophil % 77.3 (*)     Lymphocyte % 4.6 (*)     Monocyte % 16.9 (*)     Eosinophil % 0.0 (*)     Immature Grans % 0.9 (*)     Lymphocytes, Absolute 0.35 (*)     Monocytes, Absolute 1.30 (*)     Immature Grans, Absolute 0.07 (*)     All other components within normal limits   URINALYSIS W/ MICROSCOPIC IF INDICATED (NO CULTURE) - Normal    Narrative:     Urine microscopic not indicated.   BNP (IN-HOUSE) - Normal    Narrative:     Among patients with dyspnea, NT-proBNP is highly sensitive for the detection of acute congestive heart failure. In addition NT-proBNP of <300 pg/ml effectively rules out acute congestive heart failure with 99% negative predictive value.    Results may be falsely decreased if patient taking Biotin.     HIGH SENSITIVITIY TROPONIN T 2HR   COMPREHENSIVE METABOLIC PANEL   C-REACTIVE PROTEIN   SEDIMENTATION RATE   FERRITIN   CBC WITH AUTO DIFFERENTIAL   CBC AND DIFFERENTIAL    Narrative:     The following orders were created for panel order CBC & Differential.  Procedure                               Abnormality         Status                     ---------                               -----------         ------                     CBC Auto Differential[[id number]]        Abnormal            Final result                 Please view results for these tests on the individual orders.   CBC AND DIFFERENTIAL    Narrative:     The following orders were created for panel order CBC & Differential.  Procedure                               Abnormality         Status                     ---------                               -----------         ------                     CBC Auto Differential[[id number]]                                                         Please  view results for these tests on the individual orders.       EKG:   ECG 12 Lead Altered Mental Status   Preliminary Result   HEART RATE= 91  bpm   RR Interval= 659  ms   MN Interval= 198  ms   P Horizontal Axis= -41  deg   P Front Axis= 24  deg   QRSD Interval= 109  ms   QT Interval= 359  ms   QRS Axis= 4  deg   T Wave Axis= 71  deg   - NORMAL ECG -   Sinus rhythm   Electronically Signed By:    Date and Time of Study: 2023-02-28 21:24:58          Meds given in ED:   Medications   sodium chloride 0.9 % flush 10 mL (has no administration in time range)   Enoxaparin Sodium (LOVENOX) syringe 40 mg (has no administration in time range)   dextromethorphan polistirex ER (DELSYM) 30 MG/5ML oral suspension 60 mg (has no administration in time range)   benzonatate (TESSALON) capsule 100 mg (has no administration in time range)   albuterol sulfate HFA (PROVENTIL HFA;VENTOLIN HFA;PROAIR HFA) inhaler 2 puff (has no administration in time range)   ipratropium-albuterol (DUO-NEB) nebulizer solution 3 mL (has no administration in time range)   acetaminophen (TYLENOL) tablet 650 mg (has no administration in time range)     Or   acetaminophen (TYLENOL) suppository 650 mg (has no administration in time range)   benzonatate (TESSALON) capsule 100 mg (100 mg Oral Given 2/28/23 2322)       Imaging results:  CT Head Without Contrast    Result Date: 2/28/2023  Moderate chronic small vessel ischemic white matter change and atrophy. No interval change or evidence for acute intracranial abnormality.  This report was finalized on 2/28/2023 10:51 PM by Dr. Leonard Harris M.D.      XR Chest 1 View    Result Date: 2/28/2023  Cardiomegaly. Mild predominantly linear basilar opacity consistent with atelectasis though it be difficult to exclude a mild basilar infiltrate. No evidence for pulmonary edema  This report was finalized on 2/28/2023 9:51 PM by Dr. Leonard Harris M.D.       Ambulatory status:   - assist x1-2    Social issues:   Social  History     Socioeconomic History    Marital status:    Tobacco Use    Smoking status: Former     Packs/day: 1.00     Years: 20.00     Pack years: 20.00     Types: Cigarettes     Start date: 1968     Quit date: 1989     Years since quittin.7    Smokeless tobacco: Never    Tobacco comments:     Did'nt inhale   Vaping Use    Vaping Use: Never used   Substance and Sexual Activity    Alcohol use: Not Currently     Alcohol/week: 1.0 standard drink     Types: 1 Shots of liquor per week     Comment: 1 drink per day at most    Drug use: Never    Sexual activity: Not Currently     Partners: Female     Birth control/protection: Condom       NIH Stroke Scale:  Interval: baseline      Iza Fallon RN  23 23:45 EST

## 2023-03-01 NOTE — PLAN OF CARE
The pt was admitted to [facility] 2/2 to difficulty walking, AMS and aphasia. MRI pending, neuro states likely a left MCA or left thalamic stroke. Also + COVID 19. Pmhx includes hypertension, diabetes, hyperlipidemia, mild cognitive impairment. Multiple falls reported at home. Today, the pt completed bed mobility with Mod A x2. Posterior lean and BUE tremoring present. Min-Mod A x2 to STS and take small shuffled steps along the EOB. He was able to take his pills with Min A due to mild coordination deficits. Max A for LBD - strong posterior lean with BUE's involved in a LB task. He was oriented x1-2 with cues. Anticipate a d/c to Acute rehab.     Patient was was not wearing a face mask during this therapy encounter. Therapist used appropriate personal protective equipment including mask and gloves. Mask used was N95/duckbill. Appropriate PPE was worn during the entire therapy session. Hand hygiene was completed before and after therapy session. Patient is in enhanced droplet precautions.

## 2023-03-01 NOTE — ED PROVIDER NOTES
EMERGENCY DEPARTMENT ENCOUNTER    Room Number:  37/37  Date seen:  2/28/2023  PCP: Valerie Burnham MD      HPI:  Chief Complaint: Confusion  A complete HPI/ROS/PMH/PSH/SH/FH are unobtainable due to: Confusion  Context: Albert Yadav is a 82 y.o. male who presents to the ED c/o confusion.  Patient was normal last night.  He woke up with word finding difficulty, confusion and weakness particular in his right leg that caused him unable to walk.  Symptoms seem to be progressive throughout the day according to family at bedside.  He has had an associated cough on review of systems.          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, left leg (Formerly [facility]) 12/13/2016   • Spinal stenosis, lumbar region, with neurogenic claudication 11/17/2017   • Carotid stenosis, asymptomatic, bilateral 09/29/2020   • Spondylolisthesis at L4-L5 level 01/20/2021   • Lumbar stenosis 10/19/2021   • Closed fracture of multiple ribs of right side, initial encounter 07/19/2022   • 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   • Cholelithiasis 1978   • Diabetes mellitus (Formerly [facility]) 2005   • Falls    • Fracture of ankle 1972   • Fracture  of wrist 1947   • Fracture, clavicle 26 years ago   • Fracture, foot 26 year ago   • Groin rash    • History of transfusion    • Hyperlipidemia    • Hypertension    • Low back pain    • Low back strain ?   • Lumbar spinal stenosis    • Numbness and tingling    • Peripheral neuropathy    • Peripheral vascular disease (HCC)    • Retroperitoneal mass 11/2022   • Staph infection    • Tendency toward bleeding easily (HCC)          PAST SURGICAL HISTORY  Past Surgical History:   Procedure Laterality Date   • ADRENALECTOMY Left 12/20/2022    Procedure: LAPAROSCOPIC EXCISION LEFT RETROPERITONEAL MASS;  Surgeon: Moy Vargas MD;  Location: [facility];  Service: General;  Laterality: Left;   • ANGIOPLASTY FEMORAL ARTERY Left 11/07/2016    Procedure: ULTRA SOUND ACCESS RIGHT FEMORAL ARTERY, AIF BILATERAL RUNOFF, SELECTIVE CATHETERIZATION LEFT FEMORAL ARTERY.;  Surgeon: Errol Michael MD;  Location: [facility] OR 18/19;  Service:    • ANKLE OPEN REDUCTION INTERNAL FIXATION  1980   • ARTERIOVENOUS FISTULA/SHUNT SURGERY Left 12/13/2016    Procedure: LEFT ILEO-FEMORAL GORTEX GRAFT AND LEFT LEG ARERIOGRAM ;  Surgeon: Errol Michael MD;  Location: [facility] OR 18/19;  Service:    • CAROTID ENDARTERECTOMY Right 09/29/2020    Procedure: CAROTID ENDARTERECTOMY;  Surgeon: James Graham MD;  Location: [facility] OR;  Service: Vascular;  Laterality: Right;   • CHOLECYSTECTOMY     • EPIDURAL BLOCK     • EYE SURGERY Left     as child   • FRACTURE SURGERY Left     arm   • ILIAC ARTERY STENT Left 2020   • KNEE SURGERY Left     ORIF   • ORIF ANKLE FRACTURE Left          FAMILY HISTORY  Family History   Problem Relation Age of Onset   • Diabetes Mother    • Cancer Mother         Cancer liver   • Cancer Father         Diabetic   • Diabetes Father    • Heart disease Maternal Grandmother    • Heart disease Maternal Grandfather    • Heart disease Paternal Grandmother    • Heart disease Paternal Grandfather    •  Alcohol abuse Son         Alcoholic   • Cancer Brother    • Malig Hyperthermia Neg Hx          SOCIAL HISTORY  Social History     Socioeconomic History   • Marital status:    Tobacco Use   • Smoking status: Former     Packs/day: 1.00     Years: 20.00     Pack years: 20.00     Types: Cigarettes     Start date: 1968     Quit date: 1989     Years since quittin.7   • Smokeless tobacco: Never   • Tobacco comments:     Did'nt inhale   Vaping Use   • Vaping Use: Never used   Substance and Sexual Activity   • Alcohol use: Not Currently     Alcohol/week: 1.0 standard drink     Types: 1 Shots of liquor per week     Comment: 1 drink per day at most   • Drug use: Never   • Sexual activity: Not Currently     Partners: Female     Birth control/protection: Condom         ALLERGIES  Patient has no known allergies.        REVIEW OF SYSTEMS  Review of Systems     All systems reviewed and negative except for those discussed in HPI.       PHYSICAL EXAM  ED Triage Vitals [23]   Temp Heart Rate Resp BP SpO2   99.6 °F (37.6 °C) 95 16 151/80 97 %      Temp src Heart Rate Source Patient Position BP Location FiO2 (%)   Tympanic Monitor Sitting Right arm --       Physical Exam      GENERAL: no acute distress  HENT: nares patent  EYES: no scleral icterus  CV: regular rhythm, normal rate  RESPIRATORY: normal effort, clear to auscultation bilaterally  ABDOMEN: soft, nontender  MUSCULOSKELETAL: no deformity  NEURO:   NIH Stroke Scale      Interval: Baseline  Time: 23:32 EST  Person Administering Scale: Errol Encinas II, MD    Administer stroke scale items in the order listed. Record performance in each category after each subscale exam. Do not go back and change scores. Follow directions provided for each exam technique. Scores should reflect what the patient does, not what the clinician thinks the patient can do. The clinician should record answers while administering the exam and work quickly. Except where  indicated, the patient should not be coached (i.e., repeated requests to patient to make a special effort).      1a  Level of consciousness: 0=alert; keenly responsive   1b. LOC questions:  0=Performs both tasks correctly   1c. LOC commands: 0=Answers both questions correctly   2.  Best Gaze: 0=normal   3.  Visual: 0=No visual loss   4. Facial Palsy: 0=Normal symmetric movement   5a.  Motor left arm: 0=No drift, limb holds 90 (or 45) degrees for full 10 seconds   5b.  Motor right arm: 1=Drift, limb holds 90 (or 45) degrees but drifts down before full 10 seconds: does not hit bed   6a. motor left le=No drift, limb holds 90 (or 45) degrees for full 10 seconds   6b  Motor right le=Drift, limb holds 90 (or 45) degrees but drifts down before full 10 seconds: does not hit bed   7. Limb Ataxia: 1=Present in one limb   8.  Sensory: 1=Mild to moderate sensory loss; patient feels pinprick is less sharp or is dull on the affected side; there is a loss of superficial pain with pinprick but patient is aware He is being touched   9. Best Language:  1=Mild to moderate aphasia; some obvious loss of fluency or facility of comprehension without significant limitation on ideas expressed or form of expression.   10. Dysarthria: 0=Normal   11. Extinction and Inattention: 1=Visual, tactile, auditory, spatial or personal inattention or extinction to bilateral simultaneous stimulation in one of the sensory modalities   12. Distal motor function: 0=Normal    Total:   6     PSYCH:  calm, cooperative  SKIN: warm, dry    Vital signs and nursing notes reviewed.          LAB RESULTS  Recent Results (from the past 24 hour(s))   Respiratory Panel PCR w/COVID-19(SARS-CoV-2) ESME/GERLAD/KRISTOFER/PAD/COR/MAD/NUHA In-House, NP Swab in UTM/VTM, 3-4 HR TAT - Swab, Nasopharynx    Collection Time: 23  9:18 PM    Specimen: Nasopharynx; Swab   Result Value Ref Range    ADENOVIRUS, PCR Not Detected Not Detected    Coronavirus 229E Not Detected Not  Detected    Coronavirus HKU1 Not Detected Not Detected    Coronavirus NL63 Not Detected Not Detected    Coronavirus OC43 Not Detected Not Detected    COVID19 Detected (C) Not Detected - Ref. Range    Human Metapneumovirus Not Detected Not Detected    Human Rhinovirus/Enterovirus Not Detected Not Detected    Influenza A PCR Not Detected Not Detected    Influenza B PCR Not Detected Not Detected    Parainfluenza Virus 1 Not Detected Not Detected    Parainfluenza Virus 2 Not Detected Not Detected    Parainfluenza Virus 3 Not Detected Not Detected    Parainfluenza Virus 4 Not Detected Not Detected    RSV, PCR Not Detected Not Detected    Bordetella pertussis pcr Not Detected Not Detected    Bordetella parapertussis PCR Not Detected Not Detected    Chlamydophila pneumoniae PCR Not Detected Not Detected    Mycoplasma pneumo by PCR Not Detected Not Detected   Comprehensive Metabolic Panel    Collection Time: 02/28/23  9:19 PM    Specimen: Arm, Right; Blood   Result Value Ref Range    Glucose 122 (H) 65 - 99 mg/dL    BUN 19 8 - 23 mg/dL    Creatinine 1.36 (H) 0.76 - 1.27 mg/dL    Sodium 134 (L) 136 - 145 mmol/L    Potassium 4.0 3.5 - 5.2 mmol/L    Chloride 102 98 - 107 mmol/L    CO2 23.0 22.0 - 29.0 mmol/L    Calcium 8.4 (L) 8.6 - 10.5 mg/dL    Total Protein 6.0 6.0 - 8.5 g/dL    Albumin 3.4 (L) 3.5 - 5.2 g/dL    ALT (SGPT) 21 1 - 41 U/L    AST (SGOT) 16 1 - 40 U/L    Alkaline Phosphatase 122 (H) 39 - 117 U/L    Total Bilirubin 0.4 0.0 - 1.2 mg/dL    Globulin 2.6 gm/dL    A/G Ratio 1.3 g/dL    BUN/Creatinine Ratio 14.0 7.0 - 25.0    Anion Gap 9.0 5.0 - 15.0 mmol/L    eGFR 51.3 (L) >60.0 mL/min/1.73   Urinalysis With Microscopic If Indicated (No Culture) - Straight Cath    Collection Time: 02/28/23  9:19 PM    Specimen: Straight Cath; Urine   Result Value Ref Range    Color, UA Yellow Yellow, Straw    Appearance, UA Clear Clear    pH, UA <=5.0 5.0 - 8.0    Specific Gravity, UA 1.020 1.005 - 1.030    Glucose, UA Negative  Negative    Ketones, UA Negative Negative    Bilirubin, UA Negative Negative    Blood, UA Negative Negative    Protein, UA Negative Negative    Leuk Esterase, UA Negative Negative    Nitrite, UA Negative Negative    Urobilinogen, UA 0.2 E.U./dL 0.2 - 1.0 E.U./dL   Single High Sensitivity Troponin T    Collection Time: 02/28/23  9:19 PM    Specimen: Arm, Right; Blood   Result Value Ref Range    HS Troponin T 31 (H) <15 ng/L   High Sensitivity Troponin T    Collection Time: 02/28/23  9:19 PM    Specimen: Arm, Right; Blood   Result Value Ref Range    HS Troponin T 31 (H) <15 ng/L   BNP    Collection Time: 02/28/23  9:19 PM    Specimen: Arm, Right; Blood   Result Value Ref Range    proBNP 315.0 0.0 - 1,800.0 pg/mL   CBC Auto Differential    Collection Time: 02/28/23  9:19 PM    Specimen: Arm, Right; Blood   Result Value Ref Range    WBC 7.67 3.40 - 10.80 10*3/mm3    RBC 3.96 (L) 4.14 - 5.80 10*6/mm3    Hemoglobin 13.0 13.0 - 17.7 g/dL    Hematocrit 39.2 37.5 - 51.0 %    MCV 99.0 (H) 79.0 - 97.0 fL    MCH 32.8 26.6 - 33.0 pg    MCHC 33.2 31.5 - 35.7 g/dL    RDW 13.2 12.3 - 15.4 %    RDW-SD 46.7 37.0 - 54.0 fl    MPV 10.4 6.0 - 12.0 fL    Platelets 181 140 - 450 10*3/mm3    Neutrophil % 77.3 (H) 42.7 - 76.0 %    Lymphocyte % 4.6 (L) 19.6 - 45.3 %    Monocyte % 16.9 (H) 5.0 - 12.0 %    Eosinophil % 0.0 (L) 0.3 - 6.2 %    Basophil % 0.3 0.0 - 1.5 %    Immature Grans % 0.9 (H) 0.0 - 0.5 %    Neutrophils, Absolute 5.93 1.70 - 7.00 10*3/mm3    Lymphocytes, Absolute 0.35 (L) 0.70 - 3.10 10*3/mm3    Monocytes, Absolute 1.30 (H) 0.10 - 0.90 10*3/mm3    Eosinophils, Absolute 0.00 0.00 - 0.40 10*3/mm3    Basophils, Absolute 0.02 0.00 - 0.20 10*3/mm3    Immature Grans, Absolute 0.07 (H) 0.00 - 0.05 10*3/mm3    nRBC 0.0 0.0 - 0.2 /100 WBC   ECG 12 Lead Altered Mental Status    Collection Time: 02/28/23  9:24 PM   Result Value Ref Range    QT Interval 359 ms       Ordered the above labs and reviewed the results.        RADIOLOGY  CT  Head Without Contrast    Result Date: 2/28/2023  CT HEAD WITHOUT CONTRAST  HISTORY: Altered mental status with right-sided weakness  TECHNIQUE:  Head CT includes axial imaging from the base of skull to the vertex without intravenous contrast. Radiation dose reduction techniques were utilized, including automated exposure control and exposure modulation based on body size.  COMPARISON:CT head without contrast 12/07/2022, 07/19/2020  FINDINGS: There is mild-to-moderate chronic small vessel ischemic white matter change and there is ventricular enlargement associated with central atrophy. Just below the right lentiform nucleus there is a CSF density area measuring 1 cm consistent with old infarction or prominent perivascular space. There is no evidence for cerebral edema or mass effect or shift of the midline structures. No extra-axial fluid collection is observed. There is no evidence for significant change when compared to previous imaging. There is severe right maxillary sinus mucoperiosteal thickening.      Moderate chronic small vessel ischemic white matter change and atrophy. No interval change or evidence for acute intracranial abnormality.  This report was finalized on 2/28/2023 10:51 PM by Dr. Leonard Harris M.D.      XR Chest 1 View    Result Date: 2/28/2023  CHEST SINGLE VIEW  HISTORY: Cough, weakness  COMPARISON: CT chest 12/07/2022, AP chest 07/20/2022.  FINDINGS: Heart size is enlarged. There is mild subsegmental linear basilar opacities that has the appearance of basilar atelectasis though it would be difficult exclude mild basilar infiltrate. The mid to upper lungs are clear. There is no pleural effusion or pneumothorax. Bones appear osteopenic. Cardiac monitor and leads are present.      Cardiomegaly. Mild predominantly linear basilar opacity consistent with atelectasis though it be difficult to exclude a mild basilar infiltrate. No evidence for pulmonary edema  This report was finalized on 2/28/2023  9:51 PM by Dr. Leonard Harris M.D.        Ordered the above noted radiological studies. Reviewed by me in PACS.          PROCEDURES  Procedures          MEDICATIONS GIVEN IN ER  Medications   sodium chloride 0.9 % flush 10 mL (has no administration in time range)   benzonatate (TESSALON) capsule 100 mg (100 mg Oral Given 2/28/23 2322)         MEDICAL DECISION MAKING, PROGRESS, and CONSULTS    Discussion below represents my analysis of pertinent findings related to patient's condition, differential diagnosis, treatment plan and final disposition.      Orders placed during this visit:  Orders Placed This Encounter   Procedures   • Respiratory Panel PCR w/COVID-19(SARS-CoV-2) ESME/GERALD/KRISTOFER/PAD/COR/MAD/NUHA In-House, NP Swab in UTM/VTM, 3-4 HR TAT - Swab, Nasopharynx   • XR Chest 1 View   • CT Head Without Contrast   • Comprehensive Metabolic Panel   • Urinalysis With Microscopic If Indicated (No Culture) - Urine, Clean Catch   • Single High Sensitivity Troponin T   • High Sensitivity Troponin T   • BNP   • CBC Auto Differential   • High Sensitivity Troponin T 2Hr   • Monitor Blood Pressure   • Cardiac Monitoring   • Pulse Oximetry, Continuous   • Inpatient Neurology Consult Stroke   • LHA (on-call MD unless specified) Details   • ECG 12 Lead Altered Mental Status   • Insert Peripheral IV   • Initiate Observation Status   • CBC & Differential         Additional sources:  - Discussed/obtained information from independent historians: Family at bedside.  They help confirm what is different than the patient's baseline self.  Additional information was obtained to confirm the patient's history.      Differential diagnosis:    Differential diagnosis for altered mental status includes:  - vital sign abnormalities such as HTN encephalopathy, hypotension, hypoxemia, hypercarbia, heat stroke  - toxic/metabolic pathology such as hypoglycemia, DKA, hypo/hyper-natremia, thyroid storm, myxedema coma, medication side effect (either  intentional or accidental)  - infectious etiology  - intracranial pathology such as stroke, seizure, intracranial mass, intracranial hemorrhage  - psychiatric pathology    After initial evaluation, I am suspicious that he has had a stroke.  I believe that he will need admission.    He is not a candidate for tenecteplase as onset of symptoms was greater than 4.5 hours ago.          Independent interpretation of labs, radiology studies, and discussions with consultants:  ED Course as of 02/28/23 2332   Tue Feb 28, 2023 2126 EKG independently interpreted by myself.  Time 9:24 PM.  Sinus rhythm.  Heart rate 91.  Normal intervals and axis.  No acute ST abnormality. [TD]   2227 COVID19(!!): Detected [TD]      ED Course User Index  [TD] Errol Encinas II, MD       I discussed the case with BETO Noel for hospitalist medicine.  We reviewed the patient's labs, history, imaging.  It is possible the patient could have a stroke that is underlying cause of symptoms for which will be admitted for further stroke work-up.  However, it is also very possible that the COVID-19 infection is causing him to have mental status changes and weakness although based on my exam he does seem to have lateralizing symptoms, albeit subtle.  His word finding difficulty is more pronounced.           PPE: The patient wore a mask throughout the entire encounter. I wore a well-fitting mask.    DIAGNOSIS  Final diagnoses:   COVID-19   Stroke-like symptoms         DISPOSITION  Admit      Latest Documented Vital Signs:  As of 23:32 EST  BP- 151/80 HR- 95 Temp- 99.6 °F (37.6 °C) (Tympanic) O2 sat- 97%      --    Please note that portions of this were completed with a voice recognition program.       Note Disclaimer: At [facility], we believe that sharing information builds trust and better relationships. You are receiving this note because you are receiving care at [facility] or recently visited. It is possible you will see [facility]  information before a provider has talked with you about it. This kind of information can be easy to misunderstand. To help you fully understand what it means for your health, we urge you to discuss this note with your provider.       Errol Encinas II, MD  02/28/23 4494

## 2023-03-01 NOTE — CONSULTS
Patient Name: Albert Yadav  :1940  82 y.o.    Date of Admission: 2023  Encounter Provider: Iza Miller MD  Date of Encounter Visit: 23  Place of Service: [facility] CARDIOLOGY  Referring Provider: Lai Albarran MD  Patient Care Team:  Valerie Burnham MD as PCP - General (Internal Medicine)      Chief complaint:  weakness    History of Present Illness:    This is a patient with hypertension and peripheral vascular disease.  I do not see any recent cardiac testing in our system or care everywhere.  He presented with mental status changes.  He was diagnosed with COVID.  His high-sensitivity troponin was slightly abnormal.  EKG does not show ischemic changes.  His wife is present at the bedside and gives the history.  He has not been complaining of chest pain.  He is just mostly been complaining of cough.    Past Medical History:   Diagnosis Date   • Anesthesia     MILD VIOLENT BEHAVIOR AFTER ANESTHESIA LEG SURGERY   • Arthritis of back 10 years ago   • At risk for sleep apnea     STOP BANG = 5   • Broken bones     arm, collar bone, ankle   • Carotid artery disorder (HCC)    • Cervical disc disorder ?   • Cholelithiasis     Gall bladder removed   • Diabetes mellitus (HCC)     Type 2   • Falls    • Fracture of ankle    • Fracture of wrist    • Fracture, clavicle 26 years ago   • Fracture, foot 26 year ago   • Groin rash     PT STATES LEFT GROIN AT TIMES   • History of transfusion    • Hyperlipidemia    • Hypertension    • Low back pain    • Low back strain ?   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   • Rheumatoid arthritis (HCC)     HANDS DIALLO   • Staph infection     2015  [facility] POST SX   • Tendency toward bleeding easily (HCC)     due to blood thinners       Past Surgical History:    Procedure Laterality Date   • ADRENALECTOMY Left 12/20/2022    Procedure: LAPAROSCOPIC EXCISION LEFT RETROPERITONEAL MASS;  Surgeon: Moy Vargas MD;  Location: [facility] MAIN OR;  Service: General;  Laterality: Left;   • ANGIOPLASTY FEMORAL ARTERY Left 11/07/2016    Procedure: ULTRA SOUND ACCESS RIGHT FEMORAL ARTERY, AIF BILATERAL RUNOFF, SELECTIVE CATHETERIZATION LEFT FEMORAL ARTERY.;  Surgeon: Errol Michael MD;  Location: [facility] OR 18/19;  Service:    • ANKLE OPEN REDUCTION INTERNAL FIXATION  1980   • ARTERIOVENOUS FISTULA/SHUNT SURGERY Left 12/13/2016    Procedure: LEFT ILEO-FEMORAL GORTEX GRAFT AND LEFT LEG ARERIOGRAM ;  Surgeon: Errol Michael MD;  Location: [facility] OR 18/19;  Service:    • CAROTID ENDARTERECTOMY Right 09/29/2020    Procedure: CAROTID ENDARTERECTOMY;  Surgeon: James Graham MD;  Location: [facility] MAIN OR;  Service: Vascular;  Laterality: Right;   • CHOLECYSTECTOMY     • EPIDURAL BLOCK     • EYE SURGERY Left     as child   • FRACTURE SURGERY Left     arm   • ILIAC ARTERY STENT Left 2020   • KNEE SURGERY Left     ORIF   • ORIF ANKLE FRACTURE Left          Prior to Admission medications    Medication Sig Start Date End Date Taking? Authorizing Provider   acetaminophen (TYLENOL) 650 MG 8 hr tablet Take 1 tablet by mouth Every 8 (Eight) Hours As Needed for Mild Pain.   Yes ProviderJude MD   atorvastatin (LIPITOR) 40 MG tablet Take 1 tablet by mouth Every Evening. 8/10/15  Yes Jude Raymundo MD   cetirizine (ZyrTEC) 10 MG tablet Take 1 tablet by mouth Every Morning. 8/11/15  Yes ProviderJude MD   clopidogrel (PLAVIX) 75 MG tablet Take 1 tablet by mouth Every Morning. TO STOP 7 DAYS BEFORE SURGERY   Yes Provider, MD Jude   folic acid (FOLVITE) 1 MG tablet Take 1 tablet by mouth Daily.   Yes ProviderJude MD   hydrochlorothiazide (HYDRODIURIL) 50 MG tablet Take 1 tablet by mouth Every Morning. 8/11/15  Yes Provider  MD Jude   lisinopril (PRINIVIL,ZESTRIL) 40 MG tablet Take 1 tablet by mouth Every Evening. 8/10/15  Yes Jude Raymundo MD   ondansetron (ZOFRAN) 4 MG tablet Take 1 tablet by mouth Every 6 (Six) Hours As Needed for Nausea or Vomiting. 22  Yes Lea Auguste APRN   triamcinolone (KENALOG) 0.025 % cream 1 application As Needed. 20  Yes Jude Raymundo MD   melatonin 5 MG tablet tablet Take 1 tablet by mouth Every Night. 8/10/15   Jude Raymundo MD   methotrexate 2.5 MG tablet Take 1 tablet by mouth.  takes 2.5mg 10 tabs all together for total of 25mg    Jude Raymundo MD   rivastigmine (EXELON) 1.5 MG capsule Take 1 capsule by mouth 2 (Two) Times a Day.    Jude Raymundo MD   Sennosides (SENNA LAX PO) Take 1 tablet by mouth 2 (Two) Times a Day.    Jude Raymundo MD   sennosides-docusate (PERICOLACE) 8.6-50 MG per tablet Take 2 tablets by mouth 2 (Two) Times a Day. 22   Lea Auguste APRN   traZODone (DESYREL) 50 MG tablet Take 1 tablet by mouth Every Night. 8/10/15   Jude Raymundo MD       No Known Allergies    Social History     Socioeconomic History   • Marital status:    Tobacco Use   • Smoking status: Former     Packs/day: 1.00     Years: 20.00     Pack years: 20.00     Types: Cigarettes     Start date: 1968     Quit date: 1989     Years since quittin.7   • Smokeless tobacco: Never   • Tobacco comments:     Did'nt inhale   Vaping Use   • Vaping Use: Never used   Substance and Sexual Activity   • Alcohol use: Not Currently     Alcohol/week: 1.0 standard drink     Types: 1 Shots of liquor per week     Comment: 1 drink per day at most   • Drug use: Never   • Sexual activity: Not Currently     Partners: Female     Birth control/protection: Condom       Family History   Problem Relation Age of Onset   • Diabetes Mother    • Cancer Mother         Cancer liver   • Cancer Father         Diabetic   • Diabetes  Father    • Heart disease Maternal Grandmother    • Heart disease Maternal Grandfather    • Heart disease Paternal Grandmother    • Heart disease Paternal Grandfather    • Alcohol abuse Son         Alcoholic   • Cancer Brother    • Malig Hyperthermia Neg Hx        REVIEW OF SYSTEMS:   All other systems reviewed and negative.        Objective:     Vitals:    02/28/23 2338 03/01/23 0048 03/01/23 0502 03/01/23 0800   BP:  144/47  130/57   BP Location:  Left arm  Left arm   Patient Position:  Lying  Lying   Pulse:    76   Resp:  18  18   Temp:  99.2 °F (37.3 °C)  99 °F (37.2 °C)   TempSrc:  Oral  Oral   SpO2:  99%  95%   Weight: 80 kg (176 lb 5.9 oz)  79.5 kg (175 lb 4.3 oz)    Height:         Body mass index is 23.77 kg/m².    Intake/Output Summary (Last 24 hours) at 3/1/2023 1100  Last data filed at 3/1/2023 0800  Gross per 24 hour   Intake 210 ml   Output 550 ml   Net -340 ml     Constitutional: Sleeping.  HENT:   Head: Normocephalic and atraumatic. Head is without contusion.   Right Ear:  No drainage.   Left Ear:  No drainage.   Nose: No nasal deformity. No epistaxis.   Eyes: Lids are normal. Right eye exhibits no exudate. Left eye exhibits no exudate.  Neck: No JVD present. Carotid bruit is not present. No tracheal deviation present. No thyroid mass and no thyromegaly present.   Cardiovascular: Normal rate, regular rhythm and normal heart sounds.     Pulmonary/Chest: Effort normal and breath sounds normal.   Abdominal: Soft. Normal appearance and bowel sounds are normal. There is no tenderness.   Musculoskeletal: No marked joint deformity   Skin: Skin is warm, dry and intact. No rash noted.   Psychiatric: Sleeping   Vitals reviewed        Lab Review:     Results from last 7 days   Lab Units 03/01/23  0839   SODIUM mmol/L 135*   POTASSIUM mmol/L 4.0   CHLORIDE mmol/L 100   CO2 mmol/L 21.7*   BUN mg/dL 18   CREATININE mg/dL 1.28*   CALCIUM mg/dL 8.0*   BILIRUBIN mg/dL 0.6   ALK PHOS U/L 112   ALT (SGPT) U/L 25   AST  (SGOT) U/L 24   GLUCOSE mg/dL 125*     Results from last 7 days   Lab Units 03/01/23  0839 02/28/23  2119   HSTROP T ng/L 40* 31*  31*     Results from last 7 days   Lab Units 03/01/23  0839   WBC 10*3/mm3 7.04   HEMOGLOBIN g/dL 12.8*   HEMATOCRIT % 37.4*   PLATELETS 10*3/mm3 198             Results from last 7 days   Lab Units 03/01/23  0839   CHOLESTEROL mg/dL 141   TRIGLYCERIDES mg/dL 71   HDL CHOL mg/dL 45   LDL CHOL mg/dL 82     I personally viewed and interpreted the patient's EKG/Telemetry data.        Assessment and Plan:       1.  Slightly altered troponin in the setting of acute on chronic kidney disease and acute COVID.  No ischemic changes on EKG. no symptoms consistent with ischemia.  I do not recommend any further cardiac testing at this time.  I be happy to see him as an outpatient if he has any residual symptoms once he recovers from COVID.  2.  Mental status change  3.  History of falls  4.  Hypertension  6.  Carotid stenosis.  History of right carotid endarterectomy November 2020.  7.  Peripheral arterial disease.    Iza Miller MD  03/01/23  11:00 EST

## 2023-03-01 NOTE — PROGRESS NOTES
[facility]  Clinical Pharmacy Department     Remdesivir Review Note    Albert Yadav is a 82 y.o. male with confirmed COVID-19 infection on day 1 of hospitalization.     Consulting Provider:  Dr Ge  Date of Confirmed SARS-CoV-2: 2/28  Date of Symptom Onset:   Other Antimicrobials:   Hydroxychloroquine or chloroquine prior to arrival:     Allergies  Allergies as of 02/28/2023    (No Known Allergies)       Microbiology:  Microbiology Results (last 10 days)       Procedure Component Value - Date/Time    Respiratory Panel PCR w/COVID-19(SARS-CoV-2) ESME/GERALD/KRISTOFER/PAD/COR/MAD/NUHA In-House, NP Swab in UTM/VTM, 3-4 HR TAT - Swab, Nasopharynx [[id number]]  (Abnormal) Collected: 02/28/23 2118    Lab Status: Final result Specimen: Swab from Nasopharynx Updated: 02/28/23 2219     ADENOVIRUS, PCR Not Detected     Coronavirus 229E Not Detected     Coronavirus HKU1 Not Detected     Coronavirus NL63 Not Detected     Coronavirus OC43 Not Detected     COVID19 Detected     Human Metapneumovirus Not Detected     Human Rhinovirus/Enterovirus Not Detected     Influenza A PCR Not Detected     Influenza B PCR Not Detected     Parainfluenza Virus 1 Not Detected     Parainfluenza Virus 2 Not Detected     Parainfluenza Virus 3 Not Detected     Parainfluenza Virus 4 Not Detected     RSV, PCR Not Detected     Bordetella pertussis pcr Not Detected     Bordetella parapertussis PCR Not Detected     Chlamydophila pneumoniae PCR Not Detected     Mycoplasma pneumo by PCR Not Detected    Narrative:      In the setting of a positive respiratory panel with a viral infection PLUS a negative procalcitonin without other underlying concern for bacterial infection, consider observing off antibiotics or discontinuation of antibiotics and continue supportive care. If the respiratory panel is positive for atypical bacterial infection (Bordetella pertussis, Chlamydophila pneumoniae, or Mycoplasma pneumoniae), consider antibiotic de-escalation to target  "atypical bacterial infection.            Vitals/Labs/I&O  Visit Vitals  /57 (BP Location: Left arm, Patient Position: Lying)   Pulse 76   Temp 99 °F (37.2 °C) (Oral)   Resp 18   Ht 182.9 cm (72\")   Wt 79.5 kg (175 lb 4.3 oz)   SpO2 95%   BMI 23.77 kg/m²       Results from last 7 days   Lab Units 03/01/23  0839 02/28/23 2119   WBC 10*3/mm3 7.04 7.67         Results from last 7 days   Lab Units 02/28/23 2119   AST (SGOT) U/L 16      Results from last 7 days   Lab Units 02/28/23 2119   ALT (SGPT) U/L 21       Estimated Creatinine Clearance: 45.5 mL/min (A) (by C-G formula based on SCr of 1.36 mg/dL (H)).  Results from last 7 days   Lab Units 02/28/23 2119   BUN mg/dL 19   CREATININE mg/dL 1.36*     Intake & Output (last 3 days)         02/26 0701 02/27 0700 02/27 0701 02/28 0700 02/28 0701 03/01 0700 03/01 0701  03/02 0700    P.O.   210     Total Intake(mL/kg)   210 (2.6)     Urine (mL/kg/hr)   350 200 (1.2)    Total Output   350 200    Net   -140 -200                    Assessment/Plan:    Patient meets the following inclusion/exclusion criteria:  Patient is hospitalized with confirmed COVID-19 infection (and accompanying symptoms)  Patient meets one of the two below criteria:  Patient is requiring an increase in baseline supplemental oxygen requirements OR SpO2 </= 94% on room air secondary to COVID-19 infection OR  Patient is symptomatic but not requiring supplemental oxygen or an increase in baseline oxygen AND has at least one of the below high risk criteria for progression to severe illness. High risk criteria: yes  Age >/= 65  Cancer  Cardiovascular diseases (HF, CAD, or cardiomyopathies)  CKD  Chronic lung disease (COPD, CF, interstitial lung disease, PE, pulmonary hypertension, bronchopulmonary dysplasia, bronchiectasis)  Diabetes mellitis (insulin dependent or poorly controlled)  Immunocompromising conditions or receipt of immunosuppressive medications  Obesity (BMI > 35 kg/m2)  Pregnancy and " recent pregnancy (within 7 days of delivery)  Sickle cell disease  Baseline and daily LFTs and Scr have been ordered prior to remdesivir initiation  ALT is not ? 10 times the upper limit of normal  Patient is not on concomitant hydroxychloroquine or chloroquine  Patient does not require invasive mechanical ventilation (IMV) or ECMO  Patient is within 10 days from symptom onset (for criteria 2a) or within 7 days of symptom onset (for criteria 2b)    Remdesivir 200 mg IV x 1 dose, followed by 100 mg IV q24h for 3 days or until hospital discharge (whichever comes first) has been ordered.    Thank you for involving pharmacy in this patient's care. Please contact pharmacy with any questions or concerns.                           Skinny Wong PharmD  Clinical Pharmacist

## 2023-03-01 NOTE — CASE MANAGEMENT/SOCIAL WORK
Discharge Planning Assessment  [facility]     Patient Name: Albert Yadav  MRN: [id number]  Today's Date: 3/1/2023    Admit Date: 2/28/2023    Plan: Referral to [facility] SNF- will need [facility] pre-cert   Discharge Needs Assessment     Row Name 03/01/23 1646       Living Environment    People in Home spouse    Current Living Arrangements home    Primary Care Provided by spouse/significant other    Provides Primary Care For no one, unable/limited ability to care for self    Family Caregiver if Needed spouse    Quality of Family Relationships helpful;involved    Able to Return to Prior Arrangements other (see comments)       Resource/Environmental Concerns    Resource/Environmental Concerns home accessibility       Transition Planning    Patient/Family Anticipates Transition to home with help/services;inpatient rehabilitation facility    Patient/Family Anticipated Services at Transition rehabilitation services    Transportation Anticipated family or friend will provide;car, drives self       Discharge Needs Assessment    Equipment Currently Used at Home other (see comments)    Concerns to be Addressed discharge planning;adjustment to diagnosis/illness;decision-making    Anticipated Changes Related to Illness inability to care for self    Equipment Needed After Discharge other (see comments)    Discharge Facility/Level of Care Needs home with home health;nursing facility, skilled    Current Discharge Risk chronically ill               Discharge Plan     Row Name 03/01/23 1646       Plan    Plan Referral to [facility]- will need [facility] pre-cert    Roadmap to Recovery Yes    Patient/Family in Agreement with Plan yes    Provided Post Acute Provider List? Yes    Post Acute Provider List Nursing Home    Plan Comments CCP called into pts room due to Covid isolation, introduced self and explained CCP role. Pt requested CCP speak with his wife Bushra ([phone]) introduced self and explained CCP  role. Verified facesheet and confirmed local pharmacy is [facility] [address]. She denies problems with medication cost. Pt PCP is Dr. Burnham. Pt lives at home with his wife, 4 steps to enter the home, and also additional internal steps inside. Pt is IADL with mobility and driving prior to admission. Wife states pt has a cane, walker and wheelchair. Pt has used [facility] and gone to [facility] in the past. They would prefer not to use [facility] again. CCP discussed dc planning and she anticipates pt needing SNF at dc unless he improves. CCP explained can make a referral but unsure of bed availability and may need a backup choice and unsure of Covid acceptance. CCP explained will check other available facilities on their covid acceptance and follow up with her tomorrow. She verbalized understanding. Sergey GOODMAN/ASHELY              Continued Care and Services - Admitted Since 2/28/2023     Destination     Service Provider Request Status Selected Services Address Phone Fax Patient Preferred    [facility] Declined  Bed not available N/A [address] [address] [phone] --            Selected Continued Care - Prior Encounters Includes continued care and service providers with selected services from prior encounters from 11/30/2022 to 3/1/2023    Discharged on 12/22/2022 Admission date: 12/20/2022 - Discharge disposition: Skilled Nursing Facility (DC - External)    Destination     Service Provider Selected Services Address Phone Fax Patient Preferred    [facility] [address] [address] [phone] --                       Demographic Summary     Row Name 03/01/23 1646       General Information    Admission Type observation    Referral Source admission list    Reason for Consult discharge planning    Preferred Language English       Contact Information    Permission Granted to Share Info With  family/designee;facility                Functional Status    No documentation.                Psychosocial    No documentation.                Abuse/Neglect    No documentation.                Legal    No documentation.                Substance Abuse    No documentation.                Patient Forms    No documentation.                   Krysten Strickland RN

## 2023-03-01 NOTE — H&P
Patient Name:  Albert Yadav  YOB: 1940  MRN:  [id number]  Admit Date:  2/28/2023  Patient Care Team:  Valerie Burnham MD as PCP - General (Internal Medicine)      Subjective   History Present Illness     Chief Complaint   Patient presents with   • Weakness - Generalized       Mr. Yadav is a 82 y.o. 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    Wife at bedside serves as primary historian given generalized weakness and progressive memory changes states followed by memory care provider--Dr. Sapna Crowe--on 1/11/2023 for progressive mild cognitive impairment (diagnosed one year ago & also stopped taking Exelon medication due to agitation) & requires walker vs cane; however, acutely immobile yesterday with cough & speech changes; therefore, went to [facility] via EMS for evaluation.    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    CT head negative for acute intercranial abnormality.  Incidental COVID-19 positive.    Recommendation pending hospital course.  Details below.    History of Present Illness  Review of Systems   Constitutional: Positive for chills and fever.   HENT: Positive for congestion. Negative for rhinorrhea.    Respiratory: Positive for cough. Negative for shortness of breath.    Cardiovascular: Negative for chest pain and leg swelling.   Gastrointestinal: Positive for diarrhea (chronic). Negative for abdominal pain, constipation, nausea and vomiting.   Endocrine: Negative for polydipsia, polyphagia and polyuria.   Genitourinary: Negative for difficulty urinating and dysuria.   Musculoskeletal: Positive for gait problem. Negative for myalgias.   Skin: Negative for rash and wound.   Neurological: Negative for  syncope and light-headedness.   Psychiatric/Behavioral: Positive for confusion. Negative for hallucinations.        Personal History     Past Medical History:   Diagnosis Date   • Anesthesia     MILD VIOLENT BEHAVIOR AFTER ANESTHESIA LEG SURGERY   • Arthritis of back 10 years ago   • At risk for sleep apnea     STOP BANG = 5   • Broken bones     arm, collar bone, ankle   • Carotid artery disorder (HCC)    • Cervical disc disorder ?   • Cholelithiasis 1978    Gall bladder removed   • Diabetes mellitus (HCC) 2005    Type 2   • Falls    • Fracture of ankle 1972   • Fracture of wrist 1947   • Fracture, clavicle 26 years ago   • Fracture, foot 26 year ago   • Groin rash     PT STATES LEFT GROIN AT TIMES   • History of transfusion    • Hyperlipidemia    • Hypertension    • Low back pain    • Low back strain ?   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   • Rheumatoid arthritis (HCC)     HANDS DIALLO   • Staph infection     2015  [facility] POST SX   • Tendency toward bleeding easily (HCC)     due to blood thinners     Past Surgical History:   Procedure Laterality Date   • ADRENALECTOMY Left 12/20/2022    Procedure: LAPAROSCOPIC EXCISION LEFT RETROPERITONEAL MASS;  Surgeon: Moy Vargas MD;  Location: [facility] OR;  Service: General;  Laterality: Left;   • ANGIOPLASTY FEMORAL ARTERY Left 11/07/2016    Procedure: ULTRA SOUND ACCESS RIGHT FEMORAL ARTERY, AIF BILATERAL RUNOFF, SELECTIVE CATHETERIZATION LEFT FEMORAL ARTERY.;  Surgeon: Errol Michael MD;  Location: [facility] OR 18/19;  Service:    • ANKLE OPEN REDUCTION INTERNAL FIXATION  1980   • ARTERIOVENOUS FISTULA/SHUNT SURGERY Left 12/13/2016    Procedure: LEFT ILEO-FEMORAL GORTEX GRAFT AND LEFT LEG ARERIOGRAM ;  Surgeon: Errol Michael MD;  Location: [facility] OR 18/19;  Service:    • CAROTID ENDARTERECTOMY  Right 2020    Procedure: CAROTID ENDARTERECTOMY;  Surgeon: James Graham MD;  Location: [facility] MAIN OR;  Service: Vascular;  Laterality: Right;   • CHOLECYSTECTOMY     • EPIDURAL BLOCK     • EYE SURGERY Left     as child   • FRACTURE SURGERY Left     arm   • ILIAC ARTERY STENT Left    • KNEE SURGERY Left     ORIF   • ORIF ANKLE FRACTURE Left      Family History   Problem Relation Age of Onset   • Diabetes Mother    • Cancer Mother         Cancer liver   • Cancer Father         Diabetic   • Diabetes Father    • Heart disease Maternal Grandmother    • Heart disease Maternal Grandfather    • Heart disease Paternal Grandmother    • Heart disease Paternal Grandfather    • Alcohol abuse Son         Alcoholic   • Cancer Brother    • Malig Hyperthermia Neg Hx      Social History     Tobacco Use   • Smoking status: Former     Packs/day: 1.00     Years: 20.00     Pack years: 20.00     Types: Cigarettes     Start date: 1968     Quit date: 1989     Years since quittin.7   • Smokeless tobacco: Never   • Tobacco comments:     Did'nt inhale   Vaping Use   • Vaping Use: Never used   Substance Use Topics   • Alcohol use: Not Currently     Alcohol/week: 1.0 standard drink     Types: 1 Shots of liquor per week     Comment: 1 drink per day at most   • Drug use: Never     No current facility-administered medications on file prior to encounter.     Current Outpatient Medications on File Prior to Encounter   Medication Sig Dispense Refill   • acetaminophen (TYLENOL) 650 MG 8 hr tablet Take 1 tablet by mouth Every 8 (Eight) Hours As Needed for Mild Pain.     • atorvastatin (LIPITOR) 40 MG tablet Take 1 tablet by mouth Every Evening.     • cetirizine (ZyrTEC) 10 MG tablet Take 1 tablet by mouth Every Morning.     • clopidogrel (PLAVIX) 75 MG tablet Take 1 tablet by mouth Every Morning. TO STOP 7 DAYS BEFORE SURGERY     • folic acid (FOLVITE) 1 MG tablet Take 1 tablet by mouth Daily.     • hydrochlorothiazide  (HYDRODIURIL) 50 MG tablet Take 1 tablet by mouth Every Morning.     • lisinopril (PRINIVIL,ZESTRIL) 40 MG tablet Take 1 tablet by mouth Every Evening.     • ondansetron (ZOFRAN) 4 MG tablet Take 1 tablet by mouth Every 6 (Six) Hours As Needed for Nausea or Vomiting.     • triamcinolone (KENALOG) 0.025 % cream 1 application As Needed.     • melatonin 5 MG tablet tablet Take 1 tablet by mouth Every Night.     • methotrexate 2.5 MG tablet Take 1 tablet by mouth. Wednesdays takes 2.5mg 10 tabs all together for total of 25mg     • rivastigmine (EXELON) 1.5 MG capsule Take 1 capsule by mouth 2 (Two) Times a Day.     • Sennosides (SENNA LAX PO) Take 1 tablet by mouth 2 (Two) Times a Day.     • sennosides-docusate (PERICOLACE) 8.6-50 MG per tablet Take 2 tablets by mouth 2 (Two) Times a Day.     • traZODone (DESYREL) 50 MG tablet Take 1 tablet by mouth Every Night.       No Known Allergies    Objective    Objective     Vital Signs  Temp:  [98.3 °F (36.8 °C)-99.6 °F (37.6 °C)] 98.3 °F (36.8 °C)  Heart Rate:  [76-95] 89  Resp:  [16-18] 18  BP: (130-151)/(47-80) 134/70  SpO2:  [95 %-99 %] 96 %  on   ;   Device (Oxygen Therapy): room air  Body mass index is 23.77 kg/m².    Physical Exam  Constitutional:       General: He is not in acute distress.     Appearance: He is ill-appearing. He is not toxic-appearing.   HENT:      Head: Normocephalic and atraumatic.   Eyes:      Extraocular Movements: Extraocular movements intact.      Conjunctiva/sclera: Conjunctivae normal.   Cardiovascular:      Rate and Rhythm: Normal rate.      Heart sounds: Normal heart sounds.   Pulmonary:      Effort: Pulmonary effort is normal.      Breath sounds: Normal breath sounds.   Abdominal:      General: Bowel sounds are normal.      Palpations: Abdomen is soft.   Musculoskeletal:         General: No tenderness.      Cervical back: Normal range of motion and neck supple.      Right lower leg: No edema.      Left lower leg: No edema.   Skin:      General: Skin is warm and dry.   Neurological:      General: No focal deficit present.      Mental Status: He is alert and oriented to person, place, and time.      Cranial Nerves: No cranial nerve deficit.   Psychiatric:         Behavior: Behavior normal.         Thought Content: Thought content normal.         Results Review:  I reviewed the patient's new clinical results.  I reviewed the patient's new imaging results and agree with the interpretation.  I reviewed the patient's other test results and agree with the interpretation  I personally viewed and interpreted the patient's EKG/Telemetry data  Discussed with ED provider.    Lab Results (last 24 hours)     Procedure Component Value Units Date/Time    Respiratory Panel PCR w/COVID-19(SARS-CoV-2) ESME/GERALD/KRISTOFER/PAD/COR/MAD/NUHA In-House, NP Swab in UTM/VTM, 3-4 HR TAT - Swab, Nasopharynx [[id number]]  (Abnormal) Collected: 02/28/23 2118    Specimen: Swab from Nasopharynx Updated: 02/28/23 2219     ADENOVIRUS, PCR Not Detected     Coronavirus 229E Not Detected     Coronavirus HKU1 Not Detected     Coronavirus NL63 Not Detected     Coronavirus OC43 Not Detected     COVID19 Detected     Human Metapneumovirus Not Detected     Human Rhinovirus/Enterovirus Not Detected     Influenza A PCR Not Detected     Influenza B PCR Not Detected     Parainfluenza Virus 1 Not Detected     Parainfluenza Virus 2 Not Detected     Parainfluenza Virus 3 Not Detected     Parainfluenza Virus 4 Not Detected     RSV, PCR Not Detected     Bordetella pertussis pcr Not Detected     Bordetella parapertussis PCR Not Detected     Chlamydophila pneumoniae PCR Not Detected     Mycoplasma pneumo by PCR Not Detected    Narrative:      In the setting of a positive respiratory panel with a viral infection PLUS a negative procalcitonin without other underlying concern for bacterial infection, consider observing off antibiotics or discontinuation of antibiotics and continue supportive care. If the  respiratory panel is positive for atypical bacterial infection (Bordetella pertussis, Chlamydophila pneumoniae, or Mycoplasma pneumoniae), consider antibiotic de-escalation to target atypical bacterial infection.    CBC & Differential [[id number]]  (Abnormal) Collected: 02/28/23 2119    Specimen: Blood from Arm, Right Updated: 02/28/23 2131    Narrative:      The following orders were created for panel order CBC & Differential.  Procedure                               Abnormality         Status                     ---------                               -----------         ------                     CBC Auto Differential[[id number]]        Abnormal            Final result                 Please view results for these tests on the individual orders.    Comprehensive Metabolic Panel [[id number]]  (Abnormal) Collected: 02/28/23 2119    Specimen: Blood from Arm, Right Updated: 02/28/23 2148     Glucose 122 mg/dL      BUN 19 mg/dL      Creatinine 1.36 mg/dL      Sodium 134 mmol/L      Potassium 4.0 mmol/L      Chloride 102 mmol/L      CO2 23.0 mmol/L      Calcium 8.4 mg/dL      Total Protein 6.0 g/dL      Albumin 3.4 g/dL      ALT (SGPT) 21 U/L      AST (SGOT) 16 U/L      Alkaline Phosphatase 122 U/L      Total Bilirubin 0.4 mg/dL      Globulin 2.6 gm/dL      A/G Ratio 1.3 g/dL      BUN/Creatinine Ratio 14.0     Anion Gap 9.0 mmol/L      eGFR 51.3 mL/min/1.73     Narrative:      GFR Normal >60  Chronic Kidney Disease <60  Kidney Failure <15    The GFR formula is only valid for adults with stable renal function between ages 18 and 70.    Urinalysis With Microscopic If Indicated (No Culture) - Straight Cath [[id number]]  (Normal) Collected: 02/28/23 2119    Specimen: Urine from Straight Cath Updated: 02/28/23 2143     Color, UA Yellow     Appearance, UA Clear     pH, UA <=5.0     Specific Gravity, UA 1.020     Glucose, UA Negative     Ketones, UA Negative     Bilirubin, UA Negative     Blood, UA Negative     Protein, UA  Negative     Leuk Esterase, UA Negative     Nitrite, UA Negative     Urobilinogen, UA 0.2 E.U./dL    Narrative:      Urine microscopic not indicated.    Single High Sensitivity Troponin T [[id number]]  (Abnormal) Collected: 02/28/23 2119    Specimen: Blood from Arm, Right Updated: 02/28/23 2148     HS Troponin T 31 ng/L     Narrative:      High Sensitive Troponin T Reference Range:  <10.0 ng/L- Negative Female for AMI  <15.0 ng/L- Negative Male for AMI  >=10 - Abnormal Female indicating possible myocardial injury.  >=15 - Abnormal Male indicating possible myocardial injury.   Clinicians would have to utilize clinical acumen, EKG, Troponin, and serial changes to determine if it is an Acute Myocardial Infarction or myocardial injury due to an underlying chronic condition.         High Sensitivity Troponin T [[id number]]  (Abnormal) Collected: 02/28/23 2119    Specimen: Blood from Arm, Right Updated: 02/28/23 2148     HS Troponin T 31 ng/L     Narrative:      High Sensitive Troponin T Reference Range:  <10.0 ng/L- Negative Female for AMI  <15.0 ng/L- Negative Male for AMI  >=10 - Abnormal Female indicating possible myocardial injury.  >=15 - Abnormal Male indicating possible myocardial injury.   Clinicians would have to utilize clinical acumen, EKG, Troponin, and serial changes to determine if it is an Acute Myocardial Infarction or myocardial injury due to an underlying chronic condition.         BNP [[id number]]  (Normal) Collected: 02/28/23 2119    Specimen: Blood from Arm, Right Updated: 02/28/23 2146     proBNP 315.0 pg/mL     Narrative:      Among patients with dyspnea, NT-proBNP is highly sensitive for the detection of acute congestive heart failure. In addition NT-proBNP of <300 pg/ml effectively rules out acute congestive heart failure with 99% negative predictive value.    Results may be falsely decreased if patient taking Biotin.      CBC Auto Differential [[id number]]  (Abnormal) Collected: 02/28/23 2119     Specimen: Blood from Arm, Right Updated: 02/28/23 2131     WBC 7.67 10*3/mm3      RBC 3.96 10*6/mm3      Hemoglobin 13.0 g/dL      Hematocrit 39.2 %      MCV 99.0 fL      MCH 32.8 pg      MCHC 33.2 g/dL      RDW 13.2 %      RDW-SD 46.7 fl      MPV 10.4 fL      Platelets 181 10*3/mm3      Neutrophil % 77.3 %      Lymphocyte % 4.6 %      Monocyte % 16.9 %      Eosinophil % 0.0 %      Basophil % 0.3 %      Immature Grans % 0.9 %      Neutrophils, Absolute 5.93 10*3/mm3      Lymphocytes, Absolute 0.35 10*3/mm3      Monocytes, Absolute 1.30 10*3/mm3      Eosinophils, Absolute 0.00 10*3/mm3      Basophils, Absolute 0.02 10*3/mm3      Immature Grans, Absolute 0.07 10*3/mm3      nRBC 0.0 /100 WBC     Sedimentation Rate [[id number]]  (Abnormal) Collected: 02/28/23 2119    Specimen: Blood from Arm, Right Updated: 03/01/23 0510     Sed Rate 30 mm/hr     Ferritin [[id number]]  (Normal) Collected: 02/28/23 2119    Specimen: Blood from Arm, Right Updated: 03/01/23 0120     Ferritin 322.00 ng/mL     Narrative:      Results may be falsely decreased if patient taking Biotin.      POC Glucose Once [[id number]]  (Normal) Collected: 03/01/23 0154    Specimen: Blood Updated: 03/01/23 0155     Glucose 126 mg/dL      Comment: Meter: [id number] : [id number] Lake Delacruz RN       POC Glucose Once [[id number]]  (Normal) Collected: 03/01/23 0630    Specimen: Blood Updated: 03/01/23 0631     Glucose 123 mg/dL      Comment: Meter: [id number] : shanique Marshall RN       High Sensitivity Troponin T 2Hr [[id number]]  (Abnormal) Collected: 03/01/23 0839    Specimen: Blood Updated: 03/01/23 0929     HS Troponin T 40 ng/L      Troponin T Delta 9 ng/L     Narrative:      High Sensitive Troponin T Reference Range:  <10.0 ng/L- Negative Female for AMI  <15.0 ng/L- Negative Male for AMI  >=10 - Abnormal Female indicating possible myocardial injury.  >=15 - Abnormal Male indicating possible myocardial injury.   Clinicians would have to utilize  clinical acumen, EKG, Troponin, and serial changes to determine if it is an Acute Myocardial Infarction or myocardial injury due to an underlying chronic condition.         CBC & Differential [[id number]]  (Abnormal) Collected: 03/01/23 0839    Specimen: Blood Updated: 03/01/23 0906    Narrative:      The following orders were created for panel order CBC & Differential.  Procedure                               Abnormality         Status                     ---------                               -----------         ------                     CBC Auto Differential[[id number]]        Abnormal            Final result                 Please view results for these tests on the individual orders.    Comprehensive Metabolic Panel [[id number]]  (Abnormal) Collected: 03/01/23 0839    Specimen: Blood Updated: 03/01/23 0926     Glucose 125 mg/dL      BUN 18 mg/dL      Creatinine 1.28 mg/dL      Sodium 135 mmol/L      Potassium 4.0 mmol/L      Chloride 100 mmol/L      CO2 21.7 mmol/L      Calcium 8.0 mg/dL      Total Protein 6.4 g/dL      Albumin 3.2 g/dL      ALT (SGPT) 25 U/L      AST (SGOT) 24 U/L      Alkaline Phosphatase 112 U/L      Total Bilirubin 0.6 mg/dL      Globulin 3.2 gm/dL      A/G Ratio 1.0 g/dL      BUN/Creatinine Ratio 14.1     Anion Gap 13.3 mmol/L      eGFR 55.2 mL/min/1.73     Narrative:      GFR Normal >60  Chronic Kidney Disease <60  Kidney Failure <15    The GFR formula is only valid for adults with stable renal function between ages 18 and 70.    C-reactive Protein [[id number]]  (Abnormal) Collected: 03/01/23 0839    Specimen: Blood Updated: 03/01/23 0926     C-Reactive Protein 6.71 mg/dL     CBC Auto Differential [[id number]]  (Abnormal) Collected: 03/01/23 0839    Specimen: Blood Updated: 03/01/23 0906     WBC 7.04 10*3/mm3      RBC 3.84 10*6/mm3      Hemoglobin 12.8 g/dL      Hematocrit 37.4 %      MCV 97.4 fL      MCH 33.3 pg      MCHC 34.2 g/dL      RDW 13.1 %      RDW-SD 45.9 fl      MPV 10.3 fL       Platelets 198 10*3/mm3      Neutrophil % 69.9 %      Lymphocyte % 10.1 %      Monocyte % 18.8 %      Eosinophil % 0.0 %      Basophil % 0.3 %      Immature Grans % 0.9 %      Neutrophils, Absolute 4.93 10*3/mm3      Lymphocytes, Absolute 0.71 10*3/mm3      Monocytes, Absolute 1.32 10*3/mm3      Eosinophils, Absolute 0.00 10*3/mm3      Basophils, Absolute 0.02 10*3/mm3      Immature Grans, Absolute 0.06 10*3/mm3      nRBC 0.0 /100 WBC     Hemoglobin A1c [[id number]]  (Abnormal) Collected: 03/01/23 0839    Specimen: Blood Updated: 03/01/23 1031     Hemoglobin A1C 6.00 %     Narrative:      Hemoglobin A1C Ranges:    Increased Risk for Diabetes  5.7% to 6.4%  Diabetes                     >= 6.5%  Diabetic Goal                < 7.0%    Lipid Panel [[id number]] Collected: 03/01/23 0839    Specimen: Blood Updated: 03/01/23 0926     Total Cholesterol 141 mg/dL      Triglycerides 71 mg/dL      HDL Cholesterol 45 mg/dL      LDL Cholesterol  82 mg/dL      VLDL Cholesterol 14 mg/dL      LDL/HDL Ratio 1.82    Narrative:      Cholesterol Reference Ranges  (U.S. Department of Health and Human Services ATP III Classifications)    Desirable          <200 mg/dL  Borderline High    200-239 mg/dL  High Risk          >240 mg/dL      Triglyceride Reference Ranges  (U.S. Department of Health and Human Services ATP III Classifications)    Normal           <150 mg/dL  Borderline High  150-199 mg/dL  High             200-499 mg/dL  Very High        >500 mg/dL    HDL Reference Ranges  (U.S. Department of Health and Human Services ATP III Classifications)    Low     <40 mg/dl (major risk factor for CHD)  High    >60 mg/dl ('negative' risk factor for CHD)        LDL Reference Ranges  (U.S. Department of Health and Human Services ATP III Classifications)    Optimal          <100 mg/dL  Near Optimal     100-129 mg/dL  Borderline High  130-159 mg/dL  High             160-189 mg/dL  Very High        >189 mg/dL    POC Glucose Once [[id number]]   (Abnormal) Collected: 03/01/23 1046    Specimen: Blood Updated: 03/01/23 1047     Glucose 132 mg/dL      Comment: Meter: [id number] : [id number] LOZANO             Imaging Results (Last 24 Hours)     Procedure Component Value Units Date/Time    CT Head Without Contrast [[id number]] Collected: 02/28/23 2246     Updated: 02/28/23 2255    Narrative:      CT HEAD WITHOUT CONTRAST     HISTORY: Altered mental status with right-sided weakness     TECHNIQUE:  Head CT includes axial imaging from the base of skull to the  vertex without intravenous contrast. Radiation dose reduction techniques  were utilized, including automated exposure control and exposure  modulation based on body size.     COMPARISON:CT head without contrast 12/07/2022, 07/19/2020     FINDINGS: There is mild-to-moderate chronic small vessel ischemic white  matter change and there is ventricular enlargement associated with  central atrophy. Just below the right lentiform nucleus there is a CSF  density area measuring 1 cm consistent with old infarction or prominent  perivascular space. There is no evidence for cerebral edema or mass  effect or shift of the midline structures. No extra-axial fluid  collection is observed. There is no evidence for significant change when  compared to previous imaging. There is severe right maxillary sinus  mucoperiosteal thickening.       Impression:      Moderate chronic small vessel ischemic white matter change  and atrophy. No interval change or evidence for acute intracranial  abnormality.     This report was finalized on 2/28/2023 10:51 PM by Dr. Leonard Harris M.D.       XR Chest 1 View [[id number]] Collected: 02/28/23 2150     Updated: 02/28/23 2154    Narrative:      CHEST SINGLE VIEW     HISTORY: Cough, weakness     COMPARISON: CT chest 12/07/2022, AP chest 07/20/2022.     FINDINGS: Heart size is enlarged. There is mild subsegmental linear  basilar opacities that has the appearance of basilar  atelectasis though  it would be difficult exclude mild basilar infiltrate. The mid to upper  lungs are clear. There is no pleural effusion or pneumothorax. Bones  appear osteopenic. Cardiac monitor and leads are present.       Impression:      Cardiomegaly. Mild predominantly linear basilar opacity  consistent with atelectasis though it be difficult to exclude a mild  basilar infiltrate. No evidence for pulmonary edema     This report was finalized on 2/28/2023 9:51 PM by Dr. Leonard Harris M.D.             Results for orders placed in visit on 08/07/15    SCANNED - ECHOCARDIOGRAM      ECG 12 Lead Altered Mental Status   Final Result   HEART RATE= 91  bpm   RR Interval= 659  ms   WY Interval= 198  ms   P Horizontal Axis= -41  deg   P Front Axis= 24  deg   QRSD Interval= 109  ms   QT Interval= 359  ms   QRS Axis= 4  deg   T Wave Axis= 71  deg   - NORMAL ECG -   Sinus rhythm   No change from previous tracing   Electronically Signed By: Corinne Stark ([facility]) 01-Mar-2023 10:37:45   Date and Time of Study: 2023-02-28 21:24:58           Assessment/Plan     Active Hospital Problems    Diagnosis  POA   • **COVID-19 [U07.1]  Yes   • PRACHI (acute kidney injury) (HCC) [N17.9]  Yes   • Anemia [D64.9]  Yes   • Macrocytosis [D75.89]  Yes   • Type 2 diabetes mellitus with hyperglycemia (HCC) [E11.65]  Yes   • Mild cognitive impairment [G31.84]  Yes   • Expressive aphasia [R47.01]  Yes   • Elevated troponin [R77.8]  Yes   • Essential hypertension [I10]  Yes      Resolved Hospital Problems   No resolved problems to display.       Mr. Yadav is a 82 y.o. 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      COVID-19: Confirmed on PCR.  Provide remdesivir per pharmacy dosing.  Oxygen saturation 96% on  room air.  Provide supplemental oxygen as needed.   10 days course dexamethasone given CRP 6.71.  Follow COVID-19 progression labs.      Expressive aphasia:  CT head negative for acute abnormality.  Neurology following.  CTA head, carotids, MRI brain without contrast.  SLP recommend NCS / cardiac diet. Lipid panel reviewed--LDL 82.      Elevated troponin: Denies chest pain.  Minimal elevation HS troponin.  No signs of ischemia on EKG.  Most likely due to mild PRACHI.  Continue aspirin, Plavix, statin for now.  Cardiology evaluated and does not recommend further cardiac testing at this time.      PRACHI (acute kidney injury) (HCC):  Serum creatinine 1.3 increased from 1.0 & improving with trend.  Suspect due to prerenal / dehydration.  Avoid nephrotoxins--HCTZ on hold.  Monitor labs.      Essential hypertension: BP acceptable acutely.  ACEi continued for now.  Monitor BP for changes.         Anemia: Chronic and similar compared to priors.  No overt signs of active bleeding.  Iron profile, folate ordered; ferritin 322.      Macrocytosis: Ordering serum vitamin B12.      Type 2 diabetes mellitus with hyperglycemia (HCC): A1c 6.0.  Provide correctional sliding scale for now.        Mild cognitive impairment: Complicating all problems.  Reportedly refusing Exelon patch given increased agitation.  Followed by Dr. Sapna Crowe in OP setting.      · I discussed the patient's findings and my recommendations with patient, RN, & Dr. Ge.    VTE Prophylaxis - enoxaparin  Code Status - No CPR       BETO Farley  [facility]  03/01/23  13:37 EST

## 2023-03-01 NOTE — PLAN OF CARE
Pt more confused this evening, he was unable to tell me where he was or why he is at hospital now, he has been very fidgety and picking at objects as well.  Wife has been at bedside throughout shift, bed alarm on, very unstable on feet. Contacted MRI this afternoon and was assured they would do MRI today, it has not been done at this time. Tylenol given for temp of 101.3.... provider aware.  Problem: Fall Injury Risk  Goal: Absence of Fall and Fall-Related Injury  Outcome: Ongoing, Not Progressing  Intervention: Identify and Manage Contributors  Intervention: Promote Injury-Free Environment  Goal: Absence of Fall and Fall-Related Injury  Outcome: Ongoing, Not Progressing  Intervention: Identify and Manage Contributors  Intervention: Promote Injury-Free Environment     Problem: Skin Injury Risk Increased  Goal: Skin Health and Integrity  Outcome: Ongoing, Not Progressing  Intervention: Optimize Skin Protection     Problem: Adjustment to Illness (Stroke, Ischemic/Transient Ischemic Attack)  Goal: Optimal Coping  Outcome: Ongoing, Not Progressing  Intervention: Support Psychosocial Response to Stroke     Problem: Bowel Elimination Impaired (Stroke, Ischemic/Transient Ischemic Attack)  Goal: Effective Bowel Elimination  Outcome: Ongoing, Not Progressing     Problem: Cerebral Tissue Perfusion (Stroke, Ischemic/Transient Ischemic Attack)  Goal: Optimal Cerebral Tissue Perfusion  Outcome: Ongoing, Not Progressing     Problem: Cognitive Impairment (Stroke, Ischemic/Transient Ischemic Attack)  Goal: Optimal Cognitive Function  Outcome: Ongoing, Not Progressing  Intervention: Optimize Cognitive Function     Problem: Communication Impairment (Stroke, Ischemic/Transient Ischemic Attack)  Goal: Improved Communication Skills  Outcome: Ongoing, Not Progressing  Intervention: Optimize Communication Skills     Problem: Functional Ability Impaired (Stroke, Ischemic/Transient Ischemic Attack)  Goal: Optimal Functional  Ability  Outcome: Ongoing, Not Progressing  Intervention: Optimize Functional Ability     Problem: Respiratory Compromise (Stroke, Ischemic/Transient Ischemic Attack)  Goal: Effective Oxygenation and Ventilation  Outcome: Ongoing, Not Progressing  Intervention: Optimize Oxygenation and Ventilation     Problem: Sensorimotor Impairment (Stroke, Ischemic/Transient Ischemic Attack)  Goal: Improved Sensorimotor Function  Outcome: Ongoing, Not Progressing  Intervention: Optimize Range of Motion, Motor Control and Function     Problem: Swallowing Impairment (Stroke, Ischemic/Transient Ischemic Attack)  Goal: Optimal Eating and Swallowing without Aspiration  Outcome: Ongoing, Not Progressing     Problem: Urinary Elimination Impaired (Stroke, Ischemic/Transient Ischemic Attack)  Goal: Effective Urinary Elimination  Outcome: Ongoing, Not Progressing  Intervention: Promote Effective Bladder Elimination     Problem: Adult Inpatient Plan of Care  Goal: Plan of Care Review  Outcome: Ongoing, Not Progressing  Goal: Patient-Specific Goal (Individualized)  Outcome: Ongoing, Not Progressing  Goal: Absence of Hospital-Acquired Illness or Injury  Outcome: Ongoing, Not Progressing  Intervention: Identify and Manage Fall Risk  Intervention: Prevent Skin Injury  Intervention: Prevent and Manage VTE (Venous Thromboembolism) Risk  Intervention: Prevent Infection  Goal: Optimal Comfort and Wellbeing  Outcome: Ongoing, Not Progressing  Intervention: Provide Person-Centered Care  Goal: Readiness for Transition of Care  Outcome: Ongoing, Not Progressing   Goal Outcome Evaluation:  Plan of Care Reviewed With: patient, spouse

## 2023-03-01 NOTE — THERAPY EVALUATION
Acute Care - Speech Language Pathology Initial Evaluation  [facility]     Patient Name: Albert Yadav  : 1940  MRN: [id number]  Today's Date: 3/1/2023               Admit Date: 2023     Visit Dx:    ICD-10-CM ICD-9-CM   1. COVID-19  U07.1 079.89   2. 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, left leg ([facility])   • Spinal stenosis, lumbar region, with neurogenic claudication   • Carotid stenosis, asymptomatic, bilateral   • Spondylolisthesis at L4-L5 level   • Lumbar stenosis   • Closed fracture of multiple ribs of right side, initial encounter   • Retroperitoneal tumor   • Closed fracture of one rib of right side, initial encounter   • Rheumatoid arthritis ([facility])   • Contusion of right hip   • COVID-19   • PRACHI (acute kidney injury) ([facility])   • Anemia   • Macrocytosis   • Type 2 diabetes mellitus with hyperglycemia ([facility])   • Mild cognitive impairment   • Expressive aphasia   • Elevated troponin     Past Medical History:   Diagnosis Date   • Anesthesia     MILD VIOLENT BEHAVIOR AFTER ANESTHESIA LEG SURGERY   • Arthritis of back 10 years ago   • At risk for sleep apnea     STOP BANG = 5   • Broken bones     arm, collar bone, ankle   • Carotid artery disorder ([facility])    • Cervical disc disorder ?   • Cholelithiasis     Gall bladder removed   • Diabetes mellitus ([facility])     Type 2   • Falls    • Fracture of ankle    • Fracture of wrist    • Fracture, clavicle 26 years ago   • Fracture, foot 26 year ago   • Groin rash     PT STATES LEFT GROIN AT TIMES   • History of transfusion    • Hyperlipidemia    • Hypertension    • Low back pain    • Low back strain ?   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   • Rheumatoid arthritis (HCC)     HANDS DIALLO   • Staph infection     2015  [facility] POST SX   • Tendency toward bleeding easily (HCC)     due to blood thinners     Past Surgical History:   Procedure Laterality Date   • ADRENALECTOMY Left 12/20/2022    Procedure: LAPAROSCOPIC EXCISION LEFT RETROPERITONEAL MASS;  Surgeon: Moy Vargas MD;  Location: [facility] OR;  Service: General;  Laterality: Left;   • ANGIOPLASTY FEMORAL ARTERY Left 11/07/2016    Procedure: ULTRA SOUND ACCESS RIGHT FEMORAL ARTERY, AIF BILATERAL RUNOFF, SELECTIVE CATHETERIZATION LEFT FEMORAL ARTERY.;  Surgeon: Errol Michael MD;  Location: [facility] OR 18/19;  Service:    • ANKLE OPEN REDUCTION INTERNAL FIXATION  1980   • ARTERIOVENOUS FISTULA/SHUNT SURGERY Left 12/13/2016    Procedure: LEFT ILEO-FEMORAL GORTEX GRAFT AND LEFT LEG ARERIOGRAM ;  Surgeon: Errol Michael MD;  Location: [facility] OR 18/19;  Service:    • CAROTID ENDARTERECTOMY Right 09/29/2020    Procedure: CAROTID ENDARTERECTOMY;  Surgeon: James Graham MD;  Location: [facility] OR;  Service: Vascular;  Laterality: Right;   • CHOLECYSTECTOMY     • EPIDURAL BLOCK     • EYE SURGERY Left     as child   • FRACTURE SURGERY Left     arm   • ILIAC ARTERY STENT Left 2020   • KNEE SURGERY Left     ORIF   • ORIF ANKLE FRACTURE Left        SLP Recommendation and Plan  SLP Diagnosis: mild-moderate, nonfluent, aphasia (03/01/23 0900)              Anticipated Discharge Disposition (SLP): unknown (03/01/23 0900)     Therapy Frequency (Swallow): PRN (03/01/23 0900)  Predicted Duration Therapy Intervention (Days): until discharge (03/01/23 0900)                          Plan of Care Reviewed With: patient (03/01/23 1009)  Outcome Evaluation: Patient seen for speech/language assessment and swallow  evaluation. No overt s/s of aspiration with thins, puree, mixed mech soft, or regular solids. Laryngeal elevation palpable and timely. Oral mech exam revealed slight assymetry on L, patient reports facial injury from fall when [age] years old. Speech/language testing revealed mild-moderate expressive aphasia. Functional performance with receptive tasks. Some difficulty with multi-step directions, but patient with diagnosis of mild cognitive impairment last year per wife. Anomia and poor attention noted this week when cough developed, patient also COVID+. (03/01/23 1009)      SLP EVALUATION (last 72 hours)     SLP SLC Evaluation     Row Name 03/01/23 0900                   General Information    Prior Level of Function-Communication cognitive-linguistic impairment  -        Patient's Goals for Discharge patient did not state  -        Family Goals for Discharge patient able to return to previous activities/roles  -           Comprehension Assessment/Intervention    Comprehension Assessment/Intervention Auditory Comprehension  -           Auditory Comprehension Assessment/Intervention    Auditory Comprehension (Communication) mild impairment;other (see comments)  vs baseline cog impairment  -        Able to Identify Objects/Pictures (Communication) body part;familiar objects;WFL  -SH        Answers Questions (Communication) yes/no;personal;simple;concrete;WFL;abstract;moderate impairment  -        Able to Follow Commands (Communication) 1-step;2-step;WFL;3-step;severe impairment  -SH        Narrative Discourse simple paragraph level;WFL  -SH           Expression Assessment/Intervention    Expression Assessment/Intervention verbal expression  -SH           Verbal Expression Assessment/Intervention    Verbal Expression mild impairment;moderate impairment  -SH        Automatic Speech (Communication) response to greeting;counting 1-20;days of week;WFL  -SH        Repetition sounds;words;phrases;WFL  -SH         Phrase Completion automatic/predictable;moderate impairment;other (see comments)  circumlocution  -        Responsive Naming simple;moderate impairment  -        Confrontational Naming high frequency;WFL;delayed responses  -        Spontaneous/Functional Words simple;WFL  -        Sentence Formulation simple;moderate impairment  -        Conversational Discourse/Fluency moderate impairment;severe impairment  -           SLP Evaluation Clinical Impressions    SLP Diagnosis mild-moderate;nonfluent;aphasia  -        Rehab Potential/Prognosis good  -           Communication Treatment Objective and Progress Goals (SLP)    Verbal Expression Treatment Objectives Verbal Expression Treatment Objectives (Group)  -           Verbal Expression Treatment Objectives    Word Retrieval Skills Selection word retrieval, SLP goal 1  -           Word Retrieval Skills Goal 1 (SLP)    Improve Word Retrieval Skills By Goal 1 (SLP) low frequency;responsive naming task;supplying an antonym;supplying a synonym;completing a divergent task;completing a convergent task;80%;with moderate cues (50-74%)  -        Time Frame (Word Retrieval Goal 1, SLP) by discharge  -              User Key  (r) = Recorded By, (t) = Taken By, (c) = Cosigned By    Initials Name Effective Dates    Stephanie Sweeney MS CCC-SLP 06/16/21 -                    EDUCATION  The patient has been educated in the following areas:     Communication Impairment.           SLP GOALS     Row Name 03/01/23 0900             Word Retrieval Skills Goal 1 (SLP)    Improve Word Retrieval Skills By Goal 1 (SLP) low frequency;responsive naming task;supplying an antonym;supplying a synonym;completing a divergent task;completing a convergent task;80%;with moderate cues (50-74%)  -      Time Frame (Word Retrieval Goal 1, SLP) by discharge  -            User Key  (r) = Recorded By, (t) = Taken By, (c) = Cosigned By    Initials Name Provider Type    Stephanie Sweeney  MS CCC-SLP Speech and Language Pathologist                        Time Calculation:      Time Calculation- SLP     Row Name 23 1419             Time Calculation- SLP    SLP Start Time 0900  -SH      SLP Received On 23  -         Untimed Charges    03312-EU Eval Speech and Production w/ Language Minutes --  60  -SH      33179-EO Eval Oral Pharyng Swallow Minutes 60  -SH         Total Minutes    Untimed Charges Total Minutes 60  -SH       Total Minutes 60  -SH            User Key  (r) = Recorded By, (t) = Taken By, (c) = Cosigned By    Initials Name Provider Type     Stephanie Pulido MS CCC-SLP Speech and Language Pathologist                Therapy Charges for Today     Code Description Service Date Service Provider Modifiers Qty    32366526199 HC ST EVAL SPEECH AND PROD W LANG  4 3/1/2023 Stephanie Pulido MS CCC-SLP GN 1    [id number] HC ST EVAL ORAL PHARYNG SWALLOW 4 3/1/2023 Stephanie Pulido MS CCC-SLP GN 1                     MS NAKIA Eng  3/1/2023 and Acute Care - Speech Language Pathology   Swallow Initial Evaluation [facility]     Patient Name: Albert Yadav  : 1940  MRN: [id number]  Today's Date: 3/1/2023               Admit Date: 2023    Visit Dx:     ICD-10-CM ICD-9-CM   1. COVID-19  U07.1 079.89   2. 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, left leg (HCC)   • Spinal stenosis, lumbar region, with neurogenic claudication   • Carotid stenosis, asymptomatic, bilateral   • Spondylolisthesis at L4-L5 level   • Lumbar stenosis   • Closed fracture of multiple ribs of right side, initial encounter   • Retroperitoneal tumor   • Closed fracture of  one rib of right side, initial encounter   • Rheumatoid arthritis (HCC)   • Contusion of right hip   • COVID-19   • PRACHI (acute kidney injury) (HCC)   • Anemia   • Macrocytosis   • Type 2 diabetes mellitus with hyperglycemia (HCC)   • Mild cognitive impairment   • Expressive aphasia   • Elevated troponin     Past Medical History:   Diagnosis Date   • Anesthesia     MILD VIOLENT BEHAVIOR AFTER ANESTHESIA LEG SURGERY   • Arthritis of back 10 years ago   • At risk for sleep apnea     STOP BANG = 5   • Broken bones     arm, collar bone, ankle   • Carotid artery disorder (HCC)    • Cervical disc disorder ?   • Cholelithiasis 1978    Gall bladder removed   • Diabetes mellitus (HCC) 2005    Type 2   • Falls    • Fracture of ankle 1972   • Fracture of wrist 1947   • Fracture, clavicle 26 years ago   • Fracture, foot 26 year ago   • Groin rash     PT STATES LEFT GROIN AT TIMES   • History of transfusion    • Hyperlipidemia    • Hypertension    • Low back pain    • Low back strain ?   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   • Rheumatoid arthritis (HCC)     HANDS DIALLO   • Staph infection     2015  [facility] POST SX   • Tendency toward bleeding easily (HCC)     due to blood thinners     Past Surgical History:   Procedure Laterality Date   • ADRENALECTOMY Left 12/20/2022    Procedure: LAPAROSCOPIC EXCISION LEFT RETROPERITONEAL MASS;  Surgeon: Moy Vargas MD;  Location: [facility] OR;  Service: General;  Laterality: Left;   • ANGIOPLASTY FEMORAL ARTERY Left 11/07/2016    Procedure: ULTRA SOUND ACCESS RIGHT FEMORAL ARTERY, AIF BILATERAL RUNOFF, SELECTIVE CATHETERIZATION LEFT FEMORAL ARTERY.;  Surgeon: Errol Michael MD;  Location: [facility] OR 18/19;  Service:    • ANKLE OPEN REDUCTION INTERNAL FIXATION  1980   • ARTERIOVENOUS FISTULA/SHUNT SURGERY Left 12/13/2016     Procedure: LEFT ILEO-FEMORAL GORTEX GRAFT AND LEFT LEG ARERIOGRAM ;  Surgeon: Errol Michael MD;  Location: [facility] HYBRID OR 18/19;  Service:    • CAROTID ENDARTERECTOMY Right 09/29/2020    Procedure: CAROTID ENDARTERECTOMY;  Surgeon: James Graham MD;  Location: [facility] MAIN OR;  Service: Vascular;  Laterality: Right;   • CHOLECYSTECTOMY     • EPIDURAL BLOCK     • EYE SURGERY Left     as child   • FRACTURE SURGERY Left     arm   • ILIAC ARTERY STENT Left 2020   • KNEE SURGERY Left     ORIF   • ORIF ANKLE FRACTURE Left        SLP Recommendation and Plan  SLP Swallowing Diagnosis: swallow WFL/no suspected pharyngeal impairment (03/01/23 0900)  SLP Diet Recommendation: regular textures, thin liquids (03/01/23 0900)  Recommended Precautions and Strategies: upright posture during/after eating (03/01/23 0900)  SLP Rec. for Method of Medication Administration: meds whole, as tolerated (03/01/23 0900)     Monitor for Signs of Aspiration: yes, notify SLP if any concerns (03/01/23 0900)  Recommended Diagnostics: SLE/Cog/Motor Speech Evaluation (03/01/23 0900)     Anticipated Discharge Disposition (SLP): unknown (03/01/23 0900)     Therapy Frequency (Swallow): PRN (03/01/23 0900)  Predicted Duration Therapy Intervention (Days): until discharge (03/01/23 0900)                                        Plan of Care Reviewed With: patient  Outcome Evaluation: Patient seen for speech/language assessment and swallow evaluation. No overt s/s of aspiration with thins, puree, mixed mech soft, or regular solids. Laryngeal elevation palpable and timely. Oral mech exam revealed slight assymetry on L, patient reports facial injury from fall when [age] years old. Speech/language testing revealed mild-moderate expressive aphasia. Functional performance with receptive tasks. Some difficulty with multi-step directions, but patient with diagnosis of mild cognitive impairment last year per wife. Anomia and poor attention noted this  week when cough developed, patient also COVID+.      SWALLOW EVALUATION (last 72 hours)     SLP Adult Swallow Evaluation     Row Name 03/01/23 0900                   Rehab Evaluation    Document Type evaluation  -        Patient/Family/Caregiver Comments/Observations wife present and served as primary historian  -        Patient Effort excellent  -           General Information    Patient Profile Reviewed yes  -        Pertinent History Of Current Problem Rule out stroke, hx mild cognitive impairment, COVID +, recurrent falls with no reported head injury  -        Current Method of Nutrition regular textures;thin liquids  -        Precautions/Limitations, Vision WFL with corrective lenses  -        Precautions/Limitations, Hearing hearing impairment, bilaterally  -        Prior Level of Function-Communication cognitive-linguistic impairment  -        Prior Level of Function-Swallowing no diet consistency restrictions  -        Plans/Goals Discussed with patient;family;agreed upon  [facility]        Barriers to Rehab medically complex  -        Patient's Goals for Discharge patient did not state  -        Family Goals for Discharge patient able to return to all previous activities/roles  -           Pain    Additional Documentation Pain Scale: FACES Pre/Post-Treatment (Group)  -           Pain Scale: FACES Pre/Post-Treatment    Pain: FACES Scale, Pretreatment 0-->no hurt  -           Oral Motor Structure and Function    Dentition Assessment natural, present and adequate  -           Oral Musculature and Cranial Nerve Assessment    Oral Motor General Assessment generalized oral motor weakness;oral labial or buccal impairment  -           Clinical Swallow Eval    Clinical Swallow Evaluation Summary No overt s/s of aspiration with thins, puree, mixed mech soft, or regular solids. Laryngeal elevation palpable and timely. Oral mech exam revealed slight assymetry on L, patient reports facial  injury from fall when [age] years old.  -           SLP Evaluation Clinical Impression    SLP Swallowing Diagnosis swallow WFL/no suspected pharyngeal impairment  -           Recommendations    Therapy Frequency (Swallow) PRN  -        Predicted Duration Therapy Intervention (Days) until discharge  -        SLP Diet Recommendation regular textures;thin liquids  -        Recommended Diagnostics SLE/Cog/Motor Speech Evaluation  -        Recommended Precautions and Strategies upright posture during/after eating  -        Oral Care Recommendations Oral Care BID/PRN  -        SLP Rec. for Method of Medication Administration meds whole;as tolerated  -        Monitor for Signs of Aspiration yes;notify SLP if any concerns  -        Anticipated Discharge Disposition (SLP) unknown  -              User Key  (r) = Recorded By, (t) = Taken By, (c) = Cosigned By    Initials Name Effective Dates    Stephanie Sweeney MS CCC-SLP 06/16/21 -                 EDUCATION  The patient has been educated in the following areas:   Dysphagia (Swallowing Impairment).        SLP GOALS     Row Name 03/01/23 0900             Word Retrieval Skills Goal 1 (SLP)    Improve Word Retrieval Skills By Goal 1 (SLP) low frequency;responsive naming task;supplying an antonym;supplying a synonym;completing a divergent task;completing a convergent task;80%;with moderate cues (50-74%)  -      Time Frame (Word Retrieval Goal 1, SLP) by discharge  -            User Key  (r) = Recorded By, (t) = Taken By, (c) = Cosigned By    Initials Name Provider Type    Stephanie Sweeney MS CCC-SLP Speech and Language Pathologist                   Time Calculation:    Time Calculation- SLP     Row Name 03/01/23 1419             Time Calculation- [facility]    SLP Start Time 0900  -      SLP Received On 03/01/23  -         Untimed Charges    92116-WW Eval Speech and Production w/ Language Minutes --  60  -      53497-ZM Eval Oral Pharyng Swallow Minutes  60  -SH         Total Minutes    Untimed Charges Total Minutes 60  -SH       Total Minutes 60  -SH            User Key  (r) = Recorded By, (t) = Taken By, (c) = Cosigned By    Initials Name Provider Type    Stephanie Sweeney MS CCC-SLP Speech and Language Pathologist                Therapy Charges for Today     Code Description Service Date Service Provider Modifiers Qty    51817398312 HC ST EVAL SPEECH AND PROD W LANG  4 3/1/2023 Stephanie Pulido MS CCC-SLP GN 1    [id number]  ST EVAL ORAL PHARYNG SWALLOW 4 3/1/2023 Stephanie Pulido MS CCC-SLP GN 1               Stephanie Pulido MS CCC-SLP  3/1/2023

## 2023-03-01 NOTE — PLAN OF CARE
Goal Outcome Evaluation:  Plan of Care Reviewed With: patient, spouse           Outcome Evaluation: Pt admitted to [facility] 2/2 to difficulty walking, AMS and aphasia. MRI pending, neuro states likely a left MCA or left thalamic stroke. Also + COVID 19. Pmhx includes hypertension, diabetes, hyperlipidemia, mild cognitive impairment. Multiple falls reported at home. On PT exam pt required Mod A x 2 rwx standing, a couple shuffled steps to HOB, gross weakness. Pt may benefit from skilled PT acutely to address functional deficits and assist w DC Planning, anticipate SNU.

## 2023-03-01 NOTE — DISCHARGE PLACEMENT REQUEST
"Albert Sheridan (82 y.o. Male)     Date of Birth   1940    Social Security Number       Address   [address] [place] [address]    Home Phone   [phone]    MRN   [id number]       Taoist   None    Marital Status                               Admission Date   2/28/23    Admission Type   Emergency    Admitting Provider   Kenrick Ge MD    Attending Provider   Kenrick Ge MD    Department, Room/Bed   [address], [address]/1       Discharge Date       Discharge Disposition       Discharge Destination                               Attending Provider: Kenrick Ge MD    Allergies: No Known Allergies    Isolation: Enh Drop/Con   Infection: COVID (confirmed) (02/28/23)   Code Status: No CPR    Ht: 182.9 cm (72\")   Wt: 79.4 kg (175 lb)    Admission Cmt: None   Principal Problem: COVID-19 [U07.1]                 Active Insurance as of 2/28/2023     Primary Coverage     Payor Plan Insurance Group Employer/Plan Group    [facility] MEDICARE REPLACEMENT [facility] MEDICARE REPLACEMENT [id number]     Payor Plan Address Payor Plan Phone Number Payor Plan Fax Number Effective Dates    [address] [address]   1/1/2016 - None Entered    [facility] [id number]       Subscriber Name Subscriber Birth Date Member ID       ALBERT SHERIDAN 1940 [id number]                 Emergency Contacts      (Rel.) Home Phone Work Phone Mobile Phone    SheridanBushra bruno (Spouse) [phone] -- [phone]    LeifCristian (Son) [phone] -- [phone]    Cyndy Jiang (Daughter) -- -- [phone]              "

## 2023-03-01 NOTE — PROGRESS NOTES
[facility] Acute Rehab    Stroke screening per stroke order set via Epic. Please note that this is a screening. Noted pt is Covid + and would need to be at least 10 days out from diagnosis with resolving symptoms and out of isolation by [facility] Infection Control before being considered for Acute Rehab.      If you feel that this patient is or will be appropriate for Acute Rehab, please contact the Admission Office at [phone] and a full evaluation will be initiated.     Thank You    Adia Sharp RN  Acute Rehab Admission Nurse

## 2023-03-01 NOTE — CONSULTS
Stroke Consult Note    Patient Name: Albert Yadav   MRN: [id number]  Age: 82 y.o.  Sex: male  : 1940    Primary Care Physician: Valerie Burnham MD  Referring Physician:  Lia Albarran MD    Handedness: Right  Race: White    Chief Complaint/Reason for Consultation: Confusion    Subjective .  HPI: 82-year-old right-handed white male with known diagnosis of hypertension, diabetes, hyperlipidemia, mild cognitive impairment, who has had 3-4 falls in the last 6 months or so, who was brought in yesterday after he was found to be confused and had difficulty walking.  His symptoms have improved, but is not back to his baseline.  Patient or family did not notice any focal weakness/numbness/facial droop.  Denies any history of stroke, but he does have a history of left CEA.  He was noted to have some right sided weakness by the hospitalist, for which we were consulted.    Last Known Normal Date/Time: 2023, 9 AM EST     Review of Systems   Musculoskeletal:        Difficulty walking   Neurological: Positive for speech difficulty and weakness.   Psychiatric/Behavioral: Positive for confusion.   All other systems reviewed and are negative.     Past Medical History:   Diagnosis Date   • Anesthesia     MILD VIOLENT BEHAVIOR AFTER ANESTHESIA LEG SURGERY   • Arthritis of back 10 years ago   • At risk for sleep apnea     STOP BANG = 5   • Broken bones     arm, collar bone, ankle   • Carotid artery disorder (HCC)    • Cervical disc disorder ?   • Cholelithiasis     Gall bladder removed   • Diabetes mellitus (HCC)     Type 2   • Falls    • Fracture of ankle    • Fracture of wrist    • Fracture, clavicle 26 years ago   • Fracture, foot 26 year ago   • Groin rash     PT STATES LEFT GROIN AT TIMES   • History of transfusion    • Hyperlipidemia    • Hypertension    • Low back pain    • Low back strain ?   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   • Rheumatoid arthritis (HCC)     HANDS DIALLO   • Staph infection     2015  [facility] POST SX   • Tendency toward bleeding easily (HCC)     due to blood thinners     Past Surgical History:   Procedure Laterality Date   • ADRENALECTOMY Left 12/20/2022    Procedure: LAPAROSCOPIC EXCISION LEFT RETROPERITONEAL MASS;  Surgeon: Moy Vargas MD;  Location: [facility];  Service: General;  Laterality: Left;   • ANGIOPLASTY FEMORAL ARTERY Left 11/07/2016    Procedure: ULTRA SOUND ACCESS RIGHT FEMORAL ARTERY, AIF BILATERAL RUNOFF, SELECTIVE CATHETERIZATION LEFT FEMORAL ARTERY.;  Surgeon: Errol Michael MD;  Location: [facility] OR 18/19;  Service:    • ANKLE OPEN REDUCTION INTERNAL FIXATION  1980   • ARTERIOVENOUS FISTULA/SHUNT SURGERY Left 12/13/2016    Procedure: LEFT ILEO-FEMORAL GORTEX GRAFT AND LEFT LEG ARERIOGRAM ;  Surgeon: Errol Michael MD;  Location: [facility] OR 18/19;  Service:    • CAROTID ENDARTERECTOMY Right 09/29/2020    Procedure: CAROTID ENDARTERECTOMY;  Surgeon: James Graham MD;  Location: [facility];  Service: Vascular;  Laterality: Right;   • CHOLECYSTECTOMY     • EPIDURAL BLOCK     • EYE SURGERY Left     as child   • FRACTURE SURGERY Left     arm   • ILIAC ARTERY STENT Left 2020   • KNEE SURGERY Left     ORIF   • ORIF ANKLE FRACTURE Left      Family History   Problem Relation Age of Onset   • Diabetes Mother    • Cancer Mother         Cancer liver   • Cancer Father         Diabetic   • Diabetes Father    • Heart disease Maternal Grandmother    • Heart disease Maternal Grandfather    • Heart disease Paternal Grandmother    • Heart disease Paternal Grandfather    • Alcohol abuse Son         Alcoholic   • Cancer Brother    • Malig Hyperthermia Neg Hx      Social History     Socioeconomic History   • Marital status:    Tobacco Use   • Smoking status: Former      Packs/day: 1.00     Years: 20.00     Pack years: 20.00     Types: Cigarettes     Start date: 1968     Quit date: 1989     Years since quittin.7   • Smokeless tobacco: Never   • Tobacco comments:     Did'nt inhale   Vaping Use   • Vaping Use: Never used   Substance and Sexual Activity   • Alcohol use: Not Currently     Alcohol/week: 1.0 standard drink     Types: 1 Shots of liquor per week     Comment: 1 drink per day at most   • Drug use: Never   • Sexual activity: Not Currently     Partners: Female     Birth control/protection: Condom     No Known Allergies  Prior to Admission medications    Medication Sig Start Date End Date Taking? Authorizing Provider   acetaminophen (TYLENOL) 650 MG 8 hr tablet Take 1 tablet by mouth Every 8 (Eight) Hours As Needed for Mild Pain.   Yes Jude Raymundo MD   atorvastatin (LIPITOR) 40 MG tablet Take 1 tablet by mouth Every Evening. 8/10/15  Yes Jude Raymundo MD   cetirizine (ZyrTEC) 10 MG tablet Take 1 tablet by mouth Every Morning. 8/11/15  Yes Jude Raymundo MD   clopidogrel (PLAVIX) 75 MG tablet Take 1 tablet by mouth Every Morning. TO STOP 7 DAYS BEFORE SURGERY   Yes Jude Raymundo MD   folic acid (FOLVITE) 1 MG tablet Take 1 tablet by mouth Daily.   Yes Jude Raymundo MD   hydrochlorothiazide (HYDRODIURIL) 50 MG tablet Take 1 tablet by mouth Every Morning. 8/11/15  Yes Jude Raymundo MD   lisinopril (PRINIVIL,ZESTRIL) 40 MG tablet Take 1 tablet by mouth Every Evening. 8/10/15  Yes Jude Raymundo MD   ondansetron (ZOFRAN) 4 MG tablet Take 1 tablet by mouth Every 6 (Six) Hours As Needed for Nausea or Vomiting. 22  Yes Lea Auguste APRN   triamcinolone (KENALOG) 0.025 % cream 1 application As Needed. 20  Yes Jude Raymundo MD   melatonin 5 MG tablet tablet Take 1 tablet by mouth Every Night. 8/10/15   Jude Raymundo MD   methotrexate 2.5 MG tablet Take 1 tablet by mouth.   takes 2.5mg 10 tabs all together for total of 25mg    Jude Raymundo MD   rivastigmine (EXELON) 1.5 MG capsule Take 1 capsule by mouth 2 (Two) Times a Day.    Jude Raymundo MD   Sennosides (SENNA LAX PO) Take 1 tablet by mouth 2 (Two) Times a Day.    Provider, Historical, MD   sennosides-docusate (PERICOLACE) 8.6-50 MG per tablet Take 2 tablets by mouth 2 (Two) Times a Day. 12/9/22   Lea Auguste APRN   traZODone (DESYREL) 50 MG tablet Take 1 tablet by mouth Every Night. 8/10/15   Jude Raymundo MD             Objective     Temp:  [98.3 °F (36.8 °C)-99.6 °F (37.6 °C)] 98.3 °F (36.8 °C)  Heart Rate:  [76-95] 89  Resp:  [16-18] 18  BP: (130-151)/(47-80) 134/70  Neurological Exam  Mental Status  Awake and alert. Oriented only to person and place. Speech is normal. Language: No obvious language deficit.    Cranial Nerves  CN II: Visual fields full to confrontation.  CN III, IV, VI: He does have left esotropia, but no obvious gaze deviation.  CN V: Facial sensation is normal.  CN VII: Full and symmetric facial movement.  CN IX, X: Palate elevates symmetrically  CN XI: Shoulder shrug strength is normal.  CN XII: Tongue midline without atrophy or fasciculations.    Motor  Normal muscle bulk throughout. No fasciculations present. Normal muscle tone.  Right upper and lower extremities 4/5, left upper and lower extremities 5/5.    Sensory  Light touch is normal in upper and lower extremities.     Coordination    No obvious dysmetria.    Gait    Not assessed.      Physical Exam  Vitals and nursing note reviewed.   Constitutional:       General: He is awake.      Appearance: Normal appearance. He is obese.   HENT:      Head: Normocephalic and atraumatic.   Cardiovascular:      Rate and Rhythm: Normal rate and regular rhythm.   Pulmonary:      Effort: Pulmonary effort is normal. No respiratory distress.   Musculoskeletal:      Cervical back: Normal range of motion and neck supple.   Neurological:       Mental Status: He is alert.   Psychiatric:         Mood and Affect: Mood normal.         Speech: Speech normal.         Behavior: Behavior normal.         Acute Stroke Data    IV Thrombolytic (TPA/Tenecteplase) Inclusion / Exclusion Criteria    Time: 14:36 EST  Person Administering Scale: Misbah Presley MD    Inclusion Criteria  [x]   18 years of age or greater   []   Onset of symptoms < 4.5 hours before beginning treatment (stroke onset = time patient was last seen well or without symptoms).   [x]   Diagnosis of acute ischemic stroke causing measurable disabling deficit (Complete Hemianopia, Any Aphasia, Visual or Sensory Extinction, Any weakness limiting sustained effort against gravity)   [x]   Any remaining deficit considered potentially disabling in view of patient and practitioner   Exclusion criteria (Do not proceed with Alteplase if any are checked under exclusion criteria)  [x]   Onset unknown or GREATER than 4.5 hours   []   ICH on CT/MRI   []   CT demonstrates hypodensity representing acute or subacute infarct   []   Significant head trauma or prior stroke in the previous 3 months   []   Symptoms suggestive of subarachnoid hemorrhage   []   History of un-ruptured intracranial aneurysm GREATER than 10 mm   []   Recent intracranial or intraspinal surgery within the last 3 months   []   Arterial puncture at a non-compressible site in the previous 7 days   []   Active internal bleeding   []   Acute bleeding tendency   []   Platelet count LESS than 100,000 for known hematological diseases such as leukemia, thrombocytopenia or chronic cirrhosis   []   Current use of anticoagulant with INR GREATER than 1.7 or PT GREATER than 15 seconds, aPTT GREATER than 40 seconds   []   Heparin received within 48 hours, resulting in abnormally elevated aPTT GREATER than upper limit of normal   []   Current use of direct thrombin inhibitors or direct factor Xa inhibitors in the past 48 hours   []   Elevated blood pressure  refractory to treatment (systolic GREATER than 185 mm/Hg or diastolic  GREATER than 110 mm/Hg   []   Suspected infective endocarditis and aortic arch dissection   []   Current use of therapeutic treatment dose of low-molecular-weight heparin (LMWH) within the previous 24 hours   []   Structural GI malignancy or bleed   Relative exclusion for all patients  []   Only minor nondisabling symptoms   []   Pregnancy   []   Seizure at onset with postictal residual neurological impairments   []   Major surgery or previous trauma within past 14 days   []   History of previous spontaneous ICH, intracranial neoplasm, or AV malformation   []   Postpartum (within previous 14 days)   []   Recent GI or urinary tract hemorrhage (within previous 21 days)   []   Recent acute MI (within previous 3 months)   []   History of unruptured intracranial aneurysm LESS than 10 mm   []   History of ruptured intracranial aneurysm   []   Blood glucose LESS than 50 mg/dL (2.7 mmol/L)   []   Dural puncture within the last 7 days   []   Known GREATER than 10 cerebral microbleeds   Additional exclusions for patients with symptoms onset between 3 and 4.5 hours.  []   Age > 80.   []   On any anticoagulants regardless of INR  >>> Warfarin (Coumadin), Heparin, Enoxaparin (Lovenox), fondaparinux (Arixtra), bivalirudin (Angiomax), Argatroban, dabigatran (Pradaxa), rivaroxaban (Xarelto), or apixaban (Eliquis)   []   Severe stroke (NIHSS > 25).   []   History of BOTH diabetes and previous ischemic stroke.   []   The risks and benefits have been discussed with the patient or family related to the administration of IV alteplase for stroke symptoms.   []   I have discussed and reviewed the patient's case and imaging with the attending prior to IV Thrombolytic (TPA/Tenecteplase).    Time Thrombolytic administered       Hospital Meds:  Scheduled- aspirin, 81 mg, Oral, Daily  atorvastatin, 80 mg, Oral, Nightly  cetirizine, 10 mg, Oral, QAM  clopidogrel, 75 mg,  Oral, QAM  enoxaparin, 40 mg, Subcutaneous, Daily  folic acid, 1 mg, Oral, Daily  insulin lispro, 0-7 Units, Subcutaneous, TID AC  lisinopril, 40 mg, Oral, Q PM  [START ON 3/2/2023] remdesivir, 100 mg, Intravenous, Q24H  sodium chloride, 10 mL, Intravenous, Q12H  traZODone, 50 mg, Oral, Nightly      Infusions-     PRNs- •  acetaminophen **OR** acetaminophen  •  albuterol sulfate HFA  •  benzonatate  •  dextromethorphan polistirex ER  •  dextrose  •  dextrose  •  glucagon (human recombinant)  •  guaifenesin  •  ipratropium-albuterol  •  ondansetron  •  [COMPLETED] Insert Peripheral IV **AND** sodium chloride  •  sodium chloride  •  sodium chloride    Functional Status Prior to Current Stroke/Ashley Score: 0    NIH Stroke Scale  Time: 14:36 EST  Person Administering Scale: Misbah Presley MD    1a  Level of consciousness: 0=alert; keenly responsive   1b. LOC questions:  1=Performs one task correctly   1c. LOC commands: 0=Performs both tasks correctly   2.  Best Gaze: 0=normal   3.  Visual: 0=No visual loss   4. Facial Palsy: 0=Normal symmetric movement   5a.  Motor left arm: 0=No drift, limb holds 90 (or 45) degrees for full 10 seconds   5b.  Motor right arm: 1=Drift, limb holds 90 (or 45) degrees but drifts down before full 10 seconds: does not hit bed   6a. motor left le=No drift, limb holds 90 (or 45) degrees for full 10 seconds   6b  Motor right le=Drift, limb holds 90 (or 45) degrees but drifts down before full 10 seconds: does not hit bed   7. Limb Ataxia: 0=Absent   8.  Sensory: 0=Normal; no sensory loss   9. Best Language:  1=Mild to moderate aphasia; some obvious loss of fluency or facility of comprehension without significant limitation on ideas expressed or form of expression.   10. Dysarthria: 0=Normal   11. Extinction and Inattention: 0=No abnormality    Total:   4       Results Reviewed:  I have personally reviewed current lab, radiology, and data   CT head shows no obvious acute change, no  hemorrhage, diffuse atrophy of the brain  Labs were reviewed    Results for orders placed in visit on 08/07/15    SCANNED - ECHOCARDIOGRAM            Assessment/Plan:      1. Right-sided weakness and aphasia.  Patient likely has small left MCA or left thalamic stroke, causing his symptoms.  Will get MRI brain, CT angiogram of head and neck, 2D echocardiogram.  If he does have embolic looking stroke, will recommend DAVIS as well.  In the meantime, start him on aspirin 81 mg, Plavix 75 mg and Lipitor 80 mg daily for secondary stroke prevention.  Patient is also diagnosed with COVID, and is being treated with remdesivir.  Will consider anticoagulation based on MRI results.  2. COVID-19 detected.  Patient has been started on remdesivir.  Further management as per the primary team.  3. Essential hypertension.  Normal blood pressure goals no need for permissive hypertension.  4. Diabetes mellitus type 2 controlled.  Maintain normoglycemia.  5. Increase activity as tolerated.    Case was discussed with patient, his wife, nursing and the primary team.  Thank you for the consult.          Misbah Presley MD  March 1, 2023  14:36 EST

## 2023-03-01 NOTE — PLAN OF CARE
Goal Outcome Evaluation:  Plan of Care Reviewed With: patient           Outcome Evaluation: Patient seen for speech/language assessment and swallow evaluation. No overt s/s of aspiration with thins, puree, mixed mech soft, or regular solids. Laryngeal elevation palpable and timely. Oral mech exam revealed slight assymetry on L, patient reports facial injury from fall when [age] years old. Speech/language testing revealed mild-moderate expressive aphasia. Functional performance with receptive tasks. Some difficulty with multi-step directions, but patient with diagnosis of mild cognitive impairment last year per wife. Anomia and poor attention noted this week when cough developed, patient also COVID+. SLP recs speech therapy at next level of care, if aphasia resolves, patient may still benefit from cognitive therapy. Patient and wife in agreement with recs.       Patient was not wearing a face mask during this therapy encounter. Therapist used appropriate personal protective equipment including gown, eye protection, mask and gloves.  Mask used was N95/duckbill. Appropriate PPE was worn during the entire therapy session. Hand hygiene was completed before and after therapy session. Patient is in enhanced droplet precautions.

## 2023-03-02 ENCOUNTER — APPOINTMENT (OUTPATIENT)
Dept: CT IMAGING | Facility: HOSPITAL | Age: 83
DRG: 177 | End: 2023-03-02
Payer: MEDICARE

## 2023-03-02 PROBLEM — K04.7 TOOTH ABSCESS: Status: ACTIVE | Noted: 2023-03-02

## 2023-03-02 PROBLEM — I63.9 ACUTE CVA (CEREBROVASCULAR ACCIDENT) (HCC): Status: ACTIVE | Noted: 2023-03-02
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
## 2023-03-02 LAB
ALBUMIN SERPL-MCNC: 3.1 G/DL (ref 3.5–5.2)
ALBUMIN/GLOB SERPL: 1.1 G/DL
ALP SERPL-CCNC: 103 U/L (ref 39–117)
ALT SERPL W P-5'-P-CCNC: 25 U/L (ref 1–41)
ANION GAP SERPL CALCULATED.3IONS-SCNC: 13 MMOL/L (ref 5–15)
AST SERPL-CCNC: 27 U/L (ref 1–40)
BASOPHILS # BLD AUTO: 0.01 10*3/MM3 (ref 0–0.2)
BASOPHILS NFR BLD AUTO: 0.1 % (ref 0–1.5)
BILIRUB SERPL-MCNC: 0.7 MG/DL (ref 0–1.2)
BUN SERPL-MCNC: 20 MG/DL (ref 8–23)
BUN/CREAT SERPL: 16.9 (ref 7–25)
CALCIUM SPEC-SCNC: 8.4 MG/DL (ref 8.6–10.5)
CHLORIDE SERPL-SCNC: 98 MMOL/L (ref 98–107)
CO2 SERPL-SCNC: 21 MMOL/L (ref 22–29)
CREAT SERPL-MCNC: 1.18 MG/DL (ref 0.76–1.27)
CRP SERPL-MCNC: 8.89 MG/DL (ref 0–0.5)
D-LACTATE SERPL-SCNC: 1.2 MMOL/L (ref 0.5–2)
DEPRECATED RDW RBC AUTO: 44.4 FL (ref 37–54)
EGFRCR SERPLBLD CKD-EPI 2021: 60.8 ML/MIN/1.73
EOSINOPHIL # BLD AUTO: 0 10*3/MM3 (ref 0–0.4)
EOSINOPHIL NFR BLD AUTO: 0 % (ref 0.3–6.2)
ERYTHROCYTE [DISTWIDTH] IN BLOOD BY AUTOMATED COUNT: 13.2 % (ref 12.3–15.4)
FOLATE SERPL-MCNC: 12.1 NG/ML (ref 4.78–24.2)
GLOBULIN UR ELPH-MCNC: 2.9 GM/DL
GLUCOSE SERPL-MCNC: 103 MG/DL (ref 65–99)
HCT VFR BLD AUTO: 35.6 % (ref 37.5–51)
HGB BLD-MCNC: 12.3 G/DL (ref 13–17.7)
IMM GRANULOCYTES # BLD AUTO: 0.05 10*3/MM3 (ref 0–0.05)
IMM GRANULOCYTES NFR BLD AUTO: 0.6 % (ref 0–0.5)
IRON 24H UR-MRATE: 18 MCG/DL (ref 59–158)
IRON SATN MFR SERPL: 5 % (ref 20–50)
LYMPHOCYTES # BLD AUTO: 0.55 10*3/MM3 (ref 0.7–3.1)
LYMPHOCYTES NFR BLD AUTO: 6.7 % (ref 19.6–45.3)
MCH RBC QN AUTO: 32.6 PG (ref 26.6–33)
MCHC RBC AUTO-ENTMCNC: 34.6 G/DL (ref 31.5–35.7)
MCV RBC AUTO: 94.4 FL (ref 79–97)
MONOCYTES # BLD AUTO: 0.91 10*3/MM3 (ref 0.1–0.9)
MONOCYTES NFR BLD AUTO: 11 % (ref 5–12)
NEUTROPHILS NFR BLD AUTO: 6.73 10*3/MM3 (ref 1.7–7)
NEUTROPHILS NFR BLD AUTO: 81.6 % (ref 42.7–76)
NRBC BLD AUTO-RTO: 0 /100 WBC (ref 0–0.2)
PLATELET # BLD AUTO: 178 10*3/MM3 (ref 140–450)
PMV BLD AUTO: 10.7 FL (ref 6–12)
POTASSIUM SERPL-SCNC: 3.7 MMOL/L (ref 3.5–5.2)
PROT SERPL-MCNC: 6 G/DL (ref 6–8.5)
RBC # BLD AUTO: 3.77 10*6/MM3 (ref 4.14–5.8)
SODIUM SERPL-SCNC: 132 MMOL/L (ref 136–145)
TIBC SERPL-MCNC: 337 MCG/DL (ref 298–536)
TRANSFERRIN SERPL-MCNC: 226 MG/DL (ref 200–360)
VIT B12 BLD-MCNC: 783 PG/ML (ref 211–946)
WBC NRBC COR # BLD: 8.25 10*3/MM3 (ref 3.4–10.8)

## 2023-03-02 PROCEDURE — 86140 C-REACTIVE PROTEIN: CPT | Performed by: NURSE PRACTITIONER

## 2023-03-02 PROCEDURE — 25510000001 IOPAMIDOL PER 1 ML: Performed by: HOSPITALIST

## 2023-03-02 PROCEDURE — 25010000002 AMPICILLIN-SULBACTAM PER 1.5 G: Performed by: NURSE PRACTITIONER

## 2023-03-02 PROCEDURE — XW033E5 INTRODUCTION OF REMDESIVIR ANTI-INFECTIVE INTO PERIPHERAL VEIN, PERCUTANEOUS APPROACH, NEW TECHNOLOGY GROUP 5: ICD-10-PCS | Performed by: HOSPITALIST

## 2023-03-02 PROCEDURE — 83540 ASSAY OF IRON: CPT | Performed by: NURSE PRACTITIONER

## 2023-03-02 PROCEDURE — 70498 CT ANGIOGRAPHY NECK: CPT

## 2023-03-02 PROCEDURE — 80053 COMPREHEN METABOLIC PANEL: CPT | Performed by: NURSE PRACTITIONER

## 2023-03-02 PROCEDURE — 25010000002 REMDESIVIR 100 MG/20ML SOLUTION 1 EACH VIAL: Performed by: HOSPITALIST

## 2023-03-02 PROCEDURE — 82607 VITAMIN B-12: CPT | Performed by: NURSE PRACTITIONER

## 2023-03-02 PROCEDURE — 99233 SBSQ HOSP IP/OBS HIGH 50: CPT | Performed by: PHYSICIAN ASSISTANT

## 2023-03-02 PROCEDURE — 87040 BLOOD CULTURE FOR BACTERIA: CPT | Performed by: NURSE PRACTITIONER

## 2023-03-02 PROCEDURE — 85025 COMPLETE CBC W/AUTO DIFF WBC: CPT | Performed by: NURSE PRACTITIONER

## 2023-03-02 PROCEDURE — 25010000002 ENOXAPARIN PER 10 MG: Performed by: NURSE PRACTITIONER

## 2023-03-02 PROCEDURE — 84466 ASSAY OF TRANSFERRIN: CPT | Performed by: NURSE PRACTITIONER

## 2023-03-02 PROCEDURE — 83605 ASSAY OF LACTIC ACID: CPT | Performed by: NURSE PRACTITIONER

## 2023-03-02 PROCEDURE — 82746 ASSAY OF FOLIC ACID SERUM: CPT | Performed by: NURSE PRACTITIONER

## 2023-03-02 PROCEDURE — 70496 CT ANGIOGRAPHY HEAD: CPT

## 2023-03-02 PROCEDURE — 70450 CT HEAD/BRAIN W/O DYE: CPT

## 2023-03-02 RX ADMIN — GUAIFENESIN 200 MG: 200 SOLUTION ORAL at 05:31

## 2023-03-02 RX ADMIN — CETIRIZINE HYDROCHLORIDE 10 MG: 10 TABLET ORAL at 08:30

## 2023-03-02 RX ADMIN — ENOXAPARIN SODIUM 40 MG: 100 INJECTION SUBCUTANEOUS at 12:00

## 2023-03-02 RX ADMIN — TRAZODONE HYDROCHLORIDE 50 MG: 50 TABLET ORAL at 20:27

## 2023-03-02 RX ADMIN — REMDESIVIR 100 MG: 100 INJECTION, POWDER, LYOPHILIZED, FOR SOLUTION INTRAVENOUS at 11:57

## 2023-03-02 RX ADMIN — Medication 10 ML: at 08:31

## 2023-03-02 RX ADMIN — Medication 10 ML: at 20:33

## 2023-03-02 RX ADMIN — IOPAMIDOL 95 ML: 755 INJECTION, SOLUTION INTRAVENOUS at 10:03

## 2023-03-02 RX ADMIN — ATORVASTATIN CALCIUM 80 MG: 80 TABLET, FILM COATED ORAL at 20:27

## 2023-03-02 RX ADMIN — ASPIRIN 81 MG: 81 TABLET, COATED ORAL at 08:30

## 2023-03-02 RX ADMIN — CLOPIDOGREL 75 MG: 75 TABLET, FILM COATED ORAL at 08:30

## 2023-03-02 RX ADMIN — ACETAMINOPHEN 650 MG: 325 TABLET, FILM COATED ORAL at 08:30

## 2023-03-02 RX ADMIN — AMPICILLIN SODIUM AND SULBACTAM SODIUM 3 G: 2; 1 INJECTION, POWDER, FOR SOLUTION INTRAMUSCULAR; INTRAVENOUS at 21:42

## 2023-03-02 RX ADMIN — LISINOPRIL 40 MG: 40 TABLET ORAL at 20:27

## 2023-03-02 RX ADMIN — FOLIC ACID 1 MG: 1 TABLET ORAL at 08:30

## 2023-03-02 NOTE — NURSING NOTE
03/02 0944  Dr. Arteaga called back on consult and said for patient to follow up outpatient. Neva GOODMAN

## 2023-03-02 NOTE — PLAN OF CARE
"Goal Outcome Evaluation:  Plan of Care Reviewed With: patient  Progress: no change     Pt alert oriented to person and place. Room air. Assist of two for bed mobility and transfers, unsteady on feet. Incontinent at times of bladder, urinal left in reach.   @ 0015 pt found in prone position next to bed. Staff assisted back to bed, assessed, see flow sheet for details. Pt reports \" I think I hit my head\". R knee with noted blanchable redness. Bed alarm on.  MD called STAT CT scan ordered. Wife Bushra called and updated.   Pt with increased confusion this morning at 0542, wanting ot get out of bed go \"somewhere\", pulling at tele leads and taking off gown. Pt reoriented to place, time and situation   Continue plan of care. Continue monitoring patient, offer/provide support and care as needed.      "

## 2023-03-02 NOTE — PROGRESS NOTES
"DOS: 3/2/2023  NAME: Albert Yadav   : 1940  PCP: Valerie Burnham MD  Chief Complaint   Patient presents with   • Weakness - Generalized       Chief complaint: weakness, confusion  Subjective: Daughter at bedside.  She says that he is much less agitated than he was.  She describes worsening confusion and generalized weakness prior to presentation.  He also stopped his Exelon 2 to 3 weeks ago    Objective:  Vital signs: /56 (BP Location: Left arm, Patient Position: Lying)   Pulse 109   Temp 97.4 °F (36.3 °C) (Oral)   Resp 18   Ht 182.9 cm (72\")   Wt 81.3 kg (179 lb 3.7 oz)   SpO2 92%   BMI 24.31 kg/m²      Gen: NAD, vitals reviewed  MS: Drowsy but arousable oriented x3, remote memory intact, fair attention/concentration, language intact, no neglect.  CN: visual acuity grossly normal, PERRL, EOMI, no facial droop, no dysarthria  Motor: No drift, good symmetric movement, normal tone  Sensory: intact to light touch all 4 ext.    ROS:  No weakness, numbness  No fevers, chills      Laboratory results:  Lab Results   Component Value Date    GLUCOSE 103 (H) 2023    CALCIUM 8.4 (L) 2023     (L) 2023    K 3.7 2023    CO2 21.0 (L) 2023    CL 98 2023    BUN 20 2023    CREATININE 1.18 2023    EGFRIFNONA 59 (L) 2020    BCR 16.9 2023    ANIONGAP 13.0 2023     Lab Results   Component Value Date    WBC 8.25 2023    HGB 12.3 (L) 2023    HCT 35.6 (L) 2023    MCV 94.4 2023     2023     Lab Results   Component Value Date    LDL 82 2023     (H) 2020     (H) 2020         Lab 23  0839   HEMOGLOBIN A1C 6.00*        Review of labs: Glucose 103, BUN 20, creatinine 1.18, potassium 3.7, WBCs 8000, hemoglobin 12, platelets 178    Review and interpretation of imaging: Head CT on  negative for hemorrhage or acute fine.  CTA head and neck not noting any significant " stenosis, 10% at the level of C2/3 on the right, incidentally a periapical abscess tooth #3.  Reviewed MRI with neuro attending and appreciate restricted diffusion of left bowel and also seen on FLAIR of     Workup to date:  TTE, EF 61%, normal LV function, inconclusive bubble  Diagnoses:  1.  Acute left thalamic stroke  2.  MCI  Impression: 82-year-old male with past medical history of hypertension, diabetes, mild cognitive impairment, history of left CEA who presented to the hospital with confusion and weakness.  His exam was compelling for right hemiparesis and aphasia.  He has improved but not returned to his baseline.  Appreciate tiny acute infarct of the left thalamus on MRI which could explain symptoms.  Small vessel versus embolic in etiology.  Furthermore he has COVID and and has recently abruptly stopped his Exelon.    Plan:  1.  Aspirin and Plavix for 1 month after which he can resume his Plavix monotherapy.  High intensity statin  2.  Zio patch  3.  Follow-up with personal neurologist    Neurology team available as needed    I spent at least 40 minutes interviewing, examining, and counseling patient.  I independently reviewed documentation, laboratory and diagnostic findings, external documentation where applicable, and formulated treatment plan which was discussed with the patient.      Thank you for this consultation.  Discussed above plan with neuro attending, Dr. Presley who agrees with above plan.  Neurology team is available for concerns or questions.

## 2023-03-02 NOTE — PROGRESS NOTES
Name: Albert Yadav ADMIT: 2023   : 1940  PCP: Valerie Burnham MD    MRN: [id number] LOS: 0 days   AGE/SEX: 82 y.o. male  ROOM: [facility]     Subjective   Subjective   Patient appears comfortable, generally weak, and in no apparent distress.  Overnight fall with head/facial strike.  Daughter and wife at bedside--family concerned for recent attempts to frequently bed exit; however, remains calm at present time.  Voices no new concerns.  Discussed with RN.    Review of Systems   Constitutional: Negative for chills and fever.   Respiratory: Negative for cough and shortness of breath.    Cardiovascular: Negative for chest pain and leg swelling.   Gastrointestinal: Negative for abdominal pain, constipation, diarrhea, nausea and vomiting.   Genitourinary: Negative for difficulty urinating and dysuria.   Musculoskeletal: Positive for gait problem (Due to generalized weakness). Negative for myalgias.        Objective   Objective   Vital Signs  Temp:  [97.4 °F (36.3 °C)-100.2 °F (37.9 °C)] 97.4 °F (36.3 °C)  Heart Rate:  [] 109  Resp:  [18-20] 18  BP: (115-147)/(56-65) 115/56  SpO2:  [92 %-97 %] 92 %  on   ;   Device (Oxygen Therapy): room air  Body mass index is 24.31 kg/m².     Physical Exam  Constitutional:       General: He is not in acute distress.     Appearance: He is not toxic-appearing.   Cardiovascular:      Rate and Rhythm: Tachycardia present.      Heart sounds: Normal heart sounds.   Pulmonary:      Effort: Pulmonary effort is normal.      Breath sounds: Normal breath sounds.   Abdominal:      General: Bowel sounds are normal.      Palpations: Abdomen is soft.   Musculoskeletal:      Right lower leg: No edema.      Left lower leg: No edema.   Skin:     General: Skin is warm and dry.   Neurological:      Mental Status: He is alert.       Results Review     I reviewed the patient's new clinical results.  Results from last 7 days   Lab Units 23  0604 23  0839 23  1427    WBC 10*3/mm3 8.25 7.04 7.67   HEMOGLOBIN g/dL 12.3* 12.8* 13.0   PLATELETS 10*3/mm3 178 198 181     Results from last 7 days   Lab Units 03/02/23  0604 03/01/23  0839 02/28/23  2119   SODIUM mmol/L 132* 135* 134*   POTASSIUM mmol/L 3.7 4.0 4.0   CHLORIDE mmol/L 98 100 102   CO2 mmol/L 21.0* 21.7* 23.0   BUN mg/dL 20 18 19   CREATININE mg/dL 1.18 1.28* 1.36*   GLUCOSE mg/dL 103* 125* 122*   EGFR mL/min/1.73 60.8 55.2* 51.3*     Results from last 7 days   Lab Units 03/02/23  0604 03/01/23  0839 02/28/23  2119   ALBUMIN g/dL 3.1* 3.2* 3.4*   BILIRUBIN mg/dL 0.7 0.6 0.4   ALK PHOS U/L 103 112 122*   AST (SGOT) U/L 27 24 16   ALT (SGPT) U/L 25 25 21     Results from last 7 days   Lab Units 03/02/23  0604 03/01/23  0839 02/28/23  2119   CALCIUM mg/dL 8.4* 8.0* 8.4*   ALBUMIN g/dL 3.1* 3.2* 3.4*       Hemoglobin A1C   Date/Time Value Ref Range Status   03/01/2023 0839 6.00 (H) 4.80 - 5.60 % Final     Glucose   Date/Time Value Ref Range Status   03/01/2023 2109 99 70 - 130 mg/dL Final     Comment:     Meter: [id number] : [id number] Francisco Nena NA   03/01/2023 1559 104 70 - 130 mg/dL Final     Comment:     Meter: [id number] : [id number] Phoenix Hassan PCA   03/01/2023 1046 132 (H) 70 - 130 mg/dL Final     Comment:     Meter: [id number] : [id number] Hernandezjose roberto Ortizy NA   03/01/2023 0630 123 70 - 130 mg/dL Final     Comment:     Meter: [id number] : ariadama Marshall RN   03/01/2023 0154 126 70 - 130 mg/dL Final     Comment:     Meter: [id number] : [id number] Lake Delacruz RN       CT Head Without Contrast    Result Date: 3/2/2023  Electronically signed by Sofie Saravia MD on 03-02-23 at 0309    CT Head Without Contrast    Result Date: 2/28/2023  Moderate chronic small vessel ischemic white matter change and atrophy. No interval change or evidence for acute intracranial abnormality.  This report was finalized on 2/28/2023 10:51 PM by Dr. Leonard Harris M.D.      MRI Brain Without Contrast    Result  Date: 3/1/2023  Chronic changes in the brain. No acute abnormality.  This report was finalized on 3/1/2023 11:21 PM by Dr. Remi Perez M.D.      XR Chest 1 View    Result Date: 2/28/2023  Cardiomegaly. Mild predominantly linear basilar opacity consistent with atelectasis though it be difficult to exclude a mild basilar infiltrate. No evidence for pulmonary edema  This report was finalized on 2/28/2023 9:51 PM by Dr. Leonard Harris M.D.      CT Angiogram Carotids    Result Date: 3/2/2023  There is mild stenosis of the right internal carotid artery at the level of C2-3 estimated be approximately 10% in severity. There is no evidence of a proximal intracranial high-grade stenosis or occlusion. Mild to moderate stenosis involving the left P2 P3 junction is appreciated. A large periapical abscess involving tooth #3 is appreciated as well as near complete opacification of the right maxillary sinus.    Radiation dose reduction techniques were utilized, including automated exposure control and exposure modulation based on body size.  This report was finalized on 3/2/2023 4:37 PM by Dr. Kenrick Vu M.D.      CT Angiogram Head    Result Date: 3/2/2023  There is mild stenosis of the right internal carotid artery at the level of C2-3 estimated be approximately 10% in severity. There is no evidence of a proximal intracranial high-grade stenosis or occlusion. Mild to moderate stenosis involving the left P2 P3 junction is appreciated. A large periapical abscess involving tooth #3 is appreciated as well as near complete opacification of the right maxillary sinus.    Radiation dose reduction techniques were utilized, including automated exposure control and exposure modulation based on body size.  This report was finalized on 3/2/2023 4:37 PM by Dr. Kenrick Vu M.D.      I have personally reviewed all medications:  Scheduled Medications  ampicillin-sulbactam, 3 g, Intravenous, Q6H  aspirin, 81 mg, Oral,  Daily  atorvastatin, 80 mg, Oral, Nightly  cetirizine, 10 mg, Oral, QAM  clopidogrel, 75 mg, Oral, QAM  enoxaparin, 40 mg, Subcutaneous, Daily  folic acid, 1 mg, Oral, Daily  lisinopril, 40 mg, Oral, Q PM  remdesivir, 100 mg, Intravenous, Q24H  sodium chloride, 10 mL, Intravenous, Q12H  traZODone, 50 mg, Oral, Nightly    Infusions   Diet  Diet: Diabetic Diets; Consistent Carbohydrate; Texture: Regular Texture (IDDSI 7); Fluid Consistency: Thin (IDDSI 0)    I have personally reviewed:  [x]  Laboratory   [x]  Microbiology   [x]  Radiology   [x]  EKG/Telemetry  [x]  Cardiology/Vascular   [x]  Pathology    [x]  Records       Assessment/Plan     Active Hospital Problems    Diagnosis  POA   • **COVID-19 [U07.1]  Yes   • Fall during current hospitalization [W19.XXXA, Y92.239]  Unknown   • Acute CVA (cerebrovascular accident) (HCC), left thalamic [I63.9]  Clinically Undetermined   • Tooth abscess, third tooth [K04.7]  Yes   • PRACHI (acute kidney injury) ([facility]) [N17.9]  Yes   • Anemia [D64.9]  Yes   • Macrocytosis [D75.89]  Yes   • Type 2 diabetes mellitus with hyperglycemia ([facility]) [E11.65]  Yes   • Mild cognitive impairment [G31.84]  Yes   • Expressive aphasia [R47.01]  Yes   • Elevated troponin [R77.8]  Yes   • Essential hypertension [I10]  Yes      Resolved Hospital Problems   No resolved problems to display.       82 y.o. male admitted with COVID-19.      COVID-19: Confirmed on PCR.  3 days remdesivir per pharmacy dosing in process.  Oxygen saturation 92% on room air.  Provide supplemental oxygen as needed.   10 days course dexamethasone given CRP 6.71.  Follow COVID-19 progression labs.      Acute CVA (cerebrovascular accident) (HCC), left thalamic:  See plan below.      Fall during current hospitalization:  CTH negative for acute findings (discussed with family requesting sitter--concern for recurrent falls & agree that bed alarm alone insufficient given need to done PPE for enhanced droplet precautions limiting staff  response to prevent additional falls).  Ordering orthostatic BP daily.      Tooth abscess, third tooth:  Incidental finding on CT.  Tmax 101.1.  Ordering blood cultures x2, lactate, Unasyn, & consult oral surgeon.           Expressive aphasia:  CT head negative for acute abnormality.  Neurology following.  CTA head, carotids, MRI brain without contrast tiny acute infarct left thalamus (increasing risk for fall).  Aspirin and Plavix for 1 month followed by Plavix monotherapy, high intensity statin, Zio patch at discharge, follow-up with previously established neurologist.  SLP recommend NCS / cardiac diet. Lipid panel reviewed--LDL 82.       Elevated troponin: Denies chest pain.  Minimal elevation HS troponin.  No signs of ischemia on EKG.  Most likely due to mild PRACHI.  Continue aspirin, Plavix, statin for now.  Cardiology evaluated and does not recommend further cardiac testing at this time.       PRACHI (acute kidney injury) (HCC):  Resolved.  Suspect due to prerenal / dehydration.  Avoid nephrotoxins--HCTZ on hold.  Monitor labs.       Essential hypertension: BP acceptable acutely.  ACEi continued for now.  Monitor BP for changes.         Anemia: Chronic and similar compared to priors.  No overt signs of active bleeding.  Deficient iron profile, folate 12; ferritin 322.  Plan iron supplement following DC.       Macrocytosis: serum vitamin B12 783.       Type 2 diabetes mellitus with hyperglycemia (HCC): A1c 6.0.  Provide correctional sliding scale for now.         Mild cognitive impairment: Complicating all problems.  Increasing risk for falls.    *Spoke with wife & daughter at length regarding tooth abscess, CVA, debility (home from rehab since early January 2023).    · Enoxaparin for DVT prophylaxis.  · No CPR  · Discussed with patient, family, RN, CCP, nurse manager, & Dr. Ge.  · Anticipate discharge pending clinical course / suspect SNF / rehab vs LTC at DC--CCP following    BETO Farley  [facility]  03/02/23  17:09 EST

## 2023-03-03 LAB
ALBUMIN SERPL-MCNC: 2.8 G/DL (ref 3.5–5.2)
ALBUMIN/GLOB SERPL: 0.9 G/DL
ALP SERPL-CCNC: 93 U/L (ref 39–117)
ALT SERPL W P-5'-P-CCNC: 21 U/L (ref 1–41)
ANION GAP SERPL CALCULATED.3IONS-SCNC: 17 MMOL/L (ref 5–15)
AST SERPL-CCNC: 26 U/L (ref 1–40)
BASOPHILS # BLD AUTO: 0.01 10*3/MM3 (ref 0–0.2)
BASOPHILS NFR BLD AUTO: 0.1 % (ref 0–1.5)
BILIRUB SERPL-MCNC: 0.5 MG/DL (ref 0–1.2)
BUN SERPL-MCNC: 31 MG/DL (ref 8–23)
BUN/CREAT SERPL: 21.5 (ref 7–25)
CALCIUM SPEC-SCNC: 7.9 MG/DL (ref 8.6–10.5)
CHLORIDE SERPL-SCNC: 99 MMOL/L (ref 98–107)
CO2 SERPL-SCNC: 17 MMOL/L (ref 22–29)
CREAT SERPL-MCNC: 1.44 MG/DL (ref 0.76–1.27)
CRP SERPL-MCNC: 13.81 MG/DL (ref 0–0.5)
DEPRECATED RDW RBC AUTO: 44.6 FL (ref 37–54)
EGFRCR SERPLBLD CKD-EPI 2021: 47.9 ML/MIN/1.73
EOSINOPHIL # BLD AUTO: 0 10*3/MM3 (ref 0–0.4)
EOSINOPHIL NFR BLD AUTO: 0 % (ref 0.3–6.2)
ERYTHROCYTE [DISTWIDTH] IN BLOOD BY AUTOMATED COUNT: 13.1 % (ref 12.3–15.4)
GLOBULIN UR ELPH-MCNC: 3 GM/DL
GLUCOSE BLDC GLUCOMTR-MCNC: 156 MG/DL (ref 70–130)
GLUCOSE SERPL-MCNC: 119 MG/DL (ref 65–99)
HCT VFR BLD AUTO: 36.6 % (ref 37.5–51)
HGB BLD-MCNC: 12.5 G/DL (ref 13–17.7)
IMM GRANULOCYTES # BLD AUTO: 0.05 10*3/MM3 (ref 0–0.05)
IMM GRANULOCYTES NFR BLD AUTO: 0.6 % (ref 0–0.5)
LYMPHOCYTES # BLD AUTO: 0.64 10*3/MM3 (ref 0.7–3.1)
LYMPHOCYTES NFR BLD AUTO: 8.2 % (ref 19.6–45.3)
MCH RBC QN AUTO: 32.7 PG (ref 26.6–33)
MCHC RBC AUTO-ENTMCNC: 34.2 G/DL (ref 31.5–35.7)
MCV RBC AUTO: 95.8 FL (ref 79–97)
MONOCYTES # BLD AUTO: 0.98 10*3/MM3 (ref 0.1–0.9)
MONOCYTES NFR BLD AUTO: 12.5 % (ref 5–12)
NEUTROPHILS NFR BLD AUTO: 6.15 10*3/MM3 (ref 1.7–7)
NEUTROPHILS NFR BLD AUTO: 78.6 % (ref 42.7–76)
NRBC BLD AUTO-RTO: 0 /100 WBC (ref 0–0.2)
PLATELET # BLD AUTO: 185 10*3/MM3 (ref 140–450)
PMV BLD AUTO: 10.8 FL (ref 6–12)
POTASSIUM SERPL-SCNC: 3.1 MMOL/L (ref 3.5–5.2)
PROT SERPL-MCNC: 5.8 G/DL (ref 6–8.5)
RBC # BLD AUTO: 3.82 10*6/MM3 (ref 4.14–5.8)
SODIUM SERPL-SCNC: 133 MMOL/L (ref 136–145)
WBC NRBC COR # BLD: 7.83 10*3/MM3 (ref 3.4–10.8)

## 2023-03-03 PROCEDURE — 80053 COMPREHEN METABOLIC PANEL: CPT | Performed by: NURSE PRACTITIONER

## 2023-03-03 PROCEDURE — 36415 COLL VENOUS BLD VENIPUNCTURE: CPT | Performed by: NURSE PRACTITIONER

## 2023-03-03 PROCEDURE — 87040 BLOOD CULTURE FOR BACTERIA: CPT | Performed by: NURSE PRACTITIONER

## 2023-03-03 PROCEDURE — 82962 GLUCOSE BLOOD TEST: CPT

## 2023-03-03 PROCEDURE — 85025 COMPLETE CBC W/AUTO DIFF WBC: CPT | Performed by: NURSE PRACTITIONER

## 2023-03-03 PROCEDURE — 25010000002 REMDESIVIR 100 MG/20ML SOLUTION 1 EACH VIAL: Performed by: HOSPITALIST

## 2023-03-03 PROCEDURE — 25010000002 ENOXAPARIN PER 10 MG: Performed by: NURSE PRACTITIONER

## 2023-03-03 PROCEDURE — 97530 THERAPEUTIC ACTIVITIES: CPT

## 2023-03-03 PROCEDURE — 25010000002 AMPICILLIN-SULBACTAM PER 1.5 G: Performed by: NURSE PRACTITIONER

## 2023-03-03 PROCEDURE — 86140 C-REACTIVE PROTEIN: CPT | Performed by: NURSE PRACTITIONER

## 2023-03-03 RX ORDER — IBUPROFEN 600 MG/1
1 TABLET ORAL
Status: DISCONTINUED | OUTPATIENT
Start: 2023-03-03 | End: 2023-03-07 | Stop reason: HOSPADM

## 2023-03-03 RX ORDER — POTASSIUM CHLORIDE 750 MG/1
40 TABLET, FILM COATED, EXTENDED RELEASE ORAL ONCE
Status: COMPLETED | OUTPATIENT
Start: 2023-03-03 | End: 2023-03-03

## 2023-03-03 RX ORDER — POTASSIUM CHLORIDE 750 MG/1
40 TABLET, FILM COATED, EXTENDED RELEASE ORAL EVERY 4 HOURS
Status: DISPENSED | OUTPATIENT
Start: 2023-03-03 | End: 2023-03-03

## 2023-03-03 RX ORDER — NICOTINE POLACRILEX 4 MG
15 LOZENGE BUCCAL
Status: DISCONTINUED | OUTPATIENT
Start: 2023-03-03 | End: 2023-03-07 | Stop reason: HOSPADM

## 2023-03-03 RX ORDER — DEXTROSE MONOHYDRATE 25 G/50ML
25 INJECTION, SOLUTION INTRAVENOUS
Status: DISCONTINUED | OUTPATIENT
Start: 2023-03-03 | End: 2023-03-07 | Stop reason: HOSPADM

## 2023-03-03 RX ORDER — SODIUM CHLORIDE 9 MG/ML
75 INJECTION, SOLUTION INTRAVENOUS CONTINUOUS
Status: DISCONTINUED | OUTPATIENT
Start: 2023-03-03 | End: 2023-03-06

## 2023-03-03 RX ORDER — INSULIN LISPRO 100 [IU]/ML
0-9 INJECTION, SOLUTION INTRAVENOUS; SUBCUTANEOUS
Status: DISCONTINUED | OUTPATIENT
Start: 2023-03-04 | End: 2023-03-07 | Stop reason: HOSPADM

## 2023-03-03 RX ADMIN — ENOXAPARIN SODIUM 40 MG: 100 INJECTION SUBCUTANEOUS at 08:30

## 2023-03-03 RX ADMIN — CETIRIZINE HYDROCHLORIDE 10 MG: 10 TABLET ORAL at 08:30

## 2023-03-03 RX ADMIN — POTASSIUM CHLORIDE 40 MEQ: 750 TABLET, EXTENDED RELEASE ORAL at 14:58

## 2023-03-03 RX ADMIN — POTASSIUM CHLORIDE 40 MEQ: 750 TABLET, EXTENDED RELEASE ORAL at 21:48

## 2023-03-03 RX ADMIN — Medication 10 ML: at 08:32

## 2023-03-03 RX ADMIN — GUAIFENESIN 200 MG: 200 SOLUTION ORAL at 08:39

## 2023-03-03 RX ADMIN — SODIUM CHLORIDE 75 ML/HR: 9 INJECTION, SOLUTION INTRAVENOUS at 21:18

## 2023-03-03 RX ADMIN — AMPICILLIN SODIUM AND SULBACTAM SODIUM 3 G: 2; 1 INJECTION, POWDER, FOR SOLUTION INTRAMUSCULAR; INTRAVENOUS at 04:00

## 2023-03-03 RX ADMIN — Medication 10 ML: at 21:39

## 2023-03-03 RX ADMIN — ASPIRIN 81 MG: 81 TABLET, COATED ORAL at 08:30

## 2023-03-03 RX ADMIN — FOLIC ACID 1 MG: 1 TABLET ORAL at 08:30

## 2023-03-03 RX ADMIN — LISINOPRIL 40 MG: 40 TABLET ORAL at 17:35

## 2023-03-03 RX ADMIN — AMPICILLIN SODIUM AND SULBACTAM SODIUM 3 G: 2; 1 INJECTION, POWDER, FOR SOLUTION INTRAMUSCULAR; INTRAVENOUS at 17:35

## 2023-03-03 RX ADMIN — ATORVASTATIN CALCIUM 80 MG: 80 TABLET, FILM COATED ORAL at 21:39

## 2023-03-03 RX ADMIN — REMDESIVIR 100 MG: 100 INJECTION, POWDER, LYOPHILIZED, FOR SOLUTION INTRAVENOUS at 10:33

## 2023-03-03 RX ADMIN — AMPICILLIN SODIUM AND SULBACTAM SODIUM 3 G: 2; 1 INJECTION, POWDER, FOR SOLUTION INTRAMUSCULAR; INTRAVENOUS at 11:53

## 2023-03-03 RX ADMIN — CLOPIDOGREL 75 MG: 75 TABLET, FILM COATED ORAL at 08:30

## 2023-03-03 RX ADMIN — TRAZODONE HYDROCHLORIDE 50 MG: 50 TABLET ORAL at 21:39

## 2023-03-03 NOTE — THERAPY TREATMENT NOTE
Patient Name: Albert Yadav  : 1940    MRN: [id number]                              Today's Date: 3/3/2023       Admit Date: 2023    Visit Dx:     ICD-10-CM ICD-9-CM   1. COVID-19  U07.1 079.89   2. Stroke-like symptoms  R29.90 781.99     Patient Active Problem List   Diagnosis   • Claudication of left lower extremity (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)   • Spinal stenosis, lumbar region, with neurogenic claudication   • Carotid stenosis, asymptomatic, bilateral   • Spondylolisthesis at L4-L5 level   • Lumbar stenosis   • Closed fracture of multiple ribs of right side, initial encounter   • Retroperitoneal tumor   • Closed fracture of one rib of right side, initial encounter   • Rheumatoid arthritis ([facility])   • Contusion of right hip   • COVID-19   • PRACHI (acute kidney injury) ([facility])   • Anemia   • Macrocytosis   • Type 2 diabetes mellitus with hyperglycemia ([facility])   • Mild cognitive impairment   • Expressive aphasia   • Elevated troponin   • Fall during current hospitalization   • Acute CVA (cerebrovascular accident) ([facility]), left thalamic   • Tooth abscess, third tooth     Past Medical History:   Diagnosis Date   • Anesthesia     MILD VIOLENT BEHAVIOR AFTER ANESTHESIA LEG SURGERY   • Arthritis of back 10 years ago   • At risk for sleep apnea     STOP BANG = 5   • Broken bones     arm, collar bone, ankle   • Carotid artery disorder ([facility])    • Cervical disc disorder ?   • Cholelithiasis     Gall bladder removed   • Diabetes mellitus ([facility])     Type 2   • Falls    • Fracture of ankle    • Fracture of wrist    • Fracture, clavicle 26 years ago   • Fracture, foot 26 year ago   • Groin rash     PT STATES LEFT GROIN AT TIMES   • History of transfusion    • Hyperlipidemia     • Hypertension    • Low back pain    • Low back strain ?   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   • Rheumatoid arthritis (HCC)     HANDS DIALLO   • Staph infection     2015  [facility] POST SX   • Tendency toward bleeding easily (HCC)     due to blood thinners     Past Surgical History:   Procedure Laterality Date   • ADRENALECTOMY Left 12/20/2022    Procedure: LAPAROSCOPIC EXCISION LEFT RETROPERITONEAL MASS;  Surgeon: Moy Vargas MD;  Location: [facility];  Service: General;  Laterality: Left;   • ANGIOPLASTY FEMORAL ARTERY Left 11/07/2016    Procedure: ULTRA SOUND ACCESS RIGHT FEMORAL ARTERY, AIF BILATERAL RUNOFF, SELECTIVE CATHETERIZATION LEFT FEMORAL ARTERY.;  Surgeon: Errol Michael MD;  Location: [facility] OR 18/19;  Service:    • ANKLE OPEN REDUCTION INTERNAL FIXATION  1980   • ARTERIOVENOUS FISTULA/SHUNT SURGERY Left 12/13/2016    Procedure: LEFT ILEO-FEMORAL GORTEX GRAFT AND LEFT LEG ARERIOGRAM ;  Surgeon: Errol Michael MD;  Location: [facility] OR 18/19;  Service:    • CAROTID ENDARTERECTOMY Right 09/29/2020    Procedure: CAROTID ENDARTERECTOMY;  Surgeon: James Graham MD;  Location: [facility];  Service: Vascular;  Laterality: Right;   • CHOLECYSTECTOMY     • EPIDURAL BLOCK     • EYE SURGERY Left     as child   • FRACTURE SURGERY Left     arm   • ILIAC ARTERY STENT Left 2020   • KNEE SURGERY Left     ORIF   • ORIF ANKLE FRACTURE Left       General Information     Row Name 03/03/23 1433          Physical Therapy Time and Intention    Document Type therapy note (daily note)  -PH     Mode of Treatment physical therapy  -PH     Row Name 03/03/23 1433          General Information    Existing Precautions/Restrictions fall  -PH     Barriers to Rehab cognitive status;medically complex  -PH     Row Name 03/03/23 1434           Cognition    Orientation Status (Cognition) oriented to;person  -PH     Row Name 03/03/23 1433          Safety Issues, Functional Mobility    Impairments Affecting Function (Mobility) balance;cognition;endurance/activity tolerance;strength  -PH     Comment, Safety Issues/Impairments (Mobility) gt belt and non skid socks donned  -PH           User Key  (r) = Recorded By, (t) = Taken By, (c) = Cosigned By    Initials Name Provider Type     Aishwarya Castorena PTA Physical Therapist Assistant               Mobility     Row Name 03/03/23 1434          Bed Mobility    Bed Mobility supine-sit  -PH     Supine-Sit [place] (Bed Mobility) moderate assist (50% patient effort);verbal cues;nonverbal cues (demo/gesture);maximum assist (25% patient effort)  -PH     Assistive Device (Bed Mobility) head of bed elevated  -PH     Row Name 03/03/23 1434          Bed-Chair Transfer    Bed-Chair [place] (Transfers) minimum assist (75% patient effort);2 person assist;verbal cues;nonverbal cues (demo/gesture)  -     Assistive Device (Bed-Chair Transfers) walker, front-wheeled  -     Comment, (Bed-Chair Transfer) a few small steps taken w/ chair pulled close for transfer. Unsteady w/ no overt LOB; pt limited by weakness and fatigue.  -PH     Row Name 03/03/23 1434          Sit-Stand Transfer    Sit-Stand [place] (Transfers) minimum assist (75% patient effort);2 person assist;verbal cues;nonverbal cues (demo/gesture)  -PH     Assistive Device (Sit-Stand Transfers) walker, front-wheeled  -PH     Row Name 03/03/23 1434          Gait/Stairs (Locomotion)    [place] Level (Gait) unable to assess  -     [place] Level (Stairs) unable to assess  -           User Key  (r) = Recorded By, (t) = Taken By, (c) = Cosigned By    Initials Name Provider Type     Aishwarya Castorena PTA Physical Therapist Assistant               Obj/Interventions     Row Name 03/03/23 1435          Balance    Balance Assessment  sitting static balance  -PH     Static Sitting Balance contact guard;minimal assist;verbal cues  -PH     Position, Sitting Balance supported;sitting edge of bed  -PH     Comment, Balance inital R lean although improved when cued for postural correction  -PH           User Key  (r) = Recorded By, (t) = Taken By, (c) = Cosigned By    Initials Name Provider Type     Aishwarya Castorena PTA Physical Therapist Assistant               Goals/Plan    No documentation.                Clinical Impression     Row Name 03/03/23 1436          Pain    Pretreatment Pain Rating 0/10 - no pain  -PH     Posttreatment Pain Rating 0/10 - no pain  -PH     Additional Documentation Pain Scale: Numbers Pre/Post-Treatment (Group)  -PH     Row Name 03/03/23 1436          Plan of Care Review    Plan of Care Reviewed With patient;daughter  -PH     Progress improving  -PH     Outcome Evaluation Pt seen by PT this date for treatment. Pt agitated at times although participated and followed directions/cues when given. Pt sat up to EOB w/ cues for sequencing and mod/max A. Pt then stood req min A x 2 w/ use of fww. Pt transferred b>c w/ chair pulled closed and pt taking a few small steps req min A x 2 w/ use of fww. No overt LOB w/ pt limited in distance by weakness and fatigue. PT will prog as pt tiago. Rec SNF after dc to address aforementioned deficits.  -PH     Row Name 03/03/23 1436          Vital Signs    O2 Delivery Pre Treatment room air  -PH     O2 Delivery Intra Treatment room air  -PH     O2 Delivery Post Treatment room air  -PH     Row Name 03/03/23 1436          Positioning and Restraints    Pre-Treatment Position in bed  -PH     Post Treatment Position chair  -PH     In Chair reclined;call light within reach;encouraged to call for assist;exit alarm on  -PH           User Key  (r) = Recorded By, (t) = Taken By, (c) = Cosigned By    Initials Name Provider Type     Aishwarya Castorena PTA Physical Therapist Assistant                Outcome Measures     Row Name 03/03/23 1438          How much help from another person do you currently need...    Turning from your back to your side while in flat bed without using bedrails? 2  -PH     Moving from lying on back to sitting on the side of a flat bed without bedrails? 2  -PH     Moving to and from a bed to a chair (including a wheelchair)? 2  -PH     Standing up from a chair using your arms (e.g., wheelchair, bedside chair)? 2  -PH     Climbing 3-5 steps with a railing? 1  -PH     To walk in hospital room? 1  -PH     AM-PAC 6 Clicks Score (PT) 10  -PH     Highest level of mobility 4 --> Transferred to chair/commode  -PH     Row Name 03/03/23 1438          Functional Assessment    Outcome Measure Options AM-PAC 6 Clicks Basic Mobility (PT)  -PH           User Key  (r) = Recorded By, (t) = Taken By, (c) = Cosigned By    Initials Name Provider Type    Aishwarya Ram PTA Physical Therapist Assistant                             Physical Therapy Education     Title: PT OT SLP Therapies (Done)     Topic: Physical Therapy (Done)     Point: Mobility training (Done)     Learning Progress Summary           Patient Acceptance, E,D, DU,NR by  at 3/3/2023 1439    Acceptance, E,TB, VU by  at 3/1/2023 1850    Acceptance, E, NR by  at 3/1/2023 1531   Significant Other Acceptance, E,TB, VU by  at 3/1/2023 1850                   Point: Home exercise program (Done)     Learning Progress Summary           Patient Acceptance, E,TB, VU by  at 3/1/2023 1850    Acceptance, E, NR by  at 3/1/2023 1531   Significant Other Acceptance, E,TB, VU by  at 3/1/2023 1850                   Point: Body mechanics (Done)     Learning Progress Summary           Patient Acceptance, E,D, DU,NR by  at 3/3/2023 1439    Acceptance, E,TB, VU by  at 3/1/2023 1850    Acceptance, E, NR by  at 3/1/2023 1531   Significant Other Acceptance, E,TB, VU by  at 3/1/2023 1850                   Point: Precautions (Done)      Learning Progress Summary           Patient Acceptance, E,D, DU,NR by  at 3/3/2023 1439    Acceptance, E,TB, VU by  at 3/1/2023 1850    Acceptance, E, NR by  at 3/1/2023 1531   Significant Other Acceptance, E,TB, VU by  at 3/1/2023 1850                               User Key     Initials Effective Dates Name Provider Type Discipline     06/16/21 -  Jyoti Perez, PT Physical Therapist PT     06/16/21 -  Aishwarya Castorena PTA Physical Therapist Assistant PT     03/01/23 - 03/01/23 Katey Mcmullen, RN Registered Nurse Nurse              PT Recommendation and Plan     Plan of Care Reviewed With: patient, daughter  Progress: improving  Outcome Evaluation: Pt seen by PT this date for treatment. Pt agitated at times although participated and followed directions/cues when given. Pt sat up to EOB w/ cues for sequencing and mod/max A. Pt then stood req min A x 2 w/ use of fww. Pt transferred b>c w/ chair pulled closed and pt taking a few small steps req min A x 2 w/ use of fww. No overt LOB w/ pt limited in distance by weakness and fatigue. PT will prog as pt tiago. Rec SNF after dc to address aforementioned deficits.     Time Calculation:    PT Charges     Row Name 03/03/23 1440             Time Calculation    Start Time 1406  -PH      Stop Time 1429  -PH      Time Calculation (min) 23 min  -PH      PT Received On 03/03/23  -PH      PT - Next Appointment 03/06/23  -PH         Timed Charges    28280 - PT Therapeutic Activity Minutes 23  -PH         Total Minutes    Timed Charges Total Minutes 23  -PH       Total Minutes 23  -PH            User Key  (r) = Recorded By, (t) = Taken By, (c) = Cosigned By    Initials Name Provider Type     Aishwarya Castorena PTA Physical Therapist Assistant              Therapy Charges for Today     Code Description Service Date Service Provider Modifiers Qty    76653952537 HC PT THERAPEUTIC ACT EA 15 MIN 3/3/2023 Aishwarya Castorena PTA GP 2    56798760055 HC PT  THER SUPP EA 15 MIN 3/3/2023 Aishwarya Castorena PTA GP 2          PT G-Codes  Outcome Measure Options: AM-PAC 6 Clicks Basic Mobility (PT)  AM-PAC 6 Clicks Score (PT): 10  AM-PAC 6 Clicks Score (OT): 12  Modified [place] Scale: 4 - Moderately severe disability.  Unable to walk without assistance, and unable to attend to own bodily needs without assistance.  PT Discharge Summary  Anticipated Discharge Disposition (PT): skilled nursing facility    Aishwarya Castorena PTA  3/3/2023

## 2023-03-03 NOTE — PLAN OF CARE
Goal Outcome Evaluation:  Plan of Care Reviewed With: patient        Progress: no change  Outcome Evaluation: patient remains confused overnight-disoriented to place, time and situation most of shift. bed alarm in place and monitored closely with baby monitor d/t covid isolation. blood cultures obtained this shift and IV abx given q6hr per order after cultures drawn-see mar. patient has been incontinent this shift, multiple large loose bms. frequently tries to get up out of bed to void-frequent rounding and reorientation provided. NIH=3. vital signs stable, remains on room air.

## 2023-03-03 NOTE — PLAN OF CARE
Goal Outcome Evaluation:  Plan of Care Reviewed With: patient, daughter        Progress: improving  Outcome Evaluation: Pt seen by PT this date for treatment. Pt agitated at times although participated and followed directions/cues when given. Pt sat up to EOB w/ cues for sequencing and mod/max A. Pt then stood req min A x 2 w/ use of fww. Pt transferred b>c w/ chair pulled closed and pt taking a few small steps req min A x 2 w/ use of fww. No overt LOB w/ pt limited in distance by weakness and fatigue. PT will prog as pt tiago. Rec SNF after dc to address aforementioned deficits. Appreciate MAXINE Hooks's assistance!    Patient was not wearing a face mask during this therapy encounter. Therapist used appropriate personal protective equipment including gown, eye protection, mask and gloves.  Mask used was N95/duckbill. Appropriate PPE was worn during the entire therapy session. Hand hygiene was completed before and after therapy session. Patient is in enhanced droplet precautions.

## 2023-03-04 LAB
ANION GAP SERPL CALCULATED.3IONS-SCNC: 8.8 MMOL/L (ref 5–15)
BASOPHILS # BLD AUTO: 0.01 10*3/MM3 (ref 0–0.2)
BASOPHILS NFR BLD AUTO: 0.2 % (ref 0–1.5)
BUN SERPL-MCNC: 31 MG/DL (ref 8–23)
BUN/CREAT SERPL: 25.6 (ref 7–25)
CALCIUM SPEC-SCNC: 7.8 MG/DL (ref 8.6–10.5)
CHLORIDE SERPL-SCNC: 106 MMOL/L (ref 98–107)
CO2 SERPL-SCNC: 21.2 MMOL/L (ref 22–29)
CREAT SERPL-MCNC: 1.21 MG/DL (ref 0.76–1.27)
DEPRECATED RDW RBC AUTO: 43.7 FL (ref 37–54)
EGFRCR SERPLBLD CKD-EPI 2021: 59 ML/MIN/1.73
EOSINOPHIL # BLD AUTO: 0.06 10*3/MM3 (ref 0–0.4)
EOSINOPHIL NFR BLD AUTO: 0.9 % (ref 0.3–6.2)
ERYTHROCYTE [DISTWIDTH] IN BLOOD BY AUTOMATED COUNT: 13.3 % (ref 12.3–15.4)
GLUCOSE BLDC GLUCOMTR-MCNC: 105 MG/DL (ref 70–130)
GLUCOSE BLDC GLUCOMTR-MCNC: 120 MG/DL (ref 70–130)
GLUCOSE BLDC GLUCOMTR-MCNC: 131 MG/DL (ref 70–130)
GLUCOSE BLDC GLUCOMTR-MCNC: 96 MG/DL (ref 70–130)
GLUCOSE SERPL-MCNC: 93 MG/DL (ref 65–99)
HCT VFR BLD AUTO: 32.7 % (ref 37.5–51)
HGB BLD-MCNC: 11.4 G/DL (ref 13–17.7)
IMM GRANULOCYTES # BLD AUTO: 0.04 10*3/MM3 (ref 0–0.05)
IMM GRANULOCYTES NFR BLD AUTO: 0.6 % (ref 0–0.5)
LYMPHOCYTES # BLD AUTO: 1.16 10*3/MM3 (ref 0.7–3.1)
LYMPHOCYTES NFR BLD AUTO: 18.2 % (ref 19.6–45.3)
MCH RBC QN AUTO: 32.7 PG (ref 26.6–33)
MCHC RBC AUTO-ENTMCNC: 34.9 G/DL (ref 31.5–35.7)
MCV RBC AUTO: 93.7 FL (ref 79–97)
MONOCYTES # BLD AUTO: 0.89 10*3/MM3 (ref 0.1–0.9)
MONOCYTES NFR BLD AUTO: 13.9 % (ref 5–12)
NEUTROPHILS NFR BLD AUTO: 4.23 10*3/MM3 (ref 1.7–7)
NEUTROPHILS NFR BLD AUTO: 66.2 % (ref 42.7–76)
NRBC BLD AUTO-RTO: 0 /100 WBC (ref 0–0.2)
PLATELET # BLD AUTO: 174 10*3/MM3 (ref 140–450)
PMV BLD AUTO: 10.8 FL (ref 6–12)
POTASSIUM SERPL-SCNC: 3.8 MMOL/L (ref 3.5–5.2)
QT INTERVAL: 464 MS
RBC # BLD AUTO: 3.49 10*6/MM3 (ref 4.14–5.8)
SARS-COV-2 AG RESP QL IA.RAPID: DETECTED
SODIUM SERPL-SCNC: 136 MMOL/L (ref 136–145)
WBC NRBC COR # BLD: 6.39 10*3/MM3 (ref 3.4–10.8)

## 2023-03-04 PROCEDURE — 93010 ELECTROCARDIOGRAM REPORT: CPT | Performed by: INTERNAL MEDICINE

## 2023-03-04 PROCEDURE — 25010000002 ENOXAPARIN PER 10 MG: Performed by: NURSE PRACTITIONER

## 2023-03-04 PROCEDURE — 25010000002 AMPICILLIN-SULBACTAM PER 1.5 G: Performed by: NURSE PRACTITIONER

## 2023-03-04 PROCEDURE — 87426 SARSCOV CORONAVIRUS AG IA: CPT | Performed by: INTERNAL MEDICINE

## 2023-03-04 PROCEDURE — 85025 COMPLETE CBC W/AUTO DIFF WBC: CPT | Performed by: INTERNAL MEDICINE

## 2023-03-04 PROCEDURE — 80048 BASIC METABOLIC PNL TOTAL CA: CPT | Performed by: INTERNAL MEDICINE

## 2023-03-04 PROCEDURE — 82962 GLUCOSE BLOOD TEST: CPT

## 2023-03-04 PROCEDURE — 93005 ELECTROCARDIOGRAM TRACING: CPT | Performed by: INTERNAL MEDICINE

## 2023-03-04 RX ORDER — TAMSULOSIN HYDROCHLORIDE 0.4 MG/1
0.4 CAPSULE ORAL DAILY
Status: DISCONTINUED | OUTPATIENT
Start: 2023-03-04 | End: 2023-03-07 | Stop reason: HOSPADM

## 2023-03-04 RX ORDER — AMOXICILLIN AND CLAVULANATE POTASSIUM 875; 125 MG/1; MG/1
1 TABLET, FILM COATED ORAL EVERY 12 HOURS SCHEDULED
Status: DISCONTINUED | OUTPATIENT
Start: 2023-03-04 | End: 2023-03-07 | Stop reason: HOSPADM

## 2023-03-04 RX ADMIN — ASPIRIN 81 MG: 81 TABLET, COATED ORAL at 08:43

## 2023-03-04 RX ADMIN — SODIUM CHLORIDE 75 ML/HR: 9 INJECTION, SOLUTION INTRAVENOUS at 22:51

## 2023-03-04 RX ADMIN — AMOXICILLIN AND CLAVULANATE POTASSIUM 1 TABLET: 875; 125 TABLET, FILM COATED ORAL at 21:32

## 2023-03-04 RX ADMIN — TAMSULOSIN HYDROCHLORIDE 0.4 MG: 0.4 CAPSULE ORAL at 21:32

## 2023-03-04 RX ADMIN — CLOPIDOGREL 75 MG: 75 TABLET, FILM COATED ORAL at 08:43

## 2023-03-04 RX ADMIN — FOLIC ACID 1 MG: 1 TABLET ORAL at 08:43

## 2023-03-04 RX ADMIN — AMPICILLIN SODIUM AND SULBACTAM SODIUM 3 G: 2; 1 INJECTION, POWDER, FOR SOLUTION INTRAMUSCULAR; INTRAVENOUS at 01:25

## 2023-03-04 RX ADMIN — GUAIFENESIN 200 MG: 200 SOLUTION ORAL at 17:35

## 2023-03-04 RX ADMIN — TRAZODONE HYDROCHLORIDE 50 MG: 50 TABLET ORAL at 21:32

## 2023-03-04 RX ADMIN — AMPICILLIN SODIUM AND SULBACTAM SODIUM 3 G: 2; 1 INJECTION, POWDER, FOR SOLUTION INTRAMUSCULAR; INTRAVENOUS at 14:36

## 2023-03-04 RX ADMIN — Medication 10 ML: at 08:43

## 2023-03-04 RX ADMIN — Medication 10 ML: at 21:32

## 2023-03-04 RX ADMIN — ATORVASTATIN CALCIUM 80 MG: 80 TABLET, FILM COATED ORAL at 21:32

## 2023-03-04 RX ADMIN — CETIRIZINE HYDROCHLORIDE 10 MG: 10 TABLET ORAL at 08:43

## 2023-03-04 RX ADMIN — AMPICILLIN SODIUM AND SULBACTAM SODIUM 3 G: 2; 1 INJECTION, POWDER, FOR SOLUTION INTRAMUSCULAR; INTRAVENOUS at 08:43

## 2023-03-04 RX ADMIN — SODIUM CHLORIDE 75 ML/HR: 9 INJECTION, SOLUTION INTRAVENOUS at 10:50

## 2023-03-04 RX ADMIN — ENOXAPARIN SODIUM 40 MG: 100 INJECTION SUBCUTANEOUS at 08:43

## 2023-03-04 NOTE — PROGRESS NOTES
Name: Albert Yadav ADMIT: 2023   : 1940  PCP: Valerie Burnham MD    MRN: [id number] LOS: 1 days   AGE/SEX: 82 y.o. male  ROOM: [facility]     Subjective   Subjective   No acute events. Patient complains of lower abdominal pain/fullness and difficulty voiding. Other than that he has no new complaints. Taking PO. No cp/dyspena/f/c/n/v/d. +mild non-productive cough.    Objective   Objective   Vital Signs  Temp:  [97.1 °F (36.2 °C)-98.6 °F (37 °C)] 98.3 °F (36.8 °C)  Heart Rate:  [66-81] 72  Resp:  [16-18] 18  BP: (100-128)/(44-73) 100/44  SpO2:  [95 %-96 %] 95 %  on   ;   Device (Oxygen Therapy): room air  Body mass index is 23.47 kg/m².  Physical Exam  Vitals and nursing note reviewed.   Constitutional:       General: He is not in acute distress.     Appearance: He is ill-appearing. He is not toxic-appearing or diaphoretic.   HENT:      Head: Normocephalic and atraumatic.      Nose: Nose normal.      Mouth/Throat:      Mouth: Mucous membranes are moist.      Pharynx: Oropharynx is clear.   Eyes:      Conjunctiva/sclera: Conjunctivae normal.      Pupils: Pupils are equal, round, and reactive to light.   Cardiovascular:      Rate and Rhythm: Normal rate and regular rhythm.      Pulses: Normal pulses.   Pulmonary:      Effort: Pulmonary effort is normal.      Breath sounds: Examination of the right-lower field reveals decreased breath sounds. Examination of the left-lower field reveals decreased breath sounds. Decreased breath sounds present.   Abdominal:      General: Bowel sounds are normal.      Palpations: Abdomen is soft.      Tenderness: There is abdominal tenderness (suprapubic). There is no guarding or rebound.   Musculoskeletal:         General: No swelling or tenderness.      Cervical back: Normal range of motion and neck supple.   Skin:     General: Skin is warm and dry.      Capillary Refill: Capillary refill takes less than 2 seconds.   Neurological:      Mental Status: He is alert and  oriented to person, place, and time.   Psychiatric:         Mood and Affect: Mood normal.         Behavior: Behavior normal.       Results Review     I reviewed the patient's new clinical results.  Results from last 7 days   Lab Units 03/03/23  0515 03/02/23  0604 03/01/23  0839 02/28/23 2119   WBC 10*3/mm3 7.83 8.25 7.04 7.67   HEMOGLOBIN g/dL 12.5* 12.3* 12.8* 13.0   PLATELETS 10*3/mm3 185 178 198 181     Results from last 7 days   Lab Units 03/03/23  0515 03/02/23  0604 03/01/23  0839 02/28/23  2119   SODIUM mmol/L 133* 132* 135* 134*   POTASSIUM mmol/L 3.1* 3.7 4.0 4.0   CHLORIDE mmol/L 99 98 100 102   CO2 mmol/L 17.0* 21.0* 21.7* 23.0   BUN mg/dL 31* 20 18 19   CREATININE mg/dL 1.44* 1.18 1.28* 1.36*   GLUCOSE mg/dL 119* 103* 125* 122*   EGFR mL/min/1.73 47.9* 60.8 55.2* 51.3*     Results from last 7 days   Lab Units 03/03/23  0515 03/02/23  0604 03/01/23  0839 02/28/23 2119   ALBUMIN g/dL 2.8* 3.1* 3.2* 3.4*   BILIRUBIN mg/dL 0.5 0.7 0.6 0.4   ALK PHOS U/L 93 103 112 122*   AST (SGOT) U/L 26 27 24 16   ALT (SGPT) U/L 21 25 25 21     Results from last 7 days   Lab Units 03/03/23  0515 03/02/23  0604 03/01/23  0839 02/28/23  2119   CALCIUM mg/dL 7.9* 8.4* 8.0* 8.4*   ALBUMIN g/dL 2.8* 3.1* 3.2* 3.4*     Results from last 7 days   Lab Units 03/02/23  1926   LACTATE mmol/L 1.2     Hemoglobin A1C   Date/Time Value Ref Range Status   03/01/2023 0839 6.00 (H) 4.80 - 5.60 % Final     Glucose   Date/Time Value Ref Range Status   03/01/2023 2109 99 70 - 130 mg/dL Final     Comment:     Meter: [id number] : [id number] Francisco Barrett NA   03/01/2023 1559 104 70 - 130 mg/dL Final     Comment:     Meter: [id number] : [id number] Phoenix MORRIS   03/01/2023 1046 132 (H) 70 - 130 mg/dL Final     Comment:     Meter: [id number] : [id number] David Solis NA   03/01/2023 0630 123 70 - 130 mg/dL Final     Comment:     Meter: [id number] : [id number] Reyes Marshall RN   03/01/2023 0154 126 70 - 130 mg/dL Final      Comment:     Meter: [id number] : [id number] Bethea Nubia GOODMAN       CT Head Without Contrast    Result Date: 3/2/2023  Electronically signed by Sofie Saravia MD on 03-02-23 at 0309    MRI Brain Without Contrast    Result Date: 3/1/2023  Chronic changes in the brain. No acute abnormality.  This report was finalized on 3/1/2023 11:21 PM by Dr. Remi Perez M.D.      CT Angiogram Carotids    Result Date: 3/2/2023  There is mild stenosis of the right internal carotid artery at the level of C2-3 estimated be approximately 10% in severity. There is no evidence of a proximal intracranial high-grade stenosis or occlusion. Mild to moderate stenosis involving the left P2 P3 junction is appreciated. A large periapical abscess involving tooth #3 is appreciated as well as near complete opacification of the right maxillary sinus.    Radiation dose reduction techniques were utilized, including automated exposure control and exposure modulation based on body size.  This report was finalized on 3/2/2023 4:37 PM by Dr. Kenrick Vu M.D.      CT Angiogram Head    Result Date: 3/2/2023  There is mild stenosis of the right internal carotid artery at the level of C2-3 estimated be approximately 10% in severity. There is no evidence of a proximal intracranial high-grade stenosis or occlusion. Mild to moderate stenosis involving the left P2 P3 junction is appreciated. A large periapical abscess involving tooth #3 is appreciated as well as near complete opacification of the right maxillary sinus.    Radiation dose reduction techniques were utilized, including automated exposure control and exposure modulation based on body size.  This report was finalized on 3/2/2023 4:37 PM by Dr. Kenrick Vu M.D.      I have personally reviewed all medications:  Scheduled Medications  ampicillin-sulbactam, 3 g, Intravenous, Q6H  aspirin, 81 mg, Oral, Daily  atorvastatin, 80 mg, Oral, Nightly  cetirizine, 10 mg, Oral, QAM  clopidogrel, 75 mg,  Oral, QAM  enoxaparin, 40 mg, Subcutaneous, Daily  folic acid, 1 mg, Oral, Daily  [START ON 3/4/2023] insulin lispro, 0-9 Units, Subcutaneous, TID AC  lisinopril, 40 mg, Oral, Q PM  potassium chloride, 40 mEq, Oral, Q4H  sodium chloride, 10 mL, Intravenous, Q12H  traZODone, 50 mg, Oral, Nightly    Infusions  sodium chloride, 75 mL/hr    Diet  Diet: Diabetic Diets; Consistent Carbohydrate; Texture: Regular Texture (IDDSI 7); Fluid Consistency: Thin (IDDSI 0)    I have personally reviewed:  [x]  Laboratory   [x]  Microbiology   [x]  Radiology   [x]  EKG/Telemetry  [x]  Cardiology/Vascular   []  Pathology    [x]  Records         Assessment/Plan     Active Hospital Problems    Diagnosis  POA   • **COVID-19 [U07.1]  Yes   • Fall during current hospitalization [W19.XXXA, Y92.239]  Unknown   • Acute CVA (cerebrovascular accident) (HCC), left thalamic [I63.9]  Clinically Undetermined   • Tooth abscess, third tooth [K04.7]  Yes   • PRACHI (acute kidney injury) (HCC) [N17.9]  Yes   • Anemia [D64.9]  Yes   • Macrocytosis [D75.89]  Yes   • Type 2 diabetes mellitus with hyperglycemia (HCC) [E11.65]  Yes   • Mild cognitive impairment [G31.84]  Yes   • Expressive aphasia [R47.01]  Yes   • Elevated troponin [R77.8]  Yes   • Essential hypertension [I10]  Yes      Resolved Hospital Problems   No resolved problems to display.   Acute CVA  - left thalamic, has right-sided weakness and aphasia  - continue ASA and plavix for 1 month, then plavix monotherapy  - continue on lipitor  - zio patch at discharge  - appreciate neurology recs, follow up with his primary neurologist at discharge    COVID-19  - mild symptoms  - completed 3 days of remdesivir given high risk  - encourage pulmonary toilet-not requiring supplemental oxygen, therefore no indication for steroids    Tooth Abscess  - continue on unasyn, can switch to augmentin at discharge  - OMFS consulted and Dr. Arteaga is recommending follow up as outpatient    PRACHI  - initially resolved  with IVF but recurred today-probably post-obstructive and pre-renal-placing rodriguez and giving IVF    Urine Retention  - per RN patient had a blood clot in his brief and bladder scan >900cc; patient had straight cath in ER  - place rodriguez    Type 2 DM  - BG acceptable, A1C 6.00%  - continue ssi/hypoglycemia protocol     Hypokalemia  - replace    HTN  - BP is on the low side  - hold lisinopril for now especially given PRACHI    Lovenox 40 mg SC daily for DVT prophylaxis.  DNR.  Discussed with patient, nursing staff and care team on multidisciplinary rounds.  Anticipate discharge TBd timing yet to be determined.      Drake Benson MD  [facility]  03/03/23  20:00 EST

## 2023-03-04 NOTE — PROGRESS NOTES
Name: Albert Yadav ADMIT: 2023   : 1940  PCP: Valerie Burnham MD    MRN: [id number] LOS: 2 days   AGE/SEX: 82 y.o. male  ROOM: [facility]     Subjective   Subjective   No acute events. Patient feels much better and denies new complaints.Taking PO. No cp/dyspena/f/c/n/v/d.  His wife is at bedside.  Objective   Objective   Vital Signs  Temp:  [97.1 °F (36.2 °C)-98.5 °F (36.9 °C)] 98.5 °F (36.9 °C)  Heart Rate:  [72-80] 80  Resp:  [18] 18  BP: (100-142)/(44-67) 100/67  SpO2:  [97 %] 97 %  on   ;   Device (Oxygen Therapy): room air  Body mass index is 23.77 kg/m².  Physical Exam  Vitals and nursing note reviewed.   Constitutional:       General: He is not in acute distress.     Appearance: He is not toxic-appearing or diaphoretic.   HENT:      Head: Normocephalic and atraumatic.      Nose: Nose normal.      Mouth/Throat:      Mouth: Mucous membranes are moist.      Pharynx: Oropharynx is clear.   Eyes:      Conjunctiva/sclera: Conjunctivae normal.      Pupils: Pupils are equal, round, and reactive to light.   Cardiovascular:      Rate and Rhythm: Normal rate and regular rhythm.      Pulses: Normal pulses.   Pulmonary:      Effort: Pulmonary effort is normal.      Breath sounds: Examination of the right-lower field reveals decreased breath sounds. Examination of the left-lower field reveals decreased breath sounds. Decreased breath sounds present.   Abdominal:      General: Bowel sounds are normal.      Palpations: Abdomen is soft.      Tenderness: There is no abdominal tenderness.   Genitourinary:     Comments: Adamson catheter in place draining dark yellow/brown  Musculoskeletal:         General: No swelling or tenderness.      Cervical back: Normal range of motion and neck supple.   Skin:     General: Skin is warm and dry.      Capillary Refill: Capillary refill takes less than 2 seconds.   Neurological:      Mental Status: He is alert and oriented to person, place, and time.   Psychiatric:          Mood and Affect: Mood normal.         Behavior: Behavior normal.       Results Review     I reviewed the patient's new clinical results.  Results from last 7 days   Lab Units 03/04/23  0550 03/03/23  0515 03/02/23  0604 03/01/23  0839   WBC 10*3/mm3 6.39 7.83 8.25 7.04   HEMOGLOBIN g/dL 11.4* 12.5* 12.3* 12.8*   PLATELETS 10*3/mm3 174 185 178 198     Results from last 7 days   Lab Units 03/04/23  0550 03/03/23  0515 03/02/23  0604 03/01/23  0839   SODIUM mmol/L 136 133* 132* 135*   POTASSIUM mmol/L 3.8 3.1* 3.7 4.0   CHLORIDE mmol/L 106 99 98 100   CO2 mmol/L 21.2* 17.0* 21.0* 21.7*   BUN mg/dL 31* 31* 20 18   CREATININE mg/dL 1.21 1.44* 1.18 1.28*   GLUCOSE mg/dL 93 119* 103* 125*   EGFR mL/min/1.73 59.0* 47.9* 60.8 55.2*     Results from last 7 days   Lab Units 03/03/23  0515 03/02/23  0604 03/01/23  0839 02/28/23  2119   ALBUMIN g/dL 2.8* 3.1* 3.2* 3.4*   BILIRUBIN mg/dL 0.5 0.7 0.6 0.4   ALK PHOS U/L 93 103 112 122*   AST (SGOT) U/L 26 27 24 16   ALT (SGPT) U/L 21 25 25 21     Results from last 7 days   Lab Units 03/04/23  0550 03/03/23  0515 03/02/23  0604 03/01/23  0839 02/28/23  2119   CALCIUM mg/dL 7.8* 7.9* 8.4* 8.0* 8.4*   ALBUMIN g/dL  --  2.8* 3.1* 3.2* 3.4*     Results from last 7 days   Lab Units 03/02/23  1926   LACTATE mmol/L 1.2     Glucose   Date/Time Value Ref Range Status   03/04/2023 1047 120 70 - 130 mg/dL Final     Comment:     Meter: [id number] : [id number] Man Quiroz NA   03/04/2023 0607 96 70 - 130 mg/dL Final     Comment:     Meter: [id number] : [id number] Matt Jansen    03/03/2023 2017 156 (H) 70 - 130 mg/dL Final     Comment:     Meter: [id number] : [id number] Carlosclaudio Jansen    03/01/2023 2109 99 70 - 130 mg/dL Final     Comment:     Meter: [id number] : [id number] Francisco Barrett    03/01/2023 1559 104 70 - 130 mg/dL Final     Comment:     Meter: [id number] : [id number] Phoenix MORRIS       No radiology results for the last day  I have personally reviewed  all medications:  Scheduled Medications  ampicillin-sulbactam, 3 g, Intravenous, Q6H  aspirin, 81 mg, Oral, Daily  atorvastatin, 80 mg, Oral, Nightly  cetirizine, 10 mg, Oral, QAM  clopidogrel, 75 mg, Oral, QAM  enoxaparin, 40 mg, Subcutaneous, Daily  folic acid, 1 mg, Oral, Daily  insulin lispro, 0-9 Units, Subcutaneous, TID AC  sodium chloride, 10 mL, Intravenous, Q12H  tamsulosin, 0.4 mg, Oral, Daily  traZODone, 50 mg, Oral, Nightly    Infusions  sodium chloride, 75 mL/hr, Last Rate: 75 mL/hr (03/04/23 1050)    Diet  Diet: Diabetic Diets; Consistent Carbohydrate; Texture: Regular Texture (IDDSI 7); Fluid Consistency: Thin (IDDSI 0)    I have personally reviewed:  [x]  Laboratory   [x]  Microbiology   []  Radiology   [x]  EKG/Telemetry  []  Cardiology/Vascular   []  Pathology    [x]  Records    Assessment/Plan     Active Hospital Problems    Diagnosis  POA   • **COVID-19 [U07.1]  Yes   • Fall during current hospitalization [W19.XXXA, Y92.239]  Unknown   • Acute CVA (cerebrovascular accident) (HCC), left thalamic [I63.9]  Clinically Undetermined   • Tooth abscess, third tooth [K04.7]  Yes   • PRACHI (acute kidney injury) ([facility]) [N17.9]  Yes   • Anemia [D64.9]  Yes   • Macrocytosis [D75.89]  Yes   • Type 2 diabetes mellitus with hyperglycemia (HCC) [E11.65]  Yes   • Mild cognitive impairment [G31.84]  Yes   • Expressive aphasia [R47.01]  Yes   • Elevated troponin [R77.8]  Yes   • Essential hypertension [I10]  Yes      Resolved Hospital Problems   No resolved problems to display.   Acute CVA  - left thalamic, has right-sided weakness and aphasia  - continue ASA and plavix for 1 month, then plavix monotherapy  - continue on lipitor  - zio patch at discharge  - appreciate neurology recs, follow up with his primary neurologist at discharge    COVID-19  - mild symptoms, now resolved  - completed 3 days of remdesivir given high risk  - encourage pulmonary toilet-not requiring supplemental oxygen, therefore no indication for  steroids  - may complicate placement, will check antigen    Tooth Abscess  - change unasyn to augmentin  - OMFS consulted and Dr. Arteaga recommended follow up as outpatient    PRACHI  - initially resolved with IVF but recurred 3/3/23-probably post-obstructive and pre-renal-improving after rodriguez placement and with IVF-will continue for today    Urine Retention  - per RN 3/3/23 patient had a blood clot in his brief and bladder scan >900cc; probably traumatic from straight cath in ER  - rdoriguez placed, start tamsulosin    Type 2 DM  - BG acceptable, A1C 6.00%  - continue ssi/hypoglycemia protocol     HTN  - BP acceptable  - lisinopril held given PRACHI    Lovenox 40 mg SC daily for DVT prophylaxis.  DNR.  Discussed with patient, spouse and nursing staff.  Anticipate discharge to SNU facility in 2-3 days.      Drake Benson MD  [facility]  03/04/23  15:22 EST

## 2023-03-05 LAB
ANION GAP SERPL CALCULATED.3IONS-SCNC: 10 MMOL/L (ref 5–15)
BASOPHILS # BLD AUTO: 0 10*3/MM3 (ref 0–0.2)
BASOPHILS NFR BLD AUTO: 0 % (ref 0–1.5)
BUN SERPL-MCNC: 17 MG/DL (ref 8–23)
BUN/CREAT SERPL: 18.5 (ref 7–25)
CALCIUM SPEC-SCNC: 7.4 MG/DL (ref 8.6–10.5)
CHLORIDE SERPL-SCNC: 107 MMOL/L (ref 98–107)
CO2 SERPL-SCNC: 21 MMOL/L (ref 22–29)
CREAT SERPL-MCNC: 0.92 MG/DL (ref 0.76–1.27)
DEPRECATED RDW RBC AUTO: 44.9 FL (ref 37–54)
EGFRCR SERPLBLD CKD-EPI 2021: 82 ML/MIN/1.73
EOSINOPHIL # BLD AUTO: 0.06 10*3/MM3 (ref 0–0.4)
EOSINOPHIL NFR BLD AUTO: 0.9 % (ref 0.3–6.2)
ERYTHROCYTE [DISTWIDTH] IN BLOOD BY AUTOMATED COUNT: 13.3 % (ref 12.3–15.4)
GLUCOSE BLDC GLUCOMTR-MCNC: 117 MG/DL (ref 70–130)
GLUCOSE BLDC GLUCOMTR-MCNC: 117 MG/DL (ref 70–130)
GLUCOSE BLDC GLUCOMTR-MCNC: 124 MG/DL (ref 70–130)
GLUCOSE BLDC GLUCOMTR-MCNC: 251 MG/DL (ref 70–130)
GLUCOSE SERPL-MCNC: 100 MG/DL (ref 65–99)
HCT VFR BLD AUTO: 32.9 % (ref 37.5–51)
HGB BLD-MCNC: 11.3 G/DL (ref 13–17.7)
IMM GRANULOCYTES # BLD AUTO: 0.03 10*3/MM3 (ref 0–0.05)
IMM GRANULOCYTES NFR BLD AUTO: 0.4 % (ref 0–0.5)
LYMPHOCYTES # BLD AUTO: 0.46 10*3/MM3 (ref 0.7–3.1)
LYMPHOCYTES NFR BLD AUTO: 6.7 % (ref 19.6–45.3)
MCH RBC QN AUTO: 32.5 PG (ref 26.6–33)
MCHC RBC AUTO-ENTMCNC: 34.3 G/DL (ref 31.5–35.7)
MCV RBC AUTO: 94.5 FL (ref 79–97)
MONOCYTES # BLD AUTO: 0.75 10*3/MM3 (ref 0.1–0.9)
MONOCYTES NFR BLD AUTO: 10.9 % (ref 5–12)
NEUTROPHILS NFR BLD AUTO: 5.61 10*3/MM3 (ref 1.7–7)
NEUTROPHILS NFR BLD AUTO: 81.1 % (ref 42.7–76)
NRBC BLD AUTO-RTO: 0 /100 WBC (ref 0–0.2)
PLATELET # BLD AUTO: 172 10*3/MM3 (ref 140–450)
PMV BLD AUTO: 10.5 FL (ref 6–12)
POTASSIUM SERPL-SCNC: 3.6 MMOL/L (ref 3.5–5.2)
RBC # BLD AUTO: 3.48 10*6/MM3 (ref 4.14–5.8)
SODIUM SERPL-SCNC: 138 MMOL/L (ref 136–145)
WBC NRBC COR # BLD: 6.91 10*3/MM3 (ref 3.4–10.8)

## 2023-03-05 PROCEDURE — 82962 GLUCOSE BLOOD TEST: CPT

## 2023-03-05 PROCEDURE — 80048 BASIC METABOLIC PNL TOTAL CA: CPT | Performed by: INTERNAL MEDICINE

## 2023-03-05 PROCEDURE — 85025 COMPLETE CBC W/AUTO DIFF WBC: CPT | Performed by: INTERNAL MEDICINE

## 2023-03-05 PROCEDURE — 25010000002 ENOXAPARIN PER 10 MG: Performed by: NURSE PRACTITIONER

## 2023-03-05 RX ORDER — ECHINACEA PURPUREA EXTRACT 125 MG
2 TABLET ORAL AS NEEDED
Status: DISCONTINUED | OUTPATIENT
Start: 2023-03-05 | End: 2023-03-07 | Stop reason: HOSPADM

## 2023-03-05 RX ADMIN — Medication 10 ML: at 09:16

## 2023-03-05 RX ADMIN — ASPIRIN 81 MG: 81 TABLET, COATED ORAL at 09:16

## 2023-03-05 RX ADMIN — TRAZODONE HYDROCHLORIDE 50 MG: 50 TABLET ORAL at 20:23

## 2023-03-05 RX ADMIN — ATORVASTATIN CALCIUM 80 MG: 80 TABLET, FILM COATED ORAL at 20:22

## 2023-03-05 RX ADMIN — SODIUM CHLORIDE 75 ML/HR: 9 INJECTION, SOLUTION INTRAVENOUS at 13:52

## 2023-03-05 RX ADMIN — AMOXICILLIN AND CLAVULANATE POTASSIUM 1 TABLET: 875; 125 TABLET, FILM COATED ORAL at 09:15

## 2023-03-05 RX ADMIN — AMOXICILLIN AND CLAVULANATE POTASSIUM 1 TABLET: 875; 125 TABLET, FILM COATED ORAL at 20:23

## 2023-03-05 RX ADMIN — FOLIC ACID 1 MG: 1 TABLET ORAL at 09:15

## 2023-03-05 RX ADMIN — GUAIFENESIN 200 MG: 200 SOLUTION ORAL at 17:43

## 2023-03-05 RX ADMIN — Medication 10 ML: at 20:23

## 2023-03-05 RX ADMIN — ENOXAPARIN SODIUM 40 MG: 100 INJECTION SUBCUTANEOUS at 09:16

## 2023-03-05 RX ADMIN — TAMSULOSIN HYDROCHLORIDE 0.4 MG: 0.4 CAPSULE ORAL at 09:15

## 2023-03-05 RX ADMIN — CETIRIZINE HYDROCHLORIDE 10 MG: 10 TABLET ORAL at 09:15

## 2023-03-05 RX ADMIN — CLOPIDOGREL 75 MG: 75 TABLET, FILM COATED ORAL at 09:15

## 2023-03-05 NOTE — PROGRESS NOTES
[facility]    [phone]   LOS: 3 days     Name: Albert Yadav  Age/Sex: 82 y.o. male  :  1940        PCP: Valerie Burnham MD  Chief Complaint   Patient presents with   • Weakness - Generalized      Subjective   Family is present at the bedside with the patient this morning.  He complained of more confusion.  This seems to come and go was worse over the evening hours and this morning maybe a little better around midday.  No fevers or chills still with some cough.  Denies other new issues or complaints right now.  They are both frustrated with the lack of physical therapy involvement  General: No Fever or Chills, Cardiac: No Chest Pain or Palpitations, Resp: No Cough or SOA, GI: No Nausea, Vomiting, or Diarrhea and Other: No bleeding    amoxicillin-clavulanate, 1 tablet, Oral, Q12H  aspirin, 81 mg, Oral, Daily  atorvastatin, 80 mg, Oral, Nightly  cetirizine, 10 mg, Oral, QAM  clopidogrel, 75 mg, Oral, QAM  enoxaparin, 40 mg, Subcutaneous, Daily  folic acid, 1 mg, Oral, Daily  insulin lispro, 0-9 Units, Subcutaneous, TID AC  sodium chloride, 10 mL, Intravenous, Q12H  tamsulosin, 0.4 mg, Oral, Daily  traZODone, 50 mg, Oral, Nightly      sodium chloride, 75 mL/hr, Last Rate: 75 mL/hr (23 2251)        Objective   Vital Signs  Temp:  [95.6 °F (35.3 °C)-98.3 °F (36.8 °C)] 98.3 °F (36.8 °C)  Heart Rate:  [71-81] 74  Resp:  [18] 18  BP: (121-143)/(65-79) 143/73  Body mass index is 24.52 kg/m².    Intake/Output Summary (Last 24 hours) at 3/5/2023 1238  Last data filed at 3/5/2023 1233  Gross per 24 hour   Intake --   Output 2000 ml   Net -2000 ml       Physical Exam      Results Review:       I reviewed the patient's new clinical results.  Results from last 7 days   Lab Units 23  0431 23  0550 23  0515 23  0604 23  0839 23  2119   WBC 10*3/mm3 6.91 6.39 7.83 8.25 7.04 7.67   HEMOGLOBIN g/dL 11.3* 11.4* 12.5* 12.3* 12.8* 13.0   PLATELETS 10*3/mm3 172  174 185 178 198 181     Results from last 7 days   Lab Units 03/05/23  0431 03/04/23  0550 03/03/23  0515 03/02/23  0604 03/01/23  0839 02/28/23  2119   SODIUM mmol/L 138 136 133* 132* 135* 134*   POTASSIUM mmol/L 3.6 3.8 3.1* 3.7 4.0 4.0   CHLORIDE mmol/L 107 106 99 98 100 102   CO2 mmol/L 21.0* 21.2* 17.0* 21.0* 21.7* 23.0   BUN mg/dL 17 31* 31* 20 18 19   CREATININE mg/dL 0.92 1.21 1.44* 1.18 1.28* 1.36*   CALCIUM mg/dL 7.4* 7.8* 7.9* 8.4* 8.0* 8.4*   Estimated Creatinine Clearance: 69.3 mL/min (by C-G formula based on SCr of 0.92 mg/dL).      Assessment & Plan   Active Hospital Problems    Diagnosis  POA   • **COVID-19 [U07.1]  Yes   • Fall during current hospitalization [W19.XXXA, Y92.239]  Unknown   • Acute CVA (cerebrovascular accident) (HCC), left thalamic [I63.9]  Clinically Undetermined   • Tooth abscess, third tooth [K04.7]  Yes   • PRACHI (acute kidney injury) (HCC) [N17.9]  Yes   • Anemia [D64.9]  Yes   • Macrocytosis [D75.89]  Yes   • Type 2 diabetes mellitus with hyperglycemia (HCC) [E11.65]  Yes   • Mild cognitive impairment [G31.84]  Yes   • Expressive aphasia [R47.01]  Yes   • Elevated troponin [R77.8]  Yes   • Essential hypertension [I10]  Yes      Resolved Hospital Problems   No resolved problems to display.       PLAN  This is an 82-year-old gentleman with a history of type 2 diabetes hypertension and hyperlipidemia that presents to the hospital with weakness upper respiratory symptoms and tested positive for COVID-19 complicated by some confusion and word finding issues and diagnosed as a acute left thalamic stroke.  Acute CVA  - left thalamic, has right-sided weakness and aphasia  - continue ASA and plavix for 1 month, then plavix monotherapy  - continue on lipitor  - zio patch at discharge  - appreciate neurology recs, follow up with his primary neurologist at discharge     COVID-19  - mild symptoms, now resolved  - completed 3 days of remdesivir given high risk  - encourage pulmonary toilet-not  requiring supplemental oxygen, therefore no indication for steroids  - may complicate placement, will check antigen    Delirium/Encephalopathy  -Waxing and waning pattern consistent with delirium  -supportive care and delirium precautions   Tooth Abscess  - change unasyn to augmentin  - OMFS consulted and Dr. Arteaga recommended follow up as outpatient     PRACHI  - initially resolved with IVF but recurred 3/3/23-probably post-obstructive and pre-renal-improving after rodriguez placement and with IVF-will continue for today     Urine Retention  - per RN 3/3/23 patient had a blood clot in his brief and bladder scan >900cc; probably traumatic from straight cath in ER  - rodriguez placed, start tamsulosin     Type 2 DM  - BG acceptable, A1C 6.00%  - continue ssi/hypoglycemia protocol      HTN  - BP acceptable  - lisinopril held given PRACHI     Lovenox 40 mg SC daily for DVT prophylaxis.  DNR.    Disposition  Anticipate discharge to SNU facility in 1-2 days.      Kenrick Dutta MD  [facility]  03/05/23  12:38 EST

## 2023-03-06 LAB
ALBUMIN SERPL-MCNC: 2.3 G/DL (ref 3.5–5.2)
ANION GAP SERPL CALCULATED.3IONS-SCNC: 6.7 MMOL/L (ref 5–15)
ANISOCYTOSIS BLD QL: ABNORMAL
BUN SERPL-MCNC: 8 MG/DL (ref 8–23)
BUN/CREAT SERPL: 9.9 (ref 7–25)
CALCIUM SPEC-SCNC: 7.3 MG/DL (ref 8.6–10.5)
CHLORIDE SERPL-SCNC: 111 MMOL/L (ref 98–107)
CO2 SERPL-SCNC: 22.3 MMOL/L (ref 22–29)
CREAT SERPL-MCNC: 0.81 MG/DL (ref 0.76–1.27)
DEPRECATED RDW RBC AUTO: 45.4 FL (ref 37–54)
EGFRCR SERPLBLD CKD-EPI 2021: 86.9 ML/MIN/1.73
EOSINOPHIL # BLD MANUAL: 0.05 10*3/MM3 (ref 0–0.4)
EOSINOPHIL NFR BLD MANUAL: 1 % (ref 0.3–6.2)
ERYTHROCYTE [DISTWIDTH] IN BLOOD BY AUTOMATED COUNT: 13.1 % (ref 12.3–15.4)
GLUCOSE BLDC GLUCOMTR-MCNC: 138 MG/DL (ref 70–130)
GLUCOSE BLDC GLUCOMTR-MCNC: 147 MG/DL (ref 70–130)
GLUCOSE BLDC GLUCOMTR-MCNC: 99 MG/DL (ref 70–130)
GLUCOSE SERPL-MCNC: 104 MG/DL (ref 65–99)
HCT VFR BLD AUTO: 30.1 % (ref 37.5–51)
HGB BLD-MCNC: 10.2 G/DL (ref 13–17.7)
LYMPHOCYTES # BLD MANUAL: 0.93 10*3/MM3 (ref 0.7–3.1)
LYMPHOCYTES NFR BLD MANUAL: 10 % (ref 5–12)
MAGNESIUM SERPL-MCNC: 1.7 MG/DL (ref 1.6–2.4)
MCH RBC QN AUTO: 32.6 PG (ref 26.6–33)
MCHC RBC AUTO-ENTMCNC: 33.9 G/DL (ref 31.5–35.7)
MCV RBC AUTO: 96.2 FL (ref 79–97)
MICROCYTES BLD QL: ABNORMAL
MONOCYTES # BLD: 0.51 10*3/MM3 (ref 0.1–0.9)
NEUTROPHILS # BLD AUTO: 3.65 10*3/MM3 (ref 1.7–7)
NEUTROPHILS NFR BLD MANUAL: 71 % (ref 42.7–76)
NRBC BLD AUTO-RTO: 0 /100 WBC (ref 0–0.2)
PHOSPHATE SERPL-MCNC: 2.1 MG/DL (ref 2.5–4.5)
PLAT MORPH BLD: NORMAL
PLATELET # BLD AUTO: 145 10*3/MM3 (ref 140–450)
PMV BLD AUTO: 10.5 FL (ref 6–12)
POIKILOCYTOSIS BLD QL SMEAR: ABNORMAL
POTASSIUM SERPL-SCNC: 3.3 MMOL/L (ref 3.5–5.2)
RBC # BLD AUTO: 3.13 10*6/MM3 (ref 4.14–5.8)
SODIUM SERPL-SCNC: 140 MMOL/L (ref 136–145)
VARIANT LYMPHS NFR BLD MANUAL: 18 % (ref 19.6–45.3)
WBC MORPH BLD: NORMAL
WBC NRBC COR # BLD: 5.14 10*3/MM3 (ref 3.4–10.8)

## 2023-03-06 PROCEDURE — 85007 BL SMEAR W/DIFF WBC COUNT: CPT | Performed by: INTERNAL MEDICINE

## 2023-03-06 PROCEDURE — 82962 GLUCOSE BLOOD TEST: CPT

## 2023-03-06 PROCEDURE — 97535 SELF CARE MNGMENT TRAINING: CPT

## 2023-03-06 PROCEDURE — 97116 GAIT TRAINING THERAPY: CPT

## 2023-03-06 PROCEDURE — 85025 COMPLETE CBC W/AUTO DIFF WBC: CPT | Performed by: INTERNAL MEDICINE

## 2023-03-06 PROCEDURE — 97110 THERAPEUTIC EXERCISES: CPT

## 2023-03-06 PROCEDURE — 83735 ASSAY OF MAGNESIUM: CPT | Performed by: HOSPITALIST

## 2023-03-06 PROCEDURE — 80069 RENAL FUNCTION PANEL: CPT | Performed by: HOSPITALIST

## 2023-03-06 PROCEDURE — 25010000002 ENOXAPARIN PER 10 MG: Performed by: NURSE PRACTITIONER

## 2023-03-06 RX ADMIN — ACETAMINOPHEN 650 MG: 325 TABLET, FILM COATED ORAL at 21:07

## 2023-03-06 RX ADMIN — AMOXICILLIN AND CLAVULANATE POTASSIUM 1 TABLET: 875; 125 TABLET, FILM COATED ORAL at 21:07

## 2023-03-06 RX ADMIN — Medication 10 ML: at 21:08

## 2023-03-06 RX ADMIN — FOLIC ACID 1 MG: 1 TABLET ORAL at 08:24

## 2023-03-06 RX ADMIN — GUAIFENESIN 200 MG: 200 SOLUTION ORAL at 21:07

## 2023-03-06 RX ADMIN — ENOXAPARIN SODIUM 40 MG: 100 INJECTION SUBCUTANEOUS at 08:24

## 2023-03-06 RX ADMIN — Medication 10 ML: at 08:24

## 2023-03-06 RX ADMIN — ATORVASTATIN CALCIUM 80 MG: 80 TABLET, FILM COATED ORAL at 21:07

## 2023-03-06 RX ADMIN — TAMSULOSIN HYDROCHLORIDE 0.4 MG: 0.4 CAPSULE ORAL at 08:23

## 2023-03-06 RX ADMIN — ASPIRIN 81 MG: 81 TABLET, COATED ORAL at 08:23

## 2023-03-06 RX ADMIN — CETIRIZINE HYDROCHLORIDE 10 MG: 10 TABLET ORAL at 08:24

## 2023-03-06 RX ADMIN — POTASSIUM PHOSPHATE, MONOBASIC POTASSIUM PHOSPHATE, DIBASIC 30 MMOL: 224; 236 INJECTION, SOLUTION, CONCENTRATE INTRAVENOUS at 13:53

## 2023-03-06 RX ADMIN — AMOXICILLIN AND CLAVULANATE POTASSIUM 1 TABLET: 875; 125 TABLET, FILM COATED ORAL at 08:24

## 2023-03-06 RX ADMIN — TRAZODONE HYDROCHLORIDE 50 MG: 50 TABLET ORAL at 21:07

## 2023-03-06 RX ADMIN — CLOPIDOGREL 75 MG: 75 TABLET, FILM COATED ORAL at 08:24

## 2023-03-06 RX ADMIN — DEXTROMETHORPHAN 60 MG: 30 SUSPENSION, EXTENDED RELEASE ORAL at 21:07

## 2023-03-06 NOTE — THERAPY TREATMENT NOTE
Patient Name: Albert Yadav  : 1940    MRN: [id number]                              Today's Date: 3/6/2023       Admit Date: 2023    Visit Dx:     ICD-10-CM ICD-9-CM   1. COVID-19  U07.1 079.89   2. 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, left leg ([facility])   • Spinal stenosis, lumbar region, with neurogenic claudication   • Carotid stenosis, asymptomatic, bilateral   • Spondylolisthesis at L4-L5 level   • Lumbar stenosis   • Closed fracture of multiple ribs of right side, initial encounter   • Retroperitoneal tumor   • Closed fracture of one rib of right side, initial encounter   • Rheumatoid arthritis ([facility])   • Contusion of right hip   • COVID-19   • PRACHI (acute kidney injury) ([facility])   • Anemia   • Macrocytosis   • Type 2 diabetes mellitus with hyperglycemia ([facility])   • Mild cognitive impairment   • Expressive aphasia   • Elevated troponin   • Fall during current hospitalization   • 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   • Cholelithiasis     Gall bladder removed   • Diabetes mellitus ([facility])     Type 2   • Falls    • Fracture of ankle    • Fracture of wrist    • Fracture, clavicle 26 years ago   • Fracture, foot 26 year ago   • Groin rash     PT STATES LEFT GROIN AT TIMES   • History of transfusion    • Hyperlipidemia     • Hypertension    • Low back pain    • Low back strain ?   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   • Rheumatoid arthritis ([facility])     HANDS DIALLO   • Staph infection     2015  [facility] POST SX   • Tendency toward bleeding easily ([facility])     due to blood thinners     Past Surgical History:   Procedure Laterality Date   • ADRENALECTOMY Left 12/20/2022    Procedure: LAPAROSCOPIC EXCISION LEFT RETROPERITONEAL MASS;  Surgeon: Moy Vargas MD;  Location: [facility];  Service: General;  Laterality: Left;   • ANGIOPLASTY FEMORAL ARTERY Left 11/07/2016    Procedure: ULTRA SOUND ACCESS RIGHT FEMORAL ARTERY, AIF BILATERAL RUNOFF, SELECTIVE CATHETERIZATION LEFT FEMORAL ARTERY.;  Surgeon: Errol Michael MD;  Location: On license of [facility] OR 18/19;  Service:    • ANKLE OPEN REDUCTION INTERNAL FIXATION  1980   • ARTERIOVENOUS FISTULA/SHUNT SURGERY Left 12/13/2016    Procedure: LEFT ILEO-FEMORAL GORTEX GRAFT AND LEFT LEG ARERIOGRAM ;  Surgeon: Errol Michael MD;  Location: On license of [facility] OR 18/19;  Service:    • CAROTID ENDARTERECTOMY Right 09/29/2020    Procedure: CAROTID ENDARTERECTOMY;  Surgeon: James Graham MD;  Location: [facility];  Service: Vascular;  Laterality: Right;   • CHOLECYSTECTOMY     • EPIDURAL BLOCK     • EYE SURGERY Left     as child   • FRACTURE SURGERY Left     arm   • ILIAC ARTERY STENT Left 2020   • KNEE SURGERY Left     ORIF   • ORIF ANKLE FRACTURE Left       General Information     Row Name 03/06/23 1042          OT Time and Intention    Document Type therapy note (daily note)  -RB     Mode of Treatment occupational therapy  -RB     Row Name 03/06/23 1042          General Information    Patient Profile Reviewed yes  -RB     Existing Precautions/Restrictions fall  -RB     Barriers to Rehab cognitive status;medically complex  -RB     Row Name  03/06/23 1042          Cognition    Orientation Status (Cognition) oriented to;person;place  -RB           User Key  (r) = Recorded By, (t) = Taken By, (c) = Cosigned By    Initials Name Provider Type    RB Kiara Hobbs OT Occupational Therapist                 Mobility/ADL's     Row Name 03/06/23 1044          Bed Mobility    Bed Mobility supine-sit  -RB     Supine-Sit [place] (Bed Mobility) minimum assist (75% patient effort);2 person assist;verbal cues  -RB     Row Name 03/06/23 1044          Transfers    Transfers sit-stand transfer;stand-sit transfer;toilet transfer;bed-chair transfer  -RB     Row Name 03/06/23 1044          Bed-Chair Transfer    Bed-Chair Barren (Transfers) contact guard;minimum assist (75% patient effort);1 person assist;verbal cues  -RB     Assistive Device (Bed-Chair Transfers) walker, front-wheeled  -RB     Row Name 03/06/23 1044          Sit-Stand Transfer    Sit-Stand Bar[place] (Transfers) minimum assist (75% patient effort);contact guard;1 person assist;verbal cues  -RB     Assistive Device (Sit-Stand Transfers) walker, front-wheeled  -RB     Row Name 03/06/23 1044          Stand-Sit Transfer    Stand-Sit [place] (Transfers) minimum assist (75% patient effort);contact guard;verbal cues  -RB     Assistive Device (Stand-Sit Transfers) walker, front-wheeled  -RB     Row Name 03/06/23 1044          Toilet Transfer    Type (Toilet Transfer) sit-stand;stand-sit  -RB     [place] Level (Toilet Transfer) contact guard;verbal cues  -RB     Assistive Device (Toilet Transfer) grab bars/safety frame;walker, front-wheeled  -RB     Row Name 03/06/23 1044          Functional Mobility    Functional Mobility- Ind. Level contact guard assist;minimum assist (75% patient effort);verbal cues required  -RB     Functional Mobility- Device walker, front-wheeled  -RB     Functional Mobility- Safety Issues balance decreased during turns  -RB     Row Name 03/06/23 1044           Activities of Daily Living    BADL Assessment/Intervention grooming;toileting  -RB     Row Name 03/06/23 1044          Grooming Assessment/Training    [place] Level (Grooming) grooming skills;minimum assist (75% patient effort);verbal cues  -RB     Position (Grooming) supported standing;sink side  -RB     Row Name 03/06/23 1044          Toileting Assessment/Training    [place] Level (Toileting) toileting skills;maximum assist (25% patient effort);perform perineal hygiene;change pad/brief;adjust/manage clothing  -RB     Position (Toileting) supported sitting;supported standing  -RB     Comment, (Toileting) in the bathroom on the commode  -RB           User Key  (r) = Recorded By, (t) = Taken By, (c) = Cosigned By    Initials Name Provider Type    Kiara Dockery OT Occupational Therapist               Obj/Interventions     Row Name 03/06/23 1046          Sensory Assessment (Somatosensory)    Sensory Assessment (Somatosensory) sensation intact  -RB     Row Name 03/06/23 1046          Shoulder (Therapeutic Exercise)    Shoulder (Therapeutic Exercise) AROM (active range of motion)  -RB     Shoulder AROM (Therapeutic Exercise) flexion;extension;sitting;10 repetitions  -RB     Row Name 03/06/23 1046          Motor Skills    Therapeutic Exercise shoulder  -RB     Row Name 03/06/23 1046          Balance    Comment, Balance CGA-Min A for standing balance. Rwx used for support. Shuffled steps and cues to not sit/stand from surfaces without reaching back to locate arm of surfaces/grab bars  -RB           User Key  (r) = Recorded By, (t) = Taken By, (c) = Cosigned By    Initials Name Provider Type    Kiara Dockery OT Occupational Therapist               Goals/Plan    No documentation.                Clinical Impression     Row Name 03/06/23 1048          Pain Assessment    Pretreatment Pain Rating 0/10 - no pain  -RB     Posttreatment Pain Rating 0/10 - no pain  -RB     Row Name 03/06/23 1048           Plan of Care Review    Plan of Care Reviewed With patient  -RB     Progress improving  -RB     Row Name 03/06/23 1048          Therapy Assessment/Plan (OT)    Rehab Potential (OT) good, to achieve stated therapy goals  -RB     Criteria for Skilled Therapeutic Interventions Met (OT) yes;skilled treatment is necessary  -RB     Therapy Frequency (OT) 5 times/wk  -RB     Row Name 03/06/23 1048          Therapy Plan Review/Discharge Plan (OT)    Anticipated Discharge Disposition (OT) inpatient rehabilitation facility;skilled nursing facility  -RB     Row Name 03/06/23 1048          Vital Signs    O2 Delivery Pre Treatment room air  -RB     O2 Delivery Intra Treatment room air  -RB     O2 Delivery Post Treatment room air  -RB     Pre Patient Position Supine  -RB     Intra Patient Position Standing  -RB     Post Patient Position Sitting  -RB     Row Name 03/06/23 1048          Positioning and Restraints    Pre-Treatment Position in bed  -RB     Post Treatment Position chair  -RB     In Chair notified nsg;reclined;sitting;call light within reach;encouraged to call for assist;exit alarm on;legs elevated  -RB           User Key  (r) = Recorded By, (t) = Taken By, (c) = Cosigned By    Initials Name Provider Type    Kiara Dockery, OT Occupational Therapist               Outcome Measures    No documentation.                 Occupational Therapy Education     Title: PT OT SLP Therapies (Done)     Topic: Occupational Therapy (Done)     Point: ADL training (Done)     Description:   Instruct learner(s) on proper safety adaptation and remediation techniques during self care or transfers.   Instruct in proper use of assistive devices.              Learning Progress Summary           Patient Acceptance, E,TB, VU by TARAN at 3/1/2023 1850   Significant Other Acceptance, E,TB, VU by MW at 3/1/2023 1850                   Point: Home exercise program (Done)     Description:   Instruct learner(s) on appropriate technique for monitoring,  assisting and/or progressing therapeutic exercises/activities.              Learning Progress Summary           Patient Acceptance, E,TB, VU by  at 3/1/2023 1850   Significant Other Acceptance, E,TB, VU by  at 3/1/2023 1850                   Point: Precautions (Done)     Description:   Instruct learner(s) on prescribed precautions during self-care and functional transfers.              Learning Progress Summary           Patient Acceptance, E,TB, VU by  at 3/1/2023 1850   Significant Other Acceptance, E,TB, VU by  at 3/1/2023 1850                   Point: Body mechanics (Done)     Description:   Instruct learner(s) on proper positioning and spine alignment during self-care, functional mobility activities and/or exercises.              Learning Progress Summary           Patient Acceptance, E,TB, VU by  at 3/1/2023 1850   Significant Other Acceptance, E,TB, VU by  at 3/1/2023 1850                               User Key     Initials Effective Dates Name Provider Type Discipline     03/01/23 - 03/01/23 Katey Mcmullen, RN Registered Nurse Nurse              OT Recommendation and Plan  Planned Therapy Interventions (OT): transfer/mobility retraining, strengthening exercise, ROM/therapeutic exercise, activity tolerance training, adaptive equipment training, BADL retraining, occupation/activity based interventions, functional balance retraining, patient/caregiver education/training  Therapy Frequency (OT): 5 times/wk  Plan of Care Review  Plan of Care Reviewed With: patient  Progress: improving     Time Calculation:    Time Calculation- OT     Row Name 03/06/23 1041             Time Calculation- OT    OT Start Time 0916  -RB      OT Stop Time 0948  -RB      OT Time Calculation (min) 32 min  -RB      Total Timed Code Minutes- OT 32 minute(s)  -RB      OT Received On 03/06/23  -RB      OT - Next Appointment 03/07/23  -RB         Timed Charges    94329 - OT Self Care/Mgmt Minutes 32  -RB         Total Minutes     Timed Charges Total Minutes 32  -RB       Total Minutes 32  -RB            User Key  (r) = Recorded By, (t) = Taken By, (c) = Cosigned By    Initials Name Provider Type    Kiara Dockery OT Occupational Therapist              Therapy Charges for Today     Code Description Service Date Service Provider Modifiers Qty    [id number]  OT SELF CARE/MGMT/TRAIN EA 15 MIN 3/6/2023 Kiara Hobbs OT GO 2               Kiara Hobbs OT  3/6/2023

## 2023-03-06 NOTE — PLAN OF CARE
Goal Outcome Evaluation:              Outcome Evaluation: pt is A&Ox3 with periods of confusion, pt being turned with pillows under both sides. Phosphate and potassium replaced. pt being discharged tomorrow at 1:00PM wheelchair van, KG, Will continue to monitor rest of shift.

## 2023-03-06 NOTE — PLAN OF CARE
The pt participated in OT/PT this morning. He completed bed mobility with Min A. CGA/Min A for OOB functional transfers/mobility in his hospital room. He required Max A for toileting in the bathroom. Min A for sinkside grooming. He remains weak and SNF rehab planned at d/c.     Patient was not wearing a face mask during this therapy encounter. Therapist used appropriate personal protective equipment including mask and gloves. Mask used was standard procedure mask. Appropriate PPE was worn during the entire therapy session. Hand hygiene was completed before and after therapy session. Patient is not in enhanced droplet precautions.

## 2023-03-06 NOTE — CASE MANAGEMENT/SOCIAL WORK
Continued Stay Note  [facility]     Patient Name: Albert Yadav  MRN: [id number]  Today's Date: 3/6/2023    Admit Date: 2/28/2023    Plan: [facility] pending pre-cert   Discharge Plan     Row Name 03/06/23 1219       Plan    Plan [facility] pending pre-cert    Patient/Family in Agreement with Plan yes    Plan Comments Spoke with wife and followed up dc planning, she verbalized udnerstanding [facility] dont accept covid + pts. She wishes for pt to go to [facility]. CCP reviewed pt in rounds and anticipate dc 1-2 days. Discussed transportation options and she would like pt to go via transportation, possible wc. Explained Hospital unable to [place] insurance coverage for EMS use. She verbalized understanding.  Notified [employer] [phone] and she will begin pre-cert for [facility]. Packet in ccp office. Sergey GOODMAN/CCP               Discharge Codes    No documentation.               Expected Discharge Date and Time     Expected Discharge Date Expected Discharge Time    Mar 7, 2023             Krysten Strickland RN

## 2023-03-06 NOTE — CASE MANAGEMENT/SOCIAL WORK
Continued Stay Note  [facility]     Patient Name: Albert Yadav  MRN: [id number]  Today's Date: 3/6/2023    Admit Date: 2/28/2023    Plan: [facility] pre-cert approved via [facility] at   Discharge Plan     Row Name 03/06/23 1428       Plan    Plan [facility] pre-cert approved via [facility] at    Patient/Family in Agreement with Plan yes    Plan Comments Spoke with [employer] Precert approved for [facility]. Notified MD, plan for dc tomorrow. CCP notified pts wife via outbound call. CCP called [facility] pickup for 1pm tomorroW (Reservation [id number]). [facility]    Row Name 03/06/23 9275       Plan    Plan [facility] pending pre-cert    Patient/Family in Agreement with Plan yes    Plan Comments Spoke with wife and followed up dc planning, she verbalized udnerstanding [facility] dont accept covid + pts. She wishes for pt to go to [facility]. CCP reviewed pt in rounds and anticipate dc 1-2 days. Discussed transportation options and she would like pt to go via transportation, possible wc. Explained Hospital unable to [place] insurance coverage for EMS use. She verbalized understanding.  Notified [employer] [phone] and she will begin pre-cert for [facility]. Packet in ccp office. Sergey GOODMAN/CCP               Discharge Codes    No documentation.               Expected Discharge Date and Time     Expected Discharge Date Expected Discharge Time    Mar 7, 2023             Krysten Strickland RN

## 2023-03-06 NOTE — PLAN OF CARE
Goal Outcome Evaluation:  Plan of Care Reviewed With: patient        Progress: improving  Outcome Evaluation: Pt tolerated treatment well this date. Required SBA for bed mobility, then CGA-min A to ambulate 25ft w/ Rw, extra person SBA for safety. Cues given to keep feet inside of walker. Instructed pt on a few seated LE exercises, and encouraged pt to stay up in chair a while, wife present.

## 2023-03-06 NOTE — THERAPY TREATMENT NOTE
Patient Name: Albert Yadav  : 1940    MRN: [id number]                              Today's Date: 3/6/2023       Admit Date: 2023    Visit Dx:     ICD-10-CM ICD-9-CM   1. COVID-19  U07.1 079.89   2. 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, left leg ([facility])   • Spinal stenosis, lumbar region, with neurogenic claudication   • Carotid stenosis, asymptomatic, bilateral   • Spondylolisthesis at L4-L5 level   • Lumbar stenosis   • Closed fracture of multiple ribs of right side, initial encounter   • Retroperitoneal tumor   • Closed fracture of one rib of right side, initial encounter   • Rheumatoid arthritis ([facility])   • Contusion of right hip   • COVID-19   • PRACHI (acute kidney injury) ([facility])   • Anemia   • Macrocytosis   • Type 2 diabetes mellitus with hyperglycemia ([facility])   • Mild cognitive impairment   • Expressive aphasia   • Elevated troponin   • Fall during current hospitalization   • 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   • Cholelithiasis     Gall bladder removed   • Diabetes mellitus ([facility])     Type 2   • Falls    • Fracture of ankle    • Fracture of wrist    • Fracture, clavicle 26 years ago   • Fracture, foot 26 year ago   • Groin rash     PT STATES LEFT GROIN AT TIMES   • History of transfusion    • Hyperlipidemia     • Hypertension    • Low back pain    • Low back strain ?   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   • Rheumatoid arthritis ([facility])     HANDS DIALLO   • Staph infection     2015  [facility] POST SX   • Tendency toward bleeding easily ([facility])     due to blood thinners     Past Surgical History:   Procedure Laterality Date   • ADRENALECTOMY Left 12/20/2022    Procedure: LAPAROSCOPIC EXCISION LEFT RETROPERITONEAL MASS;  Surgeon: Moy Vargas MD;  Location: [facility];  Service: General;  Laterality: Left;   • ANGIOPLASTY FEMORAL ARTERY Left 11/07/2016    Procedure: ULTRA SOUND ACCESS RIGHT FEMORAL ARTERY, AIF BILATERAL RUNOFF, SELECTIVE CATHETERIZATION LEFT FEMORAL ARTERY.;  Surgeon: Errol Michael MD;  Location: [facility] OR 18/19;  Service:    • ANKLE OPEN REDUCTION INTERNAL FIXATION  1980   • ARTERIOVENOUS FISTULA/SHUNT SURGERY Left 12/13/2016    Procedure: LEFT ILEO-FEMORAL GORTEX GRAFT AND LEFT LEG ARERIOGRAM ;  Surgeon: Errol Michael MD;  Location: [facility] OR 18/19;  Service:    • CAROTID ENDARTERECTOMY Right 09/29/2020    Procedure: CAROTID ENDARTERECTOMY;  Surgeon: James Graham MD;  Location: [facility];  Service: Vascular;  Laterality: Right;   • CHOLECYSTECTOMY     • EPIDURAL BLOCK     • EYE SURGERY Left     as child   • FRACTURE SURGERY Left     arm   • ILIAC ARTERY STENT Left 2020   • KNEE SURGERY Left     ORIF   • ORIF ANKLE FRACTURE Left       General Information     Row Name 03/06/23 1137          Physical Therapy Time and Intention    Document Type therapy note (daily note)  -     Mode of Treatment physical therapy  -     Row Name 03/06/23 1137          General Information    Existing Precautions/Restrictions fall  -     Row Name 03/06/23 1137          Cognition    Orientation Status (Cognition) oriented x 3  -            User Key  (r) = Recorded By, (t) = Taken By, (c) = Cosigned By    Initials Name Provider Type     Stephanie Auguste PTA Physical Therapist Assistant               Mobility     Row Name 03/06/23 1142          Bed Mobility    Bed Mobility supine-sit  -     Supine-Sit [place] (Bed Mobility) standby assist  -     Assistive Device (Bed Mobility) bed rails;head of bed elevated  -     Row Name 03/06/23 1142          Sit-Stand Transfer    Sit-Stand [place] (Transfers) contact guard  -     Assistive Device (Sit-Stand Transfers) walker, front-wheeled  -     Row Name 03/06/23 1142          Gait/Stairs (Locomotion)    [place] Level (Gait) contact guard;minimum assist (75% patient effort)  -     Assistive Device (Gait) walker, front-wheeled  -     Distance in Feet (Gait) 25  -     Deviations/Abnormal Patterns (Gait) josep decreased;stride length decreased  -     Bilateral Gait Deviations forward flexed posture  -     Comment, (Gait/Stairs) cues to keep feet inside of walker  -           User Key  (r) = Recorded By, (t) = Taken By, (c) = Cosigned By    Initials Name Provider Type     Stephanie Auguste PTA Physical Therapist Assistant               Obj/Interventions     Row Name 03/06/23 1146          Motor Skills    Therapeutic Exercise --  seated AP, LAQ, marches x10 reps  -           User Key  (r) = Recorded By, (t) = Taken By, (c) = Cosigned By    Initials Name Provider Type    Stephanie Evans PTA Physical Therapist Assistant               Goals/Plan    No documentation.                Clinical Impression     Row Name 03/06/23 1146          Pain    Pretreatment Pain Rating 0/10 - no pain  -     Posttreatment Pain Rating 0/10 - no pain  -     Row Name 03/06/23 1146          Positioning and Restraints    Pre-Treatment Position in bed  -     Post Treatment Position chair  -     In Chair reclined;call light within reach;encouraged to call for assist;exit  alarm on;with family/caregiver  -           User Key  (r) = Recorded By, (t) = Taken By, (c) = Cosigned By    Initials Name Provider Type    Stephanie Evans PTA Physical Therapist Assistant               Outcome Measures     Row Name 03/06/23 1148 03/06/23 0823       How much help from another person do you currently need...    Turning from your back to your side while in flat bed without using bedrails? 3  -SM 2  -TA    Moving from lying on back to sitting on the side of a flat bed without bedrails? 3  -SM 2  -TA    Moving to and from a bed to a chair (including a wheelchair)? 3  -SM 2  -TA    Standing up from a chair using your arms (e.g., wheelchair, bedside chair)? 3  -SM 2  -TA    Climbing 3-5 steps with a railing? 2  -SM 2  -TA    To walk in hospital room? 3  -SM 2  -TA    AM-PAC 6 Clicks Score (PT) 17  - 12  -TA    Highest level of mobility 5 --> Static standing  - 4 --> Transferred to chair/commode  -TA    Row Name 03/06/23 1148          Functional Assessment    Outcome Measure Options AM-PAC 6 Clicks Basic Mobility (PT)  -           User Key  (r) = Recorded By, (t) = Taken By, (c) = Cosigned By    Initials Name Provider Type    Clementina CRYSTAL RN Registered Nurse    Stephanie Evans PTA Physical Therapist Assistant                             Physical Therapy Education     Title: PT OT SLP Therapies (Done)     Topic: Physical Therapy (Done)     Point: Mobility training (Done)     Learning Progress Summary           Patient Acceptance, E,TB,D, VU,NR by  at 3/6/2023 1148    Acceptance, E,D, DU,NR by  at 3/3/2023 1439    Acceptance, E,TB, VU by  at 3/1/2023 1850    Acceptance, E, NR by  at 3/1/2023 1531   Significant Other Acceptance, E,TB, VU by TARAN at 3/1/2023 1850                   Point: Home exercise program (Done)     Learning Progress Summary           Patient Acceptance, E,TB,D, VU,NR by  at 3/6/2023 1148    Acceptance, E,TB, VU by  at 3/1/2023 1850     Acceptance, E, NR by  at 3/1/2023 1531   Significant Other Acceptance, E,TB, VU by  at 3/1/2023 1850                   Point: Body mechanics (Done)     Learning Progress Summary           Patient Acceptance, E,TB,D, VU,NR by  at 3/6/2023 1148    Acceptance, E,D, DU,NR by  at 3/3/2023 1439    Acceptance, E,TB, VU by  at 3/1/2023 1850    Acceptance, E, NR by  at 3/1/2023 1531   Significant Other Acceptance, E,TB, VU by  at 3/1/2023 1850                   Point: Precautions (Done)     Learning Progress Summary           Patient Acceptance, E,TB,D, VU,NR by  at 3/6/2023 1148    Acceptance, E,D, DU,NR by  at 3/3/2023 1439    Acceptance, E,TB, VU by  at 3/1/2023 1850    Acceptance, E, NR by  at 3/1/2023 1531   Significant Other Acceptance, E,TB, VU by  at 3/1/2023 1850                               User Key     Initials Effective Dates Name Provider Type Discipline     06/16/21 -  Jyoti Perez, PT Physical Therapist PT     03/07/18 -  Stephanie Auguste PTA Physical Therapist Assistant PT     06/16/21 -  Aishwarya Castorena PTA Physical Therapist Assistant PT     03/01/23 - 03/01/23 Katey Mcmullen, RN Registered Nurse Nurse              PT Recommendation and Plan     Plan of Care Reviewed With: patient  Progress: improving  Outcome Evaluation: Pt tolerated treatment well this date. Required SBA for bed mobility, then CGA-min A to ambulate 25ft w/ Rw, extra person SBA for safety. Cues given to keep feet inside of walker. Instructed pt on a few seated LE exercises, and encouraged pt to stay up in chair a while, wife present.     Time Calculation:    PT Charges     Row Name 03/06/23 1159             Time Calculation    Start Time 0916  -      Stop Time 0948  -      Time Calculation (min) 32 min  -      PT Received On 03/06/23  -      PT - Next Appointment 03/08/23  -            User Key  (r) = Recorded By, (t) = Taken By, (c) = Cosigned By    Initials Name Provider Type      Stephanie Auguste PTA Physical Therapist Assistant              Therapy Charges for Today     Code Description Service Date Service Provider Modifiers Qty    32605245078 HC PT THER PROC EA 15 MIN 3/6/2023 Stephanie Auguste, [place] GP 1    19402378584 HC GAIT TRAINING EA 15 MIN 3/6/2023 Stephanie Auguste, [place] GP 1    24315150980 HC PT THER SUPP EA 15 MIN 3/6/2023 Stephanie Auguste, [place] GP 1          PT G-Codes  Outcome Measure Options: AM-PAC 6 Clicks Basic Mobility (PT)  AM-PAC 6 Clicks Score (PT): 17  AM-PAC 6 Clicks Score (OT): 12  Modified Clements Scale: 4 - Moderately severe disability.  Unable to walk without assistance, and unable to attend to own bodily needs without assistance.  PT Discharge Summary  Anticipated Discharge Disposition (PT): inpatient rehabilitation facility, skilled nursing facility    Stephanie Auguste PTA  3/6/2023

## 2023-03-06 NOTE — PROGRESS NOTES
Ded[facility] to Hospital Care    [phone]   LOS: 4 days     Name: Albert Yadav  Age/Sex: 82 y.o. male  :  1940        PCP: Valerie Burnham MD  Chief Complaint   Patient presents with   • Weakness - Generalized      Subjective   Confusion seems to have improved today.  Denying other new issues or complaints right now.  Shortness of breath coughing and wheezing seem to have improved as well.  No fevers or chills  General: No Fever or Chills, Cardiac: No Chest Pain or Palpitations, Resp: No Cough or SOA, GI: No Nausea, Vomiting, or Diarrhea and Other: No bleeding    amoxicillin-clavulanate, 1 tablet, Oral, Q12H  aspirin, 81 mg, Oral, Daily  atorvastatin, 80 mg, Oral, Nightly  cetirizine, 10 mg, Oral, QAM  clopidogrel, 75 mg, Oral, QAM  enoxaparin, 40 mg, Subcutaneous, Daily  folic acid, 1 mg, Oral, Daily  insulin lispro, 0-9 Units, Subcutaneous, TID AC  potassium phosphate, 30 mmol, Intravenous, Once  sodium chloride, 10 mL, Intravenous, Q12H  tamsulosin, 0.4 mg, Oral, Daily  traZODone, 50 mg, Oral, Nightly           Objective   Vital Signs  Temp:  [97.8 °F (36.6 °C)-98.8 °F (37.1 °C)] 97.8 °F (36.6 °C)  Heart Rate:  [64-78] 78  Resp:  [18] 18  BP: (137-145)/(62-65) 137/65  Body mass index is 25.24 kg/m².  No intake or output data in the 24 hours ending 23 1311    Physical Exam  Vitals and nursing note reviewed.   Constitutional:       Appearance: He is ill-appearing.   HENT:      Head: Normocephalic and atraumatic.   Cardiovascular:      Rate and Rhythm: Normal rate and regular rhythm.   Pulmonary:      Effort: Pulmonary effort is normal. No respiratory distress.   Abdominal:      General: Bowel sounds are normal. There is no distension.      Palpations: Abdomen is soft.   Neurological:      General: No focal deficit present.      Mental Status: He is alert and oriented to person, place, and time. Mental status is at baseline.           Results Review:       I reviewed the patient's new  clinical results.  Results from last 7 days   Lab Units 03/06/23  0400 03/05/23  0431 03/04/23  0550 03/03/23  0515 03/02/23  0604 03/01/23  0839 02/28/23  2119   WBC 10*3/mm3 5.14 6.91 6.39 7.83 8.25 7.04 7.67   HEMOGLOBIN g/dL 10.2* 11.3* 11.4* 12.5* 12.3* 12.8* 13.0   PLATELETS 10*3/mm3 145 172 174 185 178 198 181     Results from last 7 days   Lab Units 03/06/23  0400 03/05/23  0431 03/04/23  0550 03/03/23  0515 03/02/23  0604 03/01/23  0839 02/28/23  2119   SODIUM mmol/L 140 138 136 133* 132* 135* 134*   POTASSIUM mmol/L 3.3* 3.6 3.8 3.1* 3.7 4.0 4.0   CHLORIDE mmol/L 111* 107 106 99 98 100 102   CO2 mmol/L 22.3 21.0* 21.2* 17.0* 21.0* 21.7* 23.0   BUN mg/dL 8 17 31* 31* 20 18 19   CREATININE mg/dL 0.81 0.92 1.21 1.44* 1.18 1.28* 1.36*   CALCIUM mg/dL 7.3* 7.4* 7.8* 7.9* 8.4* 8.0* 8.4*   MAGNESIUM mg/dL 1.7  --   --   --   --   --   --    PHOSPHORUS mg/dL 2.1*  --   --   --   --   --   --    Estimated Creatinine Clearance: 81 mL/min (by C-G formula based on SCr of 0.81 mg/dL).      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       PLAN  This is an 82-year-old gentleman with a history of type 2 diabetes hypertension and hyperlipidemia that presents to the hospital with weakness upper respiratory symptoms and tested positive for COVID-19 complicated by some confusion and word  finding issues and diagnosed as a acute left thalamic stroke.    Acute CVA  - left thalamic, has right-sided weakness and aphasia  - continue ASA and plavix for 1 month, then plavix monotherapy  - continue on lipitor  - zio patch at discharge  - appreciate neurology recs, follow up with his primary neurologist at discharge     COVID-19  - mild symptoms, now resolved  - completed 3 days of remdesivir given high risk  - encourage pulmonary toilet-not requiring supplemental oxygen, therefore no indication for steroids  - may complicate placement, antigen test positive is    Delirium/Encephalopathy  -Waxing and waning pattern consistent with delirium  -supportive care and delirium precautions   -Overall this does seem to be improving over time    Tooth Abscess  - change unasyn to augmentin  - OMFS consulted and Dr. Arteaga recommended follow up as outpatient     PRACHI  - initially resolved with IVF but recurred 3/3/23-probably post-obstructive and pre-renal-improving after rodriguez placement and with IVF-will continue for today     Urine Retention  - per RN 3/3/23 patient had a blood clot in his brief and bladder scan >900cc; probably traumatic from straight cath in ER  - rodriguez placed, started on tamsulosin  - Has been seen by Dr. Martinez in the past.  I will touch base with him today to see if they can follow-up with him after discharge to rehab for a voiding trial.     Type 2 DM  - BG acceptable, A1C 6.00%  - continue ssi/hypoglycemia protocol      HTN  - BP acceptable  - lisinopril held given PRACHI     Lovenox 40 mg SC daily for DVT prophylaxis.  DNR.    Disposition  Anticipate discharge to SNU facility in 1-2 days.      Kenrick Dutta MD  [facility]  03/06/23  13:11 EST

## 2023-03-07 ENCOUNTER — APPOINTMENT (OUTPATIENT)
Dept: CARDIOLOGY | Facility: HOSPITAL | Age: 83
DRG: 177 | End: 2023-03-07
Payer: MEDICARE

## 2023-03-07 VITALS
DIASTOLIC BLOOD PRESSURE: 82 MMHG | OXYGEN SATURATION: 95 % | SYSTOLIC BLOOD PRESSURE: 154 MMHG | BODY MASS INDEX: 25.2 KG/M2 | TEMPERATURE: 97.9 F | HEIGHT: 72 IN | HEART RATE: 77 BPM | RESPIRATION RATE: 18 BRPM | WEIGHT: 186.07 LBS

## 2023-03-07 LAB
ANION GAP SERPL CALCULATED.3IONS-SCNC: 3 MMOL/L (ref 5–15)
BACTERIA SPEC AEROBE CULT: NORMAL
BUN SERPL-MCNC: 5 MG/DL (ref 8–23)
BUN/CREAT SERPL: 6 (ref 7–25)
BURR CELLS BLD QL SMEAR: ABNORMAL
CALCIUM SPEC-SCNC: 7.3 MG/DL (ref 8.6–10.5)
CHLORIDE SERPL-SCNC: 113 MMOL/L (ref 98–107)
CO2 SERPL-SCNC: 23 MMOL/L (ref 22–29)
CREAT SERPL-MCNC: 0.84 MG/DL (ref 0.76–1.27)
DEPRECATED RDW RBC AUTO: 44.6 FL (ref 37–54)
EGFRCR SERPLBLD CKD-EPI 2021: 86 ML/MIN/1.73
ERYTHROCYTE [DISTWIDTH] IN BLOOD BY AUTOMATED COUNT: 12.9 % (ref 12.3–15.4)
GLUCOSE BLDC GLUCOMTR-MCNC: 108 MG/DL (ref 70–130)
GLUCOSE BLDC GLUCOMTR-MCNC: 132 MG/DL (ref 70–130)
GLUCOSE SERPL-MCNC: 104 MG/DL (ref 65–99)
HCT VFR BLD AUTO: 29.2 % (ref 37.5–51)
HGB BLD-MCNC: 10 G/DL (ref 13–17.7)
LYMPHOCYTES # BLD MANUAL: 0.44 10*3/MM3 (ref 0.7–3.1)
LYMPHOCYTES NFR BLD MANUAL: 2 % (ref 5–12)
MCH RBC QN AUTO: 32.7 PG (ref 26.6–33)
MCHC RBC AUTO-ENTMCNC: 34.2 G/DL (ref 31.5–35.7)
MCV RBC AUTO: 95.4 FL (ref 79–97)
MONOCYTES # BLD: 0.15 10*3/MM3 (ref 0.1–0.9)
MYELOCYTES NFR BLD MANUAL: 1 % (ref 0–0)
NEUTROPHILS # BLD AUTO: 6.72 10*3/MM3 (ref 1.7–7)
NEUTROPHILS NFR BLD MANUAL: 91 % (ref 42.7–76)
NRBC BLD AUTO-RTO: 0 /100 WBC (ref 0–0.2)
PLAT MORPH BLD: NORMAL
PLATELET # BLD AUTO: 144 10*3/MM3 (ref 140–450)
PMV BLD AUTO: 10.4 FL (ref 6–12)
POIKILOCYTOSIS BLD QL SMEAR: ABNORMAL
POTASSIUM SERPL-SCNC: 3.7 MMOL/L (ref 3.5–5.2)
RBC # BLD AUTO: 3.06 10*6/MM3 (ref 4.14–5.8)
SODIUM SERPL-SCNC: 139 MMOL/L (ref 136–145)
VARIANT LYMPHS NFR BLD MANUAL: 6 % (ref 19.6–45.3)
WBC MORPH BLD: NORMAL
WBC NRBC COR # BLD: 7.38 10*3/MM3 (ref 3.4–10.8)

## 2023-03-07 PROCEDURE — 25010000002 ENOXAPARIN PER 10 MG: Performed by: NURSE PRACTITIONER

## 2023-03-07 PROCEDURE — 80048 BASIC METABOLIC PNL TOTAL CA: CPT | Performed by: INTERNAL MEDICINE

## 2023-03-07 PROCEDURE — 82962 GLUCOSE BLOOD TEST: CPT

## 2023-03-07 PROCEDURE — 93242 EXT ECG>48HR<7D RECORDING: CPT

## 2023-03-07 PROCEDURE — 93246 EXT ECG>7D<15D RECORDING: CPT

## 2023-03-07 PROCEDURE — 85025 COMPLETE CBC W/AUTO DIFF WBC: CPT | Performed by: INTERNAL MEDICINE

## 2023-03-07 PROCEDURE — 85007 BL SMEAR W/DIFF WBC COUNT: CPT | Performed by: INTERNAL MEDICINE

## 2023-03-07 RX ORDER — AMOXICILLIN AND CLAVULANATE POTASSIUM 875; 125 MG/1; MG/1
1 TABLET, FILM COATED ORAL EVERY 12 HOURS SCHEDULED
Qty: 8 TABLET | Refills: 0 | Status: SHIPPED | OUTPATIENT
Start: 2023-03-07 | End: 2023-03-11

## 2023-03-07 RX ORDER — ATORVASTATIN CALCIUM 80 MG/1
80 TABLET, FILM COATED ORAL NIGHTLY
Qty: 90 TABLET
Start: 2023-03-07

## 2023-03-07 RX ORDER — ACETAMINOPHEN 325 MG/1
650 TABLET ORAL EVERY 4 HOURS PRN
Start: 2023-03-07

## 2023-03-07 RX ORDER — ASPIRIN 81 MG/1
81 TABLET ORAL DAILY
Qty: 30 TABLET | Refills: 0
Start: 2023-03-08 | End: 2023-04-07

## 2023-03-07 RX ORDER — LISINOPRIL 40 MG/1
40 TABLET ORAL
Status: DISCONTINUED | OUTPATIENT
Start: 2023-03-07 | End: 2023-03-07 | Stop reason: HOSPADM

## 2023-03-07 RX ORDER — TAMSULOSIN HYDROCHLORIDE 0.4 MG/1
0.4 CAPSULE ORAL DAILY
Qty: 30 CAPSULE
Start: 2023-03-08

## 2023-03-07 RX ADMIN — TAMSULOSIN HYDROCHLORIDE 0.4 MG: 0.4 CAPSULE ORAL at 09:24

## 2023-03-07 RX ADMIN — CLOPIDOGREL 75 MG: 75 TABLET, FILM COATED ORAL at 09:24

## 2023-03-07 RX ADMIN — CETIRIZINE HYDROCHLORIDE 10 MG: 10 TABLET ORAL at 09:24

## 2023-03-07 RX ADMIN — AMOXICILLIN AND CLAVULANATE POTASSIUM 1 TABLET: 875; 125 TABLET, FILM COATED ORAL at 09:24

## 2023-03-07 RX ADMIN — FOLIC ACID 1 MG: 1 TABLET ORAL at 09:24

## 2023-03-07 RX ADMIN — ENOXAPARIN SODIUM 40 MG: 100 INJECTION SUBCUTANEOUS at 09:25

## 2023-03-07 RX ADMIN — ASPIRIN 81 MG: 81 TABLET, COATED ORAL at 09:24

## 2023-03-07 RX ADMIN — Medication 10 ML: at 09:25

## 2023-03-07 NOTE — PROGRESS NOTES
[facility] Acute Inpt Rehab Stroke Screening Note     Chart reviewed.  Plans are for subacute rehab at discharge per CCP note.  Will sign off at this time.  If acute rehab eval felt needed, please call [phone] to initiate a full referral.    Thank you,   Sindy Auguste RN  Rehab Admission Nurse

## 2023-03-07 NOTE — DISCHARGE SUMMARY
Patient Name: Albert Yadav  : 1940  MRN: [id number]    Date of Admission: 2023  Date of Discharge:  3/7/2023  Primary Care Physician: Valerie Burnham MD      Chief Complaint:   Weakness - Generalized      Discharge Diagnoses     Active Hospital Problems    Diagnosis  POA   • **COVID-19 [U07.1]  Yes   • Fall during current hospitalization [W19.XXXA, Y92.239]  No   • Acute CVA (cerebrovascular accident) (HCC), left thalamic [I63.9]  Yes   • Tooth abscess, third tooth [K04.7]  Yes   • PRACHI (acute kidney injury) ([facility]) [N17.9]  Yes   • Anemia [D64.9]  Yes   • Macrocytosis [D75.89]  Yes   • Type 2 diabetes mellitus with hyperglycemia ([facility]) [E11.65]  Yes   • Mild cognitive impairment [G31.84]  Yes   • Expressive aphasia [R47.01]  Yes   • Elevated troponin [R77.8]  Yes   • Essential hypertension [I10]  Yes      Resolved Hospital Problems   No resolved problems to display.        Hospital Course     Pleasant 82-year-old gentleman with a history of type 2 diabetes, hypertension, mild cognitive impairment, and hyperlipidemia, that presented to the hospital with weakness and upper respiratory symptoms and tested positive for COVID-19. Admission complicated by some confusion and word finding issues--diagnosed as a acute left thalamic stroke here. Please see below for details of admission:     Acute CVA  - left thalamic, had right-sided weakness and aphasia--both improving  - continue ASA and Plavix for 1 month, then Plavix monotherapy  - continue high-dose Lipitor  - ZioPatch at discharge, results to Dr. Presley (Neuro)  - appreciate Neuro attention to pt, follow up with [facility] in 3 months     COVID-19  - mild symptoms on presentation, now resolved  - completed 3 days of Remdesivir given high risk  - not requiring supplemental oxygen, therefore no indication for steroids     Delirium/Encephalopathy  -Waxing and waning pattern consistent with delirium  -supportive care and delirium  precautions in place  -Overall this does seem to be improving over time     Tooth Abscess  - symptomatic improvement on Augmentin  - OMFS consulted, Dr. Arteaga recommends follow up as outpatient in a week     PRACHI  - initially resolved with IVF but recurred 3/3/23--probably post-obstructive and pre-renal  - Cr normalized after rodriguez placement, holding ACE-I and HCTZ,  and with IVFs     Urine Retention  - per RN 3/3/23 patient had a blood clot in his brief and bladder scan >900cc; probably traumatic from straight cath in ER  - rodriguez placed, started on tamsulosin  - d/w , Dr. Martinez (pt's urologist) recommended keeping rodriguez in place at dc and following up in office in 7-10 days for voiding trial     Type 2 DM  - sugars acceptable, A1c 6.0  - covered with SSI here     HTN  - BP a bit robust  - lisinopril and HCTZ were held given PRACHI, restart lisinopril today  - medical staff at facility will need to f/u on BPs, lytes, and renal function    D/w pt, wife, RN, and CCP  DNR confirmed  Lovenox for DVT ppx while here  Discharge to [facility] today    During all interaction with pt proper PPE was utilized (gown, gloves, mask, goggles, and face shield) and was donned/doffed according to recommendations. Hands were cleaned before and after interaction.    Day of Discharge     Subjective:  Feeling fine today. No complaints. Denies N/V/D/F/C/NS/abd pain/SOA/CP/palp/cough. Tolerating diet. Making urine. Pt and wife both amenable to dc today    Physical Exam:  Temp:  [97.4 °F (36.3 °C)-97.9 °F (36.6 °C)] 97.9 °F (36.6 °C)  Heart Rate:  [56-77] 77  Resp:  [18] 18  BP: (129-158)/(64-82) 154/82  Body mass index is 25.24 kg/m².  Physical Exam  Vitals and nursing note reviewed. Exam conducted with a chaperone present (Wife).   Constitutional:       General: He is not in acute distress.     Appearance: He is ill-appearing (chronically). He is not toxic-appearing or diaphoretic.   HENT:      Head: Normocephalic.      Mouth/Throat:       Mouth: Mucous membranes are moist.      Pharynx: Oropharynx is clear.   Eyes:      General: No scleral icterus.        Right eye: No discharge.         Left eye: No discharge.      Extraocular Movements: Extraocular movements intact.      Conjunctiva/sclera: Conjunctivae normal.   Cardiovascular:      Rate and Rhythm: Normal rate and regular rhythm.      Pulses: Normal pulses.   Pulmonary:      Effort: Pulmonary effort is normal. No respiratory distress.      Breath sounds: Normal breath sounds. No wheezing or rales.   Abdominal:      General: Bowel sounds are normal. There is no distension.      Palpations: Abdomen is soft.      Tenderness: There is no abdominal tenderness.   Musculoskeletal:         General: No swelling or deformity. Normal range of motion.      Cervical back: Neck supple. No rigidity.   Lymphadenopathy:      Cervical: No cervical adenopathy.   Skin:     General: Skin is warm and dry.      Capillary Refill: Capillary refill takes less than 2 seconds.      Coloration: Skin is not jaundiced.   Neurological:      Mental Status: He is alert. Mental status is at baseline.      Cranial Nerves: No cranial nerve deficit.      Motor: No weakness.      Coordination: Coordination normal.      Comments: Oriented to person   Psychiatric:         Mood and Affect: Mood normal.      Comments: Very pleasant         Consultants     Consult Orders (all) (From admission, onward)     Start     Ordered    03/02/23 1654  Inpatient Oral & Maxillofacial Surgery Consult  Once        Specialty:  Oral Surgery  Provider:  Jason Woody MD    03/02/23 1654    03/01/23 1010  Inpatient Cardiology Consult  Once        Specialty:  Cardiology  Provider:  Daquan Ramos MD    03/01/23 1010    03/01/23 0820  Inpatient Neurology Consult General  Once        Specialty:  Neurology  Provider:  Owen Ramey MD    03/01/23 0820    02/28/23 2343  Notify Stroke Coordinator  Once        Provider:  (Not yet assigned)     02/28/23 2345    02/28/23 2343  Inpatient Rehab Admission Consult  Once        Provider:  (Not yet assigned)    02/28/23 2345    02/28/23 2343  Inpatient Case Management  Consult  Once        Provider:  (Not yet assigned)    02/28/23 2345    02/28/23 2343  Inpatient Diabetes Educator Consult  Once,   Status:  Canceled        Provider:  (Not yet assigned)    02/28/23 2345    02/28/23 2343  Inpatient Neurology Consult Stroke  Once        Specialty:  Neurology  Provider:  (Not yet assigned)    02/28/23 2345 02/28/23 2312  [facility] (on-call MD unless specified) Details  Once        Specialty:  Hospitalist  Provider:  (Not yet assigned)    02/28/23 2311 02/28/23 2119  Inpatient Neurology Consult Stroke  Once        Specialty:  Neurology  Provider:  Trey Castellano DO    02/28/23 2118              Procedures     * Surgery not found *      Imaging Results (All)     Procedure Component Value Units Date/Time    CT Angiogram Carotids [[id number]] Collected: 03/02/23 1259     Updated: 03/02/23 1640    Narrative:      CT ANGIOGRAM OF THE NECK AND HEAD WITH CONTRAST     HISTORY:  Aphasia, right hemiparesis.     COMPARISON: CT head 03/02/2023 and MRI brain 03/01/2023.     A noncontrasted CT examination of the brain was performed. The brain and  ventricles are symmetrical.  There is no evidence of hemorrhage,  hydrocephalus or of abnormal extra-axial fluid. No focal area of  decreased attenuation to suggest acute infarction is identified. Mild  vascular calcification involving the carotid siphons are noted  bilaterally.     A CT angiogram of the neck and head was then performed. Multiplanar as  well as 3-dimensional reconstructions were generated.     There is a bovine configuration of the great vessels. Calcified plaque  involving the aortic arch and origins of great vessels is appreciated  but without ostial stenosis. There is mild stenosis of the right  internal carotid artery secondary to noncalcified plaque  at the level of  C2-3 estimated to be approximately 10% in severity. The distal aspects  of the internal carotid arteries and the proximal aspects of the  anterior and middle cerebral arteries appear unremarkable.     Both vertebral arteries were opacified. The basilar artery and the right  posterior cerebral artery appear unremarkable. A fetal origin of the  left posterior cerebral artery is noted. Mild to moderate stenosis  involving the P2 P3 junction on the left is appreciated.     There is near complete opacification of the right maxillary sinus.     A large periapical abscess involving tooth #3 is noted.         Impression:      There is mild stenosis of the right internal carotid artery  at the level of C2-3 estimated be approximately 10% in severity. There  is no evidence of a proximal intracranial high-grade stenosis or  occlusion. Mild to moderate stenosis involving the left P2 P3 junction  is appreciated. A large periapical abscess involving tooth #3 is  appreciated as well as near complete opacification of the right  maxillary sinus.           Radiation dose reduction techniques were utilized, including automated  exposure control and exposure modulation based on body size.     This report was finalized on 3/2/2023 4:37 PM by Dr. Kenrick Vu M.D.       CT Angiogram Head [[id number]] Collected: 03/02/23 1259     Updated: 03/02/23 1640    Narrative:      CT ANGIOGRAM OF THE NECK AND HEAD WITH CONTRAST     HISTORY:  Aphasia, right hemiparesis.     COMPARISON: CT head 03/02/2023 and MRI brain 03/01/2023.     A noncontrasted CT examination of the brain was performed. The brain and  ventricles are symmetrical.  There is no evidence of hemorrhage,  hydrocephalus or of abnormal extra-axial fluid. No focal area of  decreased attenuation to suggest acute infarction is identified. Mild  vascular calcification involving the carotid siphons are noted  bilaterally.     A CT angiogram of the neck and head was then  performed. Multiplanar as  well as 3-dimensional reconstructions were generated.     There is a bovine configuration of the great vessels. Calcified plaque  involving the aortic arch and origins of great vessels is appreciated  but without ostial stenosis. There is mild stenosis of the right  internal carotid artery secondary to noncalcified plaque at the level of  C2-3 estimated to be approximately 10% in severity. The distal aspects  of the internal carotid arteries and the proximal aspects of the  anterior and middle cerebral arteries appear unremarkable.     Both vertebral arteries were opacified. The basilar artery and the right  posterior cerebral artery appear unremarkable. A fetal origin of the  left posterior cerebral artery is noted. Mild to moderate stenosis  involving the P2 P3 junction on the left is appreciated.     There is near complete opacification of the right maxillary sinus.     A large periapical abscess involving tooth #3 is noted.         Impression:      There is mild stenosis of the right internal carotid artery  at the level of C2-3 estimated be approximately 10% in severity. There  is no evidence of a proximal intracranial high-grade stenosis or  occlusion. Mild to moderate stenosis involving the left P2 P3 junction  is appreciated. A large periapical abscess involving tooth #3 is  appreciated as well as near complete opacification of the right  maxillary sinus.           Radiation dose reduction techniques were utilized, including automated  exposure control and exposure modulation based on body size.     This report was finalized on 3/2/2023 4:37 PM by Dr. Kenrick Vu M.D.       CT Head Without Contrast [[id number]] Collected: 03/02/23 0310     Updated: 03/02/23 0310    Narrative:        Patient: NARINDER SHERIDAN  Time Out: 03:09  Exam(s): CT HEAD Without Contrast     EXAM:    CT Head Without Intravenous Contrast    CLINICAL HISTORY:     Reason for exam: Head trauma, moderate-severe.  pt reports hitting back   of head during fall.    TECHNIQUE:    Axial computed tomography images of the head brain without intravenous   contrast.  CTDI is 55.7 mGy and DLP is 1081.3 mGy-cm.  This CT exam was   performed according to the principle of ALARA (As Low As Reasonably   Achievable) by using one or more of the following dose reduction   techniques: automated exposure control, adjustment of the mA and or kV   according to patient size, and or use of iterative reconstruction   technique.    COMPARISON:    No relevant prior studies available.    FINDINGS:    Brain:  No hemorrhage, herniation, or mass effect.  Chronic   microvascular ischemic changes.    Ventricles:  No hydrocephalus.  Age related cerebral volume loss.    Bones joints:  Unremarkable.    Soft tissues:  Unremarkable.    Sinuses: Severe right maxillary sinus opacification.    Mastoid air cells:  Clear.    IMPRESSION:         No acute hemorrhage, hydrocephalus, or mass effect.      Impression:          Electronically signed by Sofie Saravia MD on 03-02-23 at 0309    MRI Brain Without Contrast [[id number]] Collected: 03/01/23 2315     Updated: 03/01/23 2324    Narrative:      MRI BRAIN WITHOUT CONTRAST     HISTORY: Syncope with suspected cerebrovascular etiology.     TECHNIQUE: Noncontrast MRI of the brain is correlated head CT from  02/28/2023 and multiple other prior head CT exams as remote as  09/02/2020.     FINDINGS: There is loss of cerebral parenchymal volume with appropriate  caliber ventricles. There is no restricted diffusion. Mild  periventricular white matter T2 signal abnormalities. Brain parenchyma  otherwise appears normal. Extra-axial spaces are normal. Flow voids are  observed as expected. Mucosal thickening in the ethmoid air cells  opacification of the right maxillary sinus. Bones of the skull appear  normal. Visualized upper cervical spine appears.       Impression:      Chronic changes in the brain. No acute abnormality.      This report was finalized on 3/1/2023 11:21 PM by Dr. Remi Perez M.D.       CT Head Without Contrast [[id number]] Collected: 02/28/23 2246     Updated: 02/28/23 2255    Narrative:      CT HEAD WITHOUT CONTRAST     HISTORY: Altered mental status with right-sided weakness     TECHNIQUE:  Head CT includes axial imaging from the base of skull to the  vertex without intravenous contrast. Radiation dose reduction techniques  were utilized, including automated exposure control and exposure  modulation based on body size.     COMPARISON:CT head without contrast 12/07/2022, 07/19/2020     FINDINGS: There is mild-to-moderate chronic small vessel ischemic white  matter change and there is ventricular enlargement associated with  central atrophy. Just below the right lentiform nucleus there is a CSF  density area measuring 1 cm consistent with old infarction or prominent  perivascular space. There is no evidence for cerebral edema or mass  effect or shift of the midline structures. No extra-axial fluid  collection is observed. There is no evidence for significant change when  compared to previous imaging. There is severe right maxillary sinus  mucoperiosteal thickening.       Impression:      Moderate chronic small vessel ischemic white matter change  and atrophy. No interval change or evidence for acute intracranial  abnormality.     This report was finalized on 2/28/2023 10:51 PM by Dr. Leonard Harris M.D.       XR Chest 1 View [[id number]] Collected: 02/28/23 2150     Updated: 02/28/23 2154    Narrative:      CHEST SINGLE VIEW     HISTORY: Cough, weakness     COMPARISON: CT chest 12/07/2022, AP chest 07/20/2022.     FINDINGS: Heart size is enlarged. There is mild subsegmental linear  basilar opacities that has the appearance of basilar atelectasis though  it would be difficult exclude mild basilar infiltrate. The mid to upper  lungs are clear. There is no pleural effusion or pneumothorax. Bones  appear osteopenic.  Cardiac monitor and leads are present.       Impression:      Cardiomegaly. Mild predominantly linear basilar opacity  consistent with atelectasis though it be difficult to exclude a mild  basilar infiltrate. No evidence for pulmonary edema     This report was finalized on 2/28/2023 9:51 PM by Dr. Leonard Harris M.D.           Results for orders placed during the hospital encounter of 09/29/20    Duplex Carotid - Right Ultrasound CAR    Interpretation Summary  · Proximal right internal carotid artery is normal.  · Imaging indicates a normal intra-op exam.    Results for orders placed during the hospital encounter of 02/28/23    Adult Transthoracic Echo Complete W/ Cont if Necessary Per Protocol    Interpretation Summary  •  Left ventricular systolic function is normal. Calculated left ventricular EF = 61.3%  •  Left ventricular diastolic function was normal.  •  Saline test for shunting was performed and was inconclusive for atrial level shunt.    Pertinent Labs     Results from last 7 days   Lab Units 03/07/23  0530 03/06/23  0400 03/05/23  0431 03/04/23  0550   WBC 10*3/mm3 7.38 5.14 6.91 6.39   HEMOGLOBIN g/dL 10.0* 10.2* 11.3* 11.4*   PLATELETS 10*3/mm3 144 145 172 174     Results from last 7 days   Lab Units 03/07/23  0530 03/06/23  0400 03/05/23  0431 03/04/23  0550   SODIUM mmol/L 139 140 138 136   POTASSIUM mmol/L 3.7 3.3* 3.6 3.8   CHLORIDE mmol/L 113* 111* 107 106   CO2 mmol/L 23.0 22.3 21.0* 21.2*   BUN mg/dL 5* 8 17 31*   CREATININE mg/dL 0.84 0.81 0.92 1.21   GLUCOSE mg/dL 104* 104* 100* 93   EGFR mL/min/1.73 86.0 86.9 82.0 59.0*     Results from last 7 days   Lab Units 03/06/23  0400 03/03/23  0515 03/02/23  0604 03/01/23  0839 02/28/23  2119   ALBUMIN g/dL 2.3* 2.8* 3.1* 3.2* 3.4*   BILIRUBIN mg/dL  --  0.5 0.7 0.6 0.4   ALK PHOS U/L  --  93 103 112 122*   AST (SGOT) U/L  --  26 27 24 16   ALT (SGPT) U/L  --  21 25 25 21     Results from last 7 days   Lab Units 03/07/23  0530 03/06/23  8926  03/05/23  0431 03/04/23  0550 03/03/23  0515 03/02/23  0604 03/01/23  0839   CALCIUM mg/dL 7.3* 7.3* 7.4* 7.8* 7.9* 8.4* 8.0*   ALBUMIN g/dL  --  2.3*  --   --  2.8* 3.1* 3.2*   MAGNESIUM mg/dL  --  1.7  --   --   --   --   --    PHOSPHORUS mg/dL  --  2.1*  --   --   --   --   --        Results from last 7 days   Lab Units 03/01/23  0839 02/28/23  2119   HSTROP T ng/L 40* 31*  31*   PROBNP pg/mL  --  315.0       Results from last 7 days   Lab Units 03/01/23  0839   CHOLESTEROL mg/dL 141   TRIGLYCERIDES mg/dL 71   HDL CHOL mg/dL 45   LDL CHOL mg/dL 82     Results from last 7 days   Lab Units 03/03/23  0719 03/02/23  2102   BLOODCX  No growth at 4 days No growth at 4 days     Results from last 7 days   Lab Units 03/04/23  2146   COVID19  Detected*       Test Results Pending at Discharge     Pending Labs     Order Current Status    Blood Culture - Blood, Arm, Left Preliminary result    Blood Culture - Blood, Hand, Right Preliminary result          Discharge Details        Discharge Medications      New Medications      Instructions Start Date   acetaminophen 325 MG tablet  Commonly known as: TYLENOL  Replaces: acetaminophen 650 MG 8 hr tablet   650 mg, Oral, Every 4 Hours PRN      amoxicillin-clavulanate 875-125 MG per tablet  Commonly known as: AUGMENTIN   1 tablet, Oral, Every 12 Hours Scheduled      aspirin 81 MG EC tablet   81 mg, Oral, Daily   Start Date: March 8, 2023     tamsulosin 0.4 MG capsule 24 hr capsule  Commonly known as: FLOMAX   0.4 mg, Oral, Daily   Start Date: March 8, 2023        Changes to Medications      Instructions Start Date   atorvastatin 80 MG tablet  Commonly known as: LIPITOR  What changed:   · medication strength  · how much to take  · when to take this   80 mg, Oral, Nightly         Continue These Medications      Instructions Start Date   cetirizine 10 MG tablet  Commonly known as: zyrTEC   10 mg, Oral, Every Morning      clopidogrel 75 MG tablet  Commonly known as: PLAVIX   75 mg,  Oral, Every Morning, TO STOP 7 DAYS BEFORE SURGERY      folic acid 1 MG tablet  Commonly known as: FOLVITE   1 mg, Oral, Daily      lisinopril 40 MG tablet  Commonly known as: PRINIVIL,ZESTRIL   40 mg, Oral, Every Evening      ondansetron 4 MG tablet  Commonly known as: ZOFRAN   4 mg, Oral, Every 6 Hours PRN      traZODone 50 MG tablet  Commonly known as: DESYREL   50 mg, Oral, Nightly         Stop These Medications    acetaminophen 650 MG 8 hr tablet  Commonly known as: TYLENOL  Replaced by: acetaminophen 325 MG tablet     hydroCHLOROthiazide 50 MG tablet  Commonly known as: HYDRODIURIL     melatonin 5 MG tablet tablet     methotrexate 2.5 MG tablet     rivastigmine 1.5 MG capsule  Commonly known as: EXELON     SENNA LAX PO     sennosides-docusate 8.6-50 MG per tablet  Commonly known as: PERICOLACE     triamcinolone 0.025 % cream  Commonly known as: KENALOG            No Known Allergies    Discharge Disposition:  Skilled Nursing Facility (DC - External)      Discharge Diet:  Diet Order   Procedures   • Diet: Diabetic Diets; Consistent Carbohydrate; Texture: Regular Texture (IDDSI 7); Fluid Consistency: Thin (IDDSI 0)       Discharge Activity:   WBAT, up with assistance only    CODE STATUS:    Code Status and Medical Interventions:   Ordered at: 02/28/23 2236     Medical Intervention Limits:    NO intubation (DNI)     Code Status (Patient has no pulse and is not breathing):    No CPR (Do Not Attempt to Resuscitate)     Medical Interventions (Patient has pulse or is breathing):    Limited Support     Release to patient:    Routine Release       Future Appointments   Date Time Provider Department Center   4/27/2023  7:30 AM TREATMENT [facility]     Additional Instructions for the Follow-ups that You Need to Schedule     Discharge Follow-up with PCP   As directed       Currently Documented PCP:    Valerie Burnham MD    PCP Phone Number:    [phone]     Follow Up Details: Dr. Burnham (PCP)  after dc from facility         Discharge Follow-up with Specified Provider: [facility]; 3 Months   As directed      To: [facility]    Follow Up: 3 Months         Discharge Follow-up with Specified Provider: Dr. Martinez ([facility]); 1 Week   As directed      To: Dr. Martinez ([facility])    Follow Up: 1 Week         Discharge Follow-up [place] Specified [place]   As directed      [place]    Follow Up: 1 Week    Follow Up Details: regarding tooth abscess            Contact information for follow-up providers     Valerie Burnham MD .    Specialty: Internal Medicine  Why: Dr. Burnham (PCP) after dc from facility  Contact information:  [address]  [place] [address]  [phone]                   Contact information for after-discharge care     Destination     [facility] [place] [facility] .    Service: Skilled Nursing  Contact information:  [address]  [facility] [address] [phone]                             Additional Instructions for the Follow-ups that You Need to Schedule     Discharge Follow-up with PCP   As directed       Currently Documented PCP:    Valerie Burnham MD    PCP Phone Number:    [phone]     Follow Up Details: Dr. Burnham (PCP) after dc from facility         Discharge Follow-up with Specified Provider: [facility]; 3 Months   As directed      To: [facility]    Follow Up: 3 Months         Discharge Follow-up with Specified Provider: Dr. Martinez ([facility]); 1 Week   As directed      To: Dr. Martinez ([facility])    Follow Up: 1 Week         Discharge Follow-up with Specified Provider[place] 1 Week   As directed      [place]te[place]    Follow Up: 1 Week    Follow Up Details: regarding tooth abscess           Time Spent on Discharge:  Greater than 30 minutes      Remi Rangel MD  [facility]  03/07/23  11:54 EST

## 2023-03-08 LAB — BACTERIA SPEC AEROBE CULT: NORMAL

## 2023-03-09 NOTE — CASE MANAGEMENT/SOCIAL WORK
Case Management Discharge Note      Final Note: Noel Contreras [facility] snf via [facility]. Sergey RN/CCP    Provided Post Acute Provider List?: Yes  Post Acute Provider List: Nursing Home    Selected Continued Care - Discharged on 3/7/2023 Admission date: 2/28/2023 - Discharge disposition: Skilled Nursing Facility (DC - External)    Destination Coordination complete.    Service Provider Selected Services Address Phone Fax Patient Preferred    [facility] [address] [address] [phone] --          Durable Medical Equipment    No services have been selected for the patient.              Dialysis/Infusion    No services have been selected for the patient.              Home Medical Care    No services have been selected for the patient.              Therapy    No services have been selected for the patient.              Community Resources    No services have been selected for the patient.              Community & DME    No services have been selected for the patient.                Selected Continued Care - Prior Encounters Includes continued care and service providers with selected services from prior encounters from 11/30/2022 to 3/7/2023    Discharged on 12/22/2022 Admission date: 12/20/2022 - Discharge disposition: Skilled Nursing Facility (DC - External)    Destination     Service Provider Selected Services Address Phone Fax Patient Preferred    [facility] [address] [address] [phone] --                    Transportation Services  W/C Van: [facility]    Final Discharge Disposition Code: 03 - skilled nursing facility (SNF)

## 2023-03-23 ENCOUNTER — HOSPITAL ENCOUNTER (INPATIENT)
Facility: HOSPITAL | Age: 83
LOS: 4 days | Discharge: NURSING, REHAB OR LONG-TERM CARE | DRG: 699 | End: 2023-03-29
Attending: EMERGENCY MEDICINE | Admitting: INTERNAL MEDICINE
Payer: MEDICARE

## 2023-03-23 DIAGNOSIS — R29.6 MULTIPLE FALLS: ICD-10-CM

## 2023-03-23 DIAGNOSIS — T83.021A DISPLACEMENT OF FOLEY CATHETER, INITIAL ENCOUNTER: ICD-10-CM

## 2023-03-23 DIAGNOSIS — R31.0 GROSS HEMATURIA: Primary | ICD-10-CM

## 2023-03-23 PROBLEM — S37.30XA URETHRAL TRAUMA, INITIAL ENCOUNTER: Status: ACTIVE | Noted: 2023-03-23

## 2023-03-23 PROBLEM — R33.9 URINARY RETENTION: Status: ACTIVE | Noted: 2023-03-23

## 2023-03-23 LAB
ALBUMIN SERPL-MCNC: 2.4 G/DL (ref 3.5–5.2)
ALBUMIN/GLOB SERPL: 0.8 G/DL
ALP SERPL-CCNC: 127 U/L (ref 39–117)
ALT SERPL W P-5'-P-CCNC: 13 U/L (ref 1–41)
ANION GAP SERPL CALCULATED.3IONS-SCNC: 8 MMOL/L (ref 5–15)
AST SERPL-CCNC: 20 U/L (ref 1–40)
BACTERIA UR QL AUTO: ABNORMAL /HPF
BASOPHILS # BLD AUTO: 0.02 10*3/MM3 (ref 0–0.2)
BASOPHILS NFR BLD AUTO: 0.2 % (ref 0–1.5)
BILIRUB SERPL-MCNC: 1.3 MG/DL (ref 0–1.2)
BILIRUB UR QL STRIP: NEGATIVE
BUN SERPL-MCNC: 14 MG/DL (ref 8–23)
BUN/CREAT SERPL: 14.4 (ref 7–25)
CALCIUM SPEC-SCNC: 7.6 MG/DL (ref 8.6–10.5)
CHLORIDE SERPL-SCNC: 102 MMOL/L (ref 98–107)
CLARITY UR: ABNORMAL
CO2 SERPL-SCNC: 25 MMOL/L (ref 22–29)
COLOR UR: ABNORMAL
CREAT SERPL-MCNC: 0.97 MG/DL (ref 0.76–1.27)
DEPRECATED RDW RBC AUTO: 43.5 FL (ref 37–54)
EGFRCR SERPLBLD CKD-EPI 2021: 77 ML/MIN/1.73
EOSINOPHIL # BLD AUTO: 0.01 10*3/MM3 (ref 0–0.4)
EOSINOPHIL NFR BLD AUTO: 0.1 % (ref 0.3–6.2)
ERYTHROCYTE [DISTWIDTH] IN BLOOD BY AUTOMATED COUNT: 12.8 % (ref 12.3–15.4)
GLOBULIN UR ELPH-MCNC: 3 GM/DL
GLUCOSE SERPL-MCNC: 118 MG/DL (ref 65–99)
GLUCOSE UR STRIP-MCNC: NEGATIVE MG/DL
HCT VFR BLD AUTO: 32.5 % (ref 37.5–51)
HGB BLD-MCNC: 11.4 G/DL (ref 13–17.7)
HGB UR QL STRIP.AUTO: ABNORMAL
HOLD SPECIMEN: NORMAL
HYALINE CASTS UR QL AUTO: ABNORMAL /LPF
IMM GRANULOCYTES # BLD AUTO: 0.14 10*3/MM3 (ref 0–0.05)
IMM GRANULOCYTES NFR BLD AUTO: 1.1 % (ref 0–0.5)
KETONES UR QL STRIP: NEGATIVE
LEUKOCYTE ESTERASE UR QL STRIP.AUTO: ABNORMAL
LYMPHOCYTES # BLD AUTO: 0.86 10*3/MM3 (ref 0.7–3.1)
LYMPHOCYTES NFR BLD AUTO: 6.6 % (ref 19.6–45.3)
MCH RBC QN AUTO: 33.1 PG (ref 26.6–33)
MCHC RBC AUTO-ENTMCNC: 35.1 G/DL (ref 31.5–35.7)
MCV RBC AUTO: 94.5 FL (ref 79–97)
MONOCYTES # BLD AUTO: 0.48 10*3/MM3 (ref 0.1–0.9)
MONOCYTES NFR BLD AUTO: 3.7 % (ref 5–12)
NEUTROPHILS NFR BLD AUTO: 11.54 10*3/MM3 (ref 1.7–7)
NEUTROPHILS NFR BLD AUTO: 88.3 % (ref 42.7–76)
NITRITE UR QL STRIP: POSITIVE
NRBC BLD AUTO-RTO: 0 /100 WBC (ref 0–0.2)
PH UR STRIP.AUTO: <=5 [PH] (ref 5–8)
PLATELET # BLD AUTO: 166 10*3/MM3 (ref 140–450)
PMV BLD AUTO: 10.5 FL (ref 6–12)
POTASSIUM SERPL-SCNC: 3.7 MMOL/L (ref 3.5–5.2)
PROT SERPL-MCNC: 5.4 G/DL (ref 6–8.5)
PROT UR QL STRIP: ABNORMAL
RBC # BLD AUTO: 3.44 10*6/MM3 (ref 4.14–5.8)
RBC # UR STRIP: ABNORMAL /HPF
REF LAB TEST METHOD: ABNORMAL
SODIUM SERPL-SCNC: 135 MMOL/L (ref 136–145)
SP GR UR STRIP: 1.01 (ref 1–1.03)
SQUAMOUS #/AREA URNS HPF: ABNORMAL /HPF
UROBILINOGEN UR QL STRIP: ABNORMAL
WBC # UR STRIP: ABNORMAL /HPF
WBC NRBC COR # BLD: 13.05 10*3/MM3 (ref 3.4–10.8)
WHOLE BLOOD HOLD COAG: NORMAL
WHOLE BLOOD HOLD SPECIMEN: NORMAL

## 2023-03-23 PROCEDURE — 25010000002 CEFTRIAXONE PER 250 MG: Performed by: EMERGENCY MEDICINE

## 2023-03-23 PROCEDURE — 85025 COMPLETE CBC W/AUTO DIFF WBC: CPT | Performed by: EMERGENCY MEDICINE

## 2023-03-23 PROCEDURE — G0378 HOSPITAL OBSERVATION PER HR: HCPCS

## 2023-03-23 PROCEDURE — 81001 URINALYSIS AUTO W/SCOPE: CPT | Performed by: EMERGENCY MEDICINE

## 2023-03-23 PROCEDURE — 51702 INSERT TEMP BLADDER CATH: CPT

## 2023-03-23 PROCEDURE — 80053 COMPREHEN METABOLIC PANEL: CPT | Performed by: EMERGENCY MEDICINE

## 2023-03-23 PROCEDURE — 36415 COLL VENOUS BLD VENIPUNCTURE: CPT

## 2023-03-23 PROCEDURE — 99285 EMERGENCY DEPT VISIT HI MDM: CPT

## 2023-03-23 RX ORDER — NITROGLYCERIN 0.4 MG/1
0.4 TABLET SUBLINGUAL
Status: DISCONTINUED | OUTPATIENT
Start: 2023-03-23 | End: 2023-03-29 | Stop reason: HOSPADM

## 2023-03-23 RX ORDER — DEXTROSE MONOHYDRATE 25 G/50ML
25 INJECTION, SOLUTION INTRAVENOUS
Status: DISCONTINUED | OUTPATIENT
Start: 2023-03-23 | End: 2023-03-29 | Stop reason: HOSPADM

## 2023-03-23 RX ORDER — CLOPIDOGREL BISULFATE 75 MG/1
75 TABLET ORAL EVERY MORNING
Status: DISCONTINUED | OUTPATIENT
Start: 2023-03-24 | End: 2023-03-23

## 2023-03-23 RX ORDER — ACETAMINOPHEN 160 MG/5ML
650 SOLUTION ORAL EVERY 4 HOURS PRN
Status: DISCONTINUED | OUTPATIENT
Start: 2023-03-23 | End: 2023-03-29 | Stop reason: HOSPADM

## 2023-03-23 RX ORDER — POLYETHYLENE GLYCOL 3350 17 G/17G
17 POWDER, FOR SOLUTION ORAL EVERY EVENING
Status: DISCONTINUED | OUTPATIENT
Start: 2023-03-23 | End: 2023-03-29 | Stop reason: HOSPADM

## 2023-03-23 RX ORDER — SODIUM CHLORIDE 0.9 % (FLUSH) 0.9 %
10 SYRINGE (ML) INJECTION EVERY 12 HOURS SCHEDULED
Status: DISCONTINUED | OUTPATIENT
Start: 2023-03-23 | End: 2023-03-29 | Stop reason: HOSPADM

## 2023-03-23 RX ORDER — ACETAMINOPHEN 650 MG/1
650 SUPPOSITORY RECTAL EVERY 4 HOURS PRN
Status: DISCONTINUED | OUTPATIENT
Start: 2023-03-23 | End: 2023-03-29 | Stop reason: HOSPADM

## 2023-03-23 RX ORDER — ASPIRIN 81 MG/1
81 TABLET ORAL DAILY
Status: DISCONTINUED | OUTPATIENT
Start: 2023-03-24 | End: 2023-03-23

## 2023-03-23 RX ORDER — SODIUM CHLORIDE 0.9 % (FLUSH) 0.9 %
10 SYRINGE (ML) INJECTION AS NEEDED
Status: DISCONTINUED | OUTPATIENT
Start: 2023-03-23 | End: 2023-03-29 | Stop reason: HOSPADM

## 2023-03-23 RX ORDER — NICOTINE POLACRILEX 4 MG
15 LOZENGE BUCCAL
Status: DISCONTINUED | OUTPATIENT
Start: 2023-03-23 | End: 2023-03-29 | Stop reason: HOSPADM

## 2023-03-23 RX ORDER — IBUPROFEN 200 MG
1 TABLET ORAL DAILY
Status: DISCONTINUED | OUTPATIENT
Start: 2023-03-24 | End: 2023-03-29 | Stop reason: HOSPADM

## 2023-03-23 RX ORDER — FOLIC ACID 1 MG/1
1 TABLET ORAL DAILY
Status: DISCONTINUED | OUTPATIENT
Start: 2023-03-24 | End: 2023-03-29 | Stop reason: HOSPADM

## 2023-03-23 RX ORDER — ATORVASTATIN CALCIUM 80 MG/1
80 TABLET, FILM COATED ORAL NIGHTLY
Status: DISCONTINUED | OUTPATIENT
Start: 2023-03-23 | End: 2023-03-29 | Stop reason: HOSPADM

## 2023-03-23 RX ORDER — IBUPROFEN 200 MG
1 TABLET ORAL DAILY
COMMUNITY

## 2023-03-23 RX ORDER — ONDANSETRON 4 MG/1
4 TABLET, FILM COATED ORAL EVERY 6 HOURS PRN
Status: DISCONTINUED | OUTPATIENT
Start: 2023-03-23 | End: 2023-03-23 | Stop reason: SDUPTHER

## 2023-03-23 RX ORDER — SODIUM CHLORIDE 9 MG/ML
40 INJECTION, SOLUTION INTRAVENOUS AS NEEDED
Status: DISCONTINUED | OUTPATIENT
Start: 2023-03-23 | End: 2023-03-29 | Stop reason: HOSPADM

## 2023-03-23 RX ORDER — UREA 10 %
3 LOTION (ML) TOPICAL NIGHTLY PRN
Status: DISCONTINUED | OUTPATIENT
Start: 2023-03-23 | End: 2023-03-29 | Stop reason: HOSPADM

## 2023-03-23 RX ORDER — ONDANSETRON 4 MG/1
4 TABLET, FILM COATED ORAL EVERY 6 HOURS PRN
Status: DISCONTINUED | OUTPATIENT
Start: 2023-03-23 | End: 2023-03-29 | Stop reason: HOSPADM

## 2023-03-23 RX ORDER — NITROFURANTOIN 25; 75 MG/1; MG/1
100 CAPSULE ORAL 2 TIMES DAILY
COMMUNITY
End: 2023-03-29 | Stop reason: HOSPADM

## 2023-03-23 RX ORDER — IBUPROFEN 600 MG/1
1 TABLET ORAL
Status: DISCONTINUED | OUTPATIENT
Start: 2023-03-23 | End: 2023-03-29 | Stop reason: HOSPADM

## 2023-03-23 RX ORDER — LISINOPRIL 40 MG/1
40 TABLET ORAL EVERY EVENING
Status: DISCONTINUED | OUTPATIENT
Start: 2023-03-23 | End: 2023-03-25

## 2023-03-23 RX ORDER — INSULIN LISPRO 100 [IU]/ML
0-7 INJECTION, SOLUTION INTRAVENOUS; SUBCUTANEOUS
Status: DISCONTINUED | OUTPATIENT
Start: 2023-03-24 | End: 2023-03-29 | Stop reason: HOSPADM

## 2023-03-23 RX ORDER — POLYETHYLENE GLYCOL 3350 17 G/17G
17 POWDER, FOR SOLUTION ORAL EVERY EVENING
COMMUNITY

## 2023-03-23 RX ORDER — ACETAMINOPHEN 325 MG/1
650 TABLET ORAL EVERY 4 HOURS PRN
Status: DISCONTINUED | OUTPATIENT
Start: 2023-03-23 | End: 2023-03-29 | Stop reason: HOSPADM

## 2023-03-23 RX ORDER — TRAZODONE HYDROCHLORIDE 50 MG/1
50 TABLET ORAL NIGHTLY
Status: DISCONTINUED | OUTPATIENT
Start: 2023-03-23 | End: 2023-03-29 | Stop reason: HOSPADM

## 2023-03-23 RX ORDER — TAMSULOSIN HYDROCHLORIDE 0.4 MG/1
0.4 CAPSULE ORAL DAILY
Status: DISCONTINUED | OUTPATIENT
Start: 2023-03-24 | End: 2023-03-29 | Stop reason: HOSPADM

## 2023-03-23 RX ORDER — ONDANSETRON 2 MG/ML
4 INJECTION INTRAMUSCULAR; INTRAVENOUS EVERY 6 HOURS PRN
Status: DISCONTINUED | OUTPATIENT
Start: 2023-03-23 | End: 2023-03-23 | Stop reason: SDUPTHER

## 2023-03-23 RX ADMIN — SODIUM CHLORIDE, POTASSIUM CHLORIDE, SODIUM LACTATE AND CALCIUM CHLORIDE 500 ML: 600; 310; 30; 20 INJECTION, SOLUTION INTRAVENOUS at 18:46

## 2023-03-23 RX ADMIN — LISINOPRIL 40 MG: 40 TABLET ORAL at 22:28

## 2023-03-23 RX ADMIN — Medication 3 MG: at 22:28

## 2023-03-23 RX ADMIN — Medication 10 ML: at 22:29

## 2023-03-23 RX ADMIN — TRAZODONE HYDROCHLORIDE 50 MG: 50 TABLET ORAL at 22:28

## 2023-03-23 RX ADMIN — ACETAMINOPHEN 650 MG: 325 TABLET, FILM COATED ORAL at 22:36

## 2023-03-23 RX ADMIN — ATORVASTATIN CALCIUM 80 MG: 80 TABLET, FILM COATED ORAL at 22:27

## 2023-03-23 RX ADMIN — CEFTRIAXONE SODIUM 1 G: 1 INJECTION, POWDER, FOR SOLUTION INTRAMUSCULAR; INTRAVENOUS at 19:23

## 2023-03-23 NOTE — ED PROVIDER NOTES
The SAIDA and I have discussed this patient's history, physical exam and treatment plan.  I provided a substantive portion of the care of this patient.  I have reviewed the documentation and personally had a face to face interaction with the patient and personally performed the physical exam, in its entirety.  I affirm the documentation and agree with the treatment and plan.  The following describes my personal findings.      The patient presents complaining of reporting he accidentally pulled his catheter out because it got hung up on his bed as he was trying to pass around the bed.  Patient denies injury from his fall other than his catheter, denies headache, chest pain, shortness of air, extremity pain, abdominal pain, fevers, reports has some mild discomfort after Adamson replacement.  Family reports patient was seen by Dr. Martinez yesterday, told he had a urinary tract infection, and given prescription for antibiotics to take by mouth which she has not started    Comprehensive Physical exam:  Patient is nontoxic appearing  awake, alert  HEENT: normocephalic, atraumatic  Neck: No JVD, no goiter, no pain with ROM  Pulmonary: Nontachypneic  cardiovascular: Nontachycardic  Abdomen: Soft, nontender  Adamson catheter with bright red blood in the catheter tubing, urine not clear  musculoskeletal:, No edema bilateral lower extremities, dorsalis pedis pulses palpable, moves all extremities well.  Neuro/psychiatric:calm, appropriate, cooperative  Skin:warm, dry    LAB RESULTS  Recent Results (from the past 24 hour(s))   Comprehensive Metabolic Panel    Collection Time: 03/23/23  4:32 PM    Specimen: Blood   Result Value Ref Range    Glucose 118 (H) 65 - 99 mg/dL    BUN 14 8 - 23 mg/dL    Creatinine 0.97 0.76 - 1.27 mg/dL    Sodium 135 (L) 136 - 145 mmol/L    Potassium 3.7 3.5 - 5.2 mmol/L    Chloride 102 98 - 107 mmol/L    CO2 25.0 22.0 - 29.0 mmol/L    Calcium 7.6 (L) 8.6 - 10.5 mg/dL    Total Protein 5.4 (L) 6.0 - 8.5 g/dL     Albumin 2.4 (L) 3.5 - 5.2 g/dL    ALT (SGPT) 13 1 - 41 U/L    AST (SGOT) 20 1 - 40 U/L    Alkaline Phosphatase 127 (H) 39 - 117 U/L    Total Bilirubin 1.3 (H) 0.0 - 1.2 mg/dL    Globulin 3.0 gm/dL    A/G Ratio 0.8 g/dL    BUN/Creatinine Ratio 14.4 7.0 - 25.0    Anion Gap 8.0 5.0 - 15.0 mmol/L    eGFR 77.0 >60.0 mL/min/1.73   CBC Auto Differential    Collection Time: 03/23/23  4:32 PM    Specimen: Blood   Result Value Ref Range    WBC 13.05 (H) 3.40 - 10.80 10*3/mm3    RBC 3.44 (L) 4.14 - 5.80 10*6/mm3    Hemoglobin 11.4 (L) 13.0 - 17.7 g/dL    Hematocrit 32.5 (L) 37.5 - 51.0 %    MCV 94.5 79.0 - 97.0 fL    MCH 33.1 (H) 26.6 - 33.0 pg    MCHC 35.1 31.5 - 35.7 g/dL    RDW 12.8 12.3 - 15.4 %    RDW-SD 43.5 37.0 - 54.0 fl    MPV 10.5 6.0 - 12.0 fL    Platelets 166 140 - 450 10*3/mm3    Neutrophil % 88.3 (H) 42.7 - 76.0 %    Lymphocyte % 6.6 (L) 19.6 - 45.3 %    Monocyte % 3.7 (L) 5.0 - 12.0 %    Eosinophil % 0.1 (L) 0.3 - 6.2 %    Basophil % 0.2 0.0 - 1.5 %    Immature Grans % 1.1 (H) 0.0 - 0.5 %    Neutrophils, Absolute 11.54 (H) 1.70 - 7.00 10*3/mm3    Lymphocytes, Absolute 0.86 0.70 - 3.10 10*3/mm3    Monocytes, Absolute 0.48 0.10 - 0.90 10*3/mm3    Eosinophils, Absolute 0.01 0.00 - 0.40 10*3/mm3    Basophils, Absolute 0.02 0.00 - 0.20 10*3/mm3    Immature Grans, Absolute 0.14 (H) 0.00 - 0.05 10*3/mm3    nRBC 0.0 0.0 - 0.2 /100 WBC   Green Top (Gel)    Collection Time: 03/23/23  4:32 PM   Result Value Ref Range    Extra Tube Hold for add-ons.    Lavender Top    Collection Time: 03/23/23  4:32 PM   Result Value Ref Range    Extra Tube hold for add-on    Light Blue Top    Collection Time: 03/23/23  4:32 PM   Result Value Ref Range    Extra Tube Hold for add-ons.        I ordered the above labs and reviewed the results.    RADIOLOGY  No Radiology Exams Resulted Within Past 24 Hours    I ordered the above noted radiological studies. I reviewed the images and results. I agree with the radiologist  interpretation.    PROCEDURES  Procedures      PROGRESS, DATA ANALYSIS, CONSULTS, AND MEDICAL DECISION MAKING  A complete history and physical exam have been performed.  All available laboratory and imaging results have been reviewed by myself prior to disposition.        [facility]    ED Course as of 03/23/23 1908   Thu Mar 23, 2023   1055 Patient presents with penis pain and bleeding from penis after forcefully pulling at his catheter prior to arrival.  Patient has a history of urinary retention.  Plan to replace catheter and reevaluate patient.    Patient also has a Zio patch which has been on since his recent admission several weeks ago.  I will talk to the ZIO reps. [EE]   1119 The ZIO rep came to the room and collected the patch. [EE]   1206 Catheter placed without difficulty.  I will discuss with urology prior to discharge. [EE]   1216 I discussed the patient with Dr. Martinez, urology.  He is familiar with the patient.  He agrees with treatment plan of replacing catheter and he will be happy to see the patient in follow-up. [EE]   1616 Family and patient reports they are uncomfortable with patient being discharged to [facility] because they feel that given his multiple falls and twice Adamson catheter pulled out they feel that the patient is unsafe there.  I discussed this with ASHELY Rajput. [TO]   1635 Duty CCP is currently speaking to the patient's wife.  It was reiterated to the wife that she would not be forced to take patient back to a place that neither of them feel comfortable. [TO]   1855 Discussed with Alvaro Marie MD who is aware of patient's presentation, hematuria, recent accidental traumatic Adamson removal, seen by Dr. Martinez and agrees to admit for further testing, treatment, urology consult as needed [TO]      ED Course User Index  [EE] Eric Mays PA  [TO] Lo Norris MD       AS OF 19:08 EDT VITALS:    BP - 115/68  HR - 87  TEMP - 99.4 °F (37.4 °C) (Tympanic)  O2 SATS - 98%    DIAGNOSIS  Final  diagnoses:   Gross hematuria   Displacement of Adamson catheter, initial encounter (HCC)   Multiple falls         DISPOSITION  ADMISSION    Discussed treatment plan and reason for admission with pt/family and admitting physician.  Pt/family voiced understanding of the plan for admission for further testing/treatment as needed.           Face mask, protective goggles and gloves were worn throughout the patient encounter, unless additional PPE was worn as indicated. Hand hygiene was performed before entering and after leaving the patient room.  Patient was wearing facemask when I entered the room and throughout our encounter. Full protective equipment was worn throughout this patient encounter including a face mask, eye protection and gloves. Hand hygiene was performed before donning protective equipment and after removal when leaving the room.           Lo Norris MD  03/23/23 0893

## 2023-03-23 NOTE — PROGRESS NOTES
Clinical Pharmacy Services: Medication History    Albert Yadav is a 82 y.o. 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), At risk for sleep apnea, Broken bones, Carotid artery disorder (HCC), Cervical disc disorder (?), Cholelithiasis (1978), Diabetes mellitus ([facility]) (2005), Falls, Fracture of ankle (1972), Fracture of wrist (1947), Fracture, clavicle (26 years ago), Fracture, foot (26 year ago), Groin rash, History of transfusion, Hyperlipidemia, Hypertension, Low back pain, Low back strain (?), Lumbar spinal stenosis, Numbness and tingling, Peripheral neuropathy, Peripheral vascular disease (HCC), 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    Allergies as of 03/23/2023   • (No Known Allergies)       Medication information was obtained from: Nursing Home  Pharmacy and Phone Number:     Prior to Admission Medications     Prescriptions Last Dose Informant Patient Reported? Taking?    acetaminophen (TYLENOL) 325 MG tablet 3/22/2023 Nursing Home No Yes    Take 2 tablets by mouth Every 4 (Four) Hours As Needed for Mild Pain.    aspirin 81 MG EC tablet 3/23/2023 Nursing Home No Yes    Take 1 tablet by mouth Daily for 30 days.    atorvastatin (LIPITOR) 80 MG tablet 3/22/2023 Nursing Home No Yes    Take 1 tablet by mouth Every Night.    clopidogrel (PLAVIX) 75 MG tablet 3/23/2023 Nursing Home Yes Yes    Take 1 tablet by mouth Every Morning.    folic acid (FOLVITE) 1 MG tablet 3/23/2023 Nursing Home Yes Yes    Take 1 tablet by mouth Daily.    lisinopril (PRINIVIL,ZESTRIL) 40 MG tablet 3/22/2023 Nursing Home Yes Yes    Take 1 tablet by mouth Every Evening.    neomycin-bacitracin-polymyxin (NEOSPORIN) 5-400-5000 ointment  Nursing Home Yes Yes    Apply 1 application topically to the appropriate area as  directed Daily.    nitrofurantoin, macrocrystal-monohydrate, (MACROBID) 100 MG capsule 3/23/2023 Nursing Home Yes Yes    Take 1 capsule by mouth 2 (Two) Times a Day. Ends 3/29/23    ondansetron (ZOFRAN) 4 MG tablet 3/22/2023 Nursing Home No Yes    Take 1 tablet by mouth Every 6 (Six) Hours As Needed for Nausea or Vomiting.    polyethylene glycol (MIRALAX) 17 g packet 3/22/2023 Nursing Home Yes Yes    Take 17 g by mouth Every Evening.    tamsulosin (FLOMAX) 0.4 MG capsule 24 hr capsule 3/23/2023 Nursing Home No Yes    Take 1 capsule by mouth Daily.    traZODone (DESYREL) 50 MG tablet 3/22/2023 Nursing Home Yes Yes    Take 1 tablet by mouth Every Night.            Medication notes:     This medication list is complete to the best of my knowledge as of 3/23/2023    Please call if questions.    Placido Oates  Medication History Technician  [phone]    3/23/2023 10:51 EDT

## 2023-03-23 NOTE — ED TRIAGE NOTES
Pt's cath ripped out when it got caught on a bed and his penis is bleeding    Patient was placed in face mask during first look triage.  Patient was wearing a face mask throughout encounter.  I wore personal protective equipment throughout the encounter.  Hand hygiene was performed before and after patient encounter.

## 2023-03-23 NOTE — ED NOTES
"Pt to ED from [facility] for \"pulled out catheter\". Daughter reports facility staff did not feel comfortable putting a new one in d/t bleeding. Pt is on baby ASA and plavix.     Pt has hx of urinary retention s/p cva. Sees dr alexander. States they tried to see if pt could no have cath but was still retaining.       Pt wearing face mask during their stay in ER. This RN wore appropriate ppe while providing patient care.     "

## 2023-03-23 NOTE — ED NOTES
Wife reports she is concerned and wants urology notified, states pt normally can get [facility] for transport but states does not think he can do that now. CCP and MD notified. Educated on expectation for bleeding from FC initially d/t recent cath trauma.

## 2023-03-23 NOTE — ED PROVIDER NOTES
EMERGENCY DEPARTMENT ENCOUNTER    Room Number:  18/18  Date of encounter:  3/23/2023  PCP: Valerie Burnham MD  Historian: Patient, family  Chronic or social conditions impacting care (social determinants of health): Nothing      I used full protective equipment while examining this patient.  This includes face mask, gloves and protective eyewear.  I washed my hands before entering the room and immediately upon leaving the room      HPI:  Chief Complaint: Catheter issue  A complete HPI/ROS/PMH/PSH/SH/FH are unobtainable due to: Nothing    Context: Albert Yadav is a 82 y.o. male who presents to the ED c/o pulling out his Adamson catheter earlier today.  Patient reports this was wrapped around the bed and he accidentally pulled it out.  Patient has had some mild bleeding since that time.  He is on Plavix, however no other blood thinners.  Patient had a catheter placed during an admission earlier this month when he had urinary retention.  Patient had followed up with Dr. Martinez yesterday in urology.  They attempted to remove the catheter however the patient was unable to use the restroom on his own.  They replaced the catheter and put him on Augmentin.  Patient at this time denies any overt penile pain, abdominal pain.    Review of prior external notes (non-ED):   I reviewed admission from 2/28/2023 through 3/7/2023.  Patient was admitted for COVID-19, left thymic stroke, urinary retention.  The urinary retention was suspected to be secondary to a blood clot from being straight cath in the ER.    Review of prior external test results outside of this encounter:  Reviewed a BMP from 3/7/2023.  This showed a glucose of 104.    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, left leg ([facility]) 12/13/2016   • Spinal stenosis, lumbar region, with neurogenic claudication 11/17/2017   • Carotid stenosis, asymptomatic, bilateral 09/29/2020   • Spondylolisthesis at L4-L5 level 01/20/2021   • Lumbar stenosis 10/19/2021   • Closed fracture of multiple ribs of right side, initial encounter 07/19/2022   • 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   • Closed fracture of one rib of right side, initial encounter 12/07/2022   • Rheumatoid arthritis ([facility]) 12/07/2022   • Contusion of right hip 12/08/2022   • COVID-19 02/28/2023   • PRACHI (acute kidney injury) ([facility]) 03/01/2023   • Anemia 03/01/2023   • Macrocytosis 03/01/2023   • Type 2 diabetes mellitus with hyperglycemia ([facility]) 03/01/2023   • Mild cognitive impairment 03/01/2023   • Expressive aphasia 03/01/2023   • Elevated troponin 03/01/2023   • Fall during current hospitalization 03/02/2023   • Acute CVA (cerebrovascular accident) ([facility]), left thalamic 03/02/2023   • Tooth abscess, third tooth 03/02/2023     Resolved Ambulatory Problems     Diagnosis Date Noted   • No Resolved Ambulatory Problems     Past Medical History:   Diagnosis Date   • Anesthesia    • Arthritis of back 10 years ago   • At risk for sleep apnea    • Broken bones    • Carotid artery disorder ([facility])    • Cervical disc disorder ?   • Cholelithiasis 1978   • Diabetes mellitus ([facility]) 2005   • Falls    • Fracture of ankle 1972   • Fracture of wrist 1947   • Fracture, clavicle 26 years ago   • Fracture, foot 26 year ago   • Groin rash    • History of transfusion    • Hyperlipidemia    • Hypertension    • Low back pain    • Low back strain ?   • Lumbar spinal stenosis    • Numbness and tingling    • Peripheral neuropathy    • Peripheral vascular disease ([facility])    • Retroperitoneal mass 11/2022   • Staph infection    • Tendency toward bleeding easily ([facility])    • Urinary  retention 3/23/2023         PAST SURGICAL HISTORY  Past Surgical History:   Procedure Laterality Date   • ADRENALECTOMY Left 2022    Procedure: LAPAROSCOPIC EXCISION LEFT RETROPERITONEAL MASS;  Surgeon: Moy aVrgas MD;  Location: [facility];  Service: General;  Laterality: Left;   • ANGIOPLASTY FEMORAL ARTERY Left 2016    Procedure: ULTRA SOUND ACCESS RIGHT FEMORAL ARTERY, AIF BILATERAL RUNOFF, SELECTIVE CATHETERIZATION LEFT FEMORAL ARTERY.;  Surgeon: Errol Michael MD;  Location: [facility] OR ;  Service:    • ANKLE OPEN REDUCTION INTERNAL FIXATION     • ARTERIOVENOUS FISTULA/SHUNT SURGERY Left 2016    Procedure: LEFT ILEO-FEMORAL GORTEX GRAFT AND LEFT LEG ARERIOGRAM ;  Surgeon: Errol Michael MD;  Location: [facility] OR ;  Service:    • CAROTID ENDARTERECTOMY Right 2020    Procedure: CAROTID ENDARTERECTOMY;  Surgeon: James Graham MD;  Location: [facility];  Service: Vascular;  Laterality: Right;   • CHOLECYSTECTOMY     • EPIDURAL BLOCK     • EYE SURGERY Left     as child   • FRACTURE SURGERY Left     arm   • ILIAC ARTERY STENT Left    • KNEE SURGERY Left     ORIF   • ORIF ANKLE FRACTURE Left          FAMILY HISTORY  Family History   Problem Relation Age of Onset   • Diabetes Mother    • Cancer Mother         Cancer liver   • Cancer Father         Diabetic   • Diabetes Father    • Heart disease Maternal Grandmother    • Heart disease Maternal Grandfather    • Heart disease Paternal Grandmother    • Heart disease Paternal Grandfather    • Alcohol abuse Son         Alcoholic   • Cancer Brother    • Malig Hyperthermia Neg Hx          SOCIAL HISTORY  Social History     Socioeconomic History   • Marital status:    Tobacco Use   • Smoking status: Former     Packs/day: 1.00     Years: 20.00     Pack years: 20.00     Types: Cigarettes     Start date: 1968     Quit date: 1989     Years since quittin.8   • Smokeless  tobacco: Never   • Tobacco comments:     Did'nt inhale   Vaping Use   • Vaping Use: Never used   Substance and Sexual Activity   • Alcohol use: Not Currently     Alcohol/week: 1.0 standard drink     Types: 1 Shots of liquor per week     Comment: 1 drink per day at most   • Drug use: Never   • Sexual activity: Not Currently     Partners: Female     Birth control/protection: Condom         ALLERGIES  Patient has no known allergies.        REVIEW OF SYSTEMS  All systems reviewed and negative except for those discussed in HPI.       PHYSICAL EXAM    I have reviewed the triage vital signs and nursing notes.    ED Triage Vitals [03/23/23 1019]   Temp Heart Rate Resp BP SpO2   99.4 °F (37.4 °C) 91 16 110/41 100 %      Temp src Heart Rate Source Patient Position BP Location FiO2 (%)   Tympanic Monitor -- -- --       Physical Exam  GENERAL: Alert, oriented, elderly, not distressed  HENT: head atraumatic, no nuchal rigidity  EYES: no scleral icterus, EOMI  CV: regular rhythm, regular rate, no murmur  RESPIRATORY: normal effort, CTA  ABDOMEN: soft, nontender  : Mild bleeding from the meatus.  No signs of external trauma.  No significant tenderness noted.  MUSCULOSKELETAL: no deformity, FROM, no calf swelling or tenderness  NEURO: alert, moves all extremities, follows commands  SKIN: warm, dry        LAB RESULTS  Recent Results (from the past 24 hour(s))   Comprehensive Metabolic Panel    Collection Time: 03/23/23  4:32 PM    Specimen: Blood   Result Value Ref Range    Glucose 118 (H) 65 - 99 mg/dL    BUN 14 8 - 23 mg/dL    Creatinine 0.97 0.76 - 1.27 mg/dL    Sodium 135 (L) 136 - 145 mmol/L    Potassium 3.7 3.5 - 5.2 mmol/L    Chloride 102 98 - 107 mmol/L    CO2 25.0 22.0 - 29.0 mmol/L    Calcium 7.6 (L) 8.6 - 10.5 mg/dL    Total Protein 5.4 (L) 6.0 - 8.5 g/dL    Albumin 2.4 (L) 3.5 - 5.2 g/dL    ALT (SGPT) 13 1 - 41 U/L    AST (SGOT) 20 1 - 40 U/L    Alkaline Phosphatase 127 (H) 39 - 117 U/L    Total Bilirubin 1.3 (H) 0.0 -  1.2 mg/dL    Globulin 3.0 gm/dL    A/G Ratio 0.8 g/dL    BUN/Creatinine Ratio 14.4 7.0 - 25.0    Anion Gap 8.0 5.0 - 15.0 mmol/L    eGFR 77.0 >60.0 mL/min/1.73   CBC Auto Differential    Collection Time: 03/23/23  4:32 PM    Specimen: Blood   Result Value Ref Range    WBC 13.05 (H) 3.40 - 10.80 10*3/mm3    RBC 3.44 (L) 4.14 - 5.80 10*6/mm3    Hemoglobin 11.4 (L) 13.0 - 17.7 g/dL    Hematocrit 32.5 (L) 37.5 - 51.0 %    MCV 94.5 79.0 - 97.0 fL    MCH 33.1 (H) 26.6 - 33.0 pg    MCHC 35.1 31.5 - 35.7 g/dL    RDW 12.8 12.3 - 15.4 %    RDW-SD 43.5 37.0 - 54.0 fl    MPV 10.5 6.0 - 12.0 fL    Platelets 166 140 - 450 10*3/mm3    Neutrophil % 88.3 (H) 42.7 - 76.0 %    Lymphocyte % 6.6 (L) 19.6 - 45.3 %    Monocyte % 3.7 (L) 5.0 - 12.0 %    Eosinophil % 0.1 (L) 0.3 - 6.2 %    Basophil % 0.2 0.0 - 1.5 %    Immature Grans % 1.1 (H) 0.0 - 0.5 %    Neutrophils, Absolute 11.54 (H) 1.70 - 7.00 10*3/mm3    Lymphocytes, Absolute 0.86 0.70 - 3.10 10*3/mm3    Monocytes, Absolute 0.48 0.10 - 0.90 10*3/mm3    Eosinophils, Absolute 0.01 0.00 - 0.40 10*3/mm3    Basophils, Absolute 0.02 0.00 - 0.20 10*3/mm3    Immature Grans, Absolute 0.14 (H) 0.00 - 0.05 10*3/mm3    nRBC 0.0 0.0 - 0.2 /100 WBC   Green Top (Gel)    Collection Time: 03/23/23  4:32 PM   Result Value Ref Range    Extra Tube Hold for add-ons.    Lavender Top    Collection Time: 03/23/23  4:32 PM   Result Value Ref Range    Extra Tube hold for add-on    Light Blue Top    Collection Time: 03/23/23  4:32 PM   Result Value Ref Range    Extra Tube Hold for add-ons.    Urinalysis With Microscopic If Indicated (No Culture) - Indwelling Urethral Catheter    Collection Time: 03/23/23  7:21 PM    Specimen: Indwelling Urethral Catheter; Urine   Result Value Ref Range    Color, UA Red (A) Yellow, Straw    Appearance, UA Turbid (A) Clear    pH, UA <=5.0 5.0 - 8.0    Specific Gravity, UA 1.006 1.005 - 1.030    Glucose, UA Negative Negative    Ketones, UA Negative Negative    Bilirubin, UA  Negative Negative    Blood, UA Large (3+) (A) Negative    Protein,  mg/dL (2+) (A) Negative    Leuk Esterase, UA Large (3+) (A) Negative    Nitrite, UA Positive (A) Negative    Urobilinogen, UA 0.2 E.U./dL 0.2 - 1.0 E.U./dL   Urinalysis, Microscopic Only - Indwelling Urethral Catheter    Collection Time: 03/23/23  7:21 PM    Specimen: Indwelling Urethral Catheter; Urine   Result Value Ref Range    RBC, UA Too Numerous to Count (A) None Seen, 0-2 /HPF    WBC, UA 0-2 None Seen, 0-2 /HPF    Bacteria, UA Trace (A) None Seen /HPF    Squamous Epithelial Cells, UA None Seen None Seen, 0-2 /HPF    Hyaline Casts, UA None Seen None Seen /LPF    Methodology Automated Microscopy        Ordered the above labs and independently reviewed the results.      MEDICATIONS GIVEN IN ER    Medications   lactated ringers bolus 500 mL (0 mL Intravenous Stopped 3/23/23 1921)   cefTRIAXone (ROCEPHIN) 1 g in sodium chloride 0.9 % 100 mL IVPB-VTB (0 g Intravenous Stopped 3/23/23 1958)         ADDITIONAL ORDERS CONSIDERED BUT NOT ORDERED:  Nothing      PROGRESS, DATA ANALYSIS, CONSULTS, AND MEDICAL DECISION MAKING    All labs have been independently interpreted by myself.  All radiology studies have been independently interpreted by myself and discussed with radiologist dictating the report.   EKG's independently interpreted by myself.  Discussion below represents my analysis of pertinent findings related to patient's condition, differential diagnosis, treatment plan and final disposition.    I have discussed case with Dr. Norris, emergency room physician.  She has performed her own bedside examination and agrees with treatment plan.    ED Course as of 03/23/23 2014   Thu Mar 23, 2023   1055 Patient presents with penis pain and bleeding from penis after forcefully pulling at his catheter prior to arrival.  Patient has a history of urinary retention.  Plan to replace catheter and reevaluate patient.    Patient also has a Zio patch which has  been on since his recent admission several weeks ago.  I will talk to the ZIO reps. [EE]   1119 The [employer]IO rep came to the room and collected the patch. [EE]   1206 Catheter placed without difficulty.  I will discuss with urology prior to discharge. [EE]   1216 I discussed the patient with Dr. Martinez, urology.  He is familiar with the patient.  He agrees with treatment plan of replacing catheter and he will be happy to see the patient in follow-up. [EE]   1616 Family and patient reports they are uncomfortable with patient being discharged to [facility] because they feel that given his multiple falls and twice Adamson catheter pulled out they feel that the patient is unsafe there.  I discussed this with ASHELY Rajput. [TO]   1635 Duty CCP is currently speaking to the patient's wife.  It was reiterated to the wife that she would not be forced to take patient back to a place that neither of them feel comfortable. [TO]   1855 Discussed with Alvaro Marie MD who is aware of patient's presentation, hematuria, recent accidental traumatic Adamson removal, seen by Dr. Martinez and agrees to admit for further testing, treatment, urology consult as needed [TO]      ED Course User Index  [EE] Eric Mays PA  [TO] Lo Norris MD       AS OF 20:14 EDT VITALS:    BP - 115/68  HR - 87  TEMP - 99.4 °F (37.4 °C) (Tympanic)  O2 SATS - 98%        DIAGNOSIS  Final diagnoses:   Gross hematuria   Displacement of Adamson catheter, initial encounter (HCC)   Multiple falls         DISPOSITION  Admitted      Dictated utilizing Dragon dictation     Eric Mays PA  03/23/23 2014

## 2023-03-23 NOTE — ED NOTES
FC beginning to run clear. MD positioned with towel roll under low back. Wife attempted to call report back to facility to inform them he is being admitted.

## 2023-03-23 NOTE — ED NOTES
Nursing report ED to floor  Albert Yadav  82 y.o.  male    HPI :   Chief Complaint   Patient presents with    Penis Pain       Admitting doctor:   El De Los Santos MD    Admitting diagnosis:   The primary encounter diagnosis was Gross hematuria. Diagnoses of Displacement of Adamson catheter, initial encounter ([facility]) and Multiple falls were also pertinent to this visit.    Code status:   Current Code Status       Date Active Code Status Order ID Comments User Context       Prior            Allergies:   Patient has no known allergies.    Isolation:   No active isolations    Intake and Output    Intake/Output Summary (Last 24 hours) at 3/23/2023 1906  Last data filed at 3/23/2023 1204  Gross per 24 hour   Intake --   Output 200 ml   Net -200 ml       Weight:       03/23/23  1021   Weight: 84.4 kg (186 lb)       Most recent vitals:   Vitals:    03/23/23 1045 03/23/23 1245 03/23/23 1400 03/23/23 1631   BP: 121/64 119/62 126/64 102/54   Pulse: 90 92 78 90   Resp: 16  18 16   Temp:       TempSrc:       SpO2: 99% 99% 98% 98%   Weight:       Height:           Active LDAs/IV Access:   Lines, Drains & Airways       Active LDAs       Name Placement date Placement time Site Days    Peripheral IV 03/23/23 1845 Left Antecubital 03/23/23  1845  Antecubital  less than 1    Urethral Catheter 22 Fr. 03/23/23  1608  -- less than 1                    Labs (abnormal labs have a star):   Labs Reviewed   COMPREHENSIVE METABOLIC PANEL - Abnormal; Notable for the following components:       Result Value    Glucose 118 (*)     Sodium 135 (*)     Calcium 7.6 (*)     Total Protein 5.4 (*)     Albumin 2.4 (*)     Alkaline Phosphatase 127 (*)     Total Bilirubin 1.3 (*)     All other components within normal limits    Narrative:     GFR Normal >60  Chronic Kidney Disease <60  Kidney Failure <15    The GFR formula is only valid for adults with stable renal function between ages 18 and 70.   CBC WITH AUTO DIFFERENTIAL - Abnormal; Notable for the  following components:    WBC 13.05 (*)     RBC 3.44 (*)     Hemoglobin 11.4 (*)     Hematocrit 32.5 (*)     MCH 33.1 (*)     Neutrophil % 88.3 (*)     Lymphocyte % 6.6 (*)     Monocyte % 3.7 (*)     Eosinophil % 0.1 (*)     Immature Grans % 1.1 (*)     Neutrophils, Absolute 11.54 (*)     Immature Grans, Absolute 0.14 (*)     All other components within normal limits   RAINBOW DRAW    Narrative:     The following orders were created for panel order [facility] Draw.  Procedure                               Abnormality         Status                     ---------                               -----------         ------                     Green Top (Gel)[[id number]]                                  Final result               Lavender Top[[id number]]                                     Final result               Light Blue Top[[id number]]                                   Final result                 Please view results for these tests on the individual orders.   URINALYSIS W/ MICROSCOPIC IF INDICATED (NO CULTURE)   CBC AND DIFFERENTIAL    Narrative:     The following orders were created for panel order CBC & Differential.  Procedure                               Abnormality         Status                     ---------                               -----------         ------                     CBC Auto Differential[[id number]]        Abnormal            Final result                 Please view results for these tests on the individual orders.   GREEN TOP   LAVENDER TOP   LIGHT BLUE TOP       EKG:   No orders to display       Meds given in ED:   Medications   lactated ringers bolus 500 mL (500 mL Intravenous New Bag 3/23/23 6546)       Imaging results:  No radiology results for the last day    Ambulatory status:   - bedrest     Social issues:   Social History     Socioeconomic History    Marital status:    Tobacco Use    Smoking status: Former     Packs/day: 1.00     Years: 20.00     Pack years: 20.00     Types:  Cigarettes     Start date: 1968     Quit date: 1989     Years since quittin.8    Smokeless tobacco: Never    Tobacco comments:     Did'nt inhale   Vaping Use    Vaping Use: Never used   Substance and Sexual Activity    Alcohol use: Not Currently     Alcohol/week: 1.0 standard drink     Types: 1 Shots of liquor per week     Comment: 1 drink per day at most    Drug use: Never    Sexual activity: Not Currently     Partners: Female     Birth control/protection: Condom       NIH Stroke Scale:         Iza Medrano RN  23 19:06 EDT

## 2023-03-23 NOTE — CASE MANAGEMENT/SOCIAL WORK
Discharge Planning Assessment  [facility]     Patient Name: Albert Yadav  MRN: [id number]  Today's Date: 3/23/2023    Admit Date: 3/23/2023        Discharge Needs Assessment    No documentation.                Discharge Plan     Row Name 03/23/23 1655       Plan    Plan Comments per request from Dr. Norris, spoke w/ wife who has some concerns about current facility. Explained to her that facility has the responsibility for assisting w/ transfer to another facility if concerns can not resolved. Agreed to speak w/ facility per phone while patient is still here. Notified liasion w/ Noel Contreras who will have someone from facility call wife on her cell phone to discuss. RN and ER MD updated-LILLY Estrada RN              Continued Care and Services - Admitted Since 3/23/2023    Coordination has not been started for this encounter.     Selected Continued Care - Prior Encounters Includes continued care and service providers with selected services from prior encounters from 12/23/2022 to 3/23/2023    Discharged on 3/7/2023 Admission date: 2/28/2023 - Discharge disposition: Skilled Nursing Facility (DC - External)    Destination     Service Provider Selected Services Address Phone Fax Patient Preferred    [facility] [address] [place] [id number] [phone] --                       Demographic Summary    No documentation.                Functional Status    No documentation.                Psychosocial    No documentation.                Abuse/Neglect    No documentation.                Legal    No documentation.                Substance Abuse    No documentation.                Patient Forms    No documentation.                   Caitie Estrada RN

## 2023-03-23 NOTE — H&P
Patient Name:  Albert Yadav  YOB: 1940  MRN:  [id number]  Admit Date:  3/23/2023  Patient Care Team:  Valerie Burnham MD as PCP - General (Internal Medicine)      Subjective   History Present Illness     Chief Complaint   Patient presents with   • Penis Pain     History of Present Illness   Mr. Yadav is a 82 y.o.with a history of Type II diabetes mellitus, CAD, HTN, HLD, spinal stenosis, and recent hospitalization forHe was recently admitted on 02/28-03/07 for Covid 19 and a  left thymic stroke during that admission he developed urinary retention that was thought to be the result of a straight cath. He presents to [facility] today complaining of penile bleeding after his indwelling catheter, was dislodged earlier today at his nursing home. According the review of medical records, the facility did attempt to see if he could void on his own, but again he was still retaining, they did not feel comfortable replacing the catheter due to the bleeding.  While in the emergency department Rodriguez catheter was replaced, with some clotting, and hematuria, that cleared after irrigating with sterile water. Mr. Yadav is a patient of Dr. Martinez with urology who seen him yesterday, and attempted to remove Rodriguez catheter unfortunately patient was still experiencing urine retention, he was noted during that visit to have an acute UTI and was started on Macrobid, and his Rodriguez catheter was replaced.  Dr. Martinez has agreed to follow in consultation during this hospitalization.  While in the emergency department family voices concern that the patient has had additional falls, and has had his rodriguez catheter pulled several times over the last several days at the skilled nursing facility, medically at this time they do not feel comfortable returning, and would like tentative placement.  CCP was contacted while patient was in the emergency department and are aware of the families concerns.    Patient reports he was getting up out of the bed and his catheter was still attached to his bed, causing his catheter to dislodge.  He does also endorse recent falls, stating that he has fallen twice over the last several days.  He is currently resting comfortably, with not complaints, there is noted bloody drainage in catheter bag.  He denies any acute distress chest pain, shortness of breath, palpitations, lightheadedness, dizziness, syncope, abdominal pain, nausea, vomiting, diarrhea or constipation    Initial evaluation in the emergency department reveals  Stable vital signs blood pressure is 115/68, heart rate of 87, respiratory rate 16, oxygen saturation of 98% on room air, he is mildly febrile with a Tmax of 99.4  He is noted to have leukocytosis with left shift his white count today is 13.05, with 88.3% neutrophils.   Chest X ray remarkable for predominantly linear basilar opacities consistent with atelectasis, possible mild basilar infiltrate.  No pulmonary edema noted.  Patient was noted to have a Zio patch on from his recent admission, the Zio reps was advised they came and removed patch while he was in the ER.     Patient will be admitted to the hospitalist group for further evaluation and treatment of hematuria and other acute and or chronic conditions.      Review of Systems   Constitutional: Negative.    HENT: Negative.    Respiratory: Negative.    Cardiovascular: Negative.    Gastrointestinal: Negative.    Endocrine: Negative.    Genitourinary: Positive for difficulty urinating, hematuria and penile pain.   Musculoskeletal: Negative.    Skin: Positive for wound.   Allergic/Immunologic: Negative.    Neurological: Negative.    Hematological: Bruises/bleeds easily.   Psychiatric/Behavioral: Negative.    All other systems reviewed and are negative.       Personal History     Past Medical History:   Diagnosis Date   • Anesthesia     MILD VIOLENT BEHAVIOR AFTER ANESTHESIA LEG SURGERY   • Arthritis of  back 10 years ago   • At risk for sleep apnea     STOP BANG = 5   • Broken bones     arm, collar bone, ankle   • Carotid artery disorder (HCC)    • Cervical disc disorder ?   • Cholelithiasis 1978    Gall bladder removed   • Diabetes mellitus (HCC) 2005    Type 2   • Falls    • Fracture of ankle 1972   • Fracture of wrist 1947   • Fracture, clavicle 26 years ago   • Fracture, foot 26 year ago   • Groin rash     PT STATES LEFT GROIN AT TIMES   • History of transfusion    • Hyperlipidemia    • Hypertension    • Low back pain    • Low back strain ?   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   • Rheumatoid arthritis (HCC)     HANDS DIALLO   • Staph infection     2015  [facility] POST SX   • Tendency toward bleeding easily (HCC)     due to blood thinners     Past Surgical History:   Procedure Laterality Date   • ADRENALECTOMY Left 12/20/2022    Procedure: LAPAROSCOPIC EXCISION LEFT RETROPERITONEAL MASS;  Surgeon: Moy Vargas MD;  Location: [facility];  Service: General;  Laterality: Left;   • ANGIOPLASTY FEMORAL ARTERY Left 11/07/2016    Procedure: ULTRA SOUND ACCESS RIGHT FEMORAL ARTERY, AIF BILATERAL RUNOFF, SELECTIVE CATHETERIZATION LEFT FEMORAL ARTERY.;  Surgeon: Errol Michael MD;  Location: [facility] OR 18/19;  Service:    • ANKLE OPEN REDUCTION INTERNAL FIXATION  1980   • ARTERIOVENOUS FISTULA/SHUNT SURGERY Left 12/13/2016    Procedure: LEFT ILEO-FEMORAL GORTEX GRAFT AND LEFT LEG ARERIOGRAM ;  Surgeon: Errol Michael MD;  Location: [facility] OR 18/19;  Service:    • CAROTID ENDARTERECTOMY Right 09/29/2020    Procedure: CAROTID ENDARTERECTOMY;  Surgeon: James Graham MD;  Location: [facility] OR;  Service: Vascular;  Laterality: Right;   • CHOLECYSTECTOMY     • EPIDURAL BLOCK     • EYE SURGERY Left     as child   • FRACTURE SURGERY Left      arm   • ILIAC ARTERY STENT Left    • KNEE SURGERY Left     ORIF   • ORIF ANKLE FRACTURE Left      Family History   Problem Relation Age of Onset   • Diabetes Mother    • Cancer Mother         Cancer liver   • Cancer Father         Diabetic   • Diabetes Father    • Heart disease Maternal Grandmother    • Heart disease Maternal Grandfather    • Heart disease Paternal Grandmother    • Heart disease Paternal Grandfather    • Alcohol abuse Son         Alcoholic   • Cancer Brother    • Malig Hyperthermia Neg Hx      Social History     Tobacco Use   • Smoking status: Former     Packs/day: 1.00     Years: 20.00     Pack years: 20.00     Types: Cigarettes     Start date: 1968     Quit date: 1989     Years since quittin.8   • Smokeless tobacco: Never   • Tobacco comments:     Did'nt inhale   Vaping Use   • Vaping Use: Never used   Substance Use Topics   • Alcohol use: Not Currently     Alcohol/week: 1.0 standard drink     Types: 1 Shots of liquor per week     Comment: 1 drink per day at most   • Drug use: Never     No current facility-administered medications on file prior to encounter.     Current Outpatient Medications on File Prior to Encounter   Medication Sig Dispense Refill   • acetaminophen (TYLENOL) 325 MG tablet Take 2 tablets by mouth Every 4 (Four) Hours As Needed for Mild Pain.     • aspirin 81 MG EC tablet Take 1 tablet by mouth Daily for 30 days. 30 tablet 0   • atorvastatin (LIPITOR) 80 MG tablet Take 1 tablet by mouth Every Night. 90 tablet    • clopidogrel (PLAVIX) 75 MG tablet Take 1 tablet by mouth Every Morning.     • folic acid (FOLVITE) 1 MG tablet Take 1 tablet by mouth Daily.     • lisinopril (PRINIVIL,ZESTRIL) 40 MG tablet Take 1 tablet by mouth Every Evening.     • neomycin-bacitracin-polymyxin (NEOSPORIN) 5-400-5000 ointment Apply 1 application topically to the appropriate area as directed Daily.     • nitrofurantoin, macrocrystal-monohydrate, (MACROBID) 100 MG capsule Take 1  capsule by mouth 2 (Two) Times a Day. Ends 3/29/23     • ondansetron (ZOFRAN) 4 MG tablet Take 1 tablet by mouth Every 6 (Six) Hours As Needed for Nausea or Vomiting.     • polyethylene glycol (MIRALAX) 17 g packet Take 17 g by mouth Every Evening.     • tamsulosin (FLOMAX) 0.4 MG capsule 24 hr capsule Take 1 capsule by mouth Daily. 30 capsule    • traZODone (DESYREL) 50 MG tablet Take 1 tablet by mouth Every Night.     • [DISCONTINUED] cetirizine (ZyrTEC) 10 MG tablet Take 1 tablet by mouth Every Morning.       No Known Allergies    Objective    Objective     Vital Signs  Temp:  [99.4 °F (37.4 °C)] 99.4 °F (37.4 °C)  Heart Rate:  [78-92] 87  Resp:  [16-18] 16  BP: (102-126)/(41-68) 115/68  SpO2:  [98 %-100 %] 98 %  on   ;   Device (Oxygen Therapy): room air  Body mass index is 25.23 kg/m².    Physical Exam  Vitals and nursing note reviewed.   Constitutional:       Appearance: Normal appearance.   HENT:      Head: Atraumatic.      Mouth/Throat:      Mouth: Mucous membranes are dry.   Eyes:      Conjunctiva/sclera: Conjunctivae normal.   Cardiovascular:      Rate and Rhythm: Normal rate and regular rhythm.      Pulses: Normal pulses.      Heart sounds: Normal heart sounds.   Pulmonary:      Effort: Pulmonary effort is normal.      Breath sounds: Normal breath sounds.   Abdominal:      General: Bowel sounds are normal.      Palpations: Abdomen is soft.   Musculoskeletal:         General: Normal range of motion.   Skin:     General: Skin is warm and dry.      Capillary Refill: Capillary refill takes less than 2 seconds.      Findings: Bruising present.      Comments: Right arm skin tear and multiple areas of bruising   Neurological:      Mental Status: He is alert and oriented to person, place, and time. Mental status is at baseline.   Psychiatric:         Mood and Affect: Mood normal.         Results Review:  I reviewed the patient's new clinical results.  I reviewed the patient's new imaging results and agree with  the interpretation.  I reviewed the patient's other test results and agree with the interpretation  I personally viewed and interpreted the patient's EKG/Telemetry data  Discussed with ED provider.    Lab Results (last 24 hours)     Procedure Component Value Units Date/Time    CBC & Differential [[id number]]  (Abnormal) Collected: 03/23/23 1632    Specimen: Blood Updated: 03/23/23 1642    Narrative:      The following orders were created for panel order CBC & Differential.  Procedure                               Abnormality         Status                     ---------                               -----------         ------                     CBC Auto Differential[[id number]]        Abnormal            Final result                 Please view results for these tests on the individual orders.    Comprehensive Metabolic Panel [[id number]]  (Abnormal) Collected: 03/23/23 1632    Specimen: Blood Updated: 03/23/23 1707     Glucose 118 mg/dL      BUN 14 mg/dL      Creatinine 0.97 mg/dL      Sodium 135 mmol/L      Potassium 3.7 mmol/L      Chloride 102 mmol/L      CO2 25.0 mmol/L      Calcium 7.6 mg/dL      Total Protein 5.4 g/dL      Albumin 2.4 g/dL      ALT (SGPT) 13 U/L      AST (SGOT) 20 U/L      Alkaline Phosphatase 127 U/L      Total Bilirubin 1.3 mg/dL      Globulin 3.0 gm/dL      A/G Ratio 0.8 g/dL      BUN/Creatinine Ratio 14.4     Anion Gap 8.0 mmol/L      eGFR 77.0 mL/min/1.73     Narrative:      GFR Normal >60  Chronic Kidney Disease <60  Kidney Failure <15    The GFR formula is only valid for adults with stable renal function between ages 18 and 70.    CBC Auto Differential [[id number]]  (Abnormal) Collected: 03/23/23 1632    Specimen: Blood Updated: 03/23/23 1642     WBC 13.05 10*3/mm3      RBC 3.44 10*6/mm3      Hemoglobin 11.4 g/dL      Hematocrit 32.5 %      MCV 94.5 fL      MCH 33.1 pg      MCHC 35.1 g/dL      RDW 12.8 %      RDW-SD 43.5 fl      MPV 10.5 fL      Platelets 166 10*3/mm3      Neutrophil %  88.3 %      Lymphocyte % 6.6 %      Monocyte % 3.7 %      Eosinophil % 0.1 %      Basophil % 0.2 %      Immature Grans % 1.1 %      Neutrophils, Absolute 11.54 10*3/mm3      Lymphocytes, Absolute 0.86 10*3/mm3      Monocytes, Absolute 0.48 10*3/mm3      Eosinophils, Absolute 0.01 10*3/mm3      Basophils, Absolute 0.02 10*3/mm3      Immature Grans, Absolute 0.14 10*3/mm3      nRBC 0.0 /100 WBC     Urinalysis With Microscopic If Indicated (No Culture) - Indwelling Urethral Catheter [[id number]] Collected: 03/23/23 1921    Specimen: Urine from Indwelling Urethral Catheter Updated: 03/23/23 1925          Imaging Results (Last 24 Hours)     ** No results found for the last 24 hours. **          Results for orders placed during the hospital encounter of 02/28/23    Adult Transthoracic Echo Complete W/ Cont if Necessary Per Protocol    Interpretation Summary  •  Left ventricular systolic function is normal. Calculated left ventricular EF = 61.3%  •  Left ventricular diastolic function was normal.  •  Saline test for shunting was performed and was inconclusive for atrial level shunt.      No orders to display        Assessment/Plan     Active Hospital Problems    Diagnosis  POA   • **Gross hematuria [R31.0]  Yes   • Type 2 diabetes mellitus with hyperglycemia (HCC) [E11.65]  Yes   • Mild cognitive impairment [G31.84]  Yes   • Hyperlipemia [E78.5]  Yes   • Essential hypertension [I10]  Yes   • Claudication of left lower extremity (HCC) [I73.9]  Yes      Resolved Hospital Problems   No resolved problems to display.     Gross hematuria  Urethral trauma, initial encounter  Secondary to indwelling catheter being dislodged  Monitor H&H  Urine now clear  Okay to irrigate Adamson catheter 30 cc sterile water    Urinary retention   Recently developed during hospitalization 03228-03/07  Indwelling Adamson catheter replaced in ED  Catheter care per policy  Monitor I's and O'  Urology consulted and following    Acute urinary tract  infection  Noted during urology visit yesterday  Placed on Macrobid  He has leukocytosis with left shift at time of admission and low-grade fever  Continue ceftriaxone ordered in ED.  Repeat   Anemia   Chronic/Stable   HBH 11.4, HCT 32.4  Monitor and transfuse as clinically appropriate.    History of left ophthalmic CVA  Aphasia has improved  No obvious residual deficits-he does report some forgetfulness  Continue dual antiplatelet therapy  Zio patch removed today   Claudication of left lower extremity  S/P non-autologous biological bypass grafts   Continue dual antiplatelet therapy with ASA and Plavix    Essential hypertension  Chronic   Vital signs per policy  Continue home lisinopril    Type 2 diabetes mellitus with hyperglycemia   Chronic   Accu-Cheks ACHS.  SSI ordered for hyperglycemia   Hypoglycemia treatment per protocol    Mild cognitive impairment  Chronic  Has had multiple recent falls  Reorient as needed  Safety precautions      · I discussed the patient's findings and my recommendations with patient.    VTE Prophylaxis - SCDs.  Code Status - DNR.       BETO Snyder  [facility]  03/23/23  19:27 EDT

## 2023-03-23 NOTE — ED NOTES
Wife reports concern over pt not being admitted today, states was supposed to be starting on abx for UTI per dr alexander yesterday. States she is unsure if pt was given a dose yet or not.

## 2023-03-24 LAB
ALBUMIN SERPL-MCNC: 2.2 G/DL (ref 3.5–5.2)
ALBUMIN/GLOB SERPL: 0.8 G/DL
ALP SERPL-CCNC: 112 U/L (ref 39–117)
ALT SERPL W P-5'-P-CCNC: 13 U/L (ref 1–41)
ANION GAP SERPL CALCULATED.3IONS-SCNC: 9.7 MMOL/L (ref 5–15)
AST SERPL-CCNC: 14 U/L (ref 1–40)
BACTERIA UR QL AUTO: ABNORMAL /HPF
BASOPHILS # BLD AUTO: 0.02 10*3/MM3 (ref 0–0.2)
BASOPHILS NFR BLD AUTO: 0.2 % (ref 0–1.5)
BILIRUB SERPL-MCNC: 1 MG/DL (ref 0–1.2)
BILIRUB UR QL STRIP: NEGATIVE
BUN SERPL-MCNC: 16 MG/DL (ref 8–23)
BUN/CREAT SERPL: 16.8 (ref 7–25)
CALCIUM SPEC-SCNC: 7.8 MG/DL (ref 8.6–10.5)
CHLORIDE SERPL-SCNC: 102 MMOL/L (ref 98–107)
CLARITY UR: ABNORMAL
CO2 SERPL-SCNC: 23.3 MMOL/L (ref 22–29)
COLOR UR: ABNORMAL
CREAT SERPL-MCNC: 0.95 MG/DL (ref 0.76–1.27)
DEPRECATED RDW RBC AUTO: 42.8 FL (ref 37–54)
EGFRCR SERPLBLD CKD-EPI 2021: 78.9 ML/MIN/1.73
EOSINOPHIL # BLD AUTO: 0.11 10*3/MM3 (ref 0–0.4)
EOSINOPHIL NFR BLD AUTO: 1.1 % (ref 0.3–6.2)
ERYTHROCYTE [DISTWIDTH] IN BLOOD BY AUTOMATED COUNT: 12.8 % (ref 12.3–15.4)
GLOBULIN UR ELPH-MCNC: 2.7 GM/DL
GLUCOSE BLDC GLUCOMTR-MCNC: 116 MG/DL (ref 70–130)
GLUCOSE BLDC GLUCOMTR-MCNC: 124 MG/DL (ref 70–130)
GLUCOSE BLDC GLUCOMTR-MCNC: 129 MG/DL (ref 70–130)
GLUCOSE BLDC GLUCOMTR-MCNC: 130 MG/DL (ref 70–130)
GLUCOSE SERPL-MCNC: 108 MG/DL (ref 65–99)
GLUCOSE UR STRIP-MCNC: NEGATIVE MG/DL
HBA1C MFR BLD: 5.5 % (ref 4.8–5.6)
HCT VFR BLD AUTO: 28.9 % (ref 37.5–51)
HGB BLD-MCNC: 10.1 G/DL (ref 13–17.7)
HGB UR QL STRIP.AUTO: ABNORMAL
HYALINE CASTS UR QL AUTO: ABNORMAL /LPF
IMM GRANULOCYTES # BLD AUTO: 0.08 10*3/MM3 (ref 0–0.05)
IMM GRANULOCYTES NFR BLD AUTO: 0.8 % (ref 0–0.5)
KETONES UR QL STRIP: NEGATIVE
LEUKOCYTE ESTERASE UR QL STRIP.AUTO: ABNORMAL
LYMPHOCYTES # BLD AUTO: 1.17 10*3/MM3 (ref 0.7–3.1)
LYMPHOCYTES NFR BLD AUTO: 11.4 % (ref 19.6–45.3)
MCH RBC QN AUTO: 32.4 PG (ref 26.6–33)
MCHC RBC AUTO-ENTMCNC: 34.9 G/DL (ref 31.5–35.7)
MCV RBC AUTO: 92.6 FL (ref 79–97)
MONOCYTES # BLD AUTO: 0.41 10*3/MM3 (ref 0.1–0.9)
MONOCYTES NFR BLD AUTO: 4 % (ref 5–12)
NEUTROPHILS NFR BLD AUTO: 8.51 10*3/MM3 (ref 1.7–7)
NEUTROPHILS NFR BLD AUTO: 82.5 % (ref 42.7–76)
NITRITE UR QL STRIP: NEGATIVE
NRBC BLD AUTO-RTO: 0 /100 WBC (ref 0–0.2)
PH UR STRIP.AUTO: <=5 [PH] (ref 5–8)
PLATELET # BLD AUTO: 141 10*3/MM3 (ref 140–450)
PMV BLD AUTO: 10.9 FL (ref 6–12)
POTASSIUM SERPL-SCNC: 3.5 MMOL/L (ref 3.5–5.2)
PROT SERPL-MCNC: 4.9 G/DL (ref 6–8.5)
PROT UR QL STRIP: ABNORMAL
RBC # BLD AUTO: 3.12 10*6/MM3 (ref 4.14–5.8)
RBC # UR STRIP: ABNORMAL /HPF
REF LAB TEST METHOD: ABNORMAL
SODIUM SERPL-SCNC: 135 MMOL/L (ref 136–145)
SP GR UR STRIP: 1.01 (ref 1–1.03)
SQUAMOUS #/AREA URNS HPF: ABNORMAL /HPF
TSH SERPL DL<=0.05 MIU/L-ACNC: 0.82 UIU/ML (ref 0.27–4.2)
UROBILINOGEN UR QL STRIP: ABNORMAL
WBC # UR STRIP: ABNORMAL /HPF
WBC NRBC COR # BLD: 10.3 10*3/MM3 (ref 3.4–10.8)

## 2023-03-24 PROCEDURE — 97530 THERAPEUTIC ACTIVITIES: CPT

## 2023-03-24 PROCEDURE — 82962 GLUCOSE BLOOD TEST: CPT

## 2023-03-24 PROCEDURE — 85025 COMPLETE CBC W/AUTO DIFF WBC: CPT | Performed by: NURSE PRACTITIONER

## 2023-03-24 PROCEDURE — 84443 ASSAY THYROID STIM HORMONE: CPT | Performed by: INTERNAL MEDICINE

## 2023-03-24 PROCEDURE — 25010000002 CEFTRIAXONE PER 250 MG: Performed by: INTERNAL MEDICINE

## 2023-03-24 PROCEDURE — 83036 HEMOGLOBIN GLYCOSYLATED A1C: CPT | Performed by: INTERNAL MEDICINE

## 2023-03-24 PROCEDURE — 80053 COMPREHEN METABOLIC PANEL: CPT | Performed by: NURSE PRACTITIONER

## 2023-03-24 PROCEDURE — 81001 URINALYSIS AUTO W/SCOPE: CPT | Performed by: INTERNAL MEDICINE

## 2023-03-24 PROCEDURE — 97162 PT EVAL MOD COMPLEX 30 MIN: CPT

## 2023-03-24 PROCEDURE — 25010000002 HYDROMORPHONE PER 4 MG: Performed by: HOSPITALIST

## 2023-03-24 PROCEDURE — G0378 HOSPITAL OBSERVATION PER HR: HCPCS

## 2023-03-24 PROCEDURE — 87086 URINE CULTURE/COLONY COUNT: CPT | Performed by: INTERNAL MEDICINE

## 2023-03-24 PROCEDURE — 87040 BLOOD CULTURE FOR BACTERIA: CPT | Performed by: INTERNAL MEDICINE

## 2023-03-24 RX ORDER — ONDANSETRON 2 MG/ML
4 INJECTION INTRAMUSCULAR; INTRAVENOUS EVERY 6 HOURS PRN
Status: DISCONTINUED | OUTPATIENT
Start: 2023-03-24 | End: 2023-03-29 | Stop reason: HOSPADM

## 2023-03-24 RX ORDER — SODIUM CHLORIDE 9 MG/ML
75 INJECTION, SOLUTION INTRAVENOUS CONTINUOUS
Status: DISCONTINUED | OUTPATIENT
Start: 2023-03-24 | End: 2023-03-26

## 2023-03-24 RX ORDER — HYDROMORPHONE HYDROCHLORIDE 1 MG/ML
0.5 INJECTION, SOLUTION INTRAMUSCULAR; INTRAVENOUS; SUBCUTANEOUS
Status: DISCONTINUED | OUTPATIENT
Start: 2023-03-24 | End: 2023-03-29 | Stop reason: HOSPADM

## 2023-03-24 RX ORDER — HYDROCODONE BITARTRATE AND ACETAMINOPHEN 5; 325 MG/1; MG/1
1 TABLET ORAL EVERY 6 HOURS PRN
Status: DISCONTINUED | OUTPATIENT
Start: 2023-03-24 | End: 2023-03-29 | Stop reason: HOSPADM

## 2023-03-24 RX ADMIN — TRAZODONE HYDROCHLORIDE 50 MG: 50 TABLET ORAL at 20:27

## 2023-03-24 RX ADMIN — HYDROMORPHONE HYDROCHLORIDE 0.5 MG: 1 INJECTION, SOLUTION INTRAMUSCULAR; INTRAVENOUS; SUBCUTANEOUS at 13:57

## 2023-03-24 RX ADMIN — SODIUM CHLORIDE 75 ML/HR: 9 INJECTION, SOLUTION INTRAVENOUS at 18:21

## 2023-03-24 RX ADMIN — Medication 10 ML: at 20:27

## 2023-03-24 RX ADMIN — Medication 1 MG: at 10:22

## 2023-03-24 RX ADMIN — HYDROMORPHONE HYDROCHLORIDE 0.5 MG: 1 INJECTION, SOLUTION INTRAMUSCULAR; INTRAVENOUS; SUBCUTANEOUS at 11:44

## 2023-03-24 RX ADMIN — ATORVASTATIN CALCIUM 80 MG: 80 TABLET, FILM COATED ORAL at 20:27

## 2023-03-24 RX ADMIN — CEFTRIAXONE SODIUM 1 G: 1 INJECTION, POWDER, FOR SOLUTION INTRAMUSCULAR; INTRAVENOUS at 18:22

## 2023-03-24 NOTE — CASE MANAGEMENT/SOCIAL WORK
Discharge Planning Assessment  [facility]     Patient Name: Albert Yadav  MRN: [id number]  Today's Date: 3/24/2023    Admit Date: 3/23/2023    Plan: Pending SNF referrals; will need pre-cert   Discharge Needs Assessment     Row Name 03/24/23 1052       Living Environment    People in Home spouse    Current Living Arrangements home    Potentially Unsafe Housing Conditions none    Primary Care Provided by spouse/significant other;self    Provides Primary Care For no one, unable/limited ability to care for self    Quality of Family Relationships helpful;involved       Transition Planning    Patient/Family Anticipates Transition to inpatient rehabilitation facility    Patient/Family Anticipated Services at Transition skilled nursing       Discharge Needs Assessment    Current Outpatient/Agency/Support Group skilled nursing facility    Concerns to be Addressed discharge planning;adjustment to diagnosis/illness    Equipment Needed After Discharge none    Outpatient/Agency/Support Group Needs skilled nursing facility    Discharge Facility/Level of Care Needs nursing facility, skilled               Discharge Plan     Row Name 03/24/23 1053       Plan    Plan Pending SNF referrals; will need pre-cert    Plan Comments CCP met with patient’s spouse, Bushra at bedside. CCP role explained and discharge planning discussed. Face sheet verified and BARDALES noted. Patient’s PCP is Dr. Shelby. Patient was at [facility] for rehab. Patient’s wife states she is privately paying for his bed hold. Patient’s spouse is interested in other SNF placement. Patient’s wife requested [facility] rehab, [facility] [facility] [place]), [facility] and [place]. Patient’s wife states she is unsure if LTC is needed but states they do have private funds if he does need LTC. CCP discussed discharge plan if no beds are available at the other facilities; patient’s wife agreeable to patient returning to [facility] and will work  with the facility on getting him transferred. CCP spoke with Patricia/Fidel and she will eval. CCP spoke with [facility]; no beds this weekend but will follow up Monday. CCP spoke with [facility]; no beds available this weekend and will follow. CCP spoke with Neva/Scott; she will evaluate. CCP left voicemail for Graciela/Signature to verify bed hold status. CCP will follow for pending SNF referrals and assist as needed. Yelena KARIMI              Continued Care and Services - Admitted Since 3/23/2023     Destination     Service Provider Request Status Selected Services Address Phone Fax Patient Preferred    [facility] Pending - Request Sent N/A [address] [facility] [address] [phone] --    [facility] Pending - Request Sent N/A [address] [address] [phone] --    [facility] Pending - Request Sent N/A [address] [address] [phone] --    [facility] Pending - Request Sent N/A [address] [facility] [address] [address] [phone] --    [facility] Pending - Request Sent N/A [address] [id number] [phone] --    [facility] Pending - Request Sent N/A [address] [place] [address] [phone] --            Selected Continued Care - Prior Encounters Includes continued care and service providers with selected services from prior encounters from 12/23/2022 to 3/24/2023    Discharged on 3/7/2023 Admission date: 2/28/2023 - Discharge disposition: Skilled Nursing Facility (DC - External)    Destination     Service Provider Selected Services Address Phone Fax Patient Preferred    [facility] [address] [place] [address] [phone] --                       Demographic Summary      Row Name 03/24/23 1052       General Information    Admission Type observation    Arrived From emergency department    Required Notices Provided Observation Status Notice    Referral Source admission list    Reason for Consult discharge planning    Preferred Language English               Functional Status     Row Name 03/24/23 1052       Functional Status    Usual Activity Tolerance moderate    Current Activity Tolerance fair       Functional Status, IADL    Medications assistive equipment    Meal Preparation assistive equipment    Housekeeping assistive equipment    Laundry assistive equipment    Shopping assistive equipment               Psychosocial    No documentation.                Abuse/Neglect    No documentation.                Legal    No documentation.                Substance Abuse    No documentation.                Patient Forms    No documentation.                   TREV Mcclain

## 2023-03-24 NOTE — PROGRESS NOTES
Full consult to follow.    82M well known to the urology service with urinary retention after cva and chronic indwelling catheter. He was seen in office Wed, failed a voiding trial, and was started on finasteride. Thereafter, he developed gross hematuria as well as weakness and was brought to the ER. He was admitted and started on CBI.    Labs stable, afebrile, culture pending.    Plan:  - wean CBI  - continue Abx  - full consult to follow tomorrow

## 2023-03-24 NOTE — DISCHARGE PLACEMENT REQUEST
"Albert Sheridan \"Domenico\" (82 y.o. Male)     Date of Birth   1940    Social Security Number       Address   [address] [place] [address]    Home Phone   [phone]    MRN   [id number]       Anglican   None    Marital Status                               Admission Date   3/23/23    Admission Type   Emergency    Admitting Provider   El De Los Santos MD    Attending Provider   Devin Oreilly MD    Department, Room/Bed   [address], [address]       Discharge Date       Discharge Disposition       Discharge Destination                               Attending Provider: Devin Oreilly MD    Allergies: No Known Allergies    Isolation: None   Infection: COVID (History) (03/23/23)   Code Status: No CPR    Ht: 182.9 cm (72\")   Wt: 97.3 kg (214 lb 8.1 oz)    Admission Cmt: None   Principal Problem: Urethral trauma, initial encounter [S37.30XA]                 Active Insurance as of 3/23/2023     Primary Coverage     Payor Plan Insurance Group Employer/Plan Group    [facility] MEDICARE REPLACEMENT [facility] MEDICARE REPLACEMENT [id number]     Payor Plan Address Payor Plan Phone Number Payor Plan Fax Number Effective Dates    [address] [address]   1/1/2016 - None Entered    [facility] [id number]       Subscriber Name Subscriber Birth Date Member ID       ALBERT SHERIDAN 1940 [id number]                 Emergency Contacts      (Rel.) Home Phone Work Phone Mobile Phone    LeifBushra (Spouse) [phone] -- [phone]    LeifCristian (Son) [phone] -- [phone]    Cyndy Jiang (Daughter) -- -- [phone]              " Letter by Homa Smyth NP at      Author: Homa Smyth NP Service: -- Author Type: --    Filed:  Encounter Date: 2019 Status: (Other)         Patient: Lopez Mcdermott   MR Number: [id number]   YOB: 1931   Date of Visit: 2019                 [facility]    DATE: 2019    NAME:  Lopez Mcdermott             :  1931  MRN: [id number]  CODE STATUS:  FULL CODE    VISIT TYPE: Review Of Multiple Medical Conditions     FACILITY:  [facility] [[id number]]       CHIEF COMPLAIN/REASON FOR VISIT:    Chief Complaint   Patient presents with   ? Review Of Multiple Medical Conditions               HISTORY OF PRESENT ILLNESS: Lopez Mcdermott is a 87 y.o. male who was admitted - for fall and found to have subdural hematoma on CT and Left comminuted intertrochanteric hip fracture. Neurosurgery was consulted and recommended conservative management. He was monitored in ICU and moved to general floor. His blood pressure was optimized <150. Neurology was consulted for possible seizure activity and recommended keppra. He underwent ORIF of Left hip fracture on . He was noted to have mod to severe AS and cardiology was consulted and recommended outpatient eval for TAVR procedure. He was also treated for UTI and completed 7 days of keflex. He was transferred to [facility] for further rehab. He has PMH of moderate to severe AS, BPH, anemia, CKD stage 3, A fib, gupta esophagus. Prior to this he lived at home with his son and daughter in law.     Today Mr. Mcdermott is seen for review of systems today. He reports he has been doing his own catheterizing and it is going fine. He says he doesn't have enough time to do this 4 times a day so he is doing it 2-3 times. He does not feel uncomfortable and feels this is enough to empty his bladder. He is still able to go some in between. He says his hip pain was bothering him during the night but is better today.  He says he doesn't think he takes many pain pills anymore and has been trying to cut back. He says he sleeps fine but last night he had filled the urine container so then he had to get up and ask for another one. He is not sure if he has an appt to see the brain surgeon again or not. He denies any shortness of breath or cough and appetite has been good. Per staff he is being straight cathed 4 times daily and staff is helping him do this. He has a care conference today and appt with hematology today. Staff have called multiple times to neurosurgery and left messages to schedule an appt but one has not been made yet.     REVIEW OF SYSTEMS:  PROBLEMS AND REVIEW OF SYSTEMS:   Review of Systems  Today on ROS:   Currently, no fever, chills, or rigors. Decreased vision and hearing. Denies any chest pain, palpitations, lightheadedness, dizziness. Appetite is good. Denies any GERD symptoms. Denies any difficulty with swallowing, nausea, or vomiting.  Denies any abdominal pain, diarrhea or constipation. No active bleeding. No rash. Positive for left hip incisions, left hip pain, no visual changes, headaches, shortness of breath with exertion, swelling in left leg, numbness left leg, urinary retention-straight cath 4 times daily      No Known Allergies  Current Outpatient Medications   Medication Sig   ? acetaminophen (TYLENOL) 500 MG tablet Take 2 tablets (1,000 mg total) by mouth 3 (three) times a day.   ? doxazosin (CARDURA) 4 MG tablet Take 1 tablet (4 mg total) by mouth daily.   ? ferrous sulfate 325 (65 FE) MG tablet Take 1 tablet by mouth 2 (two) times a day.   ? fluticasone (FLONASE) 50 mcg/actuation nasal spray 1 spray into each nostril daily.   ? levETIRAcetam (KEPPRA) 500 MG tablet Take 1 tablet (500 mg total) by mouth 2 (two) times a day.   ? metoprolol tartrate (LOPRESSOR) 25 MG tablet Take 1 tablet (25 mg total) by mouth daily.   ? multivitamin therapeutic tablet Take 1 tablet by mouth daily.   ? oxyCODONE  (ROXICODONE) 5 MG immediate release tablet Take 0.5-1 tablets (2.5-5 mg total) by mouth every 4 (four) hours as needed.   ? pantoprazole (PROTONIX) 40 MG tablet Take 1 tablet (40 mg total) by mouth daily.   ? senna-docusate (PERICOLACE) 8.6-50 mg tablet Take 1 tablet by mouth 2 (two) times a day.   ? tamsulosin (FLOMAX) 0.4 mg cap Take 0.4 mg by mouth.     Past Medical History:    No past medical history on file.        PHYSICAL EXAMINATION  Vitals:    02/18/19 2146   BP: 152/67   Pulse: 71   Resp: 18   Temp: 96.6  F (35.9  C)   SpO2: 96%   Weight: 166 lb (75.3 kg)       Today on physical exam:     GENERAL: Awake, Alert, oriented x3, not in any form of acute distress, answers questions appropriately, follows simple commands, conversant  HEENT: Head is normocephalic with normal hair distribution. No evidence of trauma. Ears: No acute purulent discharge. Eyes: Conjunctivae pink with no scleral jaundice. Nose: Normal mucosa and septum. NECK: Supple with no cervical or supraclavicular lymphadenopathy. Trachea is midline. glasses  CHEST: No tenderness or deformity, no crepitus  LUNG: Clear to auscultation with good chest expansion. There are no crackles or wheezes, normal AP diameter.  BACK: No kyphosis of the thoracic spine. Symmetric, no curvature, ROM normal, no CVA tenderness, no spinal tenderness   CVS: irregularly irregular rhythm, systolic murmur, no rubs, gallops, or heaves,  2+ pulses symmetric in all extremities.  ABDOMEN: Rounded and soft, nontender to palpation, non distended, no masses, no organomegaly, good bowel sounds, no rebound or guarding, no peritoneal signs.   EXTREMITIES: trace LLE pedal edema, Left hip two incisions c/d/i, Left hip pain and tenderness with ROM  SKIN: Warm and dry,   NEUROLOGICAL: The patient is oriented to person, place and time but is forgetful at times. No movement disorder, no arm drift, visual changes, no facial droop, unilateral weakness, mild numbness and tingling in left  leg.             LABS:   No results found for this or any previous visit (from the past 168 hour(s)).  Results for orders placed or performed in visit on 02/05/19   Basic Metabolic Panel   Result Value Ref Range    Sodium 138 136 - 145 mmol/L    Potassium 3.8 3.5 - 5.0 mmol/L    Chloride 104 98 - 107 mmol/L    CO2 26 22 - 31 mmol/L    Anion Gap, Calculation 8 5 - 18 mmol/L    Glucose 87 70 - 125 mg/dL    Calcium 8.7 8.5 - 10.5 mg/dL    BUN 20 8 - 28 mg/dL    Creatinine 0.97 0.70 - 1.30 mg/dL    GFR MDRD Af Amer >60 >60 mL/min/1.73m2    GFR MDRD Non Af Amer >60 >60 mL/min/1.73m2         Lab Results   Component Value Date    WBC 9.3 02/05/2019    HGB 8.5 (L) 02/11/2019    HCT 29.8 (L) 02/05/2019    MCV 91 02/05/2019     02/05/2019       Lab Results   Component Value Date    [id number] 637 01/28/2019     No results found for: HGBA1C  Lab Results   Component Value Date    INR 1.18 (H) 01/26/2019    INR 1.27 (H) 07/23/2018    INR 1.21 (H) 05/16/2018     No results found for: [id number]  Lab Results   Component Value Date    TSH 3.19 01/28/2019           ASSESSMENT/PLAN:    1. Subdural hematoma, fall: No headaches, visual changes, no neuro deficits noted. Neuro checks daily. F/u with Neurosurgery in 2 weeks with head CT, scheduled for 2/11-apparently missed CT appt, spoke to staff and multiple calls but no appt scheduled. Keppra for seizure prophylaxis.   2. Left hip fracture, s/p ORIF: Incisions c/d/i, 1+ edema LLE. Pain controlled. Tylenol three times a day, oxycodone prn. F/u with ortho in 2 weeks. tevin hose. Ice prn. PT, OT. Needs f/u appt scheduled. Reviewed oxycodone use, using once a day for last couple days before that not used for few days. Will plan to dc at next visit most likely as pain improving.   3. Moderate-severe AS: Was being evaluated for LAAO and TAVR procedure prior to hospital stay. Will need to f/u with cardiology once more stable.   4. BPH, Jimenez catheter: On doxazosin and added flomas.  Jimenez catheter in place, appears was placed on 1/26 with no void trial inpatient. No h/o urinary retention in medical records however pt now reporting that he had been straight cathing himself at home as needed but prior to hospital stay was up to 4 times per day. Failed void trial 2/7 and 2/14. Scheduled straight cath four times a day as doing at home and f/u with urology after discharge. No recent concerns, staff assisting patient in doing.   5. A fibrillation: Rate controlled in 70s. On metoprolol. hold parameters. No anticoagulants, antiplatelets until cleared per neurosurgery.   6. Macdonald's esophagus, h/o GI bleed: On pantoprazole. No current issues.   7. Iron deficiency anemia, acute blood loss anemia: Follows with hem/onc for weekly/biweekly hg levels, blood transfusions, iron infusions. On folic acid. Hg 8 on 1/29. 8.7 on 2/5. HG 8.5 on 2/11, stable. Appt with hematology and infusion clinic on 2/19.   8. CKD stage 3: Cr level 1.03 on 1/30. Cr 0.97 on 2/5. Stable.   9. UTI: completed keflex.   10. Allergic rhinitis: On flonase.   11. Constipation: On senna docusate two times a day.     Per therapy soft LCD 2/26 CGA for toileting and dressing, ambulates 35 feet with cga, 11 on slums, 4.2 on cpt, care conference today.     Electronically signed by: Homa Smyth NP    Time spent in interview and examination of patient, review of available records, and discussion with nursing staff. Continue care plan, efforts at therapy, and monitor nutritional status.

## 2023-03-24 NOTE — PLAN OF CARE
"Goal Outcome Evaluation:            Pt admitted from [facility] previous night pt had accidentally pulled out his rodriguez once for sure unclear if it was twice. Pt came up with spouse at bedside. CBI started in ER with red in the rodriguez bag all shift. Never cleared this shift. Moderate to low drip. Pt is pleasantly confused and forgetful also became irritated a few times about medication not wanting to wait for meds. Pt wanted 10mg of melatonin. Pt was irritated that he could not sleep d/t hearing 'loud voices'. This RN apologized however the unit was quiet. Pt rested a short while. Pt has skin tears, scabs, bruises every where, 4 mepilex covering elbows and knees.  PI from rodriguez cath tubing on right thigh. Pt states that he has been on the floor at the NH and the carpet is \"rough\".  BP little low a few times this shift. ER treated him with rochephin for UTI, and 500 LR bolus. CTM, safety maintained.      "

## 2023-03-24 NOTE — THERAPY EVALUATION
Patient Name: Albert Yadav  : 1940    MRN: [id number]                              Today's Date: 3/24/2023       Admit Date: 3/23/2023    Visit Dx:     ICD-10-CM ICD-9-CM   1. Gross hematuria  R31.0 599.71   2. Displacement of Adamson catheter, initial encounter ([facility])  T83.021A 996.31   3. 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, left leg ([facility])   • Spinal stenosis, lumbar region, with neurogenic claudication   • Carotid stenosis, asymptomatic, bilateral   • Spondylolisthesis at L4-L5 level   • Lumbar stenosis   • Closed fracture of multiple ribs of right side, initial encounter   • Retroperitoneal tumor   • Closed fracture of one rib of right side, initial encounter   • Rheumatoid arthritis ([facility])   • Contusion of right hip   • COVID-19   • PRACHI (acute kidney injury) ([facility])   • Anemia   • Macrocytosis   • Type 2 diabetes mellitus with hyperglycemia ([facility])   • Mild cognitive impairment   • Expressive aphasia   • Elevated troponin   • Fall during current hospitalization   • 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   • Cholelithiasis     Gall bladder removed   • Diabetes mellitus ([facility])     Type 2   • Falls    • Fracture of ankle    • Fracture of wrist    •  Fracture, clavicle 26 years ago   • Fracture, foot 26 year ago   • Groin rash     PT STATES LEFT GROIN AT TIMES   • History of transfusion    • Hyperlipidemia    • Hypertension    • Low back pain    • Low back strain ?   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   • Rheumatoid arthritis ([facility])     HANDS DIALLO   • Staph infection     2015  [facility] POST SX   • Tendency toward bleeding easily ([facility])     due to blood thinners   • Urinary retention 3/23/2023     Past Surgical History:   Procedure Laterality Date   • ADRENALECTOMY Left 12/20/2022    Procedure: LAPAROSCOPIC EXCISION LEFT RETROPERITONEAL MASS;  Surgeon: Moy Vargas MD;  Location: [facility];  Service: General;  Laterality: Left;   • ANGIOPLASTY FEMORAL ARTERY Left 11/07/2016    Procedure: ULTRA SOUND ACCESS RIGHT FEMORAL ARTERY, AIF BILATERAL RUNOFF, SELECTIVE CATHETERIZATION LEFT FEMORAL ARTERY.;  Surgeon: Errol Michael MD;  Location: [facility] OR 18/19;  Service:    • ANKLE OPEN REDUCTION INTERNAL FIXATION  1980   • ARTERIOVENOUS FISTULA/SHUNT SURGERY Left 12/13/2016    Procedure: LEFT ILEO-FEMORAL GORTEX GRAFT AND LEFT LEG ARERIOGRAM ;  Surgeon: Errol Michael MD;  Location: [facility] OR 18/19;  Service:    • CAROTID ENDARTERECTOMY Right 09/29/2020    Procedure: CAROTID ENDARTERECTOMY;  Surgeon: James Graham MD;  Location: [facility] OR;  Service: Vascular;  Laterality: Right;   • CHOLECYSTECTOMY     • EPIDURAL BLOCK     • EYE SURGERY Left     as child   • FRACTURE SURGERY Left     arm   • ILIAC ARTERY STENT Left 2020   • KNEE SURGERY Left     ORIF   • ORIF ANKLE FRACTURE Left       General Information     Row Name 03/24/23 1018          Physical Therapy Time and Intention    Document Type evaluation  -SM     Mode of Treatment individual therapy;physical therapy  -     Row  Name 03/24/23 1018          General Information    Patient Profile Reviewed yes  -     Prior Level of Function min assist:;independent:  -     Existing Precautions/Restrictions fall  -     Row Name 03/24/23 1018          Living Environment    People in Home spouse  -     Row Name 03/24/23 1018          Home Main Entrance    Number of Stairs, Main Entrance four  -SM     Stair Railings, Main Entrance railing on right side (ascending)  -     Row Name 03/24/23 1018          Cognition    Orientation Status (Cognition) oriented x 3;verbal cues/prompts needed for orientation  -     Row Name 03/24/23 1018          Safety Issues, Functional Mobility    Safety Issues Affecting Function (Mobility) problem-solving;sequencing abilities;awareness of need for assistance  -     Impairments Affecting Function (Mobility) balance;strength;endurance/activity tolerance  -           User Key  (r) = Recorded By, (t) = Taken By, (c) = Cosigned By    Initials Name Provider Type     Cheyenne Martin PT Physical Therapist               Mobility     Row Name 03/24/23 1019          Bed Mobility    Bed Mobility supine-sit;sit-supine  -     Supine-Sit [place] (Bed Mobility) verbal cues;minimum assist (75% patient effort)  -     Sit-Supine [place] (Bed Mobility) verbal cues;minimum assist (75% patient effort)  -     Assistive Device (Bed Mobility) head of bed elevated;bed rails  -     Comment, (Bed Mobility) VCs for sequencing  -     Row Name 03/24/23 1019          Sit-Stand Transfer    Sit-Stand [place] (Transfers) contact guard;verbal cues  -     Assistive Device (Sit-Stand Transfers) walker, front-wheeled  -     Row Name 03/24/23 1019          Gait/Stairs (Locomotion)    [place] Level (Gait) contact guard;minimum assist (75% patient effort)  -     Assistive Device (Gait) walker, front-wheeled  -     Distance in Feet (Gait) 3ft fwd and back, SS to HOB  -     Deviations/Abnormal Patterns  (Gait) festinating/shuffling;gait speed decreased  -     Bilateral Gait Deviations forward flexed posture  -     Comment, (Gait/Stairs) Slow shuffled steps with distance limited by fatigue.  -           User Key  (r) = Recorded By, (t) = Taken By, (c) = Cosigned By    Initials Name Provider Type     Cheyenne Martin PT Physical Therapist               Obj/Interventions     Row Name 03/24/23 1020          Range of Motion Comprehensive    General Range of Motion bilateral lower extremity ROM WFL  -     Row Name 03/24/23 1020          Strength Comprehensive (MMT)    General Manual Muscle Testing (MMT) Assessment lower extremity strength deficits identified  -     Comment, General Manual Muscle Testing (MMT) Assessment B LEs grossly 3+/5  -     Row Name 03/24/23 1020          Balance    Balance Assessment sitting static balance;sitting dynamic balance;sit to stand dynamic balance;standing static balance;standing dynamic balance  -     Static Sitting Balance standby assist  -     Dynamic Sitting Balance contact guard  -     Position, Sitting Balance sitting edge of bed  -     Sit to Stand Dynamic Balance contact guard;verbal cues  -     Static Standing Balance contact guard  -     Dynamic Standing Balance minimal assist;contact guard  -     Position/Device Used, Standing Balance supported;walker, front-wheeled  -     Balance Interventions sitting;standing;sit to stand;supported;dynamic;static  -           User Key  (r) = Recorded By, (t) = Taken By, (c) = Cosigned By    Initials Name Provider Type     Cheyenne Martin PT Physical Therapist               Goals/Plan     Row Name 03/24/23 1026          Bed Mobility Goal 1 (PT)    Activity/Assistive Device (Bed Mobility Goal 1, PT) bed mobility activities, all  -     [place] Level/Cues Needed (Bed Mobility Goal 1, PT) standby assist  -     Time Frame (Bed Mobility Goal 1, PT) 2 weeks  -     Row Name 03/24/23 1026           Transfer Goal 1 (PT)    Activity/Assistive Device (Transfer Goal 1, PT) sit-to-stand/stand-to-sit;bed-to-chair/chair-to-bed  -SM     [place] Level/Cues Needed (Transfer Goal 1, PT) standby assist  -SM     Time Frame (Transfer Goal 1, PT) 2 weeks  -SM     Row Name 03/24/23 1026          Gait Training Goal 1 (PT)    Activity/Assistive Device (Gait Training Goal 1, PT) gait (walking locomotion);walker, rolling  -SM     [place] Level (Gait Training Goal 1, PT) standby assist  -SM     Distance (Gait Training Goal 1, PT) 100ft  -SM     Time Frame (Gait Training Goal 1, PT) 2 weeks  -SM           User Key  (r) = Recorded By, (t) = Taken By, (c) = Cosigned By    Initials Name Provider Type     Cheyenne Martin, PT Physical Therapist               Clinical Impression     Row Name 03/24/23 1021          Pain    Pretreatment Pain Rating 0/10 - no pain  -SM     Posttreatment Pain Rating 0/10 - no pain  -SM     Row Name 03/24/23 1021          Plan of Care Review    Plan of Care Reviewed With patient  -     Outcome Evaluation Patient is an 82 y.o male who presents to [facility] after his catheter was pulled out when caught on the bed along with gross hematuria. Patient with multiple recent falls. Patient AOx3 with moments of confusion. Patient recently d/c in early March after a stroke and being COVID (+) per wife. Patient has been at [facility] for rehab. Patient lives at home with is wife with 4 USAMA. Patient uses a rwx and cane at BL. Cane used within home due to space. Patient sat up to EOB with Silvano this date. Patient performed STS from EOB with CGA and VCs for hand placement. Patient ambulated 3ft fwd and back with rwx and CGA-Silvano. Slow shuffled steps with distance limited by fatigue. Patient able to scoot self up toward HOB with SBA. Patient required Silvano to assist legs when returning to supine. Patient would continue to benefit from skilled PT intervention to address deficits in functional mobility and  maximize safety and independence. Wife hopeful for acute rehab at d/c. PT will continue to monitor.  -     Row Name 03/24/23 1021          Therapy Assessment/Plan (PT)    Rehab Potential (PT) good, to achieve stated therapy goals  -     Criteria for Skilled Interventions Met (PT) yes  -SM     Therapy Frequency (PT) 6 times/wk  -     Row Name 03/24/23 1021          Vital Signs    O2 Delivery Pre Treatment room air  -SM     O2 Delivery Intra Treatment room air  -SM     O2 Delivery Post Treatment room air  -SM     Pre Patient Position Supine  -SM     Intra Patient Position Standing  -SM     Post Patient Position Supine  -SM     Row Name 03/24/23 1021          Positioning and Restraints    Pre-Treatment Position in bed  -SM     Post Treatment Position bed  -SM     In Bed call light within reach;encouraged to call for assist;exit alarm on;with family/caregiver;notified nsg  -           User Key  (r) = Recorded By, (t) = Taken By, (c) = Cosigned By    Initials Name Provider Type     Cheyenne Martin, PT Physical Therapist               Outcome Measures     Row Name 03/24/23 1027 03/23/23 1692       How much help from another person do you currently need...    Turning from your back to your side while in flat bed without using bedrails? 3  -SM 2  -TG    Moving from lying on back to sitting on the side of a flat bed without bedrails? 3  -SM 2  -TG    Moving to and from a bed to a chair (including a wheelchair)? 3  -SM 2  -TG    Standing up from a chair using your arms (e.g., wheelchair, bedside chair)? 3  -SM 2  -TG    Climbing 3-5 steps with a railing? 1  -SM 1  -TG    To walk in hospital room? 2  -SM 1  -TG    AM-PAC 6 Clicks Score (PT) 15  -SM 10  -TG    Highest level of mobility 4 --> Transferred to chair/commode  -SM 4 --> Transferred to chair/commode  -TG    Row Name 03/24/23 1027          Functional Assessment    Outcome Measure Options AM-PAC 6 Clicks Basic Mobility (PT)  -           User Key  (r) =  Recorded By, (t) = Taken By, (c) = Cosigned By    Initials Name Provider Type    TG Elizabeth Espitia, RN Registered Nurse     Cheyenne Martin PT Physical Therapist                             Physical Therapy Education     Title: PT OT SLP Therapies (In Progress)     Topic: Physical Therapy (In Progress)     Point: Mobility training (Done)     Learning Progress Summary           Patient Acceptance, E, VU by  at 3/24/2023 1027   Significant Other Acceptance, E, VU by  at 3/24/2023 1027                   Point: Home exercise program (Not Started)     Learner Progress:  Not documented in this visit.          Point: Body mechanics (Done)     Learning Progress Summary           Patient Acceptance, E, VU by  at 3/24/2023 1027   Significant Other Acceptance, E, VU by  at 3/24/2023 1027                   Point: Precautions (Done)     Learning Progress Summary           Patient Acceptance, E, VU by  at 3/24/2023 1027   Significant Other Acceptance, E, VU by  at 3/24/2023 1027                               User Key     Initials Effective Dates Name Provider Type Discipline     05/02/22 -  Cheyenne Martin PT Physical Therapist PT              PT Recommendation and Plan     Plan of Care Reviewed With: patient  Outcome Evaluation: Patient is an 82 y.o male who presents to [facility] after his catheter was pulled out when caught on the bed along with gross hematuria. Patient with multiple recent falls. Patient AOx3 with moments of confusion. Patient recently d/c in early March after a stroke and being COVID (+) per wife. Patient has been at [facility] for rehab. Patient lives at home with is wife with 4 USAMA. Patient uses a rwx and cane at BL. Cane used within home due to space. Patient sat up to EOB with Silvano this date. Patient performed STS from EOB with CGA and VCs for hand placement. Patient ambulated 3ft fwd and back with rwx and CGA-Silvano. Slow shuffled steps with distance limited by fatigue. Patient able  to scoot self up toward HOB with SBA. Patient required Silvano to assist legs when returning to supine. Patient would continue to benefit from skilled PT intervention to address deficits in functional mobility and maximize safety and independence. Wife hopeful for acute rehab at d/c. PT will continue to monitor.     Time Calculation:    PT Charges     Row Name 03/24/23 1028             Time Calculation    Start Time 0852  -SM      Stop Time 0916  -SM      Time Calculation (min) 24 min  -SM      PT Received On 03/24/23  -SM      PT - Next Appointment 03/25/23  -      PT Goal Re-Cert Due Date 04/07/23  -SM         Time Calculation- PT    Total Timed Code Minutes- PT 18 minute(s)  -SM         Timed Charges    94075 - PT Therapeutic Activity Minutes 18  -SM         Total Minutes    Timed Charges Total Minutes 18  -SM       Total Minutes 18  -SM            User Key  (r) = Recorded By, (t) = Taken By, (c) = Cosigned By    Initials Name Provider Type     Cheyenne Martin, YOUSFI Physical Therapist              Therapy Charges for Today     Code Description Service Date Service Provider Modifiers Qty    47250336908 HC PT THERAPEUTIC ACT EA 15 MIN 3/24/2023 Cheyenne Martin, PT GP 1    26260227587 HC PT EVAL MOD COMPLEXITY 3 3/24/2023 Cheyenne Martin, PT GP 1          PT G-Codes  Outcome Measure Options: AM-PAC 6 Clicks Basic Mobility (PT)  AM-PAC 6 Clicks Score (PT): 15  PT Discharge Summary  Anticipated Discharge Disposition (PT): inpatient rehabilitation facility  Patient was not wearing a face mask during this therapy encounter. Therapist used appropriate personal protective equipment including mask and gloves.  Mask used was standard procedure mask. Appropriate PPE was worn during the entire therapy session. Hand hygiene was completed before and after therapy session. Patient is not in enhanced droplet precautions.     Cheyenne Martin PT  3/24/2023

## 2023-03-24 NOTE — NURSING NOTE
Call placed to Dr. Martinez. Per Dr. Martinez, continue the CBI, bladder scan to check for emptying. Normal for pt to have some pain and bladder spasms with catheter. Urology will see him tomorrow.

## 2023-03-24 NOTE — NURSING NOTE
CWOCN- consult for right posterior thigh. Patient had an indwelling catheter prior to admission and there are a few scattered scabs along the posterior thigh where it looks like the catheter tubing was under him prior. The scabs are dry, intact and a light yellow, tan in color. They appear to be healing without s/s infection. No drainage noted. Recommend to leave open to air to continue to heal.   Patient and his wife note that he often gets redness at his coccyx. I assessed- the skin is red along the gluteal cleft. It is blanching. I placed a silicone border dressing at the coccyx. Patient says that feels fine.   No follow up needed at this time. Recommend to continue protective measures for skin care. Patient tolerates turns and is on an accumax mattress with pump.

## 2023-03-24 NOTE — PROGRESS NOTES
"DAILY PROGRESS NOTE  [facility]    Patient Identification:  Name: Albert Yadav  Age: 82 y.o.  Sex: male  :  1940  MRN: [id number]         Primary Care Physician: Valerie Burnham MD    Subjective:  Interval History:He is weak. Getting bladder irrigation    Objective:    Scheduled Meds:atorvastatin, 80 mg, Oral, Nightly  cefTRIAXone, 1 g, Intravenous, Q24H  folic acid, 1 mg, Oral, Daily  insulin lispro, 0-7 Units, Subcutaneous, TID AC  lisinopril, 40 mg, Oral, Q PM  neomycin-bacitracin-polymyxin, 1 application, Topical, Daily  polyethylene glycol, 17 g, Oral, Q PM  sodium chloride, 10 mL, Intravenous, Q12H  tamsulosin, 0.4 mg, Oral, Daily  traZODone, 50 mg, Oral, Nightly      Continuous Infusions:     Vital signs in last 24 hours:  Temp:  [97.9 °F (36.6 °C)-99 °F (37.2 °C)] 97.9 °F (36.6 °C)  Heart Rate:  [80-98] 82  Resp:  [16-18] 18  BP: ()/(47-68) 93/53    Intake/Output:    Intake/Output Summary (Last 24 hours) at 3/24/2023 1700  Last data filed at 3/24/2023 1521  Gross per 24 hour   Intake 66754 ml   Output 61738 ml   Net -37311 ml       Exam:  BP 93/53 (BP Location: Right arm, Patient Position: Lying)   Pulse 82   Temp 97.9 °F (36.6 °C) (Oral)   Resp 18   Ht 182.9 cm (72\")   Wt 97.3 kg (214 lb 8.1 oz)   SpO2 98%   BMI 29.09 kg/m²     General Appearance:    Alert, cooperative, no distress   Head:    Normocephalic, without obvious abnormality, atraumatic   Eyes:       Throat:   Lips, tongue, gums normal   Neck:   Supple, symmetrical, trachea midline, no JVD   Lungs:     Clear to auscultation bilaterally, respirations unlabored   Chest Wall:    No tenderness or deformity    Heart:    Regular rate and rhythm, S1 and S2 normal, no murmur,no  Rub or gallop   Abdomen:     Soft, nontender, bowel sounds active, no masses, no organomegaly    Extremities:   Extremities normal, atraumatic, no cyanosis or edema   Pulses:      Skin:   Skin is warm and dry,  no rashes or palpable " lesions   Neurologic:   He is weak.      Lab Results (last 72 hours)     Procedure Component Value Units Date/Time    POC Glucose Once [[id number]]  (Normal) Collected: 03/24/23 1614    Specimen: Blood Updated: 03/24/23 1616     Glucose 129 mg/dL      Comment: Meter: [id number] : [id number] Dre Tijerina NA       POC Glucose Once [[id number]]  (Normal) Collected: 03/24/23 1057    Specimen: Blood Updated: 03/24/23 1105     Glucose 130 mg/dL      Comment: Meter: [id number] : [id number] Mariela Hammond NA       POC Glucose Once [[id number]]  (Normal) Collected: 03/24/23 0604    Specimen: Blood Updated: 03/24/23 0607     Glucose 124 mg/dL      Comment: Meter: [id number] : [id number] Hamiltonthien LOZANO       Comprehensive Metabolic Panel [[id number]]  (Abnormal) Collected: 03/24/23 0432    Specimen: Blood Updated: 03/24/23 0510     Glucose 108 mg/dL      BUN 16 mg/dL      Creatinine 0.95 mg/dL      Sodium 135 mmol/L      Potassium 3.5 mmol/L      Chloride 102 mmol/L      CO2 23.3 mmol/L      Calcium 7.8 mg/dL      Total Protein 4.9 g/dL      Albumin 2.2 g/dL      ALT (SGPT) 13 U/L      AST (SGOT) 14 U/L      Alkaline Phosphatase 112 U/L      Total Bilirubin 1.0 mg/dL      Globulin 2.7 gm/dL      A/G Ratio 0.8 g/dL      BUN/Creatinine Ratio 16.8     Anion Gap 9.7 mmol/L      eGFR 78.9 mL/min/1.73     Narrative:      GFR Normal >60  Chronic Kidney Disease <60  Kidney Failure <15    The GFR formula is only valid for adults with stable renal function between ages 18 and 70.    CBC Auto Differential [[id number]]  (Abnormal) Collected: 03/24/23 0432    Specimen: Blood Updated: 03/24/23 0452     WBC 10.30 10*3/mm3      RBC 3.12 10*6/mm3      Hemoglobin 10.1 g/dL      Hematocrit 28.9 %      MCV 92.6 fL      MCH 32.4 pg      MCHC 34.9 g/dL      RDW 12.8 %      RDW-SD 42.8 fl      MPV 10.9 fL      Platelets 141 10*3/mm3      Neutrophil % 82.5 %      Lymphocyte % 11.4 %      Monocyte % 4.0 %      Eosinophil % 1.1 %       Basophil % 0.2 %      Immature Grans % 0.8 %      Neutrophils, Absolute 8.51 10*3/mm3      Lymphocytes, Absolute 1.17 10*3/mm3      Monocytes, Absolute 0.41 10*3/mm3      Eosinophils, Absolute 0.11 10*3/mm3      Basophils, Absolute 0.02 10*3/mm3      Immature Grans, Absolute 0.08 10*3/mm3      nRBC 0.0 /100 WBC     Urinalysis, Microscopic Only - Indwelling Urethral Catheter [[id number]]  (Abnormal) Collected: 03/24/23 0409    Specimen: Urine from Indwelling Urethral Catheter Updated: 03/24/23 0441     RBC, UA Too Numerous to Count /HPF      WBC, UA 6-12 /HPF      Bacteria, UA Trace /HPF      Squamous Epithelial Cells, UA 0-2 /HPF      Hyaline Casts, UA None Seen /LPF      Methodology Manual Light Microscopy    Urine Culture - Urine, Indwelling Urethral Catheter [[id number]] Collected: 03/24/23 0409    Specimen: Urine from Indwelling Urethral Catheter Updated: 03/24/23 0441    Urinalysis With Culture If Indicated - Indwelling Urethral Catheter [[id number]]  (Abnormal) Collected: 03/24/23 0409    Specimen: Urine from Indwelling Urethral Catheter Updated: 03/24/23 0430     Color, UA Red     Comment: Any Substance that causes an abnormal urine color can alter the accuracy of the chemical reactions.        Appearance, UA Cloudy     pH, UA <=5.0     Specific Gravity, UA 1.006     Glucose, UA Negative     Ketones, UA Negative     Bilirubin, UA Negative     Blood, UA Large (3+)     Protein, UA 30 mg/dL (1+)     Leuk Esterase, UA Small (1+)     Nitrite, UA Negative     Urobilinogen, UA 0.2 E.U./dL    Narrative:      In absence of clinical symptoms, the presence of pyuria, bacteria, and/or nitrites on the urinalysis result does not correlate with infection.    TSH [[id number]]  (Normal) Collected: 03/24/23 0056    Specimen: Blood Updated: 03/24/23 0149     TSH 0.824 uIU/mL     Hemoglobin A1c [[id number]]  (Normal) Collected: 03/24/23 0056    Specimen: Blood Updated: 03/24/23 0128     Hemoglobin A1C 5.50 %     Narrative:       Hemoglobin A1C Ranges:    Increased Risk for Diabetes  5.7% to 6.4%  Diabetes                     >= 6.5%  Diabetic Goal                < 7.0%    Blood Culture - Blood, Arm, Right [[id number]] Collected: 03/24/23 0056    Specimen: Blood from Arm, Right Updated: 03/24/23 0109    Blood Culture - Blood, Hand, Left [[id number]] Collected: 03/24/23 0056    Specimen: Blood from Hand, Left Updated: 03/24/23 0108    Urinalysis, Microscopic Only - Indwelling Urethral Catheter [[id number]]  (Abnormal) Collected: 03/23/23 1921    Specimen: Urine from Indwelling Urethral Catheter Updated: 03/23/23 1947     RBC, UA Too Numerous to Count /HPF      WBC, UA 0-2 /HPF      Bacteria, UA Trace /HPF      Squamous Epithelial Cells, UA None Seen /HPF      Hyaline Casts, UA None Seen /LPF      Methodology Automated Microscopy    Urinalysis With Microscopic If Indicated (No Culture) - Indwelling Urethral Catheter [[id number]]  (Abnormal) Collected: 03/23/23 1921    Specimen: Urine from Indwelling Urethral Catheter Updated: 03/23/23 1944     Color, UA Red     Comment: Any Substance that causes an abnormal urine color can alter the accuracy of the chemical reactions.        Appearance, UA Turbid     pH, UA <=5.0     Specific Gravity, UA 1.006     Glucose, UA Negative     Ketones, UA Negative     Bilirubin, UA Negative     Blood, UA Large (3+)     Protein,  mg/dL (2+)     Leuk Esterase, UA Large (3+)     Nitrite, UA Positive     Urobilinogen, UA 0.2 E.U./dL    [facility] Draw [[id number]] Collected: 03/23/23 1632    Specimen: Blood Updated: 03/23/23 1745    Narrative:      The following orders were created for panel order [place] Draw.  Procedure                               Abnormality         Status                     ---------                               -----------         ------                     Green Top (Gel)[[id number]]                                  Final result               Lavender Top[[id number]]                                      Final result               Light Blue Top[[id number]]                                   Final result                 Please view results for these tests on the individual orders.    Lavender Top [[id number]] Collected: 03/23/23 1632    Specimen: Blood Updated: 03/23/23 1745     Extra Tube hold for add-on     Comment: Auto resulted       Light Blue Top [[id number]] Collected: 03/23/23 1632    Specimen: Blood Updated: 03/23/23 1745     Extra Tube Hold for add-ons.     Comment: Auto resulted       Green Top (Gel) [[id number]] Collected: 03/23/23 1632    Specimen: Blood Updated: 03/23/23 1745     Extra Tube Hold for add-ons.     Comment: Auto resulted.       Comprehensive Metabolic Panel [[id number]]  (Abnormal) Collected: 03/23/23 1632    Specimen: Blood Updated: 03/23/23 1707     Glucose 118 mg/dL      BUN 14 mg/dL      Creatinine 0.97 mg/dL      Sodium 135 mmol/L      Potassium 3.7 mmol/L      Chloride 102 mmol/L      CO2 25.0 mmol/L      Calcium 7.6 mg/dL      Total Protein 5.4 g/dL      Albumin 2.4 g/dL      ALT (SGPT) 13 U/L      AST (SGOT) 20 U/L      Alkaline Phosphatase 127 U/L      Total Bilirubin 1.3 mg/dL      Globulin 3.0 gm/dL      A/G Ratio 0.8 g/dL      BUN/Creatinine Ratio 14.4     Anion Gap 8.0 mmol/L      eGFR 77.0 mL/min/1.73     Narrative:      GFR Normal >60  Chronic Kidney Disease <60  Kidney Failure <15    The GFR formula is only valid for adults with stable renal function between ages 18 and 70.    CBC & Differential [[id number]]  (Abnormal) Collected: 03/23/23 1632    Specimen: Blood Updated: 03/23/23 1642    Narrative:      The following orders were created for panel order CBC & Differential.  Procedure                               Abnormality         Status                     ---------                               -----------         ------                     CBC Auto Differential[[id number]]        Abnormal            Final result                 Please view results for these tests on the  individual orders.    CBC Auto Differential [[id number]]  (Abnormal) Collected: 03/23/23 1632    Specimen: Blood Updated: 03/23/23 1642     WBC 13.05 10*3/mm3      RBC 3.44 10*6/mm3      Hemoglobin 11.4 g/dL      Hematocrit 32.5 %      MCV 94.5 fL      MCH 33.1 pg      MCHC 35.1 g/dL      RDW 12.8 %      RDW-SD 43.5 fl      MPV 10.5 fL      Platelets 166 10*3/mm3      Neutrophil % 88.3 %      Lymphocyte % 6.6 %      Monocyte % 3.7 %      Eosinophil % 0.1 %      Basophil % 0.2 %      Immature Grans % 1.1 %      Neutrophils, Absolute 11.54 10*3/mm3      Lymphocytes, Absolute 0.86 10*3/mm3      Monocytes, Absolute 0.48 10*3/mm3      Eosinophils, Absolute 0.01 10*3/mm3      Basophils, Absolute 0.02 10*3/mm3      Immature Grans, Absolute 0.14 10*3/mm3      nRBC 0.0 /100 WBC         Data Review:  Results from last 7 days   Lab Units 03/24/23  0432 03/23/23  1632   SODIUM mmol/L 135* 135*   POTASSIUM mmol/L 3.5 3.7   CHLORIDE mmol/L 102 102   CO2 mmol/L 23.3 25.0   BUN mg/dL 16 14   CREATININE mg/dL 0.95 0.97   GLUCOSE mg/dL 108* 118*   CALCIUM mg/dL 7.8* 7.6*     Results from last 7 days   Lab Units 03/24/23  0432 03/23/23  1632   WBC 10*3/mm3 10.30 13.05*   HEMOGLOBIN g/dL 10.1* 11.4*   HEMATOCRIT % 28.9* 32.5*   PLATELETS 10*3/mm3 141 166     Results from last 7 days   Lab Units 03/24/23  0056   TSH uIU/mL 0.824     Results from last 7 days   Lab Units 03/24/23  0056   HEMOGLOBIN A1C % 5.50     Lab Results   Lab Value Date/Time    TROPONINT 40 (H) 03/01/2023 0839    TROPONINT 31 (H) 02/28/2023 2119    TROPONINT 31 (H) 02/28/2023 2119         Results from last 7 days   Lab Units 03/24/23  0432 03/23/23  1632   ALK PHOS U/L 112 127*   BILIRUBIN mg/dL 1.0 1.3*   ALT (SGPT) U/L 13 13   AST (SGOT) U/L 14 20     Results from last 7 days   Lab Units 03/24/23  0056   TSH uIU/mL 0.824     Results from last 7 days   Lab Units 03/24/23  0056   HEMOGLOBIN A1C % 5.50     Glucose   Date/Time Value Ref Range Status   03/24/2023 7004  129 70 - 130 mg/dL Final     Comment:     Meter: [id number] : [id number] Dre Tijerina NA   03/24/2023 1057 130 70 - 130 mg/dL Final     Comment:     Meter: [id number] : [id number] Mariela Hammodn NA   03/24/2023 0604 124 70 - 130 mg/dL Final     Comment:     Meter: [id number] : [id number] Annie LOZANO           Past Medical History:   Diagnosis Date   • Anesthesia     MILD VIOLENT BEHAVIOR AFTER ANESTHESIA LEG SURGERY   • Arthritis of back 10 years ago   • At risk for sleep apnea     STOP BANG = 5   • Broken bones     arm, collar bone, ankle   • Carotid artery disorder (HCC)    • Cervical disc disorder ?   • Cholelithiasis 1978    Gall bladder removed   • Diabetes mellitus (HCC) 2005    Type 2   • Falls    • Fracture of ankle 1972   • Fracture of wrist 1947   • Fracture, clavicle 26 years ago   • Fracture, foot 26 year ago   • Groin rash     PT STATES LEFT GROIN AT TIMES   • History of transfusion    • Hyperlipidemia    • Hypertension    • Low back pain    • Low back strain ?   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   • Rheumatoid arthritis (HCC)     HANDS DIALLO   • Staph infection     2015  [facility] POST SX   • Tendency toward bleeding easily (HCC)     due to blood thinners   • Urinary retention 3/23/2023       Assessment:  Active Hospital Problems    Diagnosis  POA   • **Urethral trauma, initial encounter [S37.30XA]  Yes   • Gross hematuria [R31.0]  Yes   • Urinary retention [R33.9]  Yes   • Type 2 diabetes mellitus with hyperglycemia (HCC) [E11.65]  Yes   • Mild cognitive impairment [G31.84]  Yes   • Hyperlipemia [E78.5]  Yes   • Essential hypertension [I10]  Yes   • Claudication of left lower extremity (HCC) [I73.9]  Yes      Resolved Hospital Problems   No resolved problems to display.       Plan:  Urology consult noted. Continue with bladder  irrigation and follow lab and cultures. Continue antibiotics.    Devin Oreilly MD  3/24/2023  17:00 EDT

## 2023-03-24 NOTE — PLAN OF CARE
Goal Outcome Evaluation:  Plan of Care Reviewed With: patient           Outcome Evaluation: Patient is an 82 y.o male who presents to [facility] after his catheter was pulled out when caught on the bed along with gross hematuria. Patient with multiple recent falls. Patient AOx3 with moments of confusion. Patient recently d/c in early March after a stroke and being COVID (+) per wife. Patient has been at [facility] for rehab. Patient lives at home with is wife with 4 USAMA. Patient uses a rwx and cane at [place]. Cane used within home due to space. Patient sat up to EOB with Silvano this date. Patient performed STS from EOB with CGA and VCs for hand placement. Patient ambulated 3ft fwd and back with rwx and CGA-Silvano. Slow shuffled steps with distance limited by fatigue. Patient able to scoot self up toward HOB with SBA. Patient required Silvano to assist legs when returning to supine. Patient would continue to benefit from skilled PT intervention to address deficits in functional mobility and maximize safety and independence. Wife hopeful for acute rehab at d/c. PT will continue to monitor.

## 2023-03-25 LAB
ANION GAP SERPL CALCULATED.3IONS-SCNC: 8.1 MMOL/L (ref 5–15)
BACTERIA SPEC AEROBE CULT: ABNORMAL
BASOPHILS # BLD AUTO: 0.01 10*3/MM3 (ref 0–0.2)
BASOPHILS NFR BLD AUTO: 0.1 % (ref 0–1.5)
BUN SERPL-MCNC: 13 MG/DL (ref 8–23)
BUN/CREAT SERPL: 15.5 (ref 7–25)
CALCIUM SPEC-SCNC: 7.6 MG/DL (ref 8.6–10.5)
CHLORIDE SERPL-SCNC: 102 MMOL/L (ref 98–107)
CO2 SERPL-SCNC: 22.9 MMOL/L (ref 22–29)
CREAT SERPL-MCNC: 0.84 MG/DL (ref 0.76–1.27)
DEPRECATED RDW RBC AUTO: 45.5 FL (ref 37–54)
EGFRCR SERPLBLD CKD-EPI 2021: 86 ML/MIN/1.73
EOSINOPHIL # BLD AUTO: 0.23 10*3/MM3 (ref 0–0.4)
EOSINOPHIL NFR BLD AUTO: 2.4 % (ref 0.3–6.2)
ERYTHROCYTE [DISTWIDTH] IN BLOOD BY AUTOMATED COUNT: 13 % (ref 12.3–15.4)
GLUCOSE BLDC GLUCOMTR-MCNC: 118 MG/DL (ref 70–130)
GLUCOSE BLDC GLUCOMTR-MCNC: 121 MG/DL (ref 70–130)
GLUCOSE BLDC GLUCOMTR-MCNC: 128 MG/DL (ref 70–130)
GLUCOSE BLDC GLUCOMTR-MCNC: 149 MG/DL (ref 70–130)
GLUCOSE SERPL-MCNC: 119 MG/DL (ref 65–99)
HCT VFR BLD AUTO: 26.5 % (ref 37.5–51)
HGB BLD-MCNC: 9 G/DL (ref 13–17.7)
IMM GRANULOCYTES # BLD AUTO: 0.07 10*3/MM3 (ref 0–0.05)
IMM GRANULOCYTES NFR BLD AUTO: 0.7 % (ref 0–0.5)
LYMPHOCYTES # BLD AUTO: 0.89 10*3/MM3 (ref 0.7–3.1)
LYMPHOCYTES NFR BLD AUTO: 9.1 % (ref 19.6–45.3)
MCH RBC QN AUTO: 32.7 PG (ref 26.6–33)
MCHC RBC AUTO-ENTMCNC: 34 G/DL (ref 31.5–35.7)
MCV RBC AUTO: 96.4 FL (ref 79–97)
MONOCYTES # BLD AUTO: 0.66 10*3/MM3 (ref 0.1–0.9)
MONOCYTES NFR BLD AUTO: 6.8 % (ref 5–12)
NEUTROPHILS NFR BLD AUTO: 7.9 10*3/MM3 (ref 1.7–7)
NEUTROPHILS NFR BLD AUTO: 80.9 % (ref 42.7–76)
NRBC BLD AUTO-RTO: 0 /100 WBC (ref 0–0.2)
PLATELET # BLD AUTO: 146 10*3/MM3 (ref 140–450)
PMV BLD AUTO: 10.7 FL (ref 6–12)
POTASSIUM SERPL-SCNC: 3.4 MMOL/L (ref 3.5–5.2)
POTASSIUM SERPL-SCNC: 4.2 MMOL/L (ref 3.5–5.2)
RBC # BLD AUTO: 2.75 10*6/MM3 (ref 4.14–5.8)
SODIUM SERPL-SCNC: 133 MMOL/L (ref 136–145)
WBC NRBC COR # BLD: 9.76 10*3/MM3 (ref 3.4–10.8)

## 2023-03-25 PROCEDURE — 84132 ASSAY OF SERUM POTASSIUM: CPT | Performed by: INTERNAL MEDICINE

## 2023-03-25 PROCEDURE — 80048 BASIC METABOLIC PNL TOTAL CA: CPT | Performed by: HOSPITALIST

## 2023-03-25 PROCEDURE — 97530 THERAPEUTIC ACTIVITIES: CPT | Performed by: PHYSICAL THERAPIST

## 2023-03-25 PROCEDURE — 25010000002 HYDROMORPHONE PER 4 MG: Performed by: HOSPITALIST

## 2023-03-25 PROCEDURE — 25010000002 CEFTRIAXONE PER 250 MG: Performed by: INTERNAL MEDICINE

## 2023-03-25 PROCEDURE — 85025 COMPLETE CBC W/AUTO DIFF WBC: CPT | Performed by: HOSPITALIST

## 2023-03-25 PROCEDURE — 82962 GLUCOSE BLOOD TEST: CPT

## 2023-03-25 RX ORDER — POTASSIUM CHLORIDE 1.5 G/1.77G
40 POWDER, FOR SOLUTION ORAL AS NEEDED
Status: DISCONTINUED | OUTPATIENT
Start: 2023-03-25 | End: 2023-03-29 | Stop reason: HOSPADM

## 2023-03-25 RX ORDER — POTASSIUM CHLORIDE 750 MG/1
40 TABLET, FILM COATED, EXTENDED RELEASE ORAL AS NEEDED
Status: DISCONTINUED | OUTPATIENT
Start: 2023-03-25 | End: 2023-03-29 | Stop reason: HOSPADM

## 2023-03-25 RX ORDER — LISINOPRIL 20 MG/1
20 TABLET ORAL EVERY EVENING
Status: DISCONTINUED | OUTPATIENT
Start: 2023-03-25 | End: 2023-03-29 | Stop reason: HOSPADM

## 2023-03-25 RX ADMIN — TRAZODONE HYDROCHLORIDE 50 MG: 50 TABLET ORAL at 21:38

## 2023-03-25 RX ADMIN — ACETAMINOPHEN 650 MG: 325 TABLET, FILM COATED ORAL at 12:18

## 2023-03-25 RX ADMIN — ATORVASTATIN CALCIUM 80 MG: 80 TABLET, FILM COATED ORAL at 21:38

## 2023-03-25 RX ADMIN — Medication 10 ML: at 08:32

## 2023-03-25 RX ADMIN — Medication 1 MG: at 08:32

## 2023-03-25 RX ADMIN — POTASSIUM CHLORIDE 40 MEQ: 750 TABLET, EXTENDED RELEASE ORAL at 16:10

## 2023-03-25 RX ADMIN — CEFTRIAXONE SODIUM 1 G: 1 INJECTION, POWDER, FOR SOLUTION INTRAMUSCULAR; INTRAVENOUS at 18:01

## 2023-03-25 RX ADMIN — SODIUM CHLORIDE 75 ML/HR: 9 INJECTION, SOLUTION INTRAVENOUS at 20:32

## 2023-03-25 RX ADMIN — BACITRACIN ZINC, NEOMYCIN, POLYMYXIN B 1 APPLICATION: 400; 3.5; 5 OINTMENT TOPICAL at 08:32

## 2023-03-25 RX ADMIN — ACETAMINOPHEN 650 MG: 325 TABLET, FILM COATED ORAL at 21:38

## 2023-03-25 RX ADMIN — HYDROMORPHONE HYDROCHLORIDE 0.5 MG: 1 INJECTION, SOLUTION INTRAMUSCULAR; INTRAVENOUS; SUBCUTANEOUS at 00:06

## 2023-03-25 RX ADMIN — POTASSIUM CHLORIDE 40 MEQ: 750 TABLET, EXTENDED RELEASE ORAL at 12:09

## 2023-03-25 RX ADMIN — TAMSULOSIN HYDROCHLORIDE 0.4 MG: 0.4 CAPSULE ORAL at 08:32

## 2023-03-25 RX ADMIN — POLYETHYLENE GLYCOL 3350 17 G: 17 POWDER, FOR SOLUTION ORAL at 18:01

## 2023-03-25 RX ADMIN — SODIUM CHLORIDE 75 ML/HR: 9 INJECTION, SOLUTION INTRAVENOUS at 07:47

## 2023-03-25 RX ADMIN — Medication 3 MG: at 00:06

## 2023-03-25 NOTE — PROGRESS NOTES
" LOS: 0 days     Name: Albert Yadav  Age: 82 y.o.  Sex: male  :  1940  MRN: [id number]         Primary Care Physician: Valerie Burnham MD    Subjective   Subjective  No new complaints or acute overnight events.  Denies abdominal pain.  Urine looking more clear per RN.  Family at bedside states that he gets little confused with the IV Dilaudid.    Objective   Vital Signs  Temp:  [97.9 °F (36.6 °C)-98 °F (36.7 °C)] 98 °F (36.7 °C)  Heart Rate:  [] 83  Resp:  [18] 18  BP: ()/(51-55) 102/51  Body mass index is 22.72 kg/m².    Objective:  General Appearance:  Comfortable and in no acute distress (Elderly appearing).    Vital signs: (most recent): Blood pressure 102/51, pulse 83, temperature 98 °F (36.7 °C), temperature source Oral, resp. rate 18, height 182.9 cm (72\"), weight 76 kg (167 lb 8.8 oz), SpO2 98 %.    Lungs:  Normal effort and normal respiratory rate.  There are decreased breath sounds.    Heart: Normal rate.  Regular rhythm.    Abdomen: Abdomen is soft.  Bowel sounds are normal.   There is no abdominal tenderness.     Extremities: There is no dependent edema or local swelling.    Neurological: Patient is alert and oriented to person, place and time.    Skin:  Warm and dry.          : Three-way catheter in place and CBI ongoing    Results Review:       I reviewed the patient's new clinical results.    Results from last 7 days   Lab Units 23  04223  0432 23  1632   WBC 10*3/mm3 9.76 10.30 13.05*   HEMOGLOBIN g/dL 9.0* 10.1* 11.4*   PLATELETS 10*3/mm3 146 141 166     Results from last 7 days   Lab Units 23  0424 23  0432 23  1632   SODIUM mmol/L 133* 135* 135*   POTASSIUM mmol/L 3.4* 3.5 3.7   CHLORIDE mmol/L 102 102 102   CO2 mmol/L 22.9 23.3 25.0   BUN mg/dL 13 16 14   CREATININE mg/dL 0.84 0.95 0.97   CALCIUM mg/dL 7.6* 7.8* 7.6*   GLUCOSE mg/dL 119* 108* 118*                 Scheduled Meds:   atorvastatin, 80 mg, Oral, Nightly  cefTRIAXone, " 1 g, Intravenous, Q24H  folic acid, 1 mg, Oral, Daily  insulin lispro, 0-7 Units, Subcutaneous, TID AC  lisinopril, 40 mg, Oral, Q PM  neomycin-bacitracin-polymyxin, 1 application, Topical, Daily  polyethylene glycol, 17 g, Oral, Q PM  sodium chloride, 10 mL, Intravenous, Q12H  tamsulosin, 0.4 mg, Oral, Daily  traZODone, 50 mg, Oral, Nightly      PRN Meds:   •  acetaminophen **OR** acetaminophen **OR** acetaminophen  •  dextrose  •  dextrose  •  glucagon (human recombinant)  •  HYDROcodone-acetaminophen  •  HYDROmorphone  •  melatonin  •  nitroglycerin  •  ondansetron  •  ondansetron  •  sodium chloride  •  sodium chloride  Continuous Infusions:  sodium chloride, 75 mL/hr, Last Rate: 75 mL/hr (03/25/23 1043)        Assessment & Plan   Active Hospital Problems    Diagnosis  POA   • **Urethral trauma, initial encounter [S37.30XA]  Yes   • Gross hematuria [R31.0]  Yes   • Urinary retention [R33.9]  Yes   • Type 2 diabetes mellitus with hyperglycemia (HCC) [E11.65]  Yes   • Mild cognitive impairment [G31.84]  Yes   • Hyperlipemia [E78.5]  Yes   • Essential hypertension [I10]  Yes   • Claudication of left lower extremity (HCC) [I73.9]  Yes      Resolved Hospital Problems   No resolved problems to display.       Assessment & Plan    -CBI ongoing as per urology  -Continue Rocephin and await blood/urine culture results  -Blood sugars reasonably controlled and will continue current regimen  -Blood pressure is overcontrolled right now.  Decrease lisinopril to 20 mg and holding parameters have been added  -Supportive care with IV fluids  -Replace potassium today      Expected Discharge Date and Time     Expected Discharge Date Expected Discharge Time    Mar 27, 2023            Leonardo Oro MD  [facility]  03/25/23  11:47 EDT

## 2023-03-25 NOTE — PLAN OF CARE
Goal Outcome Evaluation:  Plan of Care Reviewed With: patient        Progress: no change  Outcome Evaluation: CBI continues, PRN irrigation performed with several large clots at times. Oriented to self and time only. Bed alarm in place.

## 2023-03-25 NOTE — PLAN OF CARE
Goal Outcome Evaluation:              Outcome Evaluation: Pt able to answer orientation questions correctly in the beginning of the day. Poor STM. Cognitive decline over the course of the day. Pt confused to time/situation and can become quite verbally hostile with staff. Not easily redirected during these episodes. Multiple attempts at unassisted self transfers. Bed alarm utilized. SCDs/accu max pump in place. Turned and repostioned with staff asssit. Heels elevated off mattress. CBI continues. Urine currently very light yellow. Bladder irrigation approx Q2 hours, multiple clots with initial irrigation of the shift and none since that time. Pt denies any bladder pain/spasms. Tylenol admin for c/o chonic arthritic back pain. Potassium replaced per protocol this shift. Maintaining sats on RA. Lisiniopril withheld due to not meeting administration parameters. Safety maintained.

## 2023-03-25 NOTE — CONSULTS
Urology Consult Note    Patient:Albert Yadav :1940  Room:[facility]  Admit Date3/  Age:82 y.o.     SEX:male     DOS:3/25/2023     MR:[id number]     Visit:[id number]       Attending: Leonardo Oro*  Referring Provider: BETO Trimble  Reason for Consultation: Gross hematuria     Patient Care Team:  Valerie Burnham MD as PCP - General (Internal Medicine)    Chief complaint Gross hematuria    Subjective .     History of present illness:    Patient is known to our practice and recently failed a voiding trial in the office. He was started on Finasteride and is on Flomax.  He presented to the ER with gross hematuria and weakness.  He currently has a 3 way rodriguez with CBI. Nursing reports having irrigated some small blood clots overnight.  Urine is currently clear and patient denies any bladder pressure. Phallus and scrotum wnl. Wife is at the bedside.     Review of Systems  Constitutional:  Weakness   Opthalmalogic: No change in vision or blurred vision  Otolaryngeal:  No epistaxis  Cardiovascular: No recent chest pain  Pulmonary:  No cough, sputum production, hemoptysis  Gastrointestinal: No melena, hematochezia, BRBPR, hematemesis  Genitourinary:  See HPI  Hematologic:  No tendency for easy bruising  Musculoskeletal: No muscle pain  Neurologic:  No Seizures, new focal deficits      History  Past Medical History:   Diagnosis Date   • Anesthesia     MILD VIOLENT BEHAVIOR AFTER ANESTHESIA LEG SURGERY   • Arthritis of back 10 years ago   • At risk for sleep apnea     STOP BANG = 5   • Broken bones     arm, collar bone, ankle   • Carotid artery disorder (HCC)    • Cervical disc disorder ?   • Cholelithiasis     Gall bladder removed   • Diabetes mellitus (HCC)     Type 2   • Falls    • Fracture of ankle    • Fracture of wrist    • Fracture, clavicle 26 years ago   • Fracture, foot 26 year ago   • Groin rash     PT STATES LEFT GROIN AT TIMES   • History of transfusion     • Hyperlipidemia    • Hypertension    • Low back pain    • Low back strain ?   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   • Rheumatoid arthritis (HCC)     HANDS DIALLO   • Staph infection     2015  [facility] POST SX   • Tendency toward bleeding easily (HCC)     due to blood thinners   • Urinary retention 3/23/2023     Past Surgical History:   Procedure Laterality Date   • ADRENALECTOMY Left 2022    Procedure: LAPAROSCOPIC EXCISION LEFT RETROPERITONEAL MASS;  Surgeon: Moy Vargas MD;  Location: [facility];  Service: General;  Laterality: Left;   • ANGIOPLASTY FEMORAL ARTERY Left 2016    Procedure: ULTRA SOUND ACCESS RIGHT FEMORAL ARTERY, AIF BILATERAL RUNOFF, SELECTIVE CATHETERIZATION LEFT FEMORAL ARTERY.;  Surgeon: Errol Michael MD;  Location: [facility] OR ;  Service:    • ANKLE OPEN REDUCTION INTERNAL FIXATION     • ARTERIOVENOUS FISTULA/SHUNT SURGERY Left 2016    Procedure: LEFT ILEO-FEMORAL GORTEX GRAFT AND LEFT LEG ARERIOGRAM ;  Surgeon: Errol Michael MD;  Location: [facility] OR ;  Service:    • CAROTID ENDARTERECTOMY Right 2020    Procedure: CAROTID ENDARTERECTOMY;  Surgeon: James Graham MD;  Location: [facility];  Service: Vascular;  Laterality: Right;   • CHOLECYSTECTOMY     • EPIDURAL BLOCK     • EYE SURGERY Left     as child   • FRACTURE SURGERY Left     arm   • ILIAC ARTERY STENT Left    • KNEE SURGERY Left     ORIF   • ORIF ANKLE FRACTURE Left      Social History     Socioeconomic History   • Marital status:    Tobacco Use   • Smoking status: Former     Packs/day: 1.00     Years: 20.00     Pack years: 20.00     Types: Cigarettes     Start date: 1968     Quit date: 1989     Years since quittin.8   • Smokeless tobacco: Never   • Tobacco comments:     Did'nt  inhale   Vaping Use   • Vaping Use: Never used   Substance and Sexual Activity   • Alcohol use: Not Currently     Alcohol/week: 1.0 standard drink     Types: 1 Shots of liquor per week     Comment: 1 drink per day at most   • Drug use: Never   • Sexual activity: Not Currently     Partners: Female     Birth control/protection: Condom     Family History   Problem Relation Age of Onset   • Diabetes Mother    • Cancer Mother         Cancer liver   • Cancer Father         Diabetic   • Diabetes Father    • Heart disease Maternal Grandmother    • Heart disease Maternal Grandfather    • Heart disease Paternal Grandmother    • Heart disease Paternal Grandfather    • Alcohol abuse Son         Alcoholic   • Cancer Brother    • Malig Hyperthermia Neg Hx      No Known Allergies  Prior to Admission medications    Medication Sig Start Date End Date Taking? Authorizing Provider   acetaminophen (TYLENOL) 325 MG tablet Take 2 tablets by mouth Every 4 (Four) Hours As Needed for Mild Pain. 3/7/23  Yes Remi Rangel MD   aspirin 81 MG EC tablet Take 1 tablet by mouth Daily for 30 days. 3/8/23 4/7/23 Yes Remi Rangel MD   atorvastatin (LIPITOR) 80 MG tablet Take 1 tablet by mouth Every Night. 3/7/23  Yes Remi Rangel MD   clopidogrel (PLAVIX) 75 MG tablet Take 1 tablet by mouth Every Morning.   Yes Jude Raymundo MD   folic acid (FOLVITE) 1 MG tablet Take 1 tablet by mouth Daily.   Yes Jude Raymundo MD   lisinopril (PRINIVIL,ZESTRIL) 40 MG tablet Take 1 tablet by mouth Every Evening. 8/10/15  Yes Jude Raymundo MD   neomycin-bacitracin-polymyxin (NEOSPORIN) 5-400-5000 ointment Apply 1 application topically to the appropriate area as directed Daily.   Yes Jude Raymundo MD   nitrofurantoin, macrocrystal-monohydrate, (MACROBID) 100 MG capsule Take 1 capsule by mouth 2 (Two) Times a Day. Ends 3/29/23   Yes Jude Raymundo MD   ondansetron (ZOFRAN) 4 MG tablet Take 1 tablet by mouth Every 6 (Six)  "Hours As Needed for Nausea or Vomiting. 22  Yes Lea Auguste APRN   polyethylene glycol (MIRALAX) 17 g packet Take 17 g by mouth Every Evening.   Yes ProviderJude MD   tamsulosin (FLOMAX) 0.4 MG capsule 24 hr capsule Take 1 capsule by mouth Daily. 3/8/23  Yes Remi Rangel MD   traZODone (DESYREL) 50 MG tablet Take 1 tablet by mouth Every Night. 8/10/15  Yes Jude Raymundo MD         Objective     tMax 24 hours:  Temp (24hrs), Av °F (36.7 °C), Min:97.9 °F (36.6 °C), Max:98 °F (36.7 °C)    Vital Sign Ranges:  Temp:  [97.9 °F (36.6 °C)-98 °F (36.7 °C)] 98 °F (36.7 °C)  Heart Rate:  [] 83  Resp:  [18] 18  BP: ()/(51-55) 102/51  Intake and Output Last 3 Shifts:  I/O last 3 completed shifts:  In: 37371 [Other:09106]  Out: 49410 [Urine:76808; Other:5000]      Physical Exam:     Vital signs: /51 (BP Location: Right arm, Patient Position: Lying)   Pulse 83   Temp 98 °F (36.7 °C) (Oral)   Resp 18   Ht 182.9 cm (72\")   Wt 76 kg (167 lb 8.8 oz)   SpO2 98%   BMI 22.72 kg/m²   98%     General: Weak, alert, some confusion.     Appears stated age. No apparent distress.    HEENT: Moist mucous membranes of the oral mucosa & nasal mucosa.    Lips are not cyanotic.    Extraocular movements intact    Neck:  Trachea position is midline.     Respiratory: Respiratory rhythm & depth: Normal.    Respiratory effort:  Normal.      GI:  Nondistended. Nontender.    Skin:  Inspection: No rash.    Palpation:  Warm, dry. No induration.     Extremities: No clubbing & no cyanosis.    No edema    :  Normal external genitalia    3 way rodriguez intact with CBI, clear          Results Review:     Lab Results (last 24 hours)     Procedure Component Value Units Date/Time    POC Glucose Once [[id number]]  (Normal) Collected: 03/25/23 0620    Specimen: Blood Updated: 23     Glucose 128 mg/dL      Comment: Meter: [id number] : [id number] Annie LOZANO       Basic Metabolic Panel " [[id number]]  (Abnormal) Collected: 03/25/23 0424    Specimen: Blood Updated: 03/25/23 0507     Glucose 119 mg/dL      BUN 13 mg/dL      Creatinine 0.84 mg/dL      Sodium 133 mmol/L      Potassium 3.4 mmol/L      Chloride 102 mmol/L      CO2 22.9 mmol/L      Calcium 7.6 mg/dL      BUN/Creatinine Ratio 15.5     Anion Gap 8.1 mmol/L      eGFR 86.0 mL/min/1.73     Narrative:      GFR Normal >60  Chronic Kidney Disease <60  Kidney Failure <15    The GFR formula is only valid for adults with stable renal function between ages 18 and 70.    CBC & Differential [[id number]]  (Abnormal) Collected: 03/25/23 0424    Specimen: Blood Updated: 03/25/23 0448    Narrative:      The following orders were created for panel order CBC & Differential.  Procedure                               Abnormality         Status                     ---------                               -----------         ------                     CBC Auto Differential[[id number]]        Abnormal            Final result                 Please view results for these tests on the individual orders.    CBC Auto Differential [[id number]]  (Abnormal) Collected: 03/25/23 0424    Specimen: Blood Updated: 03/25/23 0448     WBC 9.76 10*3/mm3      RBC 2.75 10*6/mm3      Hemoglobin 9.0 g/dL      Hematocrit 26.5 %      MCV 96.4 fL      MCH 32.7 pg      MCHC 34.0 g/dL      RDW 13.0 %      RDW-SD 45.5 fl      MPV 10.7 fL      Platelets 146 10*3/mm3      Neutrophil % 80.9 %      Lymphocyte % 9.1 %      Monocyte % 6.8 %      Eosinophil % 2.4 %      Basophil % 0.1 %      Immature Grans % 0.7 %      Neutrophils, Absolute 7.90 10*3/mm3      Lymphocytes, Absolute 0.89 10*3/mm3      Monocytes, Absolute 0.66 10*3/mm3      Eosinophils, Absolute 0.23 10*3/mm3      Basophils, Absolute 0.01 10*3/mm3      Immature Grans, Absolute 0.07 10*3/mm3      nRBC 0.0 /100 WBC     Blood Culture - Blood, Arm, Right [[id number]]  (Normal) Collected: 03/24/23 0056    Specimen: Blood from Arm, Right  Updated: 03/25/23 0115     Blood Culture No growth at 24 hours    Blood Culture - Blood, Hand, Left [[id number]]  (Normal) Collected: 03/24/23 0056    Specimen: Blood from Hand, Left Updated: 03/25/23 0115     Blood Culture No growth at 24 hours    POC Glucose Once [[id number]]  (Normal) Collected: 03/24/23 2036    Specimen: Blood Updated: 03/24/23 2038     Glucose 116 mg/dL      Comment: Meter: [id number] : [id number] Annie Ruiz NA       POC Glucose Once [[id number]]  (Normal) Collected: 03/24/23 1614    Specimen: Blood Updated: 03/24/23 1616     Glucose 129 mg/dL      Comment: Meter: [id number] : [id number] Ericksonjagruti Tijerina NA       POC Glucose Once [[id number]]  (Normal) Collected: 03/24/23 1057    Specimen: Blood Updated: 03/24/23 1105     Glucose 130 mg/dL      Comment: Meter: [id number] : [id number] Mariela LOZANO            No results found for: URINECX     Imaging Results (Last 7 Days)     ** No results found for the last 168 hours. **             Inpatient Meds:   Current Meds:        Scheduled Meds:atorvastatin, 80 mg, Oral, Nightly  cefTRIAXone, 1 g, Intravenous, Q24H  folic acid, 1 mg, Oral, Daily  insulin lispro, 0-7 Units, Subcutaneous, TID AC  lisinopril, 40 mg, Oral, Q PM  neomycin-bacitracin-polymyxin, 1 application, Topical, Daily  polyethylene glycol, 17 g, Oral, Q PM  sodium chloride, 10 mL, Intravenous, Q12H  tamsulosin, 0.4 mg, Oral, Daily  traZODone, 50 mg, Oral, Nightly       Continuous Infusions:sodium chloride, 75 mL/hr, Last Rate: 75 mL/hr (03/25/23 0747)       PRN Meds:.•  acetaminophen **OR** acetaminophen **OR** acetaminophen  •  dextrose  •  dextrose  •  glucagon (human recombinant)  •  HYDROcodone-acetaminophen  •  HYDROmorphone  •  melatonin  •  nitroglycerin  •  ondansetron  •  ondansetron  •  sodium chloride  •  sodium chloride      Impression:      Urethral trauma, initial encounter    Claudication of left lower extremity (HCC)    Essential hypertension     Hyperlipemia    Type 2 diabetes mellitus with hyperglycemia (HCC)    Mild cognitive impairment    Gross hematuria    Urinary retention     Plan:  Continue to hand irrigate 3way rodriguez every 2 hours and prn for clots,titrate CBI to keep clear  Recommend continuing Flomax and Finasteride  UCS pending  Recommend continuing Rocephin    I discussed the patients findings and my recommendations with patient.    Devora Lemon, APRN  03/25/23  09:06 EDT

## 2023-03-25 NOTE — PLAN OF CARE
Goal Outcome Evaluation:              Outcome Evaluation: Pt in bed, spouse in room. Increased ambulation distance today. Pt fatigues quickly and is unsteady. L lateral LOB x1 with mod A to recover.

## 2023-03-26 LAB
ANION GAP SERPL CALCULATED.3IONS-SCNC: 9 MMOL/L (ref 5–15)
BUN SERPL-MCNC: 7 MG/DL (ref 8–23)
BUN/CREAT SERPL: 9.9 (ref 7–25)
CALCIUM SPEC-SCNC: 7.5 MG/DL (ref 8.6–10.5)
CHLORIDE SERPL-SCNC: 107 MMOL/L (ref 98–107)
CO2 SERPL-SCNC: 24 MMOL/L (ref 22–29)
CREAT SERPL-MCNC: 0.71 MG/DL (ref 0.76–1.27)
DEPRECATED RDW RBC AUTO: 45.1 FL (ref 37–54)
EGFRCR SERPLBLD CKD-EPI 2021: 90.5 ML/MIN/1.73
ERYTHROCYTE [DISTWIDTH] IN BLOOD BY AUTOMATED COUNT: 12.9 % (ref 12.3–15.4)
GLUCOSE BLDC GLUCOMTR-MCNC: 115 MG/DL (ref 70–130)
GLUCOSE BLDC GLUCOMTR-MCNC: 126 MG/DL (ref 70–130)
GLUCOSE BLDC GLUCOMTR-MCNC: 150 MG/DL (ref 70–130)
GLUCOSE SERPL-MCNC: 102 MG/DL (ref 65–99)
HCT VFR BLD AUTO: 26.4 % (ref 37.5–51)
HGB BLD-MCNC: 8.8 G/DL (ref 13–17.7)
MCH RBC QN AUTO: 32.2 PG (ref 26.6–33)
MCHC RBC AUTO-ENTMCNC: 33.3 G/DL (ref 31.5–35.7)
MCV RBC AUTO: 96.7 FL (ref 79–97)
PLATELET # BLD AUTO: 164 10*3/MM3 (ref 140–450)
PMV BLD AUTO: 10.4 FL (ref 6–12)
POTASSIUM SERPL-SCNC: 4.1 MMOL/L (ref 3.5–5.2)
RBC # BLD AUTO: 2.73 10*6/MM3 (ref 4.14–5.8)
SODIUM SERPL-SCNC: 140 MMOL/L (ref 136–145)
WBC NRBC COR # BLD: 6.68 10*3/MM3 (ref 3.4–10.8)

## 2023-03-26 PROCEDURE — 85027 COMPLETE CBC AUTOMATED: CPT | Performed by: INTERNAL MEDICINE

## 2023-03-26 PROCEDURE — 25010000002 HYDROMORPHONE PER 4 MG: Performed by: HOSPITALIST

## 2023-03-26 PROCEDURE — 82962 GLUCOSE BLOOD TEST: CPT

## 2023-03-26 PROCEDURE — 25010000002 CEFTRIAXONE PER 250 MG: Performed by: INTERNAL MEDICINE

## 2023-03-26 PROCEDURE — 80048 BASIC METABOLIC PNL TOTAL CA: CPT | Performed by: INTERNAL MEDICINE

## 2023-03-26 PROCEDURE — 63710000001 INSULIN LISPRO (HUMAN) PER 5 UNITS: Performed by: NURSE PRACTITIONER

## 2023-03-26 RX ORDER — QUETIAPINE FUMARATE 25 MG/1
25 TABLET, FILM COATED ORAL
Status: DISCONTINUED | OUTPATIENT
Start: 2023-03-26 | End: 2023-03-29 | Stop reason: HOSPADM

## 2023-03-26 RX ADMIN — Medication 10 ML: at 20:19

## 2023-03-26 RX ADMIN — LISINOPRIL 20 MG: 20 TABLET ORAL at 18:02

## 2023-03-26 RX ADMIN — HYDROMORPHONE HYDROCHLORIDE 0.5 MG: 1 INJECTION, SOLUTION INTRAMUSCULAR; INTRAVENOUS; SUBCUTANEOUS at 20:06

## 2023-03-26 RX ADMIN — SODIUM CHLORIDE 75 ML/HR: 9 INJECTION, SOLUTION INTRAVENOUS at 08:16

## 2023-03-26 RX ADMIN — Medication 10 ML: at 08:16

## 2023-03-26 RX ADMIN — Medication 3 MG: at 20:19

## 2023-03-26 RX ADMIN — BACITRACIN ZINC, NEOMYCIN, POLYMYXIN B 1 APPLICATION: 400; 3.5; 5 OINTMENT TOPICAL at 08:17

## 2023-03-26 RX ADMIN — ACETAMINOPHEN 650 MG: 325 TABLET, FILM COATED ORAL at 08:16

## 2023-03-26 RX ADMIN — POLYETHYLENE GLYCOL 3350 17 G: 17 POWDER, FOR SOLUTION ORAL at 18:02

## 2023-03-26 RX ADMIN — ATORVASTATIN CALCIUM 80 MG: 80 TABLET, FILM COATED ORAL at 20:19

## 2023-03-26 RX ADMIN — TRAZODONE HYDROCHLORIDE 50 MG: 50 TABLET ORAL at 20:19

## 2023-03-26 RX ADMIN — QUETIAPINE FUMARATE 25 MG: 25 TABLET ORAL at 18:02

## 2023-03-26 RX ADMIN — TAMSULOSIN HYDROCHLORIDE 0.4 MG: 0.4 CAPSULE ORAL at 08:16

## 2023-03-26 RX ADMIN — CEFTRIAXONE SODIUM 1 G: 1 INJECTION, POWDER, FOR SOLUTION INTRAMUSCULAR; INTRAVENOUS at 18:02

## 2023-03-26 RX ADMIN — INSULIN LISPRO 2 UNITS: 100 INJECTION, SOLUTION INTRAVENOUS; SUBCUTANEOUS at 18:01

## 2023-03-26 RX ADMIN — Medication 1 MG: at 08:16

## 2023-03-26 NOTE — PROGRESS NOTES
Patient resting in bed.  Wife at bedside.    NAD  CBI to slow drip, clear urine.  Discussed clamping and restarting if blood returns.  No clots when hand irrigated this am.    Cr 0.7  WBC 6.6  Hgb 8.8    Plan:  Clamp CBI  Rodriguez to gravity drainage, discussed continuing rodriguez through discharge as pt has failed multiple voiding trials and has had 2 episodes of traumatic rodriguez removal  Recommend continuing Flomax   Continue Rocephin

## 2023-03-26 NOTE — PROGRESS NOTES
" LOS: 1 day     Name: Albert Yadav  Age: 82 y.o.  Sex: male  :  1940  MRN: [id number]         Primary Care Physician: Valerie Burnham MD    Subjective   Subjective  Had increasing confusion as the day went on yesterday with some psychomotor agitation overnight.  Appears somewhat confused this morning.  Urine now clear.  No fevers.    Objective   Vital Signs  Temp:  [98 °F (36.7 °C)-98.5 °F (36.9 °C)] 98.1 °F (36.7 °C)  Heart Rate:  [71-98] 77  Resp:  [18] 18  BP: (110-173)/(53-79) 158/69  Body mass index is 22.81 kg/m².    Objective:  General Appearance:  Comfortable and in no acute distress.    Vital signs: (most recent): Blood pressure 158/69, pulse 77, temperature 98.1 °F (36.7 °C), temperature source Oral, resp. rate 18, height 182.9 cm (72\"), weight 76.3 kg (168 lb 3.4 oz), SpO2 98 %.    Lungs:  Normal effort and normal respiratory rate.  He is not in respiratory distress.  There are decreased breath sounds.    Heart: Normal rate.  Regular rhythm.    Abdomen: Abdomen is soft.  Bowel sounds are normal.   There is no abdominal tenderness.     Extremities: There is no dependent edema or local swelling.    Neurological: Patient is alert.  (Pleasantly confused).    Skin:  Warm and dry.          : Three-way catheter in place with CBI ongoing    Results Review:       I reviewed the patient's new clinical results.    Results from last 7 days   Lab Units 23  0509 234 23  04323  1632   WBC 10*3/mm3 6.68 9.76 10.30 13.05*   HEMOGLOBIN g/dL 8.8* 9.0* 10.1* 11.4*   PLATELETS 10*3/mm3 164 146 141 166     Results from last 7 days   Lab Units 23  0509 23  0424 23  0432 23  1632   SODIUM mmol/L 140  --  133* 135* 135*   POTASSIUM mmol/L 4.1 4.2 3.4* 3.5 3.7   CHLORIDE mmol/L 107  --  102 102 102   CO2 mmol/L 24.0  --  22.9 23.3 25.0   BUN mg/dL 7*  --  13 16 14   CREATININE mg/dL 0.71*  --  0.84 0.95 0.97   CALCIUM mg/dL 7.5*  --  7.6* 7.8* " 7.6*   GLUCOSE mg/dL 102*  --  119* 108* 118*                 Scheduled Meds:   atorvastatin, 80 mg, Oral, Nightly  cefTRIAXone, 1 g, Intravenous, Q24H  folic acid, 1 mg, Oral, Daily  insulin lispro, 0-7 Units, Subcutaneous, TID AC  lisinopril, 20 mg, Oral, Q PM  neomycin-bacitracin-polymyxin, 1 application, Topical, Daily  polyethylene glycol, 17 g, Oral, Q PM  QUEtiapine, 25 mg, Oral, Daily With Dinner  sodium chloride, 10 mL, Intravenous, Q12H  tamsulosin, 0.4 mg, Oral, Daily  traZODone, 50 mg, Oral, Nightly      PRN Meds:   •  acetaminophen **OR** acetaminophen **OR** acetaminophen  •  dextrose  •  dextrose  •  glucagon (human recombinant)  •  HYDROcodone-acetaminophen  •  HYDROmorphone  •  melatonin  •  nitroglycerin  •  ondansetron  •  ondansetron  •  potassium chloride  •  potassium chloride  •  sodium chloride  •  sodium chloride  Continuous Infusions:  sodium chloride, 75 mL/hr, Last Rate: 75 mL/hr (03/26/23 0816)        Assessment & Plan   Active Hospital Problems    Diagnosis  POA   • **Urethral trauma, initial encounter [S37.30XA]  Yes   • Gross hematuria [R31.0]  Yes   • Urinary retention [R33.9]  Yes   • Type 2 diabetes mellitus with hyperglycemia (HCC) [E11.65]  Yes   • Mild cognitive impairment [G31.84]  Yes   • Hyperlipemia [E78.5]  Yes   • Essential hypertension [I10]  Yes   • Claudication of left lower extremity (HCC) [I73.9]  Yes      Resolved Hospital Problems   No resolved problems to display.       Assessment & Plan    -CBI stopped today as per urology.  Possible voiding trial tomorrow  -Continue Rocephin and await blood/urine culture results  -Blood sugars reasonably controlled and will continue current regimen  -Continue present antihypertensives  -DC IV fluids  -Add low-dose Seroquel at dinnertime tonight for sundowning.  -Possible discharge tomorrow      Expected Discharge Date and Time     Expected Discharge Date Expected Discharge Time    Mar 27, 2023            Leonardo Oro,  MD [facility]  03/26/23  11:30 EDT

## 2023-03-26 NOTE — PLAN OF CARE
Goal Outcome Evaluation:              Outcome Evaluation: Pt confused to place and situation all this shift. Much more irritable today than yesterday. More difficult to redirect. Wife at bedside during day and pt argumentative with her. CBI clamped since approx 1000. Adamson draining clear/yellow urine to BSC, no clots. Pt received PRN tylenol this AM for chronic back pain, no further c/o pain or discomfort. Up to chair with assist x 2 and walker. Turned/repositioned by staff. Accumax and SCDs in place. Bed/chair alarm utilized. Safety maintained.

## 2023-03-27 LAB
ANION GAP SERPL CALCULATED.3IONS-SCNC: 8.1 MMOL/L (ref 5–15)
BUN SERPL-MCNC: 5 MG/DL (ref 8–23)
BUN/CREAT SERPL: 6.9 (ref 7–25)
CALCIUM SPEC-SCNC: 8 MG/DL (ref 8.6–10.5)
CHLORIDE SERPL-SCNC: 106 MMOL/L (ref 98–107)
CO2 SERPL-SCNC: 23.9 MMOL/L (ref 22–29)
CREAT SERPL-MCNC: 0.72 MG/DL (ref 0.76–1.27)
DEPRECATED RDW RBC AUTO: 44.1 FL (ref 37–54)
EGFRCR SERPLBLD CKD-EPI 2021: 90.1 ML/MIN/1.73
ERYTHROCYTE [DISTWIDTH] IN BLOOD BY AUTOMATED COUNT: 12.6 % (ref 12.3–15.4)
GLUCOSE BLDC GLUCOMTR-MCNC: 138 MG/DL (ref 70–130)
GLUCOSE BLDC GLUCOMTR-MCNC: 161 MG/DL (ref 70–130)
GLUCOSE BLDC GLUCOMTR-MCNC: 97 MG/DL (ref 70–130)
GLUCOSE SERPL-MCNC: 133 MG/DL (ref 65–99)
HCT VFR BLD AUTO: 27.9 % (ref 37.5–51)
HGB BLD-MCNC: 9.4 G/DL (ref 13–17.7)
MCH RBC QN AUTO: 31.8 PG (ref 26.6–33)
MCHC RBC AUTO-ENTMCNC: 33.7 G/DL (ref 31.5–35.7)
MCV RBC AUTO: 94.3 FL (ref 79–97)
PLATELET # BLD AUTO: 170 10*3/MM3 (ref 140–450)
PMV BLD AUTO: 10.3 FL (ref 6–12)
POTASSIUM SERPL-SCNC: 3.7 MMOL/L (ref 3.5–5.2)
RBC # BLD AUTO: 2.96 10*6/MM3 (ref 4.14–5.8)
SODIUM SERPL-SCNC: 138 MMOL/L (ref 136–145)
WBC NRBC COR # BLD: 6.8 10*3/MM3 (ref 3.4–10.8)

## 2023-03-27 PROCEDURE — 85027 COMPLETE CBC AUTOMATED: CPT | Performed by: INTERNAL MEDICINE

## 2023-03-27 PROCEDURE — 80048 BASIC METABOLIC PNL TOTAL CA: CPT | Performed by: INTERNAL MEDICINE

## 2023-03-27 PROCEDURE — 25010000002 CEFTRIAXONE PER 250 MG: Performed by: INTERNAL MEDICINE

## 2023-03-27 PROCEDURE — 25010000002 ONDANSETRON PER 1 MG: Performed by: HOSPITALIST

## 2023-03-27 PROCEDURE — 97166 OT EVAL MOD COMPLEX 45 MIN: CPT

## 2023-03-27 PROCEDURE — 97530 THERAPEUTIC ACTIVITIES: CPT

## 2023-03-27 PROCEDURE — 82962 GLUCOSE BLOOD TEST: CPT

## 2023-03-27 RX ORDER — AMOXICILLIN 250 MG
2 CAPSULE ORAL 2 TIMES DAILY
Status: DISCONTINUED | OUTPATIENT
Start: 2023-03-27 | End: 2023-03-29 | Stop reason: HOSPADM

## 2023-03-27 RX ADMIN — LISINOPRIL 20 MG: 20 TABLET ORAL at 18:28

## 2023-03-27 RX ADMIN — CEFTRIAXONE SODIUM 1 G: 1 INJECTION, POWDER, FOR SOLUTION INTRAMUSCULAR; INTRAVENOUS at 18:29

## 2023-03-27 RX ADMIN — ONDANSETRON 4 MG: 2 INJECTION INTRAMUSCULAR; INTRAVENOUS at 05:17

## 2023-03-27 RX ADMIN — Medication 1 MG: at 08:51

## 2023-03-27 RX ADMIN — ATORVASTATIN CALCIUM 80 MG: 80 TABLET, FILM COATED ORAL at 20:31

## 2023-03-27 RX ADMIN — QUETIAPINE FUMARATE 25 MG: 25 TABLET ORAL at 18:28

## 2023-03-27 RX ADMIN — TAMSULOSIN HYDROCHLORIDE 0.4 MG: 0.4 CAPSULE ORAL at 08:51

## 2023-03-27 RX ADMIN — TRAZODONE HYDROCHLORIDE 50 MG: 50 TABLET ORAL at 20:31

## 2023-03-27 RX ADMIN — POLYETHYLENE GLYCOL 3350 17 G: 17 POWDER, FOR SOLUTION ORAL at 18:28

## 2023-03-27 RX ADMIN — DOCUSATE SODIUM 50MG AND SENNOSIDES 8.6MG 2 TABLET: 8.6; 5 TABLET, FILM COATED ORAL at 20:31

## 2023-03-27 RX ADMIN — Medication 3 MG: at 20:37

## 2023-03-27 RX ADMIN — Medication 10 ML: at 20:38

## 2023-03-27 RX ADMIN — BACITRACIN ZINC, NEOMYCIN, POLYMYXIN B 1 APPLICATION: 400; 3.5; 5 OINTMENT TOPICAL at 18:30

## 2023-03-27 NOTE — PLAN OF CARE
Goal Outcome Evaluation:              Outcome Evaluation: WAS VERY AGITATED, ANGRY AND RESTLESS FIRST PART OF EVENING. TRYING TO CLIMB OUT OF BED. SAID HE HAD TO GET OUT OF HERE AND CATCH THE CRIMINAL. HE THINKS HE IS AT THE GUN RANGE. DID MANAGE TO GO TO SLEEP AFTER MIDNIGHT. CBI REMAINS CLAMPED AND URINE DRAINING CLEAR YELLOW.

## 2023-03-27 NOTE — THERAPY TREATMENT NOTE
Patient Name: Albert Yadav  : 1940    MRN: [id number]                              Today's Date: 3/27/2023       Admit Date: 3/23/2023    Visit Dx:     ICD-10-CM ICD-9-CM   1. Gross hematuria  R31.0 599.71   2. Displacement of Adamson catheter, initial encounter ([facility])  T83.021A 996.31   3. 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, left leg ([facility])   • Spinal stenosis, lumbar region, with neurogenic claudication   • Carotid stenosis, asymptomatic, bilateral   • Spondylolisthesis at L4-L5 level   • Lumbar stenosis   • Closed fracture of multiple ribs of right side, initial encounter   • Retroperitoneal tumor   • Closed fracture of one rib of right side, initial encounter   • Rheumatoid arthritis ([facility])   • Contusion of right hip   • COVID-19   • PRACHI (acute kidney injury) ([facility])   • Anemia   • Macrocytosis   • Type 2 diabetes mellitus with hyperglycemia ([facility])   • Mild cognitive impairment   • Expressive aphasia   • Elevated troponin   • Fall during current hospitalization   • 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   • Cholelithiasis     Gall bladder removed   • Diabetes mellitus ([facility])     Type 2   • Falls    • Fracture of ankle    • Fracture of wrist    •  Fracture, clavicle 26 years ago   • Fracture, foot 26 year ago   • Groin rash     PT STATES LEFT GROIN AT TIMES   • History of transfusion    • Hyperlipidemia    • Hypertension    • Low back pain    • Low back strain ?   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   • Rheumatoid arthritis (HCC)     HANDS DIALLO   • Staph infection     2015  [facility] POST SX   • Tendency toward bleeding easily (HCC)     due to blood thinners   • Urinary retention 3/23/2023     Past Surgical History:   Procedure Laterality Date   • ADRENALECTOMY Left 12/20/2022    Procedure: LAPAROSCOPIC EXCISION LEFT RETROPERITONEAL MASS;  Surgeon: Moy aVrgas MD;  Location: [facility];  Service: General;  Laterality: Left;   • ANGIOPLASTY FEMORAL ARTERY Left 11/07/2016    Procedure: ULTRA SOUND ACCESS RIGHT FEMORAL ARTERY, AIF BILATERAL RUNOFF, SELECTIVE CATHETERIZATION LEFT FEMORAL ARTERY.;  Surgeon: Errol Michael MD;  [facility];  Service:    • ANKLE OPEN REDUCTION INTERNAL FIXATION  1980   • ARTERIOVENOUS FISTULA/SHUNT SURGERY Left 12/13/2016    Procedure: LEFT ILEO-FEMORAL GORTEX GRAFT AND LEFT LEG ARERIOGRAM ;  Surgeon: Errol Michael MD;  [facility];  Service:    • CAROTID ENDARTERECTOMY Right 09/29/2020    Procedure: CAROTID ENDARTERECTOMY;  Surgeon: James Graham MD;  Location: [facility] OR;  Service: Vascular;  Laterality: Right;   • CHOLECYSTECTOMY     • EPIDURAL BLOCK     • EYE SURGERY Left     as child   • FRACTURE SURGERY Left     arm   • ILIAC ARTERY STENT Left 2020   • KNEE SURGERY Left     ORIF   • ORIF ANKLE FRACTURE Left       General Information     Row Name 03/27/23 0943          Physical Therapy Time and Intention    Document Type therapy note (daily note)  -SM     Mode of Treatment physical therapy  -SM     Row Name  03/27/23 0943          General Information    Patient Profile Reviewed yes  -     Existing Precautions/Restrictions fall  -     Row Name 03/27/23 0943          Cognition    Orientation Status (Cognition) verbal cues/prompts needed for orientation;oriented to;person;place  -     Row Name 03/27/23 0943          Safety Issues, Functional Mobility    Safety Issues Affecting Function (Mobility) positioning of assistive device;judgment;sequencing abilities;problem-solving  -     Impairments Affecting Function (Mobility) balance;strength;endurance/activity tolerance  -           User Key  (r) = Recorded By, (t) = Taken By, (c) = Cosigned By    Initials Name Provider Type     Cheyenne Martin PT Physical Therapist               Mobility     Row Name 03/27/23 0944          Bed Mobility    Bed Mobility supine-sit  -     Supine-Sit [place] (Bed Mobility) verbal cues;minimum assist (75% patient effort)  -     Assistive Device (Bed Mobility) head of bed elevated;bed rails  -     Comment, (Bed Mobility) Patient UIC  at end of session. VCs needed for sequencing.  -     Row Name 03/27/23 0944          Sit-Stand Transfer    Sit-Stand [place] (Transfers) minimum assist (75% patient effort)  -     Assistive Device (Sit-Stand Transfers) walker, front-wheeled  -     Row Name 03/27/23 0944          Gait/Stairs (Locomotion)    [place] Level (Gait) minimum assist (75% patient effort);verbal cues  -     Assistive Device (Gait) walker, front-wheeled  -     Distance in Feet (Gait) 55ft  -     Deviations/Abnormal Patterns (Gait) festinating/shuffling;gait speed decreased  -     Bilateral Gait Deviations forward flexed posture  -     Comment, (Gait/Stairs) Gait very slow and shuffled. Patient appeared to have difficulty initiating steps at times. VCs needed for sequencing and to stay inside of rwx.  -           User Key  (r) = Recorded By, (t) = Taken By, (c) = Cosigned By    Initials Name  Provider Type     Cheyenne Martin PT Physical Therapist               Obj/Interventions     Row Name 03/27/23 0945          Balance    Balance Assessment sitting static balance;sitting dynamic balance;sit to stand dynamic balance;standing static balance;standing dynamic balance  -     Static Sitting Balance standby assist  -     Dynamic Sitting Balance contact guard  -     Position, Sitting Balance sitting edge of bed  -     Sit to Stand Dynamic Balance contact guard;minimal assist  -     Static Standing Balance contact guard  -     Dynamic Standing Balance minimal assist  -     Position/Device Used, Standing Balance supported;walker, front-wheeled  -     Balance Interventions sitting;standing;sit to stand;supported;static;dynamic  -           User Key  (r) = Recorded By, (t) = Taken By, (c) = Cosigned By    Initials Name Provider Type     Cheyenne Martin PT Physical Therapist               Goals/Plan    No documentation.                Clinical Impression     Row Name 03/27/23 0945          Pain    Pretreatment Pain Rating 0/10 - no pain  -SM     Posttreatment Pain Rating 0/10 - no pain  -SM     Row Name 03/27/23 0945          Plan of Care Review    Plan of Care Reviewed With patient  -     Outcome Evaluation Patient seen for PT session this AM. Patient AO with some confusion and forgetfullness. Patient eager to participate in therapy. Patient sat up to EOB with Silvano-modA and VCs for sequencing. Patient performed STS from EOB with Silvano. Patient demonstrated overall improvements in activity endurance this date though requiring alot of cueing for mechanics and safety. Patient ambulated 55ft with rwx and Silvano. Gait very slow and shuffled. Patient appeared to have difficulty initiating steps at times. Patient tends to drag R LE throughout. VCs needed for sequencing and to stay inside of rwx. Patient sitting UIC at end of session. Patient would continue to benefit from skilled PT  intervention to address deficits in functional mobility. PT will continue to monitor.  -SM     Row Name 03/27/23 0945          Vital Signs    O2 Delivery Pre Treatment room air  -SM     O2 Delivery Intra Treatment room air  -SM     O2 Delivery Post Treatment room air  -SM     Pre Patient Position Supine  -SM     Intra Patient Position Standing  -SM     Post Patient Position Sitting  -SM     Row Name 03/27/23 0945          Positioning and Restraints    Pre-Treatment Position in bed  -SM     Post Treatment Position chair  -SM     In Chair notified nsg;reclined;call light within reach;encouraged to call for assist;exit alarm on;with family/caregiver  -           User Key  (r) = Recorded By, (t) = Taken By, (c) = Cosigned By    Initials Name Provider Type    Cheyenne Cedeño PT Physical Therapist               Outcome Measures     Row Name 03/27/23 0949          How much help from another person do you currently need...    Turning from your back to your side while in flat bed without using bedrails? 3  -SM     Moving from lying on back to sitting on the side of a flat bed without bedrails? 3  -SM     Moving to and from a bed to a chair (including a wheelchair)? 3  -SM     Standing up from a chair using your arms (e.g., wheelchair, bedside chair)? 3  -SM     Climbing 3-5 steps with a railing? 2  -SM     To walk in hospital room? 3  -SM     AM-PAC 6 Clicks Score (PT) 17  -SM     Highest level of mobility 5 --> Static standing  -SM           User Key  (r) = Recorded By, (t) = Taken By, (c) = Cosigned By    Initials Name Provider Type    Cheyenne Cedeño PT Physical Therapist                             Physical Therapy Education     Title: PT OT SLP Therapies (In Progress)     Topic: Physical Therapy (Done)     Point: Mobility training (Done)     Learning Progress Summary           Patient Acceptance, E, VU,NR by  at 3/27/2023 0949    Acceptance, E, VU by  at 3/24/2023 1027   Significant Other Acceptance,  E, VU by  at 3/24/2023 1027                   Point: Home exercise program (Done)     Learning Progress Summary           Patient Acceptance, E, VU,NR by  at 3/27/2023 0949                   Point: Body mechanics (Done)     Learning Progress Summary           Patient Acceptance, E, VU,NR by  at 3/27/2023 0949    Acceptance, E, VU by  at 3/24/2023 1027   Significant Other Acceptance, E, VU by  at 3/24/2023 1027                   Point: Precautions (Done)     Learning Progress Summary           Patient Acceptance, E, VU,NR by  at 3/27/2023 0949    Acceptance, E, VU by  at 3/24/2023 1027   Significant Other Acceptance, E, VU by  at 3/24/2023 1027                               User Key     Initials Effective Dates Name Provider Type Discipline     05/02/22 -  Cheyenne Martin, PT Physical Therapist PT              PT Recommendation and Plan     Plan of Care Reviewed With: patient  Outcome Evaluation: Patient seen for PT session this AM. Patient AO with some confusion and forgetfullness. Patient eager to participate in therapy. Patient sat up to EOB with Silvano-modA and VCs for sequencing. Patient performed STS from EOB with Silvano. Patient demonstrated overall improvements in activity endurance this date though requiring alot of cueing for mechanics and safety. Patient ambulated 55ft with rwx and Silvano. Gait very slow and shuffled. Patient appeared to have difficulty initiating steps at times. Patient tends to drag R LE throughout. VCs needed for sequencing and to stay inside of rwx. Patient sitting UIC at end of session. Patient would continue to benefit from skilled PT intervention to address deficits in functional mobility. PT will continue to monitor.     Time Calculation:    PT Charges     Row Name 03/27/23 0949             Time Calculation    Start Time 0908  -      Stop Time 0926  -      Time Calculation (min) 18 min  -      PT Received On 03/27/23  -      PT - Next Appointment 03/28/23  -          Time Calculation- PT    Total Timed Code Minutes- PT 18 minute(s)  -SM         Timed Charges    75600 - PT Therapeutic Activity Minutes 18  -SM         Total Minutes    Timed Charges Total Minutes 18  -SM       Total Minutes 18  -SM            User Key  (r) = Recorded By, (t) = Taken By, (c) = Cosigned By    Initials Name Provider Type     Cheyenne Martin PT Physical Therapist              Therapy Charges for Today     Code Description Service Date Service Provider Modifiers Qty    [id number]  PT THERAPEUTIC ACT EA 15 MIN 3/27/2023 Cheyenne Martin PT GP 1          PT G-Codes  Outcome Measure Options: AM-PAC 6 Clicks Basic Mobility (PT)  AM-PAC 6 Clicks Score (PT): 17  PT Discharge Summary  Anticipated Discharge Disposition (PT): inpatient rehabilitation facility  Patient was not wearing a face mask during this therapy encounter. Therapist used appropriate personal protective equipment including mask and gloves.  Mask used was standard procedure mask. Appropriate PPE was worn during the entire therapy session. Hand hygiene was completed before and after therapy session. Patient is not in enhanced droplet precautions.     Cheyenne Martin PT  3/27/2023

## 2023-03-27 NOTE — CASE MANAGEMENT/SOCIAL WORK
Continued Stay Note  [facility]     Patient Name: Albert Yadav  MRN: [id number]  Today's Date: 3/27/2023    Admit Date: 3/23/2023    Plan: [facility] pending precert   Discharge Plan     Row Name 03/27/23 1637       Plan    Plan [facility] pending precert    Patient/Family in Agreement with Plan yes    Plan Comments Multiple conversations with wife regarding placement options to include short term rehab then to long term placement. Spoke with Kneia from [facility] and [facility], no beds. Spoke with Neva with [facility], unable to meet patient needs.  [facility] spoke with [facility] no beds available. Wife was agreeable for patient to go to [facility], until another option is found. Spoke with Graciela with [facility], patient is private pay bed hold and they will start precert today. Patient will need transport. Will continue to monitor for new or changing discharge needs. Otilia TANG RN CCP               Discharge Codes    No documentation.               Expected Discharge Date and Time     Expected Discharge Date Expected Discharge Time    Mar 28, 2023             Otilia Varghese RN

## 2023-03-27 NOTE — PROGRESS NOTES
" LOS: 2 days     Name: Albert Yadav  Age: 82 y.o.  Sex: male  :  1940  MRN: [id number]         Primary Care Physician: Valerie Burnham MD    Subjective   Subjective  Had some increased confusion to begin the night last night but eventually got to sleep.  He is pleasantly confused this morning and without any behavioral issues.    Objective   Vital Signs  Temp:  [97.9 °F (36.6 °C)-98.3 °F (36.8 °C)] 98.3 °F (36.8 °C)  Heart Rate:  [78-92] 78  Resp:  [18-20] 18  BP: (157-164)/(66-79) 164/66  Body mass index is 22.81 kg/m².    Objective:  General Appearance:  Comfortable and in no acute distress (Chronically ill-appearing).    Vital signs: (most recent): Blood pressure 164/66, pulse 78, temperature 98.3 °F (36.8 °C), temperature source Oral, resp. rate 18, height 182.9 cm (72\"), weight 76.3 kg (168 lb 3.4 oz), SpO2 98 %.    Lungs:  Normal effort and normal respiratory rate.  He is not in respiratory distress.  There are decreased breath sounds.    Heart: Normal rate.  Regular rhythm.    Abdomen: Abdomen is soft.  Bowel sounds are normal.   There is no abdominal tenderness.     Extremities: There is no dependent edema or local swelling.    Neurological: Patient is alert.  (Pleasantly confused).    Skin:  Warm and dry.          : Adamson catheter in place    Results Review:       I reviewed the patient's new clinical results.    Results from last 7 days   Lab Units 23  0823  0509 23  0424 23  04323  1632   WBC 10*3/mm3 6.80 6.68 9.76 10.30 13.05*   HEMOGLOBIN g/dL 9.4* 8.8* 9.0* 10.1* 11.4*   PLATELETS 10*3/mm3 170 164 146 141 166     Results from last 7 days   Lab Units 23  0827 23  0509 23  2033 23  0424 23  0432 23  1632   SODIUM mmol/L 138 140  --  133* 135* 135*   POTASSIUM mmol/L 3.7 4.1 4.2 3.4* 3.5 3.7   CHLORIDE mmol/L 106 107  --  102 102 102   CO2 mmol/L 23.9 24.0  --  22.9 23.3 25.0   BUN mg/dL 5* 7*  --  13 16 14 "   CREATININE mg/dL 0.72* 0.71*  --  0.84 0.95 0.97   CALCIUM mg/dL 8.0* 7.5*  --  7.6* 7.8* 7.6*   GLUCOSE mg/dL 133* 102*  --  119* 108* 118*                 Scheduled Meds:   atorvastatin, 80 mg, Oral, Nightly  cefTRIAXone, 1 g, Intravenous, Q24H  folic acid, 1 mg, Oral, Daily  insulin lispro, 0-7 Units, Subcutaneous, TID AC  lisinopril, 20 mg, Oral, Q PM  neomycin-bacitracin-polymyxin, 1 application, Topical, Daily  polyethylene glycol, 17 g, Oral, Q PM  QUEtiapine, 25 mg, Oral, Daily With Dinner  senna-docusate sodium, 2 tablet, Oral, BID  sodium chloride, 10 mL, Intravenous, Q12H  tamsulosin, 0.4 mg, Oral, Daily  traZODone, 50 mg, Oral, Nightly      PRN Meds:   •  acetaminophen **OR** acetaminophen **OR** acetaminophen  •  dextrose  •  dextrose  •  glucagon (human recombinant)  •  HYDROcodone-acetaminophen  •  HYDROmorphone  •  melatonin  •  nitroglycerin  •  ondansetron  •  ondansetron  •  potassium chloride  •  potassium chloride  •  sodium chloride  •  sodium chloride  Continuous Infusions:       Assessment & Plan   Active Hospital Problems    Diagnosis  POA   • **Urethral trauma, initial encounter [S37.30XA]  Yes   • Gross hematuria [R31.0]  Yes   • Urinary retention [R33.9]  Yes   • Type 2 diabetes mellitus with hyperglycemia (HCC) [E11.65]  Yes   • Mild cognitive impairment [G31.84]  Yes   • Hyperlipemia [E78.5]  Yes   • Essential hypertension [I10]  Yes   • Claudication of left lower extremity (HCC) [I73.9]  Yes      Resolved Hospital Problems   No resolved problems to display.       Assessment & Plan    -CBI has been discontinued and urology recommends keeping catheter in for now and reassessing in the outpatient setting.  -Continue Rocephin with plans to transition to Macrobid at discharge.  Urine culture grew less than 10,000 gram-negative bacilli  -Blood sugars reasonably controlled and will continue current regimen  -Continue present antihypertensives  -Continue Seroquel and trazodone in the  evenings  -Possible discharge tomorrow once rehab arranged      Expected Discharge Date and Time     Expected Discharge Date Expected Discharge Time    Mar 27, 2023            Leonardo Oro MD  [facility]  03/27/23  12:08 EDT

## 2023-03-27 NOTE — PLAN OF CARE
Goal Outcome Evaluation:  Plan of Care Reviewed With: patient           Outcome Evaluation: Patient seen for PT session this AM. Patient AO with some confusion and forgetfullness. Patient eager to participate in therapy. Patient sat up to EOB with Silvano-modA and VCs for sequencing. Patient performed STS from EOB with Silvano. Patient demonstrated overall improvements in activity endurance this date though requiring alot of cueing for mechanics and safety. Patient ambulated 55ft with rwx and Silvano. Gait very slow and shuffled. Patient appeared to have difficulty initiating steps at times. Patient tends to drag R LE throughout. VCs needed for sequencing and to stay inside of rwx. Patient sitting UIC at end of session. Patient would continue to benefit from skilled PT intervention to address deficits in functional mobility. PT will continue to monitor.

## 2023-03-27 NOTE — PLAN OF CARE
Goal Outcome Evaluation:  Plan of Care Reviewed With: patient, spouse        Progress: no change  Outcome Evaluation: VSS. CBI disconnected today. Urine continues to be clear yellow with no s/s hematuria. Pt cooperative during the day but starts getting anxious and confused later in the afternoon. Spouse at bedside during the day. Anticipated dc tomorrow - waiting on precert. Safety measures in place.

## 2023-03-27 NOTE — THERAPY EVALUATION
Patient Name: Albert Yadav  : 1940    MRN: [id number]                              Today's Date: 3/27/2023       Admit Date: 3/23/2023    Visit Dx:     ICD-10-CM ICD-9-CM   1. Gross hematuria  R31.0 599.71   2. Displacement of Adamson catheter, initial encounter ([facility])  T83.021A 996.31   3. 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, left leg ([facility])   • Spinal stenosis, lumbar region, with neurogenic claudication   • Carotid stenosis, asymptomatic, bilateral   • Spondylolisthesis at L4-L5 level   • Lumbar stenosis   • Closed fracture of multiple ribs of right side, initial encounter   • Retroperitoneal tumor   • Closed fracture of one rib of right side, initial encounter   • Rheumatoid arthritis ([facility])   • Contusion of right hip   • COVID-19   • PRACHI (acute kidney injury) ([facility])   • Anemia   • Macrocytosis   • Type 2 diabetes mellitus with hyperglycemia ([facility])   • Mild cognitive impairment   • Expressive aphasia   • Elevated troponin   • Fall during current hospitalization   • 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   • Cholelithiasis     Gall bladder removed   • Diabetes mellitus ([facility])     Type 2   • Falls    • Fracture of ankle    • Fracture of wrist    •  Fracture, clavicle 26 years ago   • Fracture, foot 26 year ago   • Groin rash     PT STATES LEFT GROIN AT TIMES   • History of transfusion    • Hyperlipidemia    • Hypertension    • Low back pain    • Low back strain ?   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   • Rheumatoid arthritis (HCC)     HANDS DIALLO   • Staph infection     2015  [facility] POST SX   • Tendency toward bleeding easily (HCC)     due to blood thinners   • Urinary retention 3/23/2023     Past Surgical History:   Procedure Laterality Date   • ADRENALECTOMY Left 12/20/2022    Procedure: LAPAROSCOPIC EXCISION LEFT RETROPERITONEAL MASS;  Surgeon: Moy Vargas MD;  Location: [facility] OR;  Service: General;  Laterality: Left;   • ANGIOPLASTY FEMORAL ARTERY Left 11/07/2016    Procedure: ULTRA SOUND ACCESS RIGHT FEMORAL ARTERY, AIF BILATERAL RUNOFF, SELECTIVE CATHETERIZATION LEFT FEMORAL ARTERY.;  Surgeon: Errol Michael MD;  Location: [facility] OR 18/19;  Service:    • ANKLE OPEN REDUCTION INTERNAL FIXATION  1980   • ARTERIOVENOUS FISTULA/SHUNT SURGERY Left 12/13/2016    Procedure: LEFT ILEO-FEMORAL GORTEX GRAFT AND LEFT LEG ARERIOGRAM ;  Surgeon: Errol Michael MD;  Location: [facility] OR 18/19;  Service:    • CAROTID ENDARTERECTOMY Right 09/29/2020    Procedure: CAROTID ENDARTERECTOMY;  Surgeon: James Graham MD;  Location: [facility] OR;  Service: Vascular;  Laterality: Right;   • CHOLECYSTECTOMY     • EPIDURAL BLOCK     • EYE SURGERY Left     as child   • FRACTURE SURGERY Left     arm   • ILIAC ARTERY STENT Left 2020   • KNEE SURGERY Left     ORIF   • ORIF ANKLE FRACTURE Left       General Information     Row Name 03/27/23 1149          OT Time and Intention    Document Type evaluation  -MW     Mode of Treatment occupational therapy  -MW     Row Name 03/27/23 1149           General Information    Patient Profile Reviewed yes  -MW     Prior Level of Function min assist:;ADL's;transfer  admitted from SNF, prior to admissions, pt overall declining per spouse report, cane use at baseline  -     Existing Precautions/Restrictions fall  -     Barriers to Rehab medically complex  -     Row Name 03/27/23 1149          Living Environment    People in Home spouse  -     Row Name 03/27/23 1149          Home Main Entrance    Number of Stairs, Main Entrance four  -     Row Name 03/27/23 1149          Cognition    Orientation Status (Cognition) verbal cues/prompts needed for orientation;oriented to;person;place  confusion noted throughout session  -     Row Name 03/27/23 1149          Safety Issues, Functional Mobility    Safety Issues Affecting Function (Mobility) insight into deficits/self-awareness;safety precaution awareness;safety precautions follow-through/compliance;judgment;positioning of assistive device  -     Impairments Affecting Function (Mobility) balance;strength;endurance/activity tolerance;postural/trunk control  -           User Key  (r) = Recorded By, (t) = Taken By, (c) = Cosigned By    Initials Name Provider Type    MW Virginia Fall OT Occupational Therapist                 Mobility/ADL's     Row Name 03/27/23 1150          Bed Mobility    Bed Mobility supine-sit  -MW     Supine-Sit [place] (Bed Mobility) verbal cues;minimum assist (75% patient effort)  -MW     Sit-Supine [place] (Bed Mobility) not tested  -     Assistive Device (Bed Mobility) head of bed elevated;bed rails  -     Comment, (Bed Mobility) [facility] end of session, increased time to complete, max cues for attention to task  -     Row Name 03/27/23 1150          Transfers    Transfers sit-stand transfer  -     Row Name 03/27/23 1150          Sit-Stand Transfer    Sit-Stand [place] (Transfers) minimum assist (75% patient effort)  -     Assistive Device (Sit-Stand Transfers)  walker, front-wheeled  -     Row Name 03/27/23 1150          Functional Mobility    Functional Mobility- Ind. Level contact guard assist;minimum assist (75% patient effort)  -     Functional Mobility- Device walker, front-wheeled  -MW     Functional Mobility- Comment completed distance into hallway, short household distances with RWx, CGA-min A, max cues for positioning of RWx, falls risk  -     Row Name 03/27/23 1150          Activities of Daily Living    BADL Assessment/Intervention lower body dressing;toileting;feeding  -     Row Name 03/27/23 1150          Lower Body Dressing Assessment/Training    [place] Level (Lower Body Dressing) don;socks;maximum assist (25% patient effort)  -     Position (Lower Body Dressing) edge of bed sitting  -     Row Name 03/27/23 1150          Toileting Assessment/Training    Comment, (Toileting) brief donned, rodriguez in place, declined need to have BM this date  -[facility] Name 03/27/23 1150          Self-Feeding Assessment/Training    Comment, (Feeding) BUE hand to mouth [facility]           User Key  (r) = Recorded By, (t) = Taken By, (c) = Cosigned By    Initials Name Provider Type     Virginia Fall OT Occupational Therapist               Obj/Interventions     Row Name 03/27/23 1153          Sensory Assessment (Somatosensory)    Sensory Assessment (Somatosensory) UE sensation intact  -[facility] Name 03/27/23 1153          Vision Assessment/Intervention    Visual Impairment/Limitations WFL  -     Row Name 03/27/23 1153          Range of Motion Comprehensive    General Range of Motion bilateral upper extremity ROM WNL  -[facility] Name 03/27/23 1153          Strength Comprehensive (MMT)    General Manual Muscle Testing (MMT) Assessment upper extremity strength deficits identified  -     Comment, General Manual Muscle Testing (MMT) Assessment generalized weakness BUE grossly 3+/5  -MW     Row Name 03/27/23 1153          Motor Skills    Motor Skills  functional endurance  -MW     Functional Endurance fair  -     Row Name 03/27/23 1153          Balance    Balance Assessment sitting static balance;sitting dynamic balance;sit to stand dynamic balance;standing static balance;standing dynamic balance  -     Static Sitting Balance standby assist  -MW     Dynamic Sitting Balance standby assist;contact guard  -MW     Position, Sitting Balance sitting edge of bed  -MW     Sit to Stand Dynamic Balance contact guard;minimal assist  -MW     Static Standing Balance contact guard  -MW     Dynamic Standing Balance contact guard;minimal assist  -MW     Position/Device Used, Standing Balance supported;walker, front-wheeled  -     Balance Interventions sitting;standing;sit to stand;supported;static;minimal challenge;dynamic  -MW     Comment, Balance high falls risk  -           User Key  (r) = Recorded By, (t) = Taken By, (c) = Cosigned By    Initials Name Provider Type    Virginia Fleming, OT Occupational Therapist               Goals/Plan     Row Name 03/27/23 1200          Transfer Goal 1 (OT)    Activity/Assistive Device (Transfer Goal 1, OT) transfers, all  -MW     [place] Level/Cues Needed (Transfer Goal 1, OT) standby assist  -MW     Time Frame (Transfer Goal 1, OT) short term goal (STG);2 weeks  -MW     Progress/Outcome (Transfer Goal 1, OT) goal ongoing  -     Row Name 03/27/23 1200          Dressing Goal 1 (OT)    Activity/Device (Dressing Goal 1, OT) upper body dressing;lower body dressing  -     [place]/Cues Needed (Dressing Goal 1, OT) contact guard required  -     Time Frame (Dressing Goal 1, OT) short term goal (STG);2 weeks  -MW     Progress/Outcome (Dressing Goal 1, OT) goal ongoing  -     Row Name 03/27/23 1200          Toileting Goal 1 (OT)    Activity/Device (Toileting Goal 1, OT) toileting skills, all  -MW     [place] Level/Cues Needed (Toileting Goal 1, OT) minimum assist (75% or more patient effort)  -     Time Frame  (Toileting Goal 1, OT) short term goal (STG);2 weeks  -MW     Progress/Outcome (Toileting Goal 1, OT) goal ongoing  -MW     Row Name 03/27/23 1200          Grooming Goal 1 (OT)    Activity/Device (Grooming Goal 1, OT) grooming skills, all  -MW     [place] (Grooming Goal 1, OT) contact guard required  -MW     Time Frame (Grooming Goal 1, OT) short term goal (STG);2 weeks  -MW     Progress/Outcome (Grooming Goal 1, OT) goal ongoing  -MW     Row Name 03/27/23 1200          Therapy Assessment/Plan (OT)    Planned Therapy Interventions (OT) activity tolerance training;functional balance retraining;BADL retraining;neuromuscular control/coordination retraining;patient/caregiver education/training;transfer/mobility retraining;strengthening exercise;ROM/therapeutic exercise;occupation/activity based interventions  -MW           User Key  (r) = Recorded By, (t) = Taken By, (c) = Cosigned By    Initials Name Provider Type    MW Virginia Fall, ALFONZO Occupational Therapist               Clinical Impression     Row Name 03/27/23 1154          Pain Assessment    Pretreatment Pain Rating 0/10 - no pain  -MW     Posttreatment Pain Rating 0/10 - no pain  -MW     Row Name 03/27/23 1154          Plan of Care Review    Plan of Care Reviewed With patient;spouse  -MW     Progress no change  -MW     Outcome Evaluation Pt is an 83 yo male admitted to [facility] after his catheter was pulled out when caught on the bed along with gross hematuria. Pt with recent falls, complex history since July 2022, pt  recently d/c in early March after a stroke and being COVID (+) per wife. Patient has been at [facility] for rehab. Today, pt A&Ox2, spouse present to paulie with prior level information. Pt lives at home with spouse at baseline, Rwx/cane use, reports min A with ADLs prior to last hosp admission. Pt presents this date below ADL baseline, impaired func transfers, mobility, safety, confusion, decreased act tolerance, balance and strength. Pt  completed bed mob with min-mod A, max A for LBD, rodriguez cath in place, STS with min A with Rwx use. Pt demo household distances this date with RWx, CGA/min A, assist with Rwx positioning, cues for initiation and proper base of support. Pt is a high falls risk at this time, [facility] at end of session, pt will continue to benefit from skilled OT to address deficits and progress toward improved (I) with ADLs. Spouse requesting acute rehab at this time, pt will need continued therapy at d/c.  -     Row Name 03/27/23 1154          Therapy Assessment/Plan (OT)    Rehab Potential (OT) good, to achieve stated therapy goals  -     Criteria for Skilled Therapeutic Interventions Met (OT) yes;meets criteria;skilled treatment is necessary  -     Therapy Frequency (OT) 5 times/wk  -     Row Name 03/27/23 1154          Therapy Plan Review/Discharge Plan (OT)    Anticipated Discharge Disposition (OT) inpatient rehabilitation facility  -     Row Name 03/27/23 1154          Vital Signs    O2 Delivery Pre Treatment room air  -MW     O2 Delivery Post Treatment room air  -MW     Pre Patient Position Supine  -MW     Intra Patient Position Standing  -MW     Post Patient Position Sitting  -     Row Name 03/27/23 1154          Positioning and Restraints    Pre-Treatment Position in bed  -MW     Post Treatment Position chair  -MW     In Chair notified nsg;reclined;call light within reach;encouraged to call for assist;exit alarm on;with family/caregiver  -           User Key  (r) = Recorded By, (t) = Taken By, (c) = Cosigned By    Initials Name Provider Type    Virginia Fleming, OT Occupational Therapist               Outcome Measures     Row Name 03/27/23 1200          How much help from another is currently needed...    Putting on and taking off regular lower body clothing? 2  -MW     Bathing (including washing, rinsing, and drying) 2  -MW     Toileting (which includes using toilet bed pan or urinal) 2  -MW     Putting on and  taking off regular upper body clothing 3  -MW     Taking care of personal grooming (such as brushing teeth) 3  -MW     Eating meals 3  -MW     AM-PAC 6 Clicks Score (OT) 15  -MW     Row Name 03/27/23 0949 03/27/23 0851       How much help from another person do you currently need...    Turning from your back to your side while in flat bed without using bedrails? 3  -SM 3  -GP    Moving from lying on back to sitting on the side of a flat bed without bedrails? 3  -SM 3  -GP    Moving to and from a bed to a chair (including a wheelchair)? 3  -SM 3  -GP    Standing up from a chair using your arms (e.g., wheelchair, bedside chair)? 3  -SM 3  -GP    Climbing 3-5 steps with a railing? 2  -SM 2  -GP    To walk in hospital room? 3  -SM 2  -GP    AM-PAC 6 Clicks Score (PT) 17  -SM 16  -GP    Highest level of mobility 5 --> Static standing  - 5 --> Static standing  -GP    Row Name 03/27/23 1200          Modified [place] Scale    Modified [place] Scale 3 - Moderate disability.  Requiring some help, but able to walk without assistance.  -     Row Name 03/27/23 1200          Functional Assessment    Outcome Measure Options AM-PAC 6 Clicks Daily Activity (OT);Modified [place]  -           User Key  (r) = Recorded By, (t) = Taken By, (c) = Cosigned By    Initials Name Provider Type    GP Coleen Cox, RN Registered Nurse     Virginia Fall OT Occupational Therapist    Cheyenne Cedeño, PT Physical Therapist                Occupational Therapy Education     Title: PT OT SLP Therapies (In Progress)     Topic: Occupational Therapy (Done)     Point: ADL training (Done)     Description:   Instruct learner(s) on proper safety adaptation and remediation techniques during self care or transfers.   Instruct in proper use of assistive devices.              Learning Progress Summary           Patient Acceptance, E, VU,NR by  at 3/27/2023 1201    Comment: role of OT, d/c rec, goals of care   Family Acceptance, E, VU,NR by TARAN  at 3/27/2023 1201    Comment: role of OT, d/c rec, goals of care                   Point: Home exercise program (Done)     Description:   Instruct learner(s) on appropriate technique for monitoring, assisting and/or progressing therapeutic exercises/activities.              Learning Progress Summary           Patient Acceptance, E, VU,NR by  at 3/27/2023 1201    Comment: role of OT, d/c rec, goals of care   Family Acceptance, E, VU,NR by MW at 3/27/2023 1201    Comment: role of OT, d/c rec, goals of care                   Point: Precautions (Done)     Description:   Instruct learner(s) on prescribed precautions during self-care and functional transfers.              Learning Progress Summary           Patient Acceptance, E, VU,NR by  at 3/27/2023 1201    Comment: role of OT, d/c rec, goals of care   Family Acceptance, E, VU,NR by MW at 3/27/2023 1201    Comment: role of OT, d/c rec, goals of care                   Point: Body mechanics (Done)     Description:   Instruct learner(s) on proper positioning and spine alignment during self-care, functional mobility activities and/or exercises.              Learning Progress Summary           Patient Acceptance, E, VU,NR by MW at 3/27/2023 1201    Comment: role of OT, d/c rec, goals of care   Family Acceptance, E, VU,NR by  at 3/27/2023 1201    Comment: role of OT, d/c rec, goals of care                               User Key     Initials Effective Dates Name Provider Type Discipline     08/20/21 -  Virginia Fall OT Occupational Therapist OT              OT Recommendation and Plan  Planned Therapy Interventions (OT): activity tolerance training, functional balance retraining, BADL retraining, neuromuscular control/coordination retraining, patient/caregiver education/training, transfer/mobility retraining, strengthening exercise, ROM/therapeutic exercise, occupation/activity based interventions  Therapy Frequency (OT): 5 times/wk  Plan of Care Review  Plan of  Care Reviewed With: patient, spouse  Progress: no change  Outcome Evaluation: Pt is an 83 yo male admitted to [facility] after his catheter was pulled out when caught on the bed along with gross hematuria. Pt with recent falls, complex history since July 2022, pt  recently d/c in early March after a stroke and being COVID (+) per wife. Patient has been at [facility] for rehab. Today, pt A&Ox2, spouse present to paulie with prior level information. Pt lives at home with spouse at baseline, Rwx/cane use, reports min A with ADLs prior to last hosp admission. Pt presents this date below ADL baseline, impaired func transfers, mobility, safety, confusion, decreased act tolerance, balance and strength. Pt completed bed mob with min-mod A, max A for LBD, rodriguez cath in place, STS with min A with Rwx use. Pt demo household distances this date with RWx, CGA/min A, assist with Rwx positioning, cues for initiation and proper base of support. Pt is a high falls risk at this time, [facility] at end of session, pt will continue to benefit from skilled OT to address deficits and progress toward improved (I) with ADLs. Spouse requesting acute rehab at this time, pt will need continued therapy at d/c.     Time Calculation:    Time Calculation- OT     Row Name 03/27/23 1201             Time Calculation- OT    OT Start Time 0908  -MW      OT Stop Time 0929  -MW      OT Time Calculation (min) 21 min  -MW      Total Timed Code Minutes- OT 14 minute(s)  -MW      OT Received On 03/27/23  -MW      OT - Next Appointment 03/28/23  -MW      OT Goal Re-Cert Due Date 04/10/23  -MW         Timed Charges    40955 - OT Therapeutic Activity Minutes 14  -MW         Untimed Charges    OT Eval/Re-eval Minutes 7  -MW         Total Minutes    Timed Charges Total Minutes 14  -MW      Untimed Charges Total Minutes 7  -MW       Total Minutes 21  -MW            User Key  (r) = Recorded By, (t) = Taken By, (c) = Cosigned By    Initials Name Provider Type    TARAN Fall  ALFONZO Coleman Occupational Therapist              Therapy Charges for Today     Code Description Service Date Service Provider Modifiers Qty    60832301212  OT THERAPEUTIC ACT EA 15 MIN 3/27/2023 Virginia Fall OT GO 1    [id number]  OT EVAL MOD COMPLEXITY 2 3/27/2023 Virginia Fall OT GO 1               Virginia Fall OT  3/27/2023

## 2023-03-27 NOTE — PLAN OF CARE
Goal Outcome Evaluation:  Plan of Care Reviewed With: patient, spouse        Progress: no change  Outcome Evaluation: Pt is an 81 yo male admitted to [facility] after his catheter was pulled out when caught on the bed along with gross hematuria. Pt with recent falls, complex history since July 2022, pt  recently d/c in early March after a stroke and being COVID (+) per wife. Patient has been at [facility] for rehab. Today, pt A&Ox2, spouse present to paulie with prior level information. Pt lives at home with spouse at baseline, Rwx/cane use, reports min A with ADLs prior to last hosp admission. Pt presents this date below ADL baseline, impaired func transfers, mobility, safety, confusion, decreased act tolerance, balance and strength. Pt completed bed mob with min-mod A, max A for LBD, rodriguez cath in place, STS with min A with Rwx use. Pt demo household distances this date with RWx, CGA/min A, assist with Rwx positioning, cues for initiation and proper base of support. Pt is a high falls risk at this time, [facility] at end of session, pt will continue to benefit from skilled OT to address deficits and progress toward improved (I) with ADLs. Spouse requesting acute rehab at this time, pt will need continued therapy at d/c.

## 2023-03-27 NOTE — PROGRESS NOTES
FU for gross hematuria, urinary retention, UTI    Comfortable. Pleasantly confused.    AVSS  Abd soft  Rodriguez intact, urine yellow and clear with CBI clamped.    Plan:  - continue indwelling rodriguez  - continue antibiotics, d/c on Macrobid to complete 7 day course  - discontinue CBI, manually irrigate as needed.  - safe for discharge from urology standpoint  - will arrange follow up this week or next

## 2023-03-28 LAB
GLUCOSE BLDC GLUCOMTR-MCNC: 112 MG/DL (ref 70–130)
GLUCOSE BLDC GLUCOMTR-MCNC: 115 MG/DL (ref 70–130)
GLUCOSE BLDC GLUCOMTR-MCNC: 141 MG/DL (ref 70–130)
GLUCOSE BLDC GLUCOMTR-MCNC: 174 MG/DL (ref 70–130)

## 2023-03-28 PROCEDURE — 25010000002 CEFTRIAXONE PER 250 MG: Performed by: INTERNAL MEDICINE

## 2023-03-28 PROCEDURE — 97530 THERAPEUTIC ACTIVITIES: CPT

## 2023-03-28 PROCEDURE — 82962 GLUCOSE BLOOD TEST: CPT

## 2023-03-28 RX ORDER — BISACODYL 10 MG
10 SUPPOSITORY, RECTAL RECTAL DAILY
Status: DISCONTINUED | OUTPATIENT
Start: 2023-03-28 | End: 2023-03-29 | Stop reason: HOSPADM

## 2023-03-28 RX ADMIN — LISINOPRIL 20 MG: 20 TABLET ORAL at 17:08

## 2023-03-28 RX ADMIN — QUETIAPINE FUMARATE 25 MG: 25 TABLET ORAL at 17:08

## 2023-03-28 RX ADMIN — Medication 1 MG: at 08:16

## 2023-03-28 RX ADMIN — Medication 10 ML: at 08:17

## 2023-03-28 RX ADMIN — DOCUSATE SODIUM 50MG AND SENNOSIDES 8.6MG 2 TABLET: 8.6; 5 TABLET, FILM COATED ORAL at 08:16

## 2023-03-28 RX ADMIN — BACITRACIN ZINC, NEOMYCIN, POLYMYXIN B 1 APPLICATION: 400; 3.5; 5 OINTMENT TOPICAL at 08:16

## 2023-03-28 RX ADMIN — ATORVASTATIN CALCIUM 80 MG: 80 TABLET, FILM COATED ORAL at 20:40

## 2023-03-28 RX ADMIN — BISACODYL 10 MG: 10 SUPPOSITORY RECTAL at 11:34

## 2023-03-28 RX ADMIN — POLYETHYLENE GLYCOL 3350 17 G: 17 POWDER, FOR SOLUTION ORAL at 08:16

## 2023-03-28 RX ADMIN — TRAZODONE HYDROCHLORIDE 50 MG: 50 TABLET ORAL at 20:40

## 2023-03-28 RX ADMIN — CEFTRIAXONE SODIUM 1 G: 1 INJECTION, POWDER, FOR SOLUTION INTRAMUSCULAR; INTRAVENOUS at 18:55

## 2023-03-28 RX ADMIN — TAMSULOSIN HYDROCHLORIDE 0.4 MG: 0.4 CAPSULE ORAL at 08:16

## 2023-03-28 RX ADMIN — Medication 10 ML: at 20:40

## 2023-03-28 NOTE — PLAN OF CARE
Goal Outcome Evaluation:  Plan of Care Reviewed With: patient        Progress: no change  Outcome Evaluation: Pt seen by PT this date for treatment. Pt in bed and sat up to EOB w/ SV. Pt then stood and amb into hallway returning to room for BR toilet. Pt req CGA / min A w/ pt becoming incr unsteady w/ distance. Pt having incr difficulty initially movement in R LE. Pt also req cues to remain closer to fww for safety. Pt returned to EOB w/ aide in room. PT will prog as pt tiago.    Patient was intermittently wearing a face mask during this therapy encounter. Therapist used appropriate personal protective equipment including mask and gloves.  Mask used was standard procedure mask. Appropriate PPE was worn during the entire therapy session. Hand hygiene was completed before and after therapy session. Patient is not in enhanced droplet precautions.

## 2023-03-28 NOTE — THERAPY TREATMENT NOTE
Patient Name: Albert Yadav  : 1940    MRN: [id number]                              Today's Date: 3/28/2023       Admit Date: 3/23/2023    Visit Dx:     ICD-10-CM ICD-9-CM   1. Gross hematuria  R31.0 599.71   2. Displacement of Adamson catheter, initial encounter ([facility])  T83.021A 996.31   3. 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, left leg ([facility])   • Spinal stenosis, lumbar region, with neurogenic claudication   • Carotid stenosis, asymptomatic, bilateral   • Spondylolisthesis at L4-L5 level   • Lumbar stenosis   • Closed fracture of multiple ribs of right side, initial encounter   • Retroperitoneal tumor   • Closed fracture of one rib of right side, initial encounter   • Rheumatoid arthritis ([facility])   • Contusion of right hip   • COVID-19   • PRACHI (acute kidney injury) ([facility])   • Anemia   • Macrocytosis   • Type 2 diabetes mellitus with hyperglycemia ([facility])   • Mild cognitive impairment   • Expressive aphasia   • Elevated troponin   • Fall during current hospitalization   • 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   • Cholelithiasis     Gall bladder removed   • Diabetes mellitus ([facility])     Type 2   • Falls    • Fracture of ankle    • Fracture of wrist    •  Fracture, clavicle 26 years ago   • Fracture, foot 26 year ago   • Groin rash     PT STATES LEFT GROIN AT TIMES   • History of transfusion    • Hyperlipidemia    • Hypertension    • Low back pain    • Low back strain ?   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   • Rheumatoid arthritis (HCC)     HANDS DIALLO   • Staph infection     2015  [facility] POST SX   • Tendency toward bleeding easily (HCC)     due to blood thinners   • Urinary retention 3/23/2023     Past Surgical History:   Procedure Laterality Date   • ADRENALECTOMY Left 12/20/2022    Procedure: LAPAROSCOPIC EXCISION LEFT RETROPERITONEAL MASS;  Surgeon: Moy Vargas MD;  Location: [facility];  Service: General;  Laterality: Left;   • ANGIOPLASTY FEMORAL ARTERY Left 11/07/2016    Procedure: ULTRA SOUND ACCESS RIGHT FEMORAL ARTERY, AIF BILATERAL RUNOFF, SELECTIVE CATHETERIZATION LEFT FEMORAL ARTERY.;  Surgeon: Errol Michael MD;  Location: [facility] OR 18/19;  Service:    • ANKLE OPEN REDUCTION INTERNAL FIXATION  1980   • ARTERIOVENOUS FISTULA/SHUNT SURGERY Left 12/13/2016    Procedure: LEFT ILEO-FEMORAL GORTEX GRAFT AND LEFT LEG ARERIOGRAM ;  Surgeon: Errol Michael MD;  Location: [facility] OR 18/19;  Service:    • CAROTID ENDARTERECTOMY Right 09/29/2020    Procedure: CAROTID ENDARTERECTOMY;  Surgeon: James Graham MD;  Location: [facility];  Service: Vascular;  Laterality: Right;   • CHOLECYSTECTOMY     • EPIDURAL BLOCK     • EYE SURGERY Left     as child   • FRACTURE SURGERY Left     arm   • ILIAC ARTERY STENT Left 2020   • KNEE SURGERY Left     ORIF   • ORIF ANKLE FRACTURE Left       General Information     Row Name 03/28/23 1419          Physical Therapy Time and Intention    Document Type therapy note (daily note)  -PH     Mode of Treatment physical therapy  -PH     Row Name  03/28/23 1419          General Information    Existing Precautions/Restrictions fall  -PH     Row Name 03/28/23 1419          Safety Issues, Functional Mobility    Impairments Affecting Function (Mobility) balance;endurance/activity tolerance;strength;cognition;postural/trunk control  -     Comment, Safety Issues/Impairments (Mobility) gt belt and non skid socks donned  -PH           User Key  (r) = Recorded By, (t) = Taken By, (c) = Cosigned By    Initials Name Provider Type    PH Aishwarya Castorena PTA Physical Therapist Assistant               Mobility     Row Name 03/28/23 1420          Bed Mobility    Bed Mobility supine-sit  -PH     Supine-Sit [place] (Bed Mobility) verbal cues;supervision;contact guard  -PH     Sit-Supine [place] (Bed Mobility) not tested  -PH     Assistive Device (Bed Mobility) bed rails;head of bed elevated  -     Row Name 03/28/23 1420          Sit-Stand Transfer    Sit-Stand [place] (Transfers) contact guard;verbal cues;minimum assist (75% patient effort)  -     Assistive Device (Sit-Stand Transfers) walker, front-wheeled  -PH     Comment, (Sit-Stand Transfer) 2x; from EOB; from toilet  -     Row Name 03/28/23 1420          Gait/Stairs (Locomotion)    [place] Level (Gait) contact guard;minimum assist (75% patient effort);verbal cues  -PH     Distance in Feet (Gait) 60'  -PH     Deviations/Abnormal Patterns (Gait) josep decreased;festinating/shuffling;gait speed decreased;stride length decreased  -PH     Bilateral Gait Deviations forward flexed posture;heel strike decreased  -PH     [place] Level (Stairs) not tested  -PH     Comment, (Gait/Stairs) slow shuffling w/ pt having difficulty advancing R foot w/ distance. Incr unsteadiness w/ no LOB w/ distance. Pt cued for upright posture, incr step length, and to remain closer to fww. Pt impulsively reaching for side bar when in br and cued a few times to keep B hands on fww.  -PH           User Key  (r)  = Recorded By, (t) = Taken By, (c) = Cosigned By    Initials Name Provider Type    PH Aishwarya Castorena PTA Physical Therapist Assistant               Obj/Interventions     Row Name 03/28/23 1422          Balance    Balance Assessment sitting static balance;standing static balance  -PH     Static Sitting Balance standby assist  -PH     Static Standing Balance contact guard  -PH     Position/Device Used, Standing Balance walker, front-wheeled  -PH     Comment, Balance incr unsteadiness w/ distance;  -PH           User Key  (r) = Recorded By, (t) = Taken By, (c) = Cosigned By    Initials Name Provider Type    PH Aishwarya Castorena PTA Physical Therapist Assistant               Goals/Plan    No documentation.                Clinical Impression     Row Name 03/28/23 1423          Pain    Pretreatment Pain Rating 0/10 - no pain  -PH     Posttreatment Pain Rating 0/10 - no pain  -PH     Row Name 03/28/23 1423          Plan of Care Review    Plan of Care Reviewed With patient  -PH     Progress no change  -PH     Outcome Evaluation Pt seen by PT this date for treatment. Pt in bed and sat up to EOB w/ SV. Pt then stood and amb into hallway returning to room for BR toilet. Pt req CGA / min A w/ pt becoming incr unsteady w/ distance. Pt having incr difficulty initially movement in R LE. Pt also req cues to remain closer to fww for safety. Pt returned to EOB w/ aide in room. PT will prog as pt tiago.  -PH     Row Name 03/28/23 1423          Vital Signs    O2 Delivery Pre Treatment room air  -PH     O2 Delivery Intra Treatment room air  -PH     O2 Delivery Post Treatment room air  -PH     Row Name 03/28/23 1423          Positioning and Restraints    Pre-Treatment Position in bed  -PH     Post Treatment Position bed  -PH     In Bed with family/caregiver;with nsg;sitting EOB  -PH           User Key  (r) = Recorded By, (t) = Taken By, (c) = Cosigned By    Initials Name Provider Type    PH Aishwarya Castorena PTA  Physical Therapist Assistant               Outcome Measures     Row Name 03/28/23 1426 03/28/23 0816       How much help from another person do you currently need...    Turning from your back to your side while in flat bed without using bedrails? 3  -PH 3  -CL    Moving from lying on back to sitting on the side of a flat bed without bedrails? 3  -PH 3  -CL    Moving to and from a bed to a chair (including a wheelchair)? 3  -PH 3  -CL    Standing up from a chair using your arms (e.g., wheelchair, bedside chair)? 3  -PH 3  -CL    Climbing 3-5 steps with a railing? 2  -PH 2  -CL    To walk in hospital room? 3  -PH 3  -CL    AM-PAC 6 Clicks Score (PT) 17  -PH 17  -CL    Highest level of mobility 5 --> Static standing  -PH 5 --> Static standing  -CL    Row Name 03/28/23 1426          Functional Assessment    Outcome Measure Options AM-PAC 6 Clicks Basic Mobility (PT)  -PH           User Key  (r) = Recorded By, (t) = Taken By, (c) = Cosigned By    Initials Name Provider Type    CL Susan Kaufman, RN Registered Nurse    PH Aishwarya Castorena PTA Physical Therapist Assistant                             Physical Therapy Education     Title: PT OT SLP Therapies (In Progress)     Topic: Physical Therapy (Done)     Point: Mobility training (Done)     Learning Progress Summary           Patient Acceptance, E,D,TB, VU,NR by  at 3/28/2023 1426    Acceptance, E, VU,NR by  at 3/27/2023 0949    Acceptance, E, VU by  at 3/24/2023 1027   Significant Other Acceptance, E, VU by  at 3/24/2023 1027                   Point: Home exercise program (Done)     Learning Progress Summary           Patient Acceptance, E, VU,NR by  at 3/27/2023 0949                   Point: Body mechanics (Done)     Learning Progress Summary           Patient Acceptance, E,D,TB, VU,NR by  at 3/28/2023 1426    Acceptance, E, VU,NR by  at 3/27/2023 0949    Acceptance, E, VU by  at 3/24/2023 1027   Significant Other Acceptance, E, VU by  at  3/24/2023 1027                   Point: Precautions (Done)     Learning Progress Summary           Patient Acceptance, E,D,TB, VU,NR by  at 3/28/2023 1426    Acceptance, E, VU,NR by  at 3/27/2023 0949    Acceptance, E, VU by  at 3/24/2023 1027   Significant Other Acceptance, E, VU by  at 3/24/2023 1027                               User Key     Initials Effective Dates Name Provider Type Discipline     06/16/21 -  Aishwarya Castorena PTA Physical Therapist Assistant PT     05/02/22 -  Cheyenne Martin PT Physical Therapist PT              PT Recommendation and Plan     Plan of Care Reviewed With: patient  Progress: no change  Outcome Evaluation: Pt seen by PT this date for treatment. Pt in bed and sat up to EOB w/ SV. Pt then stood and amb into hallway returning to room for BR toilet. Pt req CGA / min A w/ pt becoming incr unsteady w/ distance. Pt having incr difficulty initially movement in R LE. Pt also req cues to remain closer to fww for safety. Pt returned to EOB w/ aide in room. PT will prog as pt tiago.     Time Calculation:    PT Charges     Row Name 03/28/23 1427             Time Calculation    Start Time 1355  -PH      Stop Time 1408  -PH      Time Calculation (min) 13 min  -PH      PT Received On 03/28/23  -PH      PT - Next Appointment 03/29/23  -PH         Timed Charges    57489 - PT Therapeutic Activity Minutes 13  -PH         Total Minutes    Timed Charges Total Minutes 13  -PH       Total Minutes 13  -PH            User Key  (r) = Recorded By, (t) = Taken By, (c) = Cosigned By    Initials Name Provider Type     Aishwarya Castorena PTA Physical Therapist Assistant              Therapy Charges for Today     Code Description Service Date Service Provider Modifiers Qty    [id number] HC PT THERAPEUTIC ACT EA 15 MIN 3/28/2023 Aishwarya Castorena PTA GP 1          PT G-Codes  Outcome Measure Options: AM-PAC 6 Clicks Basic Mobility (PT)  AM-PAC 6 Clicks Score (PT): 17  AM-PAC 6  Clicks Score (OT): 15  Modified Elmo Scale: 3 - Moderate disability.  Requiring some help, but able to walk without assistance.  PT Discharge Summary  Anticipated Discharge Disposition (PT): skilled nursing facility, inpatient rehabilitation facility    Aishwarya Castorena, SANTA  3/28/2023

## 2023-03-28 NOTE — PLAN OF CARE
Goal Outcome Evaluation:  Plan of Care Reviewed With: patient        Progress: no change  Outcome Evaluation: Pt remains confused. Reorients well. Adamson remains. Slept for a good amount of shift. Possible D/C today.

## 2023-03-28 NOTE — CASE MANAGEMENT/SOCIAL WORK
Continued Stay Note  [facility]     Patient Name: Albert Yadav  MRN: [id number]  Today's Date: 3/28/2023    Admit Date: 3/23/2023    Plan: [facility] Pending precert   Discharge Plan     Row Name 03/28/23 1726       Plan    Plan [facility] Pending precert    Plan Comments Spoke to Graciela with [facility], precert is still pending. Will continue to monitor for new or changing discharge needs. JEN RN CCP               Discharge Codes    No documentation.               Expected Discharge Date and Time     Expected Discharge Date Expected Discharge Time    Mar 28, 2023             Otilia Varghese RN

## 2023-03-28 NOTE — PLAN OF CARE
Goal Outcome Evaluation:  Plan of Care Reviewed With: patient, spouse        Progress: no change  Outcome Evaluation: pt had a BM today. still waiting for precert. wife called [facility] and stated that the  stated the patient would be able to return tomorrow. [employer]  note states still pending precert. q2 turn. cont IV rocephin.

## 2023-03-28 NOTE — PROGRESS NOTES
" LOS: 3 days     Name: Albert Yadav  Age: 82 y.o.  Sex: male  :  1940  MRN: [id number]         Primary Care Physician: Valerie Burnham MD    Subjective   Subjective  Patient had a better night last night with more sleep and less confusion    Objective   Vital Signs  Temp:  [97.8 °F (36.6 °C)-98.1 °F (36.7 °C)] 97.8 °F (36.6 °C)  Heart Rate:  [68-79] 71  Resp:  [18] 18  BP: (129-148)/(61-78) 134/78  Body mass index is 23.02 kg/m².    Objective:  General Appearance:  Comfortable and in no acute distress (Chronically ill-appearing).    Vital signs: (most recent): Blood pressure 134/78, pulse 71, temperature 97.8 °F (36.6 °C), temperature source Oral, resp. rate 18, height 182.9 cm (72\"), weight 77 kg (169 lb 12.1 oz), SpO2 96 %.    Lungs:  Normal effort and normal respiratory rate.    Heart: Normal rate.  Regular rhythm.    Abdomen: Abdomen is soft.  Bowel sounds are normal.   There is no abdominal tenderness.     Extremities: There is no dependent edema or local swelling.    Neurological: Patient is alert.  (Pleasantly confused).    Skin:  Warm and dry.          : Adamson catheter in place    Results Review:       I reviewed the patient's new clinical results.    Results from last 7 days   Lab Units 23  0823  05023  1632   WBC 10*3/mm3 6.80 6.68 9.76 10.30 13.05*   HEMOGLOBIN g/dL 9.4* 8.8* 9.0* 10.1* 11.4*   PLATELETS 10*3/mm3 170 164 146 141 166     Results from last 7 days   Lab Units 23  0823  0509 23  04223  04323  1632   SODIUM mmol/L 138 140  --  133* 135* 135*   POTASSIUM mmol/L 3.7 4.1 4.2 3.4* 3.5 3.7   CHLORIDE mmol/L 106 107  --  102 102 102   CO2 mmol/L 23.9 24.0  --  22.9 23.3 25.0   BUN mg/dL 5* 7*  --  13 16 14   CREATININE mg/dL 0.72* 0.71*  --  0.84 0.95 0.97   CALCIUM mg/dL 8.0* 7.5*  --  7.6* 7.8* 7.6*   GLUCOSE mg/dL 133* 102*  --  119* 108* 118*                 Scheduled Meds: "   atorvastatin, 80 mg, Oral, Nightly  bisacodyl, 10 mg, Rectal, Daily  cefTRIAXone, 1 g, Intravenous, Q24H  folic acid, 1 mg, Oral, Daily  insulin lispro, 0-7 Units, Subcutaneous, TID AC  lisinopril, 20 mg, Oral, Q PM  neomycin-bacitracin-polymyxin, 1 application, Topical, Daily  polyethylene glycol, 17 g, Oral, Q PM  QUEtiapine, 25 mg, Oral, Daily With Dinner  senna-docusate sodium, 2 tablet, Oral, BID  sodium chloride, 10 mL, Intravenous, Q12H  tamsulosin, 0.4 mg, Oral, Daily  traZODone, 50 mg, Oral, Nightly      PRN Meds:   •  acetaminophen **OR** acetaminophen **OR** acetaminophen  •  dextrose  •  dextrose  •  glucagon (human recombinant)  •  HYDROcodone-acetaminophen  •  HYDROmorphone  •  melatonin  •  nitroglycerin  •  ondansetron  •  ondansetron  •  potassium chloride  •  potassium chloride  •  sodium chloride  •  sodium chloride  Continuous Infusions:       Assessment & Plan   Active Hospital Problems    Diagnosis  POA   • **Urethral trauma, initial encounter [S37.30XA]  Yes   • Gross hematuria [R31.0]  Yes   • Urinary retention [R33.9]  Yes   • Type 2 diabetes mellitus with hyperglycemia (HCC) [E11.65]  Yes   • Mild cognitive impairment [G31.84]  Yes   • Hyperlipemia [E78.5]  Yes   • Essential hypertension [I10]  Yes   • Claudication of left lower extremity (HCC) [I73.9]  Yes      Resolved Hospital Problems   No resolved problems to display.       Assessment & Plan    -CBI has been discontinued and urology recommends keeping catheter in for now and reassessing in the outpatient setting.  -Continue Rocephin with plans to transition to Macrobid at discharge.  Urine culture grew less than 10,000 gram-negative bacilli  -Blood sugars reasonably controlled and will continue current regimen  -Continue present antihypertensives  -Continue Seroquel and trazodone in the evenings  -Discharge once pre-CERT obtained      Expected Discharge Date and Time     Expected Discharge Date Expected Discharge Time    Mar 28, 2023             Leonardo Oro MD  [facility]  03/28/23  12:54 EDT

## 2023-03-29 VITALS
HEIGHT: 72 IN | HEART RATE: 79 BPM | SYSTOLIC BLOOD PRESSURE: 131 MMHG | WEIGHT: 167.55 LBS | BODY MASS INDEX: 22.69 KG/M2 | DIASTOLIC BLOOD PRESSURE: 57 MMHG | RESPIRATION RATE: 16 BRPM | TEMPERATURE: 98.4 F | OXYGEN SATURATION: 97 %

## 2023-03-29 LAB
BACTERIA SPEC AEROBE CULT: NORMAL
BACTERIA SPEC AEROBE CULT: NORMAL
GLUCOSE BLDC GLUCOMTR-MCNC: 112 MG/DL (ref 70–130)
GLUCOSE BLDC GLUCOMTR-MCNC: 183 MG/DL (ref 70–130)

## 2023-03-29 PROCEDURE — 63710000001 INSULIN LISPRO (HUMAN) PER 5 UNITS: Performed by: NURSE PRACTITIONER

## 2023-03-29 PROCEDURE — 82962 GLUCOSE BLOOD TEST: CPT

## 2023-03-29 RX ORDER — LISINOPRIL 20 MG/1
20 TABLET ORAL EVERY EVENING
Start: 2023-03-29

## 2023-03-29 RX ORDER — NITROFURANTOIN 25; 75 MG/1; MG/1
100 CAPSULE ORAL 2 TIMES DAILY
Qty: 14 CAPSULE | Refills: 0 | Status: CANCELLED
Start: 2023-03-29 | End: 2023-04-05

## 2023-03-29 RX ORDER — NITROFURANTOIN 25; 75 MG/1; MG/1
100 CAPSULE ORAL EVERY 12 HOURS SCHEDULED
Status: DISCONTINUED | OUTPATIENT
Start: 2023-03-29 | End: 2023-03-29 | Stop reason: HOSPADM

## 2023-03-29 RX ORDER — QUETIAPINE FUMARATE 25 MG/1
25 TABLET, FILM COATED ORAL
Start: 2023-03-29

## 2023-03-29 RX ORDER — NITROFURANTOIN 25; 75 MG/1; MG/1
100 CAPSULE ORAL EVERY 12 HOURS SCHEDULED
Qty: 13 CAPSULE | Refills: 0
Start: 2023-03-29 | End: 2023-04-05

## 2023-03-29 RX ADMIN — TAMSULOSIN HYDROCHLORIDE 0.4 MG: 0.4 CAPSULE ORAL at 10:29

## 2023-03-29 RX ADMIN — BACITRACIN ZINC, NEOMYCIN, POLYMYXIN B 1 APPLICATION: 400; 3.5; 5 OINTMENT TOPICAL at 10:31

## 2023-03-29 RX ADMIN — Medication 1 MG: at 10:29

## 2023-03-29 RX ADMIN — DOCUSATE SODIUM 50MG AND SENNOSIDES 8.6MG 2 TABLET: 8.6; 5 TABLET, FILM COATED ORAL at 10:29

## 2023-03-29 RX ADMIN — NITROFURANTOIN MONOHYDRATE/MACROCRYSTALLINE 100 MG: 25; 75 CAPSULE ORAL at 10:29

## 2023-03-29 RX ADMIN — Medication 10 ML: at 10:31

## 2023-03-29 NOTE — CASE MANAGEMENT/SOCIAL WORK
Continued Stay Note  [facility]     Patient Name: Albert Yadav  MRN: [id number]  Today's Date: 3/29/2023    Admit Date: 3/23/2023    Plan: DC to [facility]   Discharge Plan     Row Name 03/29/23 1348       Plan    Plan DC to [facility]    Plan Comments CCP rec'd called back and said pt can return today.[facility] informed wife and pt and they were very pleased.  She said I will need to arrange W/C transportation.  She was unable to manage pt.  [facility] updated pts nurse, Cindy.  Cindy to call MD re: bed available today.  CCP called [facility]- they were booked, called [facility], they are full today, called Wheelchair transport- no call back, called [facility] - no call back.  CCP called [facility] spoke to Ely, and pt scheduled for 3pm transport to [facility] via w/c today.  Pt to meet  at the Discharge doors by the [employer] shop once the  calls the nurse. Pharmacy updated.  [facility] updated pts nurse and gave her the packet and the [facility] voucher.  Wife in [facility] and I gave her the update.  CCP will continue to follow. Lucero MUELLER    Row Name 03/29/23 1231       Plan    Plan [facility] pending precert.    Plan Comments CCP was called into pts room and both pt and his wife wanting to transfer to facility today.  I informed them that their insurance was slow in responding to precert request.  Wife said well just tell them we will pay for the few days it takes to get precert.  CCP called Graciela [phone] and left vm.  Will continue to follow.  Thanks, Lucero MUELLER.               Discharge Codes    No documentation.               Expected Discharge Date and Time     Expected Discharge Date Expected Discharge Time    Mar 29, 2023             Lucero Molina

## 2023-03-29 NOTE — CASE MANAGEMENT/SOCIAL WORK
Continued Stay Note  [facility]     Patient Name: Albert Yadav  MRN: [id number]  Today's Date: 3/29/2023    Admit Date: 3/23/2023    Plan: DC to [facility]   Discharge Plan     Row Name 03/29/23 1521       Plan    Plan DC to [facility]    Plan Comments [facility] rec'd email from MD regarding P2P.  Per insurance,They feel he is close to baseline and can go to LTC level of care @ prior facility and get intermittent therapy and not SNF level of care - family can appeal if they feel strongly about it.  [facility] called Shavonne with determination.  She said she will inform the pt and his wife of the outcome and appeal.  Per shavonne, they were already paying pvt pay at [facility].  CCP will continue to follow. Thanks, Lucero Juarez Name 03/29/23 1348       Plan    Plan DC to [facility]    Plan Comments CCP rec'd called back and said pt can return today.[facility] informed wife and pt and they were very pleased.  She said I will need to arrange W/C transportation.  She was unable to manage pt.  [facility] updated pts nurse, Cindy.  Cindy to call MD re: bed available today.  CCP called [facility]- they were booked, called [facility], they are full today, called Wheelchair transport- no call back, called [facility] - no call back.  CCP called [facility] spoke to Ely, and pt scheduled for 3pm transport to [facility] via w/c today.  Pt to meet  at the Discharge doors by the [employer] shop once the  calls the nurse. Pharmacy updated.  [facility] updated pts nurse and gave her the packet and the [facility] voucher.  Wife in [facility] and I gave her the update.  CCP will continue to follow. Lucero Juarez Name 03/29/23 1231       Plan    Plan [facility] pending precert.    Plan Comments CCP was called into pts room and both pt and his wife wanting to transfer to facility today.  I informed them that their insurance was slow in responding to precert request.  Wife said well just tell them we will pay for the few days it takes to  get precert.  [facility] called Graciela [phone] and left vm.  Will continue to follow.  Thanks, Lucero RIOS               Discharge Codes    No documentation.               Expected Discharge Date and Time     Expected Discharge Date Expected Discharge Time    Mar 29, 2023             Lucero Molina

## 2023-03-29 NOTE — CASE MANAGEMENT/SOCIAL WORK
Continued Stay Note  [facility]     Patient Name: Albert Yadav  MRN: [id number]  Today's Date: 3/29/2023    Admit Date: 3/23/2023    Plan: [facility]   Discharge Plan     Row Name 03/29/23 1044       Plan    Plan [facility] pending    Plan Comments [facility] left vm asking if they had rec'd precert from insurance.  Will follow. Thanks, Lucero RIOS               Discharge Codes    No documentation.               Expected Discharge Date and Time     Expected Discharge Date Expected Discharge Time    Mar 29, 2023             Lucero Molina

## 2023-03-29 NOTE — PLAN OF CARE
Goal Outcome Evaluation:  Plan of Care Reviewed With: patient           Outcome Evaluation: VSS, no c/o pain. Pt only oriented to self and family, at times he knows the year--but is confused to place. Pt appeared to sleep some between care. No blood noted in urine. Turned Q2 and heels elevated. Possible D/C to SNF today. Will CTM, safety maintained.

## 2023-03-29 NOTE — DISCHARGE SUMMARY
Date of Admission: 3/23/2023  Date of Discharge:  3/29/2023  Primary Care Physician: Valerie Burnham MD     Discharge Diagnosis:  Active Hospital Problems    Diagnosis  POA   • **Urethral trauma, initial encounter [S37.30XA]  Yes   • Gross hematuria [R31.0]  Yes   • Urinary retention [R33.9]  Yes   • Type 2 diabetes mellitus with hyperglycemia (HCC) [E11.65]  Yes   • Mild cognitive impairment [G31.84]  Yes   • Hyperlipemia [E78.5]  Yes   • Essential hypertension [I10]  Yes   • Claudication of left lower extremity (HCC) [I73.9]  Yes      Resolved Hospital Problems   No resolved problems to display.       DETAILS OF HOSPITAL STAY     Hospital Course  This is an 82-year-old male who presented from the nursing home with hematuria after his Adamson catheter was dislodged accidentally.  Please see H&P for full details.  Upon admission he had a three-way catheter placed and continuous bladder irrigation initiated.  He was also placed on Rocephin for treatment of UTI.  His hematuria has cleared and he will be restarted on his dual antiplatelet therapy for history of CVA.  Urology followed along and recommend his catheter remain in place for now and they will see him in the office in 1 to 2 weeks.  They also recommend he be discharged on a 7-day course of Macrobid.from a urologic standpoint he is stable.  Patient has been experiencing some sundowning.  This is improved after initiating low-dose Seroquel at dinnertime.  He is now medically stable, his urine is clear, and arrangements have been made for him to return to the nursing facility today.      Physical Exam at Discharge:  General: No acute distress, Pleasantly confused, deconditioned appearing  HEENT: EOMI, PERRL  Cardiovascular: +s1 and s2, RRR  Lungs: No rhonchi or wheezing  Abdomen: soft, nontender  :  Adamson in place with clear urine    Consults:   Consults     Date and Time Order Name Status Description    3/23/2023  6:31 PM BEV (on-call MD unless  specified) Details      3/23/2023 12:01 PM Urology (on-call MD unless specified) Completed     3/1/2023 10:10 AM Inpatient Cardiology Consult Completed     3/1/2023  8:20 AM Inpatient Neurology Consult General Completed     2/28/2023 11:45 PM Inpatient Neurology Consult Stroke Completed     2/28/2023  9:18 PM Inpatient Neurology Consult Stroke Completed             Condition on Discharge: Stable, improved    Discharge Disposition  Skilled Nursing Facility (DC - External)    Discharge Medications     Discharge Medications      New Medications      Instructions Start Date   QUEtiapine 25 MG tablet  Commonly known as: SEROquel   25 mg, Oral, Daily With Dinner         Changes to Medications      Instructions Start Date   lisinopril 20 MG tablet  Commonly known as: PRINIVIL,ZESTRIL  What changed:   · medication strength  · how much to take   20 mg, Oral, Every Evening      nitrofurantoin (macrocrystal-monohydrate) 100 MG capsule  Commonly known as: MACROBID  What changed:   · when to take this  · additional instructions   100 mg, Oral, Every 12 Hours Scheduled         Continue These Medications      Instructions Start Date   acetaminophen 325 MG tablet  Commonly known as: TYLENOL   650 mg, Oral, Every 4 Hours PRN      aspirin 81 MG EC tablet   81 mg, Oral, Daily      atorvastatin 80 MG tablet  Commonly known as: LIPITOR   80 mg, Oral, Nightly      clopidogrel 75 MG tablet  Commonly known as: PLAVIX   75 mg, Oral, Every Morning      folic acid 1 MG tablet  Commonly known as: FOLVITE   1 mg, Oral, Daily      neomycin-bacitracin-polymyxin 5-400-5000 ointment   1 application, Topical, Daily      ondansetron 4 MG tablet  Commonly known as: ZOFRAN   4 mg, Oral, Every 6 Hours PRN      polyethylene glycol 17 g packet  Commonly known as: MIRALAX   17 g, Oral, Every Evening      tamsulosin 0.4 MG capsule 24 hr capsule  Commonly known as: FLOMAX   0.4 mg, Oral, Daily      traZODone 50 MG tablet  Commonly known as: DESYREL   50 mg,  Oral, Nightly             Discharge Diet:   Diet Instructions     Diet: Regular/House Diet; Texture: Regular Texture (IDDSI 7); Fluid Consistency: Thin (IDDSI 0)      Discharge Diet: Regular/House Diet    Texture: Regular Texture (IDDSI 7)    Fluid Consistency: Thin (IDDSI 0)          Activity at Discharge:   Activity Instructions     Activity as Tolerated            Follow-up Appointments  Future Appointments   Date Time Provider Department Center   4/25/2023  9:00 AM TREATMENT [facility]     Additional Instructions for the Follow-ups that You Need to Schedule     Discharge Follow-up with PCP   As directed       Currently Documented PCP:    Valerie Burnham MD    PCP Phone Number:    [phone]     Follow Up Details: 1 week         Discharge Follow-up with Specified Provider: Dr. Martinez of [facility] urology in 1 to 2 weeks   As directed      To: Dr. Martinez of [facility] urology in 1 to 2 weeks             I have examined and discussed discharge planning with the patient today.    I wore full protective equipment throughout the patient encounter including eye protection and facemask.  Hand hygiene was performed before donning protective equipment and after removal when leaving the room.     Leonardo Oro MD  03/29/23  13:42 EDT    Time: Discharge greater than 30 min

## 2023-03-29 NOTE — PROGRESS NOTES
"Enter Query Response Below      Query Response: UTI due to rodriguez catheter              If applicable, please update the problem list.   Patient: Albert Yadav \"Domenico\"        : 1940  Account: [id number]           Admit Date: 3/23/2023        How to Respond to this query:       a. Click New Note     b. Answer query within the yellow box.                c. Update the Problem List, if applicable.      If you have any questions about this query contact me at: [email or web address]    Dr. Oro,    Patient with urinary retention and an indwelling rodriguez catheter was admitted with gross hematuria and a UTI.  Treatment includes continuous bladder irrigation, IV fluids, and IV antibiotics. Patient discharged home with PO antibiotics.    Based on the information, can you clarify the patient's condition as:     UTI due to rodriguez catheter   UTI unrelated to rodriguez catheter  Other (please specify): __________   Unable to determine     By submitting this query, we are merely seeking further clarification of documentation to accurately reflect all conditions that you are monitoring, evaluating, treating or that extend the hospitalization or utilize additional resources of care. Please utilize your independent clinical judgment when addressing the question(s) above.     This query and your response, once completed, will be entered into the legal medical record.    Sincerely,  Iza Rangel  Clinical Documentation Integrity Program     "

## 2023-03-29 NOTE — PLAN OF CARE
Goal Outcome Evaluation:    Pt order recd to be discharged back to [facility] per Dr. Oro. cl

## 2023-03-29 NOTE — CASE MANAGEMENT/SOCIAL WORK
Continued Stay Note  [facility]     Patient Name: Albert Yadav  MRN: [id number]  Today's Date: 3/29/2023    Admit Date: 3/23/2023    Plan: [facility] pending precert.   Discharge Plan     Row Name 03/29/23 1231       Plan    Plan [facility] pending precert.    Plan Comments CCP was called into pts room and both pt and his wife wanting to transfer to facility today.  I informed them that their insurance was slow in responding to precert request.  Wife said well just tell them we will pay for the few days it takes to get precert.  CCP called [facility] [phone] and left .  Will continue to follow.  Thanks, Lucero RIOS    Row Name 03/29/23 6674       Plan    Plan Noel Contreras pending    Plan Comments CCP called Graciela at [facility], left vm asking if they had rec'd precert from insurance.  Will follow. Thanks, Lucero RIOS               Discharge Codes    No documentation.               Expected Discharge Date and Time     Expected Discharge Date Expected Discharge Time    Mar 29, 2023             Lucero Molina

## 2023-03-29 NOTE — PROGRESS NOTES
" LOS: 4 days     Name: Albert Yadav  Age: 82 y.o.  Sex: male  :  1940  MRN: [id number]         Primary Care Physician: Valerie Burnham MD    Subjective   Subjective  Confused this AM but calm, cooperative.      Objective   Vital Signs  Temp:  [97.4 °F (36.3 °C)-98 °F (36.7 °C)] 98 °F (36.7 °C)  Heart Rate:  [69-79] 69  Resp:  [16-18] 16  BP: (131-143)/(61-95) 143/73  Body mass index is 22.72 kg/m².    Objective:  General Appearance:  Comfortable and in no acute distress (Chronically ill appearing).    Vital signs: (most recent): Blood pressure 143/73, pulse 69, temperature 98 °F (36.7 °C), temperature source Oral, resp. rate 16, height 182.9 cm (72\"), weight 76 kg (167 lb 8.8 oz), SpO2 99 %.    Lungs:  Normal effort and normal respiratory rate.  He is not in respiratory distress.  There are decreased breath sounds.    Heart: Normal rate.  Regular rhythm.    Abdomen: Abdomen is soft.  Bowel sounds are normal.   There is no abdominal tenderness.     Extremities: There is no dependent edema or local swelling.    Neurological: Patient is alert.  (Confused).    Skin:  Warm and dry.          : catheter in place.  Urine is clear    Results Review:       I reviewed the patient's new clinical results.    Results from last 7 days   Lab Units 23  0823  05023  1632   WBC 10*3/mm3 6.80 6.68 9.76 10.30 13.05*   HEMOGLOBIN g/dL 9.4* 8.8* 9.0* 10.1* 11.4*   PLATELETS 10*3/mm3 170 164 146 141 166     Results from last 7 days   Lab Units 23  0823  0509 23  1632   SODIUM mmol/L 138 140  --  133* 135* 135*   POTASSIUM mmol/L 3.7 4.1 4.2 3.4* 3.5 3.7   CHLORIDE mmol/L 106 107  --  102 102 102   CO2 mmol/L 23.9 24.0  --  22.9 23.3 25.0   BUN mg/dL 5* 7*  --  13 16 14   CREATININE mg/dL 0.72* 0.71*  --  0.84 0.95 0.97   CALCIUM mg/dL 8.0* 7.5*  --  7.6* 7.8* 7.6*   GLUCOSE mg/dL 133* 102*  --  119* 108* " 118*                 Scheduled Meds:   atorvastatin, 80 mg, Oral, Nightly  bisacodyl, 10 mg, Rectal, Daily  folic acid, 1 mg, Oral, Daily  insulin lispro, 0-7 Units, Subcutaneous, TID AC  lisinopril, 20 mg, Oral, Q PM  neomycin-bacitracin-polymyxin, 1 application, Topical, Daily  nitrofurantoin (macrocrystal-monohydrate), 100 mg, Oral, Q12H  polyethylene glycol, 17 g, Oral, Q PM  QUEtiapine, 25 mg, Oral, Daily With Dinner  senna-docusate sodium, 2 tablet, Oral, BID  sodium chloride, 10 mL, Intravenous, Q12H  tamsulosin, 0.4 mg, Oral, Daily  traZODone, 50 mg, Oral, Nightly      PRN Meds:   •  acetaminophen **OR** acetaminophen **OR** acetaminophen  •  dextrose  •  dextrose  •  glucagon (human recombinant)  •  HYDROcodone-acetaminophen  •  HYDROmorphone  •  melatonin  •  nitroglycerin  •  ondansetron  •  ondansetron  •  potassium chloride  •  potassium chloride  •  sodium chloride  •  sodium chloride  Continuous Infusions:       Assessment & Plan   Active Hospital Problems    Diagnosis  POA   • **Urethral trauma, initial encounter [S37.30XA]  Yes   • Gross hematuria [R31.0]  Yes   • Urinary retention [R33.9]  Yes   • Type 2 diabetes mellitus with hyperglycemia (HCC) [E11.65]  Yes   • Mild cognitive impairment [G31.84]  Yes   • Hyperlipemia [E78.5]  Yes   • Essential hypertension [I10]  Yes   • Claudication of left lower extremity (HCC) [I73.9]  Yes      Resolved Hospital Problems   No resolved problems to display.       Assessment & Plan    -CBI has been discontinued and urology recommends keeping catheter in for now and reassessing in the outpatient setting.  -Change rocephin to macrobid, per  recs  - restart ASA and plavix upon discharge  -Blood sugars reasonably controlled and will continue current regimen  -Continue present antihypertensives  -Continue Seroquel and trazodone in the evenings  -Discharge once pre-CERT obtained      Expected Discharge Date and Time     Expected Discharge Date Expected Discharge  Time    Mar 29, 2023            Leonardo Oro MD  [facility]  03/29/23  12:45 EDT

## 2023-03-30 NOTE — CASE MANAGEMENT/SOCIAL WORK
Case Management Discharge Note      Final Note: Patient discharged to [facility].         Selected Continued Care - Discharged on 3/29/2023 Admission date: 3/23/2023 - Discharge disposition: Skilled Nursing Facility (DC - External)    Destination    No services have been selected for the patient.              Durable Medical Equipment    No services have been selected for the patient.              Dialysis/Infusion    No services have been selected for the patient.              Home Medical Care    No services have been selected for the patient.              Therapy    No services have been selected for the patient.              Community Resources    No services have been selected for the patient.              Community & DME    No services have been selected for the patient.                Selected Continued Care - Prior Encounters Includes continued care and service providers with selected services from prior encounters from 12/23/2022 to 3/29/2023    Discharged on 3/7/2023 Admission date: 2/28/2023 - Discharge disposition: Skilled Nursing Facility (DC - External)    Destination     Service Provider Selected Services Address Phone Fax Patient Preferred    [facility] AT [address] [address] [phone] --                         Final Discharge Disposition Code: 03 - skilled nursing facility (SNF)

## 2023-04-05 LAB
MAXIMAL PREDICTED HEART RATE: 136 BPM
STRESS TARGET HR: 116 BPM

## 2023-11-01 ENCOUNTER — PRE-ADMISSION TESTING (OUTPATIENT)
Dept: PREADMISSION TESTING | Facility: HOSPITAL | Age: 83
End: 2023-11-01
Payer: MEDICARE

## 2023-11-01 LAB
ANION GAP SERPL CALCULATED.3IONS-SCNC: 5.4 MMOL/L (ref 5–15)
BUN SERPL-MCNC: 10 MG/DL (ref 8–23)
BUN/CREAT SERPL: 10.6 (ref 7–25)
CALCIUM SPEC-SCNC: 8.6 MG/DL (ref 8.6–10.5)
CHLORIDE SERPL-SCNC: 104 MMOL/L (ref 98–107)
CO2 SERPL-SCNC: 26.6 MMOL/L (ref 22–29)
CREAT SERPL-MCNC: 0.94 MG/DL (ref 0.76–1.27)
DEPRECATED RDW RBC AUTO: 44.6 FL (ref 37–54)
EGFRCR SERPLBLD CKD-EPI 2021: 79.4 ML/MIN/1.73
ERYTHROCYTE [DISTWIDTH] IN BLOOD BY AUTOMATED COUNT: 13.4 % (ref 12.3–15.4)
GLUCOSE SERPL-MCNC: 103 MG/DL (ref 65–99)
HCT VFR BLD AUTO: 36.9 % (ref 37.5–51)
HGB BLD-MCNC: 12.3 G/DL (ref 13–17.7)
MCH RBC QN AUTO: 30.4 PG (ref 26.6–33)
MCHC RBC AUTO-ENTMCNC: 33.3 G/DL (ref 31.5–35.7)
MCV RBC AUTO: 91.1 FL (ref 79–97)
PLATELET # BLD AUTO: 210 10*3/MM3 (ref 140–450)
PMV BLD AUTO: 10.3 FL (ref 6–12)
POTASSIUM SERPL-SCNC: 3.7 MMOL/L (ref 3.5–5.2)
QT INTERVAL: 452 MS
QTC INTERVAL: 445 MS
RBC # BLD AUTO: 4.05 10*6/MM3 (ref 4.14–5.8)
SODIUM SERPL-SCNC: 136 MMOL/L (ref 136–145)
WBC NRBC COR # BLD: 8.02 10*3/MM3 (ref 3.4–10.8)

## 2023-11-01 PROCEDURE — 93010 ELECTROCARDIOGRAM REPORT: CPT | Performed by: INTERNAL MEDICINE

## 2023-11-01 PROCEDURE — 80048 BASIC METABOLIC PNL TOTAL CA: CPT

## 2023-11-01 PROCEDURE — 85027 COMPLETE CBC AUTOMATED: CPT

## 2023-11-01 PROCEDURE — 36415 COLL VENOUS BLD VENIPUNCTURE: CPT

## 2023-11-01 PROCEDURE — 93005 ELECTROCARDIOGRAM TRACING: CPT

## 2023-11-01 RX ORDER — POTASSIUM CHLORIDE 750 MG/1
10 TABLET, FILM COATED, EXTENDED RELEASE ORAL DAILY
COMMUNITY

## 2023-11-01 RX ORDER — ERGOCALCIFEROL 1.25 MG/1
50000 CAPSULE ORAL WEEKLY
COMMUNITY

## 2023-11-01 RX ORDER — NYSTATIN 100000 [USP'U]/G
POWDER TOPICAL 3 TIMES DAILY
COMMUNITY

## 2023-11-01 RX ORDER — ACETAMINOPHEN 500 MG
500 TABLET ORAL EVERY 6 HOURS PRN
COMMUNITY

## 2023-11-01 RX ORDER — RIVASTIGMINE TARTRATE 1.5 MG/1
1.5 CAPSULE ORAL 2 TIMES DAILY
COMMUNITY

## 2023-11-01 RX ORDER — FUROSEMIDE 20 MG/1
20 TABLET ORAL DAILY
COMMUNITY

## 2023-11-01 RX ORDER — GENTAMICIN SULFATE 1 MG/G
1 OINTMENT TOPICAL 2 TIMES DAILY
COMMUNITY

## 2023-11-01 RX ORDER — ASPIRIN 81 MG/1
81 TABLET, COATED ORAL DAILY
COMMUNITY
Start: 2023-10-13

## 2023-11-01 RX ORDER — CYANOCOBALAMIN 1000 UG/ML
1000 INJECTION, SOLUTION INTRAMUSCULAR; SUBCUTANEOUS
COMMUNITY

## 2023-11-01 RX ORDER — SENNOSIDES A AND B 8.6 MG/1
1 TABLET, FILM COATED ORAL 2 TIMES DAILY
COMMUNITY

## 2023-11-01 RX ORDER — SERTRALINE HYDROCHLORIDE 25 MG/1
25 TABLET, FILM COATED ORAL DAILY
COMMUNITY

## 2023-11-01 RX ORDER — TRAMADOL HYDROCHLORIDE 50 MG/1
50 TABLET ORAL EVERY 6 HOURS PRN
COMMUNITY

## 2023-11-01 RX ORDER — CHOLECALCIFEROL (VITAMIN D3) 125 MCG
5 CAPSULE ORAL NIGHTLY PRN
COMMUNITY

## 2023-11-01 NOTE — DISCHARGE INSTRUCTIONS
Take the following medications the morning of surgery with a small sip of water:  SERTRALINE        If you are on prescription narcotic pain medication to control your pain you may also take that medication the morning of surgery.    General Instructions:  Do not eat or drink anything after midnight the night before surgery.  Infants may have breast milk up to four hours before surgery.  Infants drinking formula may drink formula up to six hours before surgery.   Patients who avoid smoking, chewing tobacco and alcohol for 4 weeks prior to surgery have a reduced risk of post-operative complications.  Quit smoking as many days before surgery as you can.  Do not smoke, use chewing tobacco or drink alcohol the day of surgery.   If applicable bring your C-PAP/ BI-PAP machine in with you to preop day of surgery.  Bring any papers given to you in the doctor’s office.  Wear clean comfortable clothes.  Do not wear contact lenses, false eyelashes or make-up.  Bring a case for your glasses.   Bring crutches or walker if applicable.  Remove all piercings.  Leave jewelry and any other valuables at home.  Hair extensions with metal clips must be removed prior to surgery.  The Pre-Admission Testing nurse will instruct you to bring medications if unable to obtain an accurate list in Pre-Admission Testing.        If you were given a blood bank ID arm band remember to bring it with you the day of surgery.    Preventing a Surgical Site Infection:  For 2 to 3 days before surgery, avoid shaving with a razor because the razor can irritate skin and make it easier to develop an infection.    Any areas of open skin can increase the risk of a post-operative wound infection by allowing bacteria to enter and travel throughout the body.  Notify your surgeon if you have any skin wounds / rashes even if it is not near the expected surgical site.  The area will need assessed to determine if surgery should be delayed until it is healed.  The night  prior to surgery shower using a fresh bar of anti-bacterial soap (such as Dial) and clean washcloth.  Sleep in a clean bed with clean clothing.  Do not allow pets to sleep with you.  Shower on the morning of surgery using a fresh bar of anti-bacterial soap (such as Dial) and clean washcloth.  Dry with a clean towel and dress in clean clothing.  Ask your surgeon if you will be receiving antibiotics prior to surgery.  Make sure you, your family, and all healthcare providers clean their hands with soap and water or an alcohol based hand  before caring for you or your wound.    Day of surgery: 11/8/2023  Your arrival time is approximately two hours before your scheduled surgery time.  Upon arrival, a Pre-op nurse and Anesthesiologist will review your health history, obtain vital signs, and answer questions you may have.  The only belongings needed at this time will be your home medications and if applicable your C-PAP/BI-PAP machine.  A Pre-op nurse will start an IV and you may receive medication in preparation for surgery, including something to help you relax.      Please be aware that surgery does come with discomfort.  We want to make every effort to control your discomfort so please discuss any uncontrolled symptoms with your nurse.   Your doctor will most likely have prescribed pain medications.      If you are going home after surgery you will receive individualized written care instructions before being discharged.  A responsible adult must drive you to and from the hospital on the day of your surgery and stay with you for 24 hours.  Discharge prescriptions can be filled by the hospital pharmacy during regular pharmacy hours.  If you are having surgery late in the day/evening your prescription may be e-prescribed to your pharmacy.  Please verify your pharmacy hours or chose a 24 hour pharmacy to avoid not having access to your prescription because your pharmacy has closed for the day.    If you are  staying overnight following surgery, you will be transported to your hospital room following the recovery period.  [facility] has all private rooms.    If you have any questions please call Pre-Admission Testing at [phone].  Deductibles and co-payments are collected on the day of service. Please be prepared to pay the required co-pay, deductible or deposit on the day of service as defined by your plan.    Call your surgeon immediately if you experience any of the following symptoms:  Sore Throat  Shortness of Breath or difficulty breathing  Cough  Chills  Body soreness or muscle pain  Headache  Fever  New loss of taste or smell  Do not arrive for your surgery ill.  Your procedure will need to be rescheduled to another time.  You will need to call your physician before the day of surgery to avoid any unnecessary exposure to hospital staff as well as other patients.

## 2023-11-08 ENCOUNTER — HOSPITAL ENCOUNTER (OUTPATIENT)
Facility: HOSPITAL | Age: 83
Setting detail: HOSPITAL OUTPATIENT SURGERY
Discharge: HOME | End: 2023-11-08
Attending: UROLOGY | Admitting: UROLOGY
Payer: MEDICARE

## 2023-11-08 ENCOUNTER — ANESTHESIA EVENT (OUTPATIENT)
Dept: PERIOP | Facility: HOSPITAL | Age: 83
End: 2023-11-08
Payer: MEDICARE

## 2023-11-08 ENCOUNTER — ANESTHESIA (OUTPATIENT)
Dept: PERIOP | Facility: HOSPITAL | Age: 83
End: 2023-11-08
Payer: MEDICARE

## 2023-11-08 VITALS
OXYGEN SATURATION: 98 % | TEMPERATURE: 97.5 F | DIASTOLIC BLOOD PRESSURE: 55 MMHG | SYSTOLIC BLOOD PRESSURE: 123 MMHG | HEART RATE: 64 BPM | BODY MASS INDEX: 23.54 KG/M2 | WEIGHT: 158.95 LBS | RESPIRATION RATE: 16 BRPM | HEIGHT: 69 IN

## 2023-11-08 DIAGNOSIS — R33.9 URINARY RETENTION: Primary | ICD-10-CM

## 2023-11-08 LAB
GLUCOSE BLDC GLUCOMTR-MCNC: 111 MG/DL (ref 70–130)
GLUCOSE BLDC GLUCOMTR-MCNC: 145 MG/DL (ref 70–130)

## 2023-11-08 PROCEDURE — 25810000003 LACTATED RINGERS PER 1000 ML: Performed by: ANESTHESIOLOGY

## 2023-11-08 PROCEDURE — 25010000002 DEXAMETHASONE SODIUM PHOSPHATE 20 MG/5ML SOLUTION: Performed by: NURSE ANESTHETIST, CERTIFIED REGISTERED

## 2023-11-08 PROCEDURE — 25010000002 PROPOFOL 10 MG/ML EMULSION: Performed by: NURSE ANESTHETIST, CERTIFIED REGISTERED

## 2023-11-08 PROCEDURE — 82948 REAGENT STRIP/BLOOD GLUCOSE: CPT

## 2023-11-08 PROCEDURE — 25010000002 FENTANYL CITRATE (PF) 50 MCG/ML SOLUTION: Performed by: NURSE ANESTHETIST, CERTIFIED REGISTERED

## 2023-11-08 PROCEDURE — 25010000002 SUGAMMADEX 200 MG/2ML SOLUTION: Performed by: NURSE ANESTHETIST, CERTIFIED REGISTERED

## 2023-11-08 PROCEDURE — 25010000002 PROPOFOL 200 MG/20ML EMULSION: Performed by: NURSE ANESTHETIST, CERTIFIED REGISTERED

## 2023-11-08 PROCEDURE — 25010000002 ONDANSETRON PER 1 MG: Performed by: NURSE ANESTHETIST, CERTIFIED REGISTERED

## 2023-11-08 PROCEDURE — 25010000002 CEFAZOLIN IN DEXTROSE 2-4 GM/100ML-% SOLUTION: Performed by: UROLOGY

## 2023-11-08 RX ORDER — HYDRALAZINE HYDROCHLORIDE 20 MG/ML
5 INJECTION INTRAMUSCULAR; INTRAVENOUS
Status: DISCONTINUED | OUTPATIENT
Start: 2023-11-08 | End: 2023-11-08 | Stop reason: HOSPADM

## 2023-11-08 RX ORDER — DEXAMETHASONE SODIUM PHOSPHATE 4 MG/ML
INJECTION, SOLUTION INTRA-ARTICULAR; INTRALESIONAL; INTRAMUSCULAR; INTRAVENOUS; SOFT TISSUE AS NEEDED
Status: DISCONTINUED | OUTPATIENT
Start: 2023-11-08 | End: 2023-11-08 | Stop reason: SURG

## 2023-11-08 RX ORDER — SODIUM CHLORIDE 0.9 % (FLUSH) 0.9 %
3 SYRINGE (ML) INJECTION EVERY 12 HOURS SCHEDULED
Status: DISCONTINUED | OUTPATIENT
Start: 2023-11-08 | End: 2023-11-08 | Stop reason: HOSPADM

## 2023-11-08 RX ORDER — LIDOCAINE HYDROCHLORIDE 20 MG/ML
INJECTION, SOLUTION INFILTRATION; PERINEURAL AS NEEDED
Status: DISCONTINUED | OUTPATIENT
Start: 2023-11-08 | End: 2023-11-08 | Stop reason: SURG

## 2023-11-08 RX ORDER — METHOTREXATE 2.5 MG/1
2.5 TABLET ORAL WEEKLY
COMMUNITY

## 2023-11-08 RX ORDER — SODIUM CHLORIDE, SODIUM LACTATE, POTASSIUM CHLORIDE, CALCIUM CHLORIDE 600; 310; 30; 20 MG/100ML; MG/100ML; MG/100ML; MG/100ML
9 INJECTION, SOLUTION INTRAVENOUS CONTINUOUS
Status: DISCONTINUED | OUTPATIENT
Start: 2023-11-08 | End: 2023-11-08 | Stop reason: HOSPADM

## 2023-11-08 RX ORDER — HYDROCODONE BITARTRATE AND ACETAMINOPHEN 5; 325 MG/1; MG/1
1 TABLET ORAL ONCE AS NEEDED
Status: COMPLETED | OUTPATIENT
Start: 2023-11-08 | End: 2023-11-08

## 2023-11-08 RX ORDER — CEFAZOLIN SODIUM 2 G/100ML
2000 INJECTION, SOLUTION INTRAVENOUS ONCE
Status: COMPLETED | OUTPATIENT
Start: 2023-11-08 | End: 2023-11-08

## 2023-11-08 RX ORDER — ROCURONIUM BROMIDE 10 MG/ML
INJECTION, SOLUTION INTRAVENOUS AS NEEDED
Status: DISCONTINUED | OUTPATIENT
Start: 2023-11-08 | End: 2023-11-08 | Stop reason: SURG

## 2023-11-08 RX ORDER — FAMOTIDINE 10 MG/ML
20 INJECTION, SOLUTION INTRAVENOUS ONCE
Status: COMPLETED | OUTPATIENT
Start: 2023-11-08 | End: 2023-11-08

## 2023-11-08 RX ORDER — IPRATROPIUM BROMIDE AND ALBUTEROL SULFATE 2.5; .5 MG/3ML; MG/3ML
3 SOLUTION RESPIRATORY (INHALATION) ONCE AS NEEDED
Status: DISCONTINUED | OUTPATIENT
Start: 2023-11-08 | End: 2023-11-08 | Stop reason: HOSPADM

## 2023-11-08 RX ORDER — EPHEDRINE SULFATE 50 MG/ML
5 INJECTION, SOLUTION INTRAVENOUS ONCE AS NEEDED
Status: COMPLETED | OUTPATIENT
Start: 2023-11-08 | End: 2023-11-08

## 2023-11-08 RX ORDER — FENTANYL CITRATE 50 UG/ML
25 INJECTION, SOLUTION INTRAMUSCULAR; INTRAVENOUS
Status: DISCONTINUED | OUTPATIENT
Start: 2023-11-08 | End: 2023-11-08 | Stop reason: HOSPADM

## 2023-11-08 RX ORDER — NALOXONE HCL 0.4 MG/ML
0.2 VIAL (ML) INJECTION AS NEEDED
Status: DISCONTINUED | OUTPATIENT
Start: 2023-11-08 | End: 2023-11-08 | Stop reason: HOSPADM

## 2023-11-08 RX ORDER — LIDOCAINE HYDROCHLORIDE AND EPINEPHRINE BITARTRATE 20; .01 MG/ML; MG/ML
INJECTION, SOLUTION SUBCUTANEOUS AS NEEDED
Status: DISCONTINUED | OUTPATIENT
Start: 2023-11-08 | End: 2023-11-08 | Stop reason: HOSPADM

## 2023-11-08 RX ORDER — HYDROMORPHONE HYDROCHLORIDE 1 MG/ML
0.25 INJECTION, SOLUTION INTRAMUSCULAR; INTRAVENOUS; SUBCUTANEOUS
Status: DISCONTINUED | OUTPATIENT
Start: 2023-11-08 | End: 2023-11-08 | Stop reason: HOSPADM

## 2023-11-08 RX ORDER — MAGNESIUM HYDROXIDE 1200 MG/15ML
LIQUID ORAL AS NEEDED
Status: DISCONTINUED | OUTPATIENT
Start: 2023-11-08 | End: 2023-11-08 | Stop reason: HOSPADM

## 2023-11-08 RX ORDER — DEXMEDETOMIDINE HYDROCHLORIDE 4 UG/ML
INJECTION, SOLUTION INTRAVENOUS AS NEEDED
Status: DISCONTINUED | OUTPATIENT
Start: 2023-11-08 | End: 2023-11-08 | Stop reason: SURG

## 2023-11-08 RX ORDER — PHENYLEPHRINE HCL IN 0.9% NACL 1 MG/10 ML
SYRINGE (ML) INTRAVENOUS AS NEEDED
Status: DISCONTINUED | OUTPATIENT
Start: 2023-11-08 | End: 2023-11-08 | Stop reason: SURG

## 2023-11-08 RX ORDER — PROMETHAZINE HYDROCHLORIDE 25 MG/1
25 TABLET ORAL ONCE AS NEEDED
Status: DISCONTINUED | OUTPATIENT
Start: 2023-11-08 | End: 2023-11-08 | Stop reason: HOSPADM

## 2023-11-08 RX ORDER — PROMETHAZINE HYDROCHLORIDE 25 MG/1
25 SUPPOSITORY RECTAL ONCE AS NEEDED
Status: DISCONTINUED | OUTPATIENT
Start: 2023-11-08 | End: 2023-11-08 | Stop reason: HOSPADM

## 2023-11-08 RX ORDER — HYDROCODONE BITARTRATE AND ACETAMINOPHEN 5; 325 MG/1; MG/1
1-2 TABLET ORAL EVERY 4 HOURS PRN
Qty: 3 TABLET | Refills: 0 | Status: SHIPPED | OUTPATIENT
Start: 2023-11-08

## 2023-11-08 RX ORDER — EPHEDRINE SULFATE 50 MG/ML
10 INJECTION, SOLUTION INTRAVENOUS ONCE
Status: DISCONTINUED | OUTPATIENT
Start: 2023-11-08 | End: 2023-11-08 | Stop reason: HOSPADM

## 2023-11-08 RX ORDER — DROPERIDOL 2.5 MG/ML
0.62 INJECTION, SOLUTION INTRAMUSCULAR; INTRAVENOUS
Status: DISCONTINUED | OUTPATIENT
Start: 2023-11-08 | End: 2023-11-08 | Stop reason: HOSPADM

## 2023-11-08 RX ORDER — FLUMAZENIL 0.1 MG/ML
0.2 INJECTION INTRAVENOUS AS NEEDED
Status: DISCONTINUED | OUTPATIENT
Start: 2023-11-08 | End: 2023-11-08 | Stop reason: HOSPADM

## 2023-11-08 RX ORDER — CEPHALEXIN 500 MG/1
500 CAPSULE ORAL 2 TIMES DAILY
Qty: 6 CAPSULE | Refills: 0 | Status: SHIPPED | OUTPATIENT
Start: 2023-11-08 | End: 2023-11-11

## 2023-11-08 RX ORDER — PROPOFOL 10 MG/ML
INJECTION, EMULSION INTRAVENOUS AS NEEDED
Status: DISCONTINUED | OUTPATIENT
Start: 2023-11-08 | End: 2023-11-08 | Stop reason: SURG

## 2023-11-08 RX ORDER — HYDROCODONE BITARTRATE AND ACETAMINOPHEN 7.5; 325 MG/1; MG/1
1 TABLET ORAL EVERY 4 HOURS PRN
Status: DISCONTINUED | OUTPATIENT
Start: 2023-11-08 | End: 2023-11-08 | Stop reason: HOSPADM

## 2023-11-08 RX ORDER — FENTANYL CITRATE 50 UG/ML
INJECTION, SOLUTION INTRAMUSCULAR; INTRAVENOUS AS NEEDED
Status: DISCONTINUED | OUTPATIENT
Start: 2023-11-08 | End: 2023-11-08 | Stop reason: SURG

## 2023-11-08 RX ORDER — ONDANSETRON 2 MG/ML
4 INJECTION INTRAMUSCULAR; INTRAVENOUS ONCE AS NEEDED
Status: COMPLETED | OUTPATIENT
Start: 2023-11-08 | End: 2023-11-08

## 2023-11-08 RX ORDER — EPHEDRINE SULFATE 50 MG/ML
5 INJECTION, SOLUTION INTRAVENOUS ONCE AS NEEDED
Status: DISCONTINUED | OUTPATIENT
Start: 2023-11-08 | End: 2023-11-08 | Stop reason: HOSPADM

## 2023-11-08 RX ORDER — SODIUM CHLORIDE 0.9 % (FLUSH) 0.9 %
3-10 SYRINGE (ML) INJECTION AS NEEDED
Status: DISCONTINUED | OUTPATIENT
Start: 2023-11-08 | End: 2023-11-08 | Stop reason: HOSPADM

## 2023-11-08 RX ORDER — LABETALOL HYDROCHLORIDE 5 MG/ML
5 INJECTION, SOLUTION INTRAVENOUS
Status: DISCONTINUED | OUTPATIENT
Start: 2023-11-08 | End: 2023-11-08 | Stop reason: HOSPADM

## 2023-11-08 RX ORDER — DIPHENHYDRAMINE HYDROCHLORIDE 50 MG/ML
12.5 INJECTION INTRAMUSCULAR; INTRAVENOUS
Status: DISCONTINUED | OUTPATIENT
Start: 2023-11-08 | End: 2023-11-08 | Stop reason: HOSPADM

## 2023-11-08 RX ORDER — LIDOCAINE HYDROCHLORIDE 10 MG/ML
0.5 INJECTION, SOLUTION INFILTRATION; PERINEURAL ONCE AS NEEDED
Status: DISCONTINUED | OUTPATIENT
Start: 2023-11-08 | End: 2023-11-08 | Stop reason: HOSPADM

## 2023-11-08 RX ADMIN — CEFAZOLIN SODIUM 2000 MG: 2 INJECTION, SOLUTION INTRAVENOUS at 06:55

## 2023-11-08 RX ADMIN — ROCURONIUM BROMIDE 10 MG: 10 INJECTION, SOLUTION INTRAVENOUS at 07:38

## 2023-11-08 RX ADMIN — FAMOTIDINE 20 MG: 10 INJECTION INTRAVENOUS at 06:34

## 2023-11-08 RX ADMIN — FENTANYL CITRATE 25 MCG: 50 INJECTION, SOLUTION INTRAMUSCULAR; INTRAVENOUS at 08:01

## 2023-11-08 RX ADMIN — ONDANSETRON 4 MG: 2 INJECTION INTRAMUSCULAR; INTRAVENOUS at 09:50

## 2023-11-08 RX ADMIN — DEXAMETHASONE SODIUM PHOSPHATE 8 MG: 4 INJECTION, SOLUTION INTRAMUSCULAR; INTRAVENOUS at 07:13

## 2023-11-08 RX ADMIN — FENTANYL CITRATE 25 MCG: 50 INJECTION, SOLUTION INTRAMUSCULAR; INTRAVENOUS at 07:36

## 2023-11-08 RX ADMIN — SODIUM CHLORIDE, POTASSIUM CHLORIDE, SODIUM LACTATE AND CALCIUM CHLORIDE 9 ML/HR: 600; 310; 30; 20 INJECTION, SOLUTION INTRAVENOUS at 06:28

## 2023-11-08 RX ADMIN — PROPOFOL 120 MG: 10 INJECTION, EMULSION INTRAVENOUS at 07:13

## 2023-11-08 RX ADMIN — Medication 200 MCG: at 07:22

## 2023-11-08 RX ADMIN — Medication 200 MCG: at 07:46

## 2023-11-08 RX ADMIN — EPHEDRINE SULFATE 5 MG: 50 INJECTION INTRAVENOUS at 08:15

## 2023-11-08 RX ADMIN — LIDOCAINE HYDROCHLORIDE 100 MG: 20 INJECTION, SOLUTION INFILTRATION; PERINEURAL at 07:13

## 2023-11-08 RX ADMIN — DEXMEDETOMIDINE HYDROCHLORIDE 10 MCG: 4 INJECTION, SOLUTION INTRAVENOUS at 07:53

## 2023-11-08 RX ADMIN — Medication 200 MCG: at 07:41

## 2023-11-08 RX ADMIN — DEXMEDETOMIDINE HYDROCHLORIDE 10 MCG: 4 INJECTION, SOLUTION INTRAVENOUS at 07:20

## 2023-11-08 RX ADMIN — SUGAMMADEX 200 MG: 100 INJECTION, SOLUTION INTRAVENOUS at 07:48

## 2023-11-08 RX ADMIN — HYDROCODONE BITARTRATE AND ACETAMINOPHEN 1 TABLET: 5; 325 TABLET ORAL at 09:51

## 2023-11-08 RX ADMIN — PROPOFOL 100 MCG/KG/MIN: 10 INJECTION, EMULSION INTRAVENOUS at 07:13

## 2023-11-08 NOTE — OP NOTE
Operative Report    [facility] OR    Patient: Albert Yadav  Age:      83 y.o.  :     1940  Sex:      male    Medical Record:  [id number]    Date of Operation/Procedure:  2023    Pre-operative Diagnosis:  Urinary retention    Post-operative Diagnosis:  Same    Surgeon:  Michelle    Name of Operation/Procedure:  Procedure(s) and Anesthesia Type:     * CYSTOSTOMY SUPRAPUBIC PERCUTANEOUS PLACEMENT - General    Findings/Complications:  No complications.    Description of procedure: After informed consent was obtained, the patient was taken to the OR and general anesthesia was induced. He was placed in lithotomy position, prepped and draped in the standard fashion. All pressure points were padded. He received IV antibiotics prior to the procedure. The rigid cystoscope was passed into the bladder. The bladder was then inspected in its entirety. There were no tumors, stones, or foreign bodies that were visible. The bladder was then filled with 500cc, and a point two fingerbreadths above the pubic symphysis was marked. Quarter percent marcaine was injected at the site. A 0.5 cm incision was made with a 15 blade. The Lowsley retractor was passed into the bladder without difficulty, and delivered through the incision. A 24F rodriguez was then withdrawn with the Lowsley into the bladder. The balloon was inflated with 30cc. The catheter was sewn into place with O-silk suture. The patient was then awakened from anesthesia in the OR and taken to the recovery room.    Estimated Blood Loss: minimal    Specimens: * No orders in the log *    Fluids/Drains: SP tube    Remi Martinez Jr., MD  2023  07:55 EST

## 2023-11-08 NOTE — DISCHARGE INSTRUCTIONS
"[facility]    Home Care after: Suprapubic Cystostomy (Suprapubic tube, \"SP Tube\")    Follow the guidelines below. Call your doctor if you notice any unusual symptoms. Remember: You are under the influence of medication. Do not drive, drink alcoholic beverages, sign legal documents or make major decisions during the next 24 hours.    Wound Care  You have a small incision on the lower abdomen.  Leave the stitch in place until follow-up with your Urologist.  You might be able to feel some stitches, do not worry. Leave them alone!  You will have a dressing on the skin, please remove the dressing 24 hours after your surgery and replace with gauze as needed.  You will have some gauze providing some pressure on the area around the tube.  If the dressing falls off before 24 hours after your surgery, do not worry.  Once the dressing is off, use the bacitracin ointment to coat the incision twice a day.  Please Shower 1-2 times daily and let water hit the area and pat dry, gently, then reapply ointment.  The wound may drain a little plasma-like fluid (light-red/yellow tinged fluid), this is NORMAL.  You may have some red-tinged urine coming through the tube and into the bag.  This is OKAY and expected.    Activity  Plan at least 7-14 days off work, more if necessary, especially if your job requires heavy lifting or a lot of physical activities.  If you wish to return to work sooner, that is okay, but light-duty is recommended.  Sexual activity may resume in 4 weeks.  No jogging, tennis, heavy weight-lifting or prolonged physical activity for 2 weeks.  No driving while taking narcotic pain medication  You can shower 1 day after your surgery, but DO NOT SOAK in tub baths.  Avoid straining with bowel movements. Narcotics can cause constipation so you can take over-the-counter stool softeners (Senokot-S, Miralax, Colace) per the instructions on the box; hold these pills if you are experiencing diarrhea.       Return to " Work/School  You may return to work/school at your physician's discretion.  If you need a note, please obtain from Dr. Martinez's office during your follow-up visit    Bathing  No submersion under water! No tub bath, hot tub, pool, lake, pond, creek, stream, river, etc.  Shower starting 24 hours after surgery  Please shower with warm water, do not scrub incision site, okay to let water hit and run off, then pat dry.    Diet  Gradually resume regular diet, as tolerated.   Drinking plenty of water is very important.    Driving (if applicable)  No driving for 24 hours after surgery due to the anesthetic.  No driving anytime you are on pain medication.      Educational  The medication used to put you to sleep will be acting in your body for the next 24 hours, so you might feel a little sleepy. This feeling will slowly wear off. For the first 24 hours, you should NOT:   Drive a car, operate machinery, or power tools.  Drink any alcoholic drinks (even beer).   Make any important decisions, sign any important or legal papers.  For your safety, we strongly suggest that a responsible adult stay with you for the rest of the day and during the night after you leave the hospital.  Medication  You may take your usual medications unless told otherwise by your doctor.  Do not take any anticoagulation (Ibuprofen, Advil, Aspirin, Eliquis, Xarelto, Coumadin, etc) until cleared by your Doctor.  Narcotic pain medications can cause constipation. You may take an over-the-counter stool softener or laxative if needed.   A bowel movement every day is preferred, every other day is reasonable.      Call your Doctor if:  Call to notify your surgeon if you develop:  Fever greater than 101F  Unmanageable pain despite pain medication  The wound expands, feels hot, begins to ooze, or the pain does not decrease after 3 - 4 days  Pain medication not effective or makes you ill  Blood staining continues to worsen for more than 24 hours  Difficulty  breathing or unusual shortness of breath, excessive bleeding, drainage at the operative site, fevers, chills, increased pain that is not relieved by pain medications, persistent nausea or vomiting    HOW TO REACH YOUR DOCTOR  Monday - Friday from 8:00 - 4:30, call the Urology Office, [phone]  After hours, weekend and holidays call the [phone] or go to Emergency Room    Follow up care:   The Urology clinic will call to schedule your post-op appointment, to occur 2-3 weeks after your operation.  If you do not receive a call within 1 week for scheduling, please call [phone] to make an appointment.

## 2023-11-08 NOTE — ANESTHESIA POSTPROCEDURE EVALUATION
"Patient: Albert Yadav    Procedure Summary       Date: 11/08/23 Room / Location: [facility] OR  / [facility] MAIN OR    Anesthesia Start: 0700 Anesthesia Stop: 0811    Procedure: CYSTOSTOMY SUPRAPUBIC PERCUTANEOUS PLACEMENT Diagnosis:     Surgeons: Remi Martinez Jr., MD Provider: Etienne Hernandez MD    Anesthesia Type: general ASA Status: 3            Anesthesia Type: general    Vitals  Vitals Value Taken Time   /56 11/08/23 1000   Temp 36.4 °C (97.5 °F) 11/08/23 0805   Pulse 58 11/08/23 1005   Resp 16 11/08/23 1000   SpO2 99 % 11/08/23 1005   Vitals shown include unfiled device data.        Post Anesthesia Care and Evaluation    Patient location during evaluation: PACU  Patient participation: complete - patient participated  Level of consciousness: awake and alert  Pain management: adequate    Airway patency: patent  Anesthetic complications: No anesthetic complications  PONV Status: controlled  Cardiovascular status: acceptable and hemodynamically stable  Respiratory status: acceptable  Hydration status: acceptable    Comments: /56   Pulse 59   Temp 36.4 °C (97.5 °F) (Oral)   Resp 16   Ht 175.3 cm (69\")   Wt 72.1 kg (158 lb 15.2 oz)   SpO2 98%   BMI 23.47 kg/m²     "

## 2023-11-08 NOTE — ANESTHESIA PREPROCEDURE EVALUATION
Anesthesia Evaluation     Patient summary reviewed and Nursing notes reviewed   no history of anesthetic complications:   NPO Solid Status: > 8 hours  NPO Liquid Status: > 2 hours           Airway   Mallampati: II  TM distance: >3 FB  Neck ROM: full  no difficulty expected  Dental - normal exam     Pulmonary - normal exam   (+) a smoker Former,  (-) COPD, asthma, lung cancer  Cardiovascular - normal exam  Exercise tolerance: good (4-7 METS)    NYHA Classification: I  ECG reviewed  PT is on anticoagulation therapy  Rhythm: regular  Rate: normal    (+) hypertension well controlled 2 medications or greater, CAD, PVD, hyperlipidemia,  carotid artery disease carotid bilateral    ROS comment: 03/2023 TTE:  ·  Left ventricular systolic function is normal. Calculated left ventricular EF = 61.3%  ·  Left ventricular diastolic function was normal.  ·  Saline test for shunting was performed and was inconclusive for atrial level shunt.        Neuro/Psych  (+) CVA residual symptoms, numbness  (-) seizures, TIA  GI/Hepatic/Renal/Endo    (+) renal disease-, diabetes mellitus type 2 well controlled  (-) hiatal hernia, GERD, PUD, hepatitis, liver disease, GI bleed    Musculoskeletal     Abdominal  - normal exam   Substance History - negative use     OB/GYN          Other   arthritis,                 Anesthesia Plan    ASA 3     general     (Pt w/ hx/o emergence delirium, would strongly consider TIVA in hopes to mitigate this issue.)  intravenous induction     Anesthetic plan, risks, benefits, and alternatives have been provided, discussed and informed consent has been obtained with: patient.    CODE STATUS:

## 2023-11-08 NOTE — H&P
[facility]      Patient Identification:  NAME:  Albert Yadav  Age:  83 y.o.   Sex:  male   :  1940   MRN:  [id number]     Chief complaint: Urinary retention    History of present illness:      This is a 83 year old man with a history of urinary retention who presents for SP tube placement.We discussed the risks of the procedure including bleeding, infection, damage to surrounding structures, pain, need to perform an open procedure, risks of anesthesia. He understands these risks and would like to proceed.      Past medical history:  Past Medical History:   Diagnosis Date    Anesthesia     MILD VIOLENT BEHAVIOR AFTER ANESTHESIA LEG SURGERY    Arthritis of back 10 years ago    At risk for sleep apnea     STOP BANG = 5    Carotid artery disorder     Cervical disc disorder ?    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    Rheumatoid arthritis     HANDS DIALLO    Skin ulcer     ON BUTTOCKS BEING TREATED BID    Staph infection     2015  [facility] POST SX    Tendency toward bleeding easily     due to blood thinners    Urinary retention 2023    Urine retention        Past surgical history:  Past Surgical History:   Procedure Laterality Date    ADRENALECTOMY Left 2022    Procedure: LAPAROSCOPIC EXCISION LEFT RETROPERITONEAL MASS;  Surgeon: Moy Vargas MD;  Location: [facility];  Service: General;  Laterality: Left;    ANGIOPLASTY FEMORAL ARTERY  Left 11/07/2016    Procedure: ULTRA SOUND ACCESS RIGHT FEMORAL ARTERY, AIF BILATERAL RUNOFF, SELECTIVE CATHETERIZATION LEFT FEMORAL ARTERY.;  Surgeon: Errol Michael MD;  Location: [facility] OR 18/19;  Service:     ANKLE OPEN REDUCTION INTERNAL FIXATION  1980    ARTERIOVENOUS FISTULA/SHUNT SURGERY Left 12/13/2016    Procedure: LEFT ILEO-FEMORAL GORTEX GRAFT AND LEFT LEG ARERIOGRAM ;  Surgeon: Errol Michael MD;  Location: [facility] OR 18/19;  Service:     CAROTID ENDARTERECTOMY Right 09/29/2020    Procedure: CAROTID ENDARTERECTOMY;  Surgeon: James Graham MD;  Location: [facility] MAIN OR;  Service: Vascular;  Laterality: Right;    CHOLECYSTECTOMY      EPIDURAL BLOCK      EYE SURGERY Left     as child    FRACTURE SURGERY Left     arm    ILIAC ARTERY STENT Left 2020    KNEE SURGERY Left     ORIF    ORIF ANKLE FRACTURE Left        Allergies:  Patient has no known allergies.    Home medications:  Medications Prior to Admission   Medication Sig Dispense Refill Last Dose    atorvastatin (LIPITOR) 80 MG tablet Take 1 tablet by mouth Every Night. 90 tablet  11/7/2023 at 1930    folic acid (FOLVITE) 1 MG tablet Take 1 tablet by mouth Daily.   11/7/2023 at 1030    furosemide (LASIX) 20 MG tablet Take 1 tablet by mouth Daily.   11/7/2023 at 1030    melatonin 5 MG tablet tablet Take 1 tablet by mouth At Night As Needed.   11/7/2023 at 1930    nystatin 991659 UNIT/GM powder Apply  topically to the appropriate area as directed 3 (Three) Times a Day. TO BUTTOCKS   11/7/2023 at 1930    polyethylene glycol (MIRALAX) 17 g packet Take 17 g by mouth At Night As Needed.   11/7/2023 at 1030    rivastigmine (EXELON) 1.5 MG capsule Take 1 capsule by mouth 2 (Two) Times a Day.   11/7/2023 at 1930    senna 8.6 MG tablet Take 1 tablet by mouth 2 (Two) Times a Day.   11/7/2023 at 1930    sertraline (ZOLOFT) 25 MG tablet Take 1 tablet by mouth Daily.   11/7/2023 at 1030    tamsulosin (FLOMAX) 0.4 MG capsule 24 hr capsule  Take 1 capsule by mouth Daily. 30 capsule  11/7/2023 at 1730    traZODone (DESYREL) 50 MG tablet Take 1 tablet by mouth Every Night.   11/7/2023 at 1930    vitamin D (ERGOCALCIFEROL) 1.25 MG (70357 UT) capsule capsule Take 1 capsule by mouth 1 (One) Time Per Week.   11/7/2023 at 0830    acetaminophen (TYLENOL) 500 MG tablet Take 1 tablet by mouth Every 6 (Six) Hours As Needed for Mild Pain.   09/06/23 at 1530    Aspirin Low Dose 81 MG EC tablet Take 1 tablet by mouth Daily. PT HOLDING FOR SURGERY   10/31/2023 at 0915    Bisacodyl (DULCOLAX RE) Insert  into the rectum Daily As Needed.   Unknown    clopidogrel (PLAVIX) 75 MG tablet Take 1 tablet by mouth Every Morning. PT IS NOT TAKING   10/25/2023 at 1000    cyanocobalamin 1000 MCG/ML injection Inject 1 mL into the appropriate muscle as directed by prescriber Every 30 (Thirty) Days.   10/06/23 at 1400    gentamicin (GARAMYCIN) 0.1 % ointment Apply 1 application  topically to the appropriate area as directed 2 (Two) Times a Day. RIGHT FOOT   11/06/23 at 0600    lisinopril (PRINIVIL,ZESTRIL) 20 MG tablet Take 1 tablet by mouth Every Evening. (Patient taking differently: Take 1 tablet by mouth Every Evening. HOLD FOR SBP LESS THAN 130)   11/06/23 at 0715    methotrexate 2.5 MG tablet Take 1 tablet by mouth 1 (One) Time Per Week.   10/20/2023 at 1030    neomycin-bacitracin-polymyxin (NEOSPORIN) 5-400-5000 ointment Apply 1 application  topically to the appropriate area as directed Daily.       ondansetron (ZOFRAN) 4 MG tablet Take 1 tablet by mouth Every 6 (Six) Hours As Needed for Nausea or Vomiting.   Unknown    potassium chloride 10 MEQ CR tablet Take 1 tablet by mouth Daily.   11/07/23 at 1030    QUEtiapine (SEROquel) 25 MG tablet Take 1 tablet by mouth Daily With Dinner. (Patient taking differently: Take 1 tablet by mouth Daily With Dinner. PT IS NOT TAKING)       traMADol (ULTRAM) 50 MG tablet Take 1 tablet by mouth Every 6 (Six) Hours As Needed for Moderate Pain.    10/24/2023 at [address] [place] [address] medications:  ceFAZolin, 2,000 mg, Intravenous, Once  sodium chloride, 3 mL, Intravenous, Q12H      lactated ringers, 9 mL/hr, Last Rate: 9 mL/hr (23)        lidocaine    sodium chloride    Family history:  Family History   Problem Relation Age of Onset    Diabetes Mother     Cancer Mother         Cancer liver    Cancer Father         Diabetic    Diabetes Father     Heart disease Maternal Grandmother     Heart disease Maternal Grandfather     Heart disease Paternal Grandmother     Heart disease Paternal Grandfather     Alcohol abuse Son         Alcoholic    Cancer Brother     Malig Hyperthermia Neg Hx        Social history:  Social History     Tobacco Use    Smoking status: Former     Packs/day: 1.00     Years: 20.00     Additional pack years: 0.00     Total pack years: 20.00     Types: Cigarettes     Start date: 1968     Quit date: 1989     Years since quittin.4    Smokeless tobacco: Never   Vaping Use    Vaping Use: Never used   Substance Use Topics    Alcohol use: Not Currently     Alcohol/week: 1.0 standard drink of alcohol     Types: 1 Shots of liquor per week    Drug use: Never       Review of systems:      Positive for:  nothing  Negative for:  chest pain, cough, sob, o/w neg    Objective:  TMax 24 hours:   Temp (24hrs), Av.2 °F (36.8 °C), Min:98.2 °F (36.8 °C), Max:98.2 °F (36.8 °C)      Vitals Ranges:   Temp:  [98.2 °F (36.8 °C)] 98.2 °F (36.8 °C)  Heart Rate:  [61] 61  Resp:  [16] 16  BP: (114)/(62) 114/62    Intake/Output Last 3 shifts:  No intake/output data recorded.     Physical Exam:    General Appearance:    Alert, cooperative, NAD                   Neuro/Psych:   Orientation intact, mood/affect pleasant       Results review:   I reviewed the patient's new clinical results.    Data review:  Lab Results (last 24 hours)       Procedure Component Value Units Date/Time    POC Glucose Once [[id number]]  (Normal) Collected: 23     Specimen: Blood Updated: 11/08/23 0609     Glucose 111 mg/dL              Imaging:  Imaging Results (Last 24 Hours)       ** No results found for the last 24 hours. **               Assessment:     Urinary retention    Plan:     SP tube placement    Remi Martinez Jr., MD  11/08/23  06:36 EST

## 2024-02-09 NOTE — CASE MANAGEMENT/SOCIAL WORK
Continued Stay Note  [facility]     Patient Name: Albert Yadav  MRN: [id number]  Today's Date: 7/23/2022    Admit Date: 7/19/2022     Discharge Plan     Row Name 07/23/22 1140       Plan    Plan Comments Received call from nurse stating that patient needs a walker prior to dc.  [employer] order form completed and comsulted in EPIC.  Walker delivered to bedside. Rosie Koehler RN               Discharge Codes    No documentation.               Expected Discharge Date and Time     Expected Discharge Date Expected Discharge Time    Jul 23, 2022             Rosie Koehler RN     Victoza (liraglutide) sent.  It is once daily injectable.  Ramping dose for first 3 weeks, instructions on first prescription and second prescription is for continuation dose.    Will have staff notify patient.    Timothy Lozano MD

## (undated) DEVICE — SUT SILK 2/0 SH 30IN K833H

## (undated) DEVICE — SUT SILK 2/0 TIES 18IN A185H

## (undated) DEVICE — TBG PENCL TELESCP MEGADYNE SMOKE EVAC 10FT

## (undated) DEVICE — GLV SURG BIOGEL LTX PF 7

## (undated) DEVICE — STERILE COTTON TIP 6IN 10PK: Brand: MEDLINE

## (undated) DEVICE — GOWN ,SIRUS,NONREINFORCED SMALL: Brand: MEDLINE

## (undated) DEVICE — INTENDED FOR TISSUE SEPARATION, AND OTHER PROCEDURES THAT REQUIRE A SHARP SURGICAL BLADE TO PUNCTURE OR CUT.: Brand: BARD-PARKER ® CARBON RIB-BACK BLADES

## (undated) DEVICE — ANTIBACTERIAL UNDYED BRAIDED (POLYGLACTIN 910), SYNTHETIC ABSORBABLE SUTURE: Brand: COATED VICRYL

## (undated) DEVICE — DEV COND GAS LAP INSUFLOW W/LUER CONN

## (undated) DEVICE — CATHETER,FOLEY,SILI-ELAST,LTX,20FR,10ML: Brand: MEDLINE

## (undated) DEVICE — SOL NACL 0.9PCT 1000ML

## (undated) DEVICE — LAPAROVUE VISIBILITY SYSTEM LAPAROSCOPIC SOLUTIONS: Brand: LAPAROVUE

## (undated) DEVICE — SUT VIC 5/0 PS2 18IN J495H

## (undated) DEVICE — SKIN PREP TRAY W/CHG: Brand: MEDLINE INDUSTRIES, INC.

## (undated) DEVICE — TIDISHIELD UROLOGY DRAIN BAGS FROSTY VINYL STERILE FITS SIEMENS UROSKOP ACCESS 20 PER CASE: Brand: TIDISHIELD

## (undated) DEVICE — CATHETER,FOLEY,SILI-ELAST,LTX,24FR,30ML: Brand: MEDLINE

## (undated) DEVICE — HORIZON TI ML 6 CLIPS/CART
Type: IMPLANTABLE DEVICE | Site: ABDOMEN | Status: NON-FUNCTIONAL
Brand: WECK

## (undated) DEVICE — 1010 S-DRAPE TOWEL DRAPE 10/BX: Brand: STERI-DRAPE™

## (undated) DEVICE — ENDOCUT SCISSOR TIP, DISPOSABLE: Brand: RENEW

## (undated) DEVICE — LOU LAP CHOLE: Brand: MEDLINE INDUSTRIES, INC.

## (undated) DEVICE — SHNT BYPASS JAVID CAROTID TEMP TYP1852 17FTO10F 27.5CM

## (undated) DEVICE — STPLR SKIN VISISTAT WD 35CT

## (undated) DEVICE — GLV SURG SIGNATURE ESSENTIAL PF LTX SZ7.5

## (undated) DEVICE — TOWEL,OR,DSP,ST,BLUE,STD,4/PK,20PK/CS: Brand: MEDLINE

## (undated) DEVICE — ADHS SKIN SURG TISS VISC PREMIERPRO EXOFIN HI/VISC FAST/DRY

## (undated) DEVICE — ADHS SKIN DERMABOND TOP ADVANCED

## (undated) DEVICE — ENDOPOUCH RETRIEVER SPECIMEN RETRIEVAL BAGS: Brand: ENDOPOUCH RETRIEVER

## (undated) DEVICE — ENDOPATH XCEL UNIVERSAL TROCAR STABLILITY SLEEVES: Brand: ENDOPATH XCEL

## (undated) DEVICE — SPK PIN NSR FLUID NONVNT 2WY MINI LL LF

## (undated) DEVICE — SUT PROLN 6/0 BV1 D/A 30IN 8709H

## (undated) DEVICE — HEMOLOK ML 6 CLIPS/CART
Type: IMPLANTABLE DEVICE | Site: ABDOMEN | Status: NON-FUNCTIONAL
Brand: WECK

## (undated) DEVICE — PENCL E/S ULTRAVAC TELESCP NOSE HOLSTR 10FT

## (undated) DEVICE — ENDOPATH XCEL BLADELESS TROCARS WITH STABILITY SLEEVES: Brand: ENDOPATH XCEL

## (undated) DEVICE — SYRINGE,TOOMEY,IRRIGATION,70CC,STERILE: Brand: MEDLINE

## (undated) DEVICE — LOU CYSTO: Brand: MEDLINE INDUSTRIES, INC.

## (undated) DEVICE — LAPAROSCOPIC SMOKE ELIMINATION DEVICE: Brand: PNEUVIEW XE

## (undated) DEVICE — ELECTRD BLD EZ CLN MOD XLNG 2.75IN

## (undated) DEVICE — SUT VIC 1 CTX 36IN J977H

## (undated) DEVICE — SUT PROLN 6/0 C1 D/A 30IN 8706H

## (undated) DEVICE — 3M™ IOBAN™ 2 ANTIMICROBIAL INCISE DRAPE 6640EZ: Brand: IOBAN™ 2

## (undated) DEVICE — CONTAINER,SPECIMEN,OR STERILE,4OZ: Brand: MEDLINE

## (undated) DEVICE — GLV SURG BIOGEL LTX PF 6

## (undated) DEVICE — NDL HYPO PRECISIONGLIDE REG 25G 1 1/2

## (undated) DEVICE — SUT SILK 4/0 TIES 18IN A183H

## (undated) DEVICE — SUT VIC 4/0 SH 27IN J415H

## (undated) DEVICE — SPNG GZ WOVN 4X4IN 12PLY 10/BX STRL

## (undated) DEVICE — DISPOSABLE GRASPER CARTRIDGE: Brand: DIRECT DRIVE REPOSABLE GRASPERS

## (undated) DEVICE — TRAP FLD MINIVAC MEGADYNE 100ML

## (undated) DEVICE — PK ENDART CARTOID 40

## (undated) DEVICE — DRSNG SURESITE WNDW 4X4.5

## (undated) DEVICE — DRSNG WND GZ PAD BORDERED 4X8IN STRL

## (undated) DEVICE — CONN TBG Y 5 IN 1 LF STRL

## (undated) DEVICE — SPNG LAP 18X18IN LF STRL PK/5

## (undated) DEVICE — SUT ETHLN 2/0 PS 18IN 585H

## (undated) DEVICE — HARMONIC ACE +7 LAPAROSCOPIC SHEARS ADVANCED HEMOSTASIS 5MM DIAMETER 36CM SHAFT LENGTH  FOR USE WITH GRAY HAND PIECE ONLY: Brand: HARMONIC ACE

## (undated) DEVICE — DISPOSABLE MONOPOLAR ENDOSCOPIC CORD 10 FT. (3M): Brand: KIRWAN

## (undated) DEVICE — SUT VIC 0 TN 27IN DYED JTN0G

## (undated) DEVICE — SUT SILK 3/0 TIES 18IN A184H

## (undated) DEVICE — SUT SILK 3/0 SH CR5 18IN C0135

## (undated) DEVICE — GLV SURG PREMIERPRO ORTHO LTX PF SZ7.5 BRN

## (undated) DEVICE — SOL NS 500ML

## (undated) DEVICE — PATIENT RETURN ELECTRODE, SINGLE-USE, CONTACT QUALITY MONITORING, ADULT, WITH 9FT CORD, FOR PATIENTS WEIGING OVER 33LBS. (15KG): Brand: MEGADYNE

## (undated) DEVICE — DRAINBAG,ANTI-REFLUX TOWER,L/F,2000ML,LL: Brand: MEDLINE